# Patient Record
Sex: FEMALE | Race: WHITE | Employment: FULL TIME | URBAN - METROPOLITAN AREA
[De-identification: names, ages, dates, MRNs, and addresses within clinical notes are randomized per-mention and may not be internally consistent; named-entity substitution may affect disease eponyms.]

---

## 2017-01-03 ENCOUNTER — OFFICE VISIT (OUTPATIENT)
Dept: FAMILY MEDICINE CLINIC | Facility: CLINIC | Age: 37
End: 2017-01-03

## 2017-01-03 VITALS
RESPIRATION RATE: 16 BRPM | HEIGHT: 67 IN | SYSTOLIC BLOOD PRESSURE: 132 MMHG | TEMPERATURE: 99 F | WEIGHT: 266 LBS | DIASTOLIC BLOOD PRESSURE: 80 MMHG | OXYGEN SATURATION: 98 % | HEART RATE: 86 BPM | BODY MASS INDEX: 41.75 KG/M2

## 2017-01-03 DIAGNOSIS — R10.11 RUQ ABDOMINAL PAIN: Primary | ICD-10-CM

## 2017-01-03 DIAGNOSIS — N92.6 MISSED PERIOD: ICD-10-CM

## 2017-01-03 LAB
APPEARANCE: CLEAR
BILIRUBIN: NEGATIVE
CONTROL LINE PRESENT WITH A CLEAR BACKGROUND (YES/NO): YES YES/NO
GLUCOSE BLOOD: NEGATIVE
KETONES (URINE DIPSTICK): NEGATIVE MG/DL
LEUKOCYTES: NEGATIVE
MULTISTIX LOT#: NORMAL NUMERIC
NITRITE, URINE: NEGATIVE
OCCULT BLOOD: NEGATIVE
PH, URINE: 6.5 (ref 4.5–8)
PREGNANCY TEST, URINE: NEGATIVE
PROTEIN (URINE DIPSTICK): NEGATIVE MG/DL
SPECIFIC GRAVITY: 1.02 (ref 1–1.03)
URINE-COLOR: YELLOW
UROBILINOGEN,SEMI-QN: 0.2 MG/DL (ref 0–1.9)

## 2017-01-03 PROCEDURE — 81003 URINALYSIS AUTO W/O SCOPE: CPT | Performed by: FAMILY MEDICINE

## 2017-01-03 PROCEDURE — 99214 OFFICE O/P EST MOD 30 MIN: CPT | Performed by: FAMILY MEDICINE

## 2017-01-03 PROCEDURE — 81025 URINE PREGNANCY TEST: CPT | Performed by: FAMILY MEDICINE

## 2017-01-03 RX ORDER — DOXEPIN HYDROCHLORIDE 50 MG/1
1 CAPSULE ORAL DAILY
COMMUNITY

## 2017-01-03 RX ORDER — NAPROXEN 500 MG/1
500 TABLET ORAL 2 TIMES DAILY WITH MEALS
Qty: 28 TABLET | Refills: 0 | Status: SHIPPED | OUTPATIENT
Start: 2017-01-03 | End: 2017-01-17

## 2017-01-03 NOTE — PATIENT INSTRUCTIONS
Unknown Causes of Abdominal Pain (Female)    The exact cause of your abdominal (stomach) pain is not clear. This does not mean that this is something to worry about.  Everyone likes to know the exact cause of the problem, but sometimes with abdominal pain · Water is important so you do not get dehydrated. Soup may also be good. Sports drinks may also help, especially if they are not too acidic. Make sure you don't drink sugary drinks as this can make things worse. Take liquids in small amounts.  Do not guzzl © 0758-5293 [address], [address] [place]. All rights reserved. This information is not intended as a substitute for professional medical care. Always follow your healthcare professional's instructions.

## 2017-01-03 NOTE — PROGRESS NOTES
[facility] Family Medicine Office Note  Chief Complaint:   Patient presents with:  Abdominal Pain: right side x 3 days      HPI:   This is a 39year old female coming in for RUQ pain x 3 days.  Pain is about 4/10, on and off, more of an ache than Smokeless Status: Never Used                        Alcohol Use: No              Family History:  Family History   Problem Relation Age of Onset   • Diabetes Father    • Stroke Father    • High Cholesterol Mother    • Cancer Mother      cervical ca   • Oth response, sneezing, hives, eczema or rhinitis.      EXAM:   /80 mmHg  Pulse 86  Temp(Src) 99.1 °F (37.3 °C) (Oral)  Resp 16  Ht 67\"  Wt 266 lb  BMI 41.65 kg/m2  SpO2 98%  LMP 12/02/2015 (Exact Date) Estimated body mass index is 41.65 kg/(m^2) as calc (14) [E]; Future  - Amylase [E]; Future  - Lipase [E]; Future    2. Missed period  Pregnancy test negative, likely hormonal, just had the baby.   - Urine Preg Test    Meds & Refills for this Visit:    Signed Prescriptions Disp Refills    naproxen 500 MG Ora

## 2017-01-05 ENCOUNTER — LAB ENCOUNTER (OUTPATIENT)
Dept: LAB | Age: 37
End: 2017-01-05
Attending: FAMILY MEDICINE
Payer: COMMERCIAL

## 2017-01-05 ENCOUNTER — HOSPITAL ENCOUNTER (OUTPATIENT)
Dept: ULTRASOUND IMAGING | Age: 37
Discharge: HOME | End: 2017-01-05
Attending: FAMILY MEDICINE
Payer: COMMERCIAL

## 2017-01-05 DIAGNOSIS — R10.11 RUQ ABDOMINAL PAIN: ICD-10-CM

## 2017-01-05 LAB
ALBUMIN SERPL-MCNC: 3.6 G/DL (ref 3.5–4.8)
ALP LIVER SERPL-CCNC: 97 U/L (ref 37–98)
ALT SERPL-CCNC: 17 U/L (ref 14–54)
AMYLASE: 39 U/L (ref 25–115)
AST SERPL-CCNC: 9 U/L (ref 15–41)
BASOPHILS # BLD AUTO: 0.01 X10(3) UL (ref 0–0.1)
BASOPHILS NFR BLD AUTO: 0.2 %
BILIRUB SERPL-MCNC: 0.7 MG/DL (ref 0.1–2)
BUN BLD-MCNC: 12 MG/DL (ref 8–20)
CALCIUM BLD-MCNC: 8.2 MG/DL (ref 8.3–10.3)
CHLORIDE: 105 MMOL/L (ref 101–111)
CO2: 25 MMOL/L (ref 22–32)
CREAT BLD-MCNC: 0.65 MG/DL (ref 0.55–1.02)
EOSINOPHIL # BLD AUTO: 0.18 X10(3) UL (ref 0–0.3)
EOSINOPHIL NFR BLD AUTO: 2.9 %
ERYTHROCYTE [DISTWIDTH] IN BLOOD BY AUTOMATED COUNT: 13.3 % (ref 11.5–16)
GLUCOSE BLD-MCNC: 94 MG/DL (ref 70–99)
HCT VFR BLD AUTO: 40.8 % (ref 34–50)
HGB BLD-MCNC: 13 G/DL (ref 12–16)
IMMATURE GRANULOCYTE COUNT: 0.02 X10(3) UL (ref 0–1)
IMMATURE GRANULOCYTE RATIO %: 0.3 %
LIPASE: 245 U/L (ref 73–393)
LYMPHOCYTES # BLD AUTO: 1.85 X10(3) UL (ref 0.9–4)
LYMPHOCYTES NFR BLD AUTO: 29.6 %
M PROTEIN MFR SERPL ELPH: 7.5 G/DL (ref 6.1–8.3)
MCH RBC QN AUTO: 27.5 PG (ref 27–33.2)
MCHC RBC AUTO-ENTMCNC: 31.9 G/DL (ref 31–37)
MCV RBC AUTO: 86.4 FL (ref 81–100)
MONOCYTES # BLD AUTO: 0.22 X10(3) UL (ref 0.1–0.6)
MONOCYTES NFR BLD AUTO: 3.5 %
NEUTROPHIL ABS PRELIM: 3.96 X10 (3) UL (ref 1.3–6.7)
NEUTROPHILS # BLD AUTO: 3.96 X10(3) UL (ref 1.3–6.7)
NEUTROPHILS NFR BLD AUTO: 63.5 %
PLATELET # BLD AUTO: 228 10(3)UL (ref 150–450)
POTASSIUM SERPL-SCNC: 4 MMOL/L (ref 3.6–5.1)
RBC # BLD AUTO: 4.72 X10(6)UL (ref 3.8–5.1)
RED CELL DISTRIBUTION WIDTH-SD: 41.3 FL (ref 35.1–46.3)
SODIUM SERPL-SCNC: 138 MMOL/L (ref 136–144)
WBC # BLD AUTO: 6.2 X10(3) UL (ref 4–13)

## 2017-01-05 PROCEDURE — 76700 US EXAM ABDOM COMPLETE: CPT

## 2017-01-05 PROCEDURE — 82150 ASSAY OF AMYLASE: CPT

## 2017-01-05 PROCEDURE — 36415 COLL VENOUS BLD VENIPUNCTURE: CPT

## 2017-01-05 PROCEDURE — 83690 ASSAY OF LIPASE: CPT

## 2017-01-05 PROCEDURE — 85025 COMPLETE CBC W/AUTO DIFF WBC: CPT

## 2017-01-05 PROCEDURE — 80053 COMPREHEN METABOLIC PANEL: CPT

## 2017-01-31 ENCOUNTER — HOSPITAL ENCOUNTER (OUTPATIENT)
Age: 37
Discharge: HOME | End: 2017-01-31
Attending: FAMILY MEDICINE
Payer: COMMERCIAL

## 2017-01-31 ENCOUNTER — APPOINTMENT (OUTPATIENT)
Dept: GENERAL RADIOLOGY | Age: 37
End: 2017-01-31
Attending: FAMILY MEDICINE
Payer: COMMERCIAL

## 2017-01-31 VITALS
BODY MASS INDEX: 43.32 KG/M2 | HEART RATE: 87 BPM | RESPIRATION RATE: 20 BRPM | TEMPERATURE: 98 F | OXYGEN SATURATION: 98 % | WEIGHT: 260 LBS | HEIGHT: 65 IN | DIASTOLIC BLOOD PRESSURE: 88 MMHG | SYSTOLIC BLOOD PRESSURE: 158 MMHG

## 2017-01-31 DIAGNOSIS — J98.01 ACUTE BRONCHOSPASM: Primary | ICD-10-CM

## 2017-01-31 PROCEDURE — 71020 XR CHEST PA + LAT CHEST (CPT=71020): CPT

## 2017-01-31 PROCEDURE — 99214 OFFICE O/P EST MOD 30 MIN: CPT

## 2017-01-31 PROCEDURE — 94640 AIRWAY INHALATION TREATMENT: CPT

## 2017-01-31 RX ORDER — PREDNISONE 20 MG/1
TABLET ORAL
Qty: 8 TABLET | Refills: 0 | Status: SHIPPED | OUTPATIENT
Start: 2017-01-31 | End: 2017-03-03 | Stop reason: ALTCHOICE

## 2017-01-31 RX ORDER — IPRATROPIUM BROMIDE AND ALBUTEROL SULFATE 2.5; .5 MG/3ML; MG/3ML
3 SOLUTION RESPIRATORY (INHALATION) ONCE
Status: COMPLETED | OUTPATIENT
Start: 2017-01-31 | End: 2017-01-31

## 2017-01-31 RX ORDER — PROMETHAZINE HYDROCHLORIDE AND CODEINE PHOSPHATE 6.25; 1 MG/5ML; MG/5ML
5 SYRUP ORAL NIGHTLY PRN
Qty: 120 ML | Refills: 0 | Status: SHIPPED | OUTPATIENT
Start: 2017-01-31 | End: 2017-02-10

## 2017-01-31 RX ORDER — ALBUTEROL SULFATE 90 UG/1
2 AEROSOL, METERED RESPIRATORY (INHALATION) EVERY 4 HOURS PRN
Qty: 1 INHALER | Refills: 0 | Status: SHIPPED | OUTPATIENT
Start: 2017-01-31 | End: 2017-03-03 | Stop reason: ALTCHOICE

## 2017-01-31 RX ORDER — PREDNISONE 20 MG/1
40 TABLET ORAL ONCE
Status: COMPLETED | OUTPATIENT
Start: 2017-01-31 | End: 2017-01-31

## 2017-01-31 NOTE — ED INITIAL ASSESSMENT (HPI)
Pt began 3 weeks ago with infuenza, and now she has a cough that is hacking and dry, and now her chest feels heavy.   She has a PMH of of pneumonia

## 2017-01-31 NOTE — ED PROVIDER NOTES
Patient Seen in: [facility]    History   Patient presents with:  Cough/URI    Stated Complaint: 3 WKS COUGH    HPI    This 44year old female presents with complaint of dry cough and chest heaviness.   She states that she had inf gall bladder disorder   • Heart Attack Maternal Grandfather    • Other[other] [OTHER] Sister      gall bladder disorder         Smoking Status: Never Smoker                      Smokeless Status: Never Used                        Alcohol Use: No PATIENT STATED HISTORY:  Dry cough with mid chest tightness for 3 weeks. Patient states she had the flu 3 weeks ago. History of pneumonia several years ago. FINDINGS:  Cardiac size and pulmonary vasculature are within normal limits.  No pleural effusions puffs into the lungs daily. Qty: 1 Inhaler Refills: 0  Associated Diagnoses:Acute bronchospasm    Albuterol Sulfate HFA (PROAIR HFA) 108 (90 Base) MCG/ACT Inhalation Aero Soln  Inhale 2 puffs into the lungs every 4 (four) hours as needed for Wheezing.   Qt

## 2017-03-03 ENCOUNTER — OFFICE VISIT (OUTPATIENT)
Dept: FAMILY MEDICINE CLINIC | Facility: CLINIC | Age: 37
End: 2017-03-03

## 2017-03-03 ENCOUNTER — LAB ENCOUNTER (OUTPATIENT)
Dept: LAB | Age: 37
End: 2017-03-03
Attending: FAMILY MEDICINE
Payer: COMMERCIAL

## 2017-03-03 VITALS
HEIGHT: 67 IN | WEIGHT: 265 LBS | BODY MASS INDEX: 41.59 KG/M2 | OXYGEN SATURATION: 99 % | TEMPERATURE: 98 F | RESPIRATION RATE: 16 BRPM | DIASTOLIC BLOOD PRESSURE: 70 MMHG | SYSTOLIC BLOOD PRESSURE: 118 MMHG | HEART RATE: 84 BPM

## 2017-03-03 DIAGNOSIS — R10.11 RUQ PAIN: ICD-10-CM

## 2017-03-03 DIAGNOSIS — E28.2 PCOS (POLYCYSTIC OVARIAN SYNDROME): ICD-10-CM

## 2017-03-03 DIAGNOSIS — E53.8 VITAMIN B12 DEFICIENCY: ICD-10-CM

## 2017-03-03 DIAGNOSIS — E55.9 VITAMIN D DEFICIENCY: ICD-10-CM

## 2017-03-03 DIAGNOSIS — R53.83 FATIGUE, UNSPECIFIED TYPE: Primary | ICD-10-CM

## 2017-03-03 DIAGNOSIS — E66.01 MORBID OBESITY DUE TO EXCESS CALORIES (HCC): ICD-10-CM

## 2017-03-03 DIAGNOSIS — R53.83 FATIGUE, UNSPECIFIED TYPE: ICD-10-CM

## 2017-03-03 LAB
25-HYDROXYVITAMIN D (TOTAL): 28.7 NG/ML (ref 30–100)
ALBUMIN SERPL-MCNC: 3.7 G/DL (ref 3.5–4.8)
ALP LIVER SERPL-CCNC: 93 U/L (ref 37–98)
ALT SERPL-CCNC: 20 U/L (ref 14–54)
AST SERPL-CCNC: 13 U/L (ref 15–41)
BASOPHILS # BLD AUTO: 0.01 X10(3) UL (ref 0–0.1)
BASOPHILS NFR BLD AUTO: 0.2 %
BILIRUB SERPL-MCNC: 0.8 MG/DL (ref 0.1–2)
BUN BLD-MCNC: 13 MG/DL (ref 8–20)
CALCIUM BLD-MCNC: 9.3 MG/DL (ref 8.3–10.3)
CHLORIDE: 103 MMOL/L (ref 101–111)
CO2: 27 MMOL/L (ref 22–32)
CREAT BLD-MCNC: 0.66 MG/DL (ref 0.55–1.02)
EOSINOPHIL # BLD AUTO: 0.12 X10(3) UL (ref 0–0.3)
EOSINOPHIL NFR BLD AUTO: 1.9 %
ERYTHROCYTE [DISTWIDTH] IN BLOOD BY AUTOMATED COUNT: 15.2 % (ref 11.5–16)
GLUCOSE BLD-MCNC: 88 MG/DL (ref 70–99)
HAV AB SERPL IA-ACNC: 221 PG/ML (ref 193–986)
HCT VFR BLD AUTO: 41.8 % (ref 34–50)
HGB BLD-MCNC: 12.8 G/DL (ref 12–16)
IMMATURE GRANULOCYTE COUNT: 0.02 X10(3) UL (ref 0–1)
IMMATURE GRANULOCYTE RATIO %: 0.3 %
LYMPHOCYTES # BLD AUTO: 1.89 X10(3) UL (ref 0.9–4)
LYMPHOCYTES NFR BLD AUTO: 29.3 %
M PROTEIN MFR SERPL ELPH: 7.5 G/DL (ref 6.1–8.3)
MCH RBC QN AUTO: 26.9 PG (ref 27–33.2)
MCHC RBC AUTO-ENTMCNC: 30.6 G/DL (ref 31–37)
MCV RBC AUTO: 88 FL (ref 81–100)
MONOCYTES # BLD AUTO: 0.3 X10(3) UL (ref 0.1–0.6)
MONOCYTES NFR BLD AUTO: 4.6 %
NEUTROPHIL ABS PRELIM: 4.12 X10 (3) UL (ref 1.3–6.7)
NEUTROPHILS # BLD AUTO: 4.12 X10(3) UL (ref 1.3–6.7)
NEUTROPHILS NFR BLD AUTO: 63.7 %
PLATELET # BLD AUTO: 236 10(3)UL (ref 150–450)
POTASSIUM SERPL-SCNC: 4 MMOL/L (ref 3.6–5.1)
RBC # BLD AUTO: 4.75 X10(6)UL (ref 3.8–5.1)
RED CELL DISTRIBUTION WIDTH-SD: 48.4 FL (ref 35.1–46.3)
SODIUM SERPL-SCNC: 137 MMOL/L (ref 136–144)
TSI SER-ACNC: 1.04 MIU/ML (ref 0.35–5.5)
WBC # BLD AUTO: 6.5 X10(3) UL (ref 4–13)

## 2017-03-03 PROCEDURE — 85025 COMPLETE CBC W/AUTO DIFF WBC: CPT

## 2017-03-03 PROCEDURE — 99214 OFFICE O/P EST MOD 30 MIN: CPT | Performed by: FAMILY MEDICINE

## 2017-03-03 PROCEDURE — 82607 VITAMIN B-12: CPT

## 2017-03-03 PROCEDURE — 36415 COLL VENOUS BLD VENIPUNCTURE: CPT

## 2017-03-03 PROCEDURE — 84443 ASSAY THYROID STIM HORMONE: CPT

## 2017-03-03 PROCEDURE — 82306 VITAMIN D 25 HYDROXY: CPT

## 2017-03-03 PROCEDURE — 80053 COMPREHEN METABOLIC PANEL: CPT

## 2017-03-03 NOTE — PROGRESS NOTES
Alma Perera is a 44year old female. HPI:   Pt. States that her vitamin D and B12 have been in the past.  Her son is 4 months ago. She is working full time and traveling. She is an . Mood is ok. Teary on and off in the past 6 weeks.   Yoel Rogers pg   MCHC 31.9 31.0-37.0 g/dL   RDW 13.3 11.5-16.0 %   RDW-SD 41.3 35.1-46.3 fL   Neutrophil Absolute Prelim 3.96 1.30-6.70 x10 (3) uL   Neutrophil Absolute 3.96 1.30-6.70 x10(3) uL   Lymphocyte Absolute 1.85 0.90-4.00 x10(3) uL   Monocyte Absolute 0.22 0. check her labs  Vitamin B12 deficiency–will check her labs; patient has had shots in the past and that has helped her  Postpartum–advised the patient speaks with Rekha Schmid today and day and is currently in the room with her; discussed with her the importance of

## 2017-03-07 DIAGNOSIS — R79.89 LOW VITAMIN D LEVEL: ICD-10-CM

## 2017-03-07 DIAGNOSIS — D64.9 ANEMIA, UNSPECIFIED TYPE: ICD-10-CM

## 2017-03-07 DIAGNOSIS — E53.8 LOW VITAMIN B12 LEVEL: Primary | ICD-10-CM

## 2017-06-09 ENCOUNTER — HOSPITAL ENCOUNTER (OUTPATIENT)
Age: 37
Discharge: HOME | End: 2017-06-09
Attending: FAMILY MEDICINE
Payer: COMMERCIAL

## 2017-06-09 VITALS
RESPIRATION RATE: 16 BRPM | OXYGEN SATURATION: 99 % | TEMPERATURE: 98 F | HEART RATE: 76 BPM | SYSTOLIC BLOOD PRESSURE: 124 MMHG | DIASTOLIC BLOOD PRESSURE: 69 MMHG

## 2017-06-09 DIAGNOSIS — R11.2 NAUSEA AND VOMITING IN ADULT: ICD-10-CM

## 2017-06-09 DIAGNOSIS — A09 TRAVELER'S DIARRHEA: Primary | ICD-10-CM

## 2017-06-09 PROCEDURE — 96361 HYDRATE IV INFUSION ADD-ON: CPT

## 2017-06-09 PROCEDURE — 96374 THER/PROPH/DIAG INJ IV PUSH: CPT

## 2017-06-09 PROCEDURE — 99214 OFFICE O/P EST MOD 30 MIN: CPT

## 2017-06-09 PROCEDURE — 85025 COMPLETE CBC W/AUTO DIFF WBC: CPT | Performed by: FAMILY MEDICINE

## 2017-06-09 PROCEDURE — 80047 BASIC METABLC PNL IONIZED CA: CPT

## 2017-06-09 PROCEDURE — 81002 URINALYSIS NONAUTO W/O SCOPE: CPT | Performed by: FAMILY MEDICINE

## 2017-06-09 PROCEDURE — 81025 URINE PREGNANCY TEST: CPT | Performed by: FAMILY MEDICINE

## 2017-06-09 RX ORDER — SODIUM CHLORIDE 9 MG/ML
1000 INJECTION, SOLUTION INTRAVENOUS ONCE
Status: COMPLETED | OUTPATIENT
Start: 2017-06-09 | End: 2017-06-09

## 2017-06-09 RX ORDER — ONDANSETRON 8 MG/1
8 TABLET, ORALLY DISINTEGRATING ORAL EVERY 12 HOURS PRN
Qty: 10 TABLET | Refills: 0 | Status: SHIPPED | OUTPATIENT
Start: 2017-06-09 | End: 2017-06-19

## 2017-06-09 RX ORDER — CIPROFLOXACIN 500 MG/1
500 TABLET, FILM COATED ORAL 2 TIMES DAILY
Qty: 6 TABLET | Refills: 0 | Status: SHIPPED | OUTPATIENT
Start: 2017-06-09 | End: 2017-06-12

## 2017-06-09 RX ORDER — ONDANSETRON 2 MG/ML
4 INJECTION INTRAMUSCULAR; INTRAVENOUS ONCE
Status: COMPLETED | OUTPATIENT
Start: 2017-06-09 | End: 2017-06-09

## 2017-06-10 ENCOUNTER — LAB ENCOUNTER (OUTPATIENT)
Dept: LAB | Age: 37
End: 2017-06-10
Attending: FAMILY MEDICINE
Payer: COMMERCIAL

## 2017-06-10 DIAGNOSIS — A09 TRAVELER'S DIARRHEA: ICD-10-CM

## 2017-06-10 PROCEDURE — 87046 STOOL CULTR AEROBIC BACT EA: CPT

## 2017-06-10 PROCEDURE — 87427 SHIGA-LIKE TOXIN AG IA: CPT

## 2017-06-10 PROCEDURE — 89055 LEUKOCYTE ASSESSMENT FECAL: CPT

## 2017-06-10 PROCEDURE — 87015 SPECIMEN INFECT AGNT CONCNTJ: CPT

## 2017-06-10 PROCEDURE — 87077 CULTURE AEROBIC IDENTIFY: CPT

## 2017-06-10 PROCEDURE — 87269 GIARDIA AG IF: CPT

## 2017-06-10 PROCEDURE — 87493 C DIFF AMPLIFIED PROBE: CPT

## 2017-06-10 PROCEDURE — 87177 OVA AND PARASITES SMEARS: CPT

## 2017-06-10 PROCEDURE — 87209 SMEAR COMPLEX STAIN: CPT

## 2017-06-10 PROCEDURE — 87045 FECES CULTURE AEROBIC BACT: CPT

## 2017-06-10 NOTE — ED INITIAL ASSESSMENT (HPI)
Pt states she was in [facility] last week and Saturday night ate fish that no one else in her group ate and since that night N/V/D. States she flew home Monday and states all week has been vomiting and diarrhea every day.  States tried some immodium and it slow

## 2017-06-10 NOTE — ED PROVIDER NOTES
Patient Seen in: [address] [facility]    History   Patient presents with:  Nausea/Vomiting/Diarrhea (gastrointestinal)    Stated Complaint: VOMITING/DIARRHEA SINCE TRAVELING TO [place]     HPI    39-year-old female presents to the clinic today Take 1 tablet by mouth daily. TYLENOL 8 HOUR OR,  Take by mouth as needed.        Family History   Problem Relation Age of Onset   • Diabetes Father    • Stroke Father    • High Cholesterol Mother    • Cancer Mother      cervical ca   • Other[other] Yahir Garsia organomegaly. Periumbilical  Tenderness . Bowel sounds: hyperactive . Guarding : no. Rigidity: no. Percussion : normal   RECTAL: Exam not done.   MUSCULOSKELETAL: back is not tender,FROM of the back  EXTREMITIES: no cyanosis, clubbing or edema  NEURO: Ebbie Needle

## 2017-10-31 ENCOUNTER — OFFICE VISIT (OUTPATIENT)
Dept: PHYSICAL THERAPY | Age: 37
End: 2017-10-31
Attending: ORTHOPAEDIC SURGERY
Payer: COMMERCIAL

## 2017-10-31 DIAGNOSIS — M17.12 ARTHRITIS OF LEFT KNEE: ICD-10-CM

## 2017-10-31 PROCEDURE — 97162 PT EVAL MOD COMPLEX 30 MIN: CPT

## 2017-10-31 PROCEDURE — 97110 THERAPEUTIC EXERCISES: CPT

## 2017-10-31 NOTE — PROGRESS NOTES
LOWER EXTREMITY EVALUATION:   Referring Physician: Dr. Neto Delgado  Diagnosis: Arthritis of left knee (M17.12)       Date of Service: 10/31/2017  Mid November follow up with Dr. Maurice Fajardo is a 44year old y/o female who presents t None  OBJECTIVE:   Observation: bilat hip ER, bilat foot pronation. Pt conscious of wearing proper footwear since hx of plantar fasciitis  Gait: antalgic first steps after sitting.    Palpation: tender medial joint line, mild swelling  Sensation:  No c/o N/ control/in good form to reach floor objects. Frequency / Duration: Patient will be seen for 2 x/week or a total of 8 visits over a 90 day period. Treatment will include: Manual Therapy; Therapeutic Exercises; Neuromuscular Re-education;  Therapeutic Acti

## 2017-11-03 ENCOUNTER — OFFICE VISIT (OUTPATIENT)
Dept: PHYSICAL THERAPY | Age: 37
End: 2017-11-03
Attending: ORTHOPAEDIC SURGERY
Payer: COMMERCIAL

## 2017-11-03 DIAGNOSIS — M17.12 ARTHRITIS OF LEFT KNEE: ICD-10-CM

## 2017-11-03 PROCEDURE — 97140 MANUAL THERAPY 1/> REGIONS: CPT

## 2017-11-03 PROCEDURE — 97110 THERAPEUTIC EXERCISES: CPT

## 2017-11-03 NOTE — PROGRESS NOTES
Dx: Arthritis of left knee (M17.12)           Authorized # of Visits:  8         Next MD visit: after PT/injections  Fall Risk: standard         Precautions: n/a             Subjective: C/o up 8/10 pain and swelling noted by the end of the day.   May get in

## 2017-11-07 ENCOUNTER — OFFICE VISIT (OUTPATIENT)
Dept: PHYSICAL THERAPY | Age: 37
End: 2017-11-07
Attending: ORTHOPAEDIC SURGERY
Payer: COMMERCIAL

## 2017-11-07 DIAGNOSIS — M17.12 ARTHRITIS OF LEFT KNEE: ICD-10-CM

## 2017-11-07 PROCEDURE — 97140 MANUAL THERAPY 1/> REGIONS: CPT

## 2017-11-07 PROCEDURE — 97110 THERAPEUTIC EXERCISES: CPT

## 2017-11-07 PROCEDURE — 97112 NEUROMUSCULAR REEDUCATION: CPT

## 2017-11-07 NOTE — PROGRESS NOTES
Dx: Arthritis of left knee (M17.12)           Authorized # of Visits:  8         Next MD visit: after PT/injections  Fall Risk: standard         Precautions: n/a             Subjective: Pt states she made an appt with the ortho for her knee injections this regarding guidelines and precautions of tape, including length of wear and removal.  However, also discussed tape is to be removed sooner if pt suspects skin irritation or increased pain.     Charges: Andrews 1 NR x 1  man x1   Total Timed Treatment: 45 min

## 2017-11-09 ENCOUNTER — LAB ENCOUNTER (OUTPATIENT)
Dept: LAB | Age: 37
End: 2017-11-09
Attending: FAMILY MEDICINE
Payer: COMMERCIAL

## 2017-11-09 ENCOUNTER — OFFICE VISIT (OUTPATIENT)
Dept: PHYSICAL THERAPY | Age: 37
End: 2017-11-09
Attending: ORTHOPAEDIC SURGERY
Payer: COMMERCIAL

## 2017-11-09 ENCOUNTER — OFFICE VISIT (OUTPATIENT)
Dept: FAMILY MEDICINE CLINIC | Facility: CLINIC | Age: 37
End: 2017-11-09

## 2017-11-09 VITALS
SYSTOLIC BLOOD PRESSURE: 118 MMHG | DIASTOLIC BLOOD PRESSURE: 80 MMHG | WEIGHT: 272 LBS | HEART RATE: 74 BPM | BODY MASS INDEX: 42.69 KG/M2 | RESPIRATION RATE: 16 BRPM | HEIGHT: 67 IN | OXYGEN SATURATION: 99 % | TEMPERATURE: 97 F

## 2017-11-09 DIAGNOSIS — R53.83 FATIGUE, UNSPECIFIED TYPE: ICD-10-CM

## 2017-11-09 DIAGNOSIS — Z11.3 SCREENING EXAMINATION FOR SEXUALLY TRANSMITTED DISEASE: ICD-10-CM

## 2017-11-09 DIAGNOSIS — M17.12 ARTHRITIS OF LEFT KNEE: ICD-10-CM

## 2017-11-09 DIAGNOSIS — R00.2 PALPITATIONS: ICD-10-CM

## 2017-11-09 DIAGNOSIS — Z00.00 ANNUAL PHYSICAL EXAM: Primary | ICD-10-CM

## 2017-11-09 DIAGNOSIS — E53.8 VITAMIN B12 DEFICIENCY: ICD-10-CM

## 2017-11-09 DIAGNOSIS — E28.2 PCOS (POLYCYSTIC OVARIAN SYNDROME): ICD-10-CM

## 2017-11-09 DIAGNOSIS — Z13.89 SCREENING FOR GENITOURINARY CONDITION: ICD-10-CM

## 2017-11-09 DIAGNOSIS — Z12.4 SCREENING FOR CERVICAL CANCER: ICD-10-CM

## 2017-11-09 DIAGNOSIS — M17.12 PRIMARY OSTEOARTHRITIS OF LEFT KNEE: ICD-10-CM

## 2017-11-09 DIAGNOSIS — Z23 NEED FOR VACCINATION: ICD-10-CM

## 2017-11-09 DIAGNOSIS — D64.9 ANEMIA, UNSPECIFIED TYPE: ICD-10-CM

## 2017-11-09 DIAGNOSIS — E66.01 MORBID OBESITY (HCC): ICD-10-CM

## 2017-11-09 DIAGNOSIS — Z00.00 ROUTINE GENERAL MEDICAL EXAMINATION AT A HEALTH CARE FACILITY: ICD-10-CM

## 2017-11-09 DIAGNOSIS — E55.9 VITAMIN D DEFICIENCY: ICD-10-CM

## 2017-11-09 PROCEDURE — 36415 COLL VENOUS BLD VENIPUNCTURE: CPT | Performed by: NURSE PRACTITIONER

## 2017-11-09 PROCEDURE — 87624 HPV HI-RISK TYP POOLED RSLT: CPT | Performed by: NURSE PRACTITIONER

## 2017-11-09 PROCEDURE — 88175 CYTOPATH C/V AUTO FLUID REDO: CPT | Performed by: NURSE PRACTITIONER

## 2017-11-09 PROCEDURE — 90471 IMMUNIZATION ADMIN: CPT | Performed by: NURSE PRACTITIONER

## 2017-11-09 PROCEDURE — 82306 VITAMIN D 25 HYDROXY: CPT | Performed by: NURSE PRACTITIONER

## 2017-11-09 PROCEDURE — 97112 NEUROMUSCULAR REEDUCATION: CPT

## 2017-11-09 PROCEDURE — 90686 IIV4 VACC NO PRSV 0.5 ML IM: CPT | Performed by: NURSE PRACTITIONER

## 2017-11-09 PROCEDURE — 82607 VITAMIN B-12: CPT | Performed by: NURSE PRACTITIONER

## 2017-11-09 PROCEDURE — 99395 PREV VISIT EST AGE 18-39: CPT | Performed by: NURSE PRACTITIONER

## 2017-11-09 PROCEDURE — 80061 LIPID PANEL: CPT | Performed by: NURSE PRACTITIONER

## 2017-11-09 PROCEDURE — 87491 CHLMYD TRACH DNA AMP PROBE: CPT | Performed by: NURSE PRACTITIONER

## 2017-11-09 PROCEDURE — 87591 N.GONORRHOEAE DNA AMP PROB: CPT | Performed by: NURSE PRACTITIONER

## 2017-11-09 PROCEDURE — 81003 URINALYSIS AUTO W/O SCOPE: CPT | Performed by: NURSE PRACTITIONER

## 2017-11-09 PROCEDURE — 80050 GENERAL HEALTH PANEL: CPT | Performed by: NURSE PRACTITIONER

## 2017-11-09 PROCEDURE — 97140 MANUAL THERAPY 1/> REGIONS: CPT

## 2017-11-09 PROCEDURE — 97110 THERAPEUTIC EXERCISES: CPT

## 2017-11-09 NOTE — PROGRESS NOTES
Dx: Arthritis of left knee (M17.12)           Authorized # of Visits:  8         Next MD visit: after PT  Fall Risk: standard         Precautions: n/a             Subjective: Pt states she still has daily pain/stiffness but the intensity can vary day to [employer] Grade II tibial glides, patellar mobes Manual ham stretch, PROM L knee. CP x 10' ITB       Manual L piriformis, ham stretch kinesiotape medial and lateral joint line. I strips        kinesiotape medial joint line.   I strip CP x 10' ITB

## 2017-11-09 NOTE — H&P
HPI:   Kena Kenney is a 44year old female who presents for a complete physical exam. Symptoms: denies discharge, itching, burning or dysuria, flow is <3 days, periods are irregular after stopping metformin for PCOS.  Pt reports she has not taken Metform exam: 6 months ago-pt reports upcoming appointment in 2 weeks. Wt Readings from Last 6 Encounters:  11/09/17 : 272 lb  03/03/17 : 265 lb  01/31/17 : 260 lb  01/03/17 : 266 lb  10/18/16 : 270 lb  09/15/16 : 260 lb    Body mass index is 42.6 kg/m². Alcohol use: No              Occ:  at [employer]. : Yes. Children: Yes,1 son-Arcadio.   Exercise: 3 times per week, healthclub, swimming. Diet: Monitors closely. Has been following Weight Watches for 4 months.      Yung Krishnamurthy introitus and vagina are normal, normal discharge and normal cervix.   Bimanual exam normal no adnexal masses or cervical motion tenderness, PAP was done-Tala AMBRIZ MA present during pap and pelvic exam.  MUSCULOSKELETAL: gait normal.  EXTREMITIES: no cl by mouth daily. Imaging & Consults:  FLULAVAL INFLUENZA VACCINE QUAD PRESERVATIVE FREE 0.5 ML  CARD MONITOR 30 DAY EVENT    Follow Up with:  No follow-up provider specified. 1. Annual physical exam    2.  Palpitations  Cardiac event monitor-30 HCl 500 MG Oral Tab; Take 3 tablets (1,500 mg total) by mouth daily. Dispense: 90 tablet; Refill: 1    9. Primary osteoarthritis of left knee  Pt currently is in physical therapy. She reports she does not feel as though this is helping.   She has a follow

## 2017-11-10 DIAGNOSIS — E55.9 VITAMIN D DEFICIENCY: Primary | ICD-10-CM

## 2017-11-10 DIAGNOSIS — R74.8 ELEVATED ALKALINE PHOSPHATASE LEVEL: ICD-10-CM

## 2017-11-10 DIAGNOSIS — E53.8 VITAMIN B12 DEFICIENCY: ICD-10-CM

## 2017-11-10 RX ORDER — ERGOCALCIFEROL 1.25 MG/1
50000 CAPSULE ORAL WEEKLY
Qty: 12 CAPSULE | Refills: 0 | Status: SHIPPED | OUTPATIENT
Start: 2017-11-10 | End: 2018-01-27

## 2017-11-10 RX ORDER — CYANOCOBALAMIN 1000 UG/ML
1000 INJECTION INTRAMUSCULAR; SUBCUTANEOUS
Status: SHIPPED | OUTPATIENT
Start: 2017-11-10 | End: 2018-02-08

## 2017-11-14 ENCOUNTER — APPOINTMENT (OUTPATIENT)
Dept: PHYSICAL THERAPY | Age: 37
End: 2017-11-14
Attending: ORTHOPAEDIC SURGERY
Payer: COMMERCIAL

## 2017-11-15 ENCOUNTER — NURSE ONLY (OUTPATIENT)
Dept: FAMILY MEDICINE CLINIC | Facility: CLINIC | Age: 37
End: 2017-11-15

## 2017-11-15 PROCEDURE — 96372 THER/PROPH/DIAG INJ SC/IM: CPT | Performed by: FAMILY MEDICINE

## 2017-11-15 RX ADMIN — CYANOCOBALAMIN 1000 MCG: 1000 INJECTION INTRAMUSCULAR; SUBCUTANEOUS at 10:17:00

## 2017-11-16 ENCOUNTER — OFFICE VISIT (OUTPATIENT)
Dept: PHYSICAL THERAPY | Age: 37
End: 2017-11-16
Attending: ORTHOPAEDIC SURGERY
Payer: COMMERCIAL

## 2017-11-16 DIAGNOSIS — M17.12 ARTHRITIS OF LEFT KNEE: ICD-10-CM

## 2017-11-16 PROCEDURE — 97140 MANUAL THERAPY 1/> REGIONS: CPT

## 2017-11-16 PROCEDURE — 97112 NEUROMUSCULAR REEDUCATION: CPT

## 2017-11-16 PROCEDURE — 97110 THERAPEUTIC EXERCISES: CPT

## 2017-11-16 NOTE — PROGRESS NOTES
Dx: Arthritis of left knee (M17.12)           Authorized # of Visits:  8         Next MD visit: after PT  Fall Risk: standard         Precautions: n/a             Subjective: Pt states she got her injection last week and it was very sore initially but now each Rocker board  AP  ML  x20 each      SLS trials/3 way hip  Functional squat in good form review Green TB  -hip ext 10x2  -hip abd 10x2 each LE Green TB  -hip ext 10x2  -hip abd 10x2 each LE      HEP review L ITB STM L ITB STM L ITB STM, knee PROM, grad

## 2017-11-20 ENCOUNTER — OFFICE VISIT (OUTPATIENT)
Dept: PHYSICAL THERAPY | Age: 37
End: 2017-11-20
Attending: ORTHOPAEDIC SURGERY
Payer: COMMERCIAL

## 2017-11-20 PROCEDURE — 97110 THERAPEUTIC EXERCISES: CPT

## 2017-11-20 PROCEDURE — 97112 NEUROMUSCULAR REEDUCATION: CPT

## 2017-11-20 PROCEDURE — 97140 MANUAL THERAPY 1/> REGIONS: CPT

## 2017-11-20 NOTE — PROGRESS NOTES
Dx: Arthritis of left knee (M17.12)           Authorized # of Visits:  8         Next MD visit: after PT, possible gel injection.    Fall Risk: standard         Precautions: n/a             Subjective: Pt states he knee is achy today but the achiness is not Green TB  -hip ext 10x2  -hip abd 10x2 each LE Green TB  -hip ext 10x2  -hip abd 10x2 each LE Green TB  -hip ext 10x2  -hip abd 10x2 each LE     HEP review L ITB STM L ITB STM L ITB STM, knee PROM, grade II mobes, patellar mobility L ITB STM, knee PROM, gr

## 2017-11-21 ENCOUNTER — APPOINTMENT (OUTPATIENT)
Dept: PHYSICAL THERAPY | Age: 37
End: 2017-11-21
Attending: ORTHOPAEDIC SURGERY
Payer: COMMERCIAL

## 2017-11-28 ENCOUNTER — OFFICE VISIT (OUTPATIENT)
Dept: PHYSICAL THERAPY | Age: 37
End: 2017-11-28
Attending: FAMILY MEDICINE
Payer: COMMERCIAL

## 2017-11-28 PROCEDURE — 97140 MANUAL THERAPY 1/> REGIONS: CPT

## 2017-11-28 PROCEDURE — 97110 THERAPEUTIC EXERCISES: CPT

## 2017-11-28 PROCEDURE — 97112 NEUROMUSCULAR REEDUCATION: CPT

## 2017-11-28 NOTE — PROGRESS NOTES
Dx: Arthritis of left knee (M17.12)           Authorized # of Visits:  8         Next MD visit: after PT, possible gel injection.    Fall Risk: standard         Precautions: n/a             Subjective: Pt states the knee has been feeling very good for the p ext 10x2  -hip abd 10x2 each LE Green TB  -hip ext 10x2  -hip abd 10x2 each LE Green TB  -hip ext 10x2  -hip abd 10x2 each LE Green TB  -hip ext 10x2  -hip abd 10x2 each LE    HEP review L ITB STM L ITB STM L ITB STM, knee PROM, grade II mobes, patellar mo

## 2017-12-04 ENCOUNTER — TELEPHONE (OUTPATIENT)
Dept: PHYSICAL THERAPY | Age: 37
End: 2017-12-04

## 2017-12-11 ENCOUNTER — TELEPHONE (OUTPATIENT)
Dept: PHYSICAL THERAPY | Age: 37
End: 2017-12-11

## 2017-12-11 NOTE — TELEPHONE ENCOUNTER
No show for 10:30 appt today. Called pt to follow up and LM, also stating that is her last scheduled visit.

## 2018-01-04 ENCOUNTER — CHARTING TRANS (OUTPATIENT)
Dept: OTHER | Age: 38
End: 2018-01-04

## 2018-01-04 ENCOUNTER — LAB SERVICES (OUTPATIENT)
Dept: OTHER | Age: 38
End: 2018-01-04

## 2018-01-04 LAB — RAPID STREP GROUP A: NORMAL

## 2018-01-04 ASSESSMENT — PAIN SCALES - GENERAL: PAINLEVEL_OUTOF10: 4

## 2018-01-05 ENCOUNTER — HOSPITAL ENCOUNTER (OUTPATIENT)
Age: 38
Discharge: HOME | End: 2018-01-05
Attending: FAMILY MEDICINE
Payer: COMMERCIAL

## 2018-01-05 VITALS
DIASTOLIC BLOOD PRESSURE: 82 MMHG | RESPIRATION RATE: 16 BRPM | SYSTOLIC BLOOD PRESSURE: 160 MMHG | HEIGHT: 66 IN | TEMPERATURE: 98 F | OXYGEN SATURATION: 97 % | HEART RATE: 76 BPM | WEIGHT: 265 LBS | BODY MASS INDEX: 42.59 KG/M2

## 2018-01-05 DIAGNOSIS — J02.9 EXUDATIVE PHARYNGITIS: Primary | ICD-10-CM

## 2018-01-05 LAB
POCT MONO: NEGATIVE
POCT RAPID STREP: NEGATIVE

## 2018-01-05 PROCEDURE — 99214 OFFICE O/P EST MOD 30 MIN: CPT

## 2018-01-05 PROCEDURE — 87081 CULTURE SCREEN ONLY: CPT | Performed by: FAMILY MEDICINE

## 2018-01-05 PROCEDURE — 87430 STREP A AG IA: CPT | Performed by: FAMILY MEDICINE

## 2018-01-05 PROCEDURE — 86308 HETEROPHILE ANTIBODY SCREEN: CPT | Performed by: FAMILY MEDICINE

## 2018-01-05 RX ORDER — AMOXICILLIN AND CLAVULANATE POTASSIUM 875; 125 MG/1; MG/1
1 TABLET, FILM COATED ORAL 2 TIMES DAILY
Qty: 20 TABLET | Refills: 0 | Status: SHIPPED | OUTPATIENT
Start: 2018-01-05 | End: 2018-01-15 | Stop reason: ALTCHOICE

## 2018-01-06 NOTE — ED PROVIDER NOTES
Patient Seen in: [address] [facility]    History   Patient presents with:  Sore Throat    Stated Complaint: ST     HPI    14-year-old female presents to the clinic today with chief complaints of 5 day history of sore throat.   Patient states sh Physical Exam    General appearance: alert, appears stated age and cooperative  Head: Normocephalic, without obvious abnormality, atraumatic  Eyes: conjunctivae/corneas clear. PERRL, EOM's intact. Fundi benign.   Ears: normal TM's and external ear c medications    Amoxicillin-Pot Clavulanate 875-125 MG Oral Tab  Take 1 tablet by mouth 2 (two) times daily.   Qty: 20 tablet Refills: 0

## 2018-01-06 NOTE — ED INITIAL ASSESSMENT (HPI)
Pt with c/o sore throat x5 days. Denies fevers. Difficulty swallowing. Went to walk in clinic 2 days ago and was told viral.  Pt states getting worse. Taking motrin, tylenol, mucinex and claritin.   Pain to ears and jaw

## 2018-01-10 ENCOUNTER — HOSPITAL ENCOUNTER (EMERGENCY)
Age: 38
Discharge: HOME | End: 2018-01-11
Attending: EMERGENCY MEDICINE
Payer: COMMERCIAL

## 2018-01-10 ENCOUNTER — APPOINTMENT (OUTPATIENT)
Dept: CT IMAGING | Age: 38
End: 2018-01-10
Attending: EMERGENCY MEDICINE
Payer: COMMERCIAL

## 2018-01-10 DIAGNOSIS — R51.9 NONINTRACTABLE HEADACHE, UNSPECIFIED CHRONICITY PATTERN, UNSPECIFIED HEADACHE TYPE: Primary | ICD-10-CM

## 2018-01-10 LAB
ALBUMIN SERPL-MCNC: 3.4 G/DL (ref 3.5–4.8)
ALP LIVER SERPL-CCNC: 97 U/L (ref 37–98)
ALT SERPL-CCNC: 19 U/L (ref 14–54)
AST SERPL-CCNC: 38 U/L (ref 15–41)
BASOPHILS # BLD AUTO: 0.03 X10(3) UL (ref 0–0.1)
BASOPHILS NFR BLD AUTO: 0.2 %
BILIRUB SERPL-MCNC: 0.4 MG/DL (ref 0.1–2)
BUN BLD-MCNC: 11 MG/DL (ref 8–20)
CALCIUM BLD-MCNC: 8.6 MG/DL (ref 8.3–10.3)
CHLORIDE: 102 MMOL/L (ref 101–111)
CO2: 24 MMOL/L (ref 22–32)
CREAT BLD-MCNC: 0.67 MG/DL (ref 0.55–1.02)
EOSINOPHIL # BLD AUTO: 0.25 X10(3) UL (ref 0–0.3)
EOSINOPHIL NFR BLD AUTO: 2.1 %
ERYTHROCYTE [DISTWIDTH] IN BLOOD BY AUTOMATED COUNT: 13.5 % (ref 11.5–16)
GLUCOSE BLD-MCNC: 118 MG/DL (ref 70–99)
HCT VFR BLD AUTO: 41.7 % (ref 34–50)
HGB BLD-MCNC: 13.6 G/DL (ref 12–16)
IMMATURE GRANULOCYTE COUNT: 0.06 X10(3) UL (ref 0–1)
IMMATURE GRANULOCYTE RATIO %: 0.5 %
LYMPHOCYTES # BLD AUTO: 3.21 X10(3) UL (ref 0.9–4)
LYMPHOCYTES NFR BLD AUTO: 26.6 %
M PROTEIN MFR SERPL ELPH: 7.9 G/DL (ref 6.1–8.3)
MCH RBC QN AUTO: 27.8 PG (ref 27–33.2)
MCHC RBC AUTO-ENTMCNC: 32.6 G/DL (ref 31–37)
MCV RBC AUTO: 85.1 FL (ref 81–100)
MONOCYTES # BLD AUTO: 0.46 X10(3) UL (ref 0.1–0.6)
MONOCYTES NFR BLD AUTO: 3.8 %
NEUTROPHIL ABS PRELIM: 8.06 X10 (3) UL (ref 1.3–6.7)
NEUTROPHILS # BLD AUTO: 8.06 X10(3) UL (ref 1.3–6.7)
NEUTROPHILS NFR BLD AUTO: 66.8 %
PLATELET # BLD AUTO: 256 10(3)UL (ref 150–450)
POTASSIUM SERPL-SCNC: 4.8 MMOL/L (ref 3.6–5.1)
RBC # BLD AUTO: 4.9 X10(6)UL (ref 3.8–5.1)
RED CELL DISTRIBUTION WIDTH-SD: 41.9 FL (ref 35.1–46.3)
SODIUM SERPL-SCNC: 134 MMOL/L (ref 136–144)
WBC # BLD AUTO: 12.1 X10(3) UL (ref 4–13)

## 2018-01-10 PROCEDURE — 85025 COMPLETE CBC W/AUTO DIFF WBC: CPT | Performed by: EMERGENCY MEDICINE

## 2018-01-10 PROCEDURE — 80053 COMPREHEN METABOLIC PANEL: CPT | Performed by: EMERGENCY MEDICINE

## 2018-01-10 PROCEDURE — 96374 THER/PROPH/DIAG INJ IV PUSH: CPT

## 2018-01-10 PROCEDURE — 96375 TX/PRO/DX INJ NEW DRUG ADDON: CPT

## 2018-01-10 PROCEDURE — 99284 EMERGENCY DEPT VISIT MOD MDM: CPT

## 2018-01-10 PROCEDURE — 70450 CT HEAD/BRAIN W/O DYE: CPT | Performed by: EMERGENCY MEDICINE

## 2018-01-10 RX ORDER — KETOROLAC TROMETHAMINE 30 MG/ML
30 INJECTION, SOLUTION INTRAMUSCULAR; INTRAVENOUS ONCE
Status: COMPLETED | OUTPATIENT
Start: 2018-01-10 | End: 2018-01-10

## 2018-01-10 RX ORDER — DIPHENHYDRAMINE HYDROCHLORIDE 50 MG/ML
50 INJECTION INTRAMUSCULAR; INTRAVENOUS ONCE
Status: COMPLETED | OUTPATIENT
Start: 2018-01-10 | End: 2018-01-10

## 2018-01-10 RX ORDER — HYDROMORPHONE HYDROCHLORIDE 1 MG/ML
1 INJECTION, SOLUTION INTRAMUSCULAR; INTRAVENOUS; SUBCUTANEOUS EVERY 30 MIN PRN
Status: DISCONTINUED | OUTPATIENT
Start: 2018-01-10 | End: 2018-01-11

## 2018-01-10 RX ORDER — METOCLOPRAMIDE HYDROCHLORIDE 5 MG/ML
10 INJECTION INTRAMUSCULAR; INTRAVENOUS ONCE
Status: COMPLETED | OUTPATIENT
Start: 2018-01-10 | End: 2018-01-10

## 2018-01-11 ENCOUNTER — APPOINTMENT (OUTPATIENT)
Dept: GENERAL RADIOLOGY | Age: 38
End: 2018-01-11
Attending: EMERGENCY MEDICINE
Payer: COMMERCIAL

## 2018-01-11 VITALS
BODY MASS INDEX: 40.81 KG/M2 | RESPIRATION RATE: 18 BRPM | OXYGEN SATURATION: 95 % | HEART RATE: 75 BPM | SYSTOLIC BLOOD PRESSURE: 136 MMHG | TEMPERATURE: 98 F | HEIGHT: 67 IN | DIASTOLIC BLOOD PRESSURE: 70 MMHG | WEIGHT: 260 LBS

## 2018-01-11 PROCEDURE — 71046 X-RAY EXAM CHEST 2 VIEWS: CPT | Performed by: EMERGENCY MEDICINE

## 2018-01-11 RX ORDER — ONDANSETRON 8 MG/1
8 TABLET, ORALLY DISINTEGRATING ORAL EVERY 4 HOURS PRN
Qty: 10 TABLET | Refills: 0 | Status: SHIPPED | OUTPATIENT
Start: 2018-01-11 | End: 2018-01-18

## 2018-01-11 RX ORDER — HYDROCODONE BITARTRATE AND ACETAMINOPHEN 5; 325 MG/1; MG/1
1-2 TABLET ORAL EVERY 4 HOURS PRN
Qty: 20 TABLET | Refills: 0 | Status: SHIPPED | OUTPATIENT
Start: 2018-01-11 | End: 2018-01-18

## 2018-01-11 NOTE — ED PROVIDER NOTES
Patient Seen in: [facility] In [place]    History   Patient presents with:  Headache (neurologic)  Cough/URI    Stated Complaint: headache, cough    HPI    Patient diagnosed with influenza as well as streptococcal pharyngitis recently throat normal.  Oropharynx moist.  Neck: No meningismus or adenopathy  Lungs: Clear  Heart: Apical pulse 84 and regular without murmur or rub  Abdomen: Soft and nontender without mass or HSM  Extremities: No peripheral edema or evidence of DVT. Neuro:  Camille 1:13 am    Follow-up:  Jodee Bosworth, DO [place] [address]  [phone]      As needed        Medications Prescribed:  Discharge Medication List as of 1/11/2018  1:15 AM    START taking these medications    HYDROcodone-acetamino

## 2018-01-15 ENCOUNTER — OFFICE VISIT (OUTPATIENT)
Dept: FAMILY MEDICINE CLINIC | Facility: CLINIC | Age: 38
End: 2018-01-15

## 2018-01-15 VITALS
HEIGHT: 67 IN | SYSTOLIC BLOOD PRESSURE: 138 MMHG | DIASTOLIC BLOOD PRESSURE: 80 MMHG | TEMPERATURE: 99 F | OXYGEN SATURATION: 96 % | HEART RATE: 80 BPM | BODY MASS INDEX: 43.47 KG/M2 | RESPIRATION RATE: 18 BRPM | WEIGHT: 277 LBS

## 2018-01-15 DIAGNOSIS — E53.8 VITAMIN B 12 DEFICIENCY: ICD-10-CM

## 2018-01-15 DIAGNOSIS — J01.10 ACUTE FRONTAL SINUSITIS, RECURRENCE NOT SPECIFIED: Primary | ICD-10-CM

## 2018-01-15 DIAGNOSIS — R51.9 SINUS HEADACHE: ICD-10-CM

## 2018-01-15 PROCEDURE — 99214 OFFICE O/P EST MOD 30 MIN: CPT | Performed by: FAMILY MEDICINE

## 2018-01-15 PROCEDURE — 96372 THER/PROPH/DIAG INJ SC/IM: CPT | Performed by: FAMILY MEDICINE

## 2018-01-15 RX ORDER — METHYLPREDNISOLONE 4 MG/1
TABLET ORAL
Qty: 1 KIT | Refills: 0 | Status: SHIPPED | OUTPATIENT
Start: 2018-01-15 | End: 2018-02-26

## 2018-01-15 RX ORDER — CYANOCOBALAMIN 1000 UG/ML
1000 INJECTION INTRAMUSCULAR; SUBCUTANEOUS ONCE
Status: COMPLETED | OUTPATIENT
Start: 2018-01-15 | End: 2018-01-15

## 2018-01-15 RX ADMIN — CYANOCOBALAMIN 1000 MCG: 1000 INJECTION INTRAMUSCULAR; SUBCUTANEOUS at 16:11:00

## 2018-01-15 NOTE — PROGRESS NOTES
HPI:   Unique Riley is a 44year old female who presents for upper respiratory symptoms for persistent sore throat and sinus headache. Patient states she started having symptoms of sore throat along with congestion and cough about 2 and half weeks ago. Mother    • Cancer Mother      cervical ca   • Other [employer] Mother      gall bladder disorder   • Heart Attack Maternal Grandfather    • Other [employer] Sister      gall bladder disorder      Smoking status: Never Smoker

## 2018-02-08 ENCOUNTER — TELEPHONE (OUTPATIENT)
Dept: FAMILY MEDICINE CLINIC | Facility: CLINIC | Age: 38
End: 2018-02-08

## 2018-02-08 RX ORDER — ERGOCALCIFEROL 1.25 MG/1
CAPSULE ORAL
Qty: 12 CAPSULE | Refills: 0 | OUTPATIENT
Start: 2018-02-08

## 2018-02-26 ENCOUNTER — HOSPITAL ENCOUNTER (EMERGENCY)
Age: 38
Discharge: HOME | End: 2018-02-27
Attending: EMERGENCY MEDICINE
Payer: COMMERCIAL

## 2018-02-26 ENCOUNTER — HOSPITAL ENCOUNTER (OUTPATIENT)
Age: 38
Discharge: ANOTHER HOSPITAL | End: 2018-02-26
Attending: FAMILY MEDICINE
Payer: COMMERCIAL

## 2018-02-26 ENCOUNTER — APPOINTMENT (OUTPATIENT)
Dept: ULTRASOUND IMAGING | Age: 38
End: 2018-02-26
Attending: EMERGENCY MEDICINE
Payer: COMMERCIAL

## 2018-02-26 VITALS
RESPIRATION RATE: 20 BRPM | DIASTOLIC BLOOD PRESSURE: 82 MMHG | SYSTOLIC BLOOD PRESSURE: 158 MMHG | TEMPERATURE: 98 F | OXYGEN SATURATION: 100 % | HEART RATE: 82 BPM

## 2018-02-26 DIAGNOSIS — R10.9 ABDOMINAL PAIN OF UNKNOWN ETIOLOGY: Primary | ICD-10-CM

## 2018-02-26 DIAGNOSIS — R10.11 RUQ ABDOMINAL PAIN: Primary | ICD-10-CM

## 2018-02-26 LAB
ALBUMIN SERPL-MCNC: 3.5 G/DL (ref 3.5–4.8)
ALP LIVER SERPL-CCNC: 91 U/L (ref 37–98)
ALT SERPL-CCNC: 16 U/L (ref 14–54)
AST SERPL-CCNC: 10 U/L (ref 15–41)
BASOPHILS # BLD AUTO: 0.02 X10(3) UL (ref 0–0.1)
BASOPHILS NFR BLD AUTO: 0.2 %
BILIRUB SERPL-MCNC: 0.3 MG/DL (ref 0.1–2)
BILIRUB UR QL STRIP.AUTO: NEGATIVE
BUN BLD-MCNC: 12 MG/DL (ref 8–20)
CALCIUM BLD-MCNC: 8.6 MG/DL (ref 8.3–10.3)
CHLORIDE: 104 MMOL/L (ref 101–111)
CLARITY UR REFRACT.AUTO: CLEAR
CO2: 29 MMOL/L (ref 22–32)
COLOR UR AUTO: YELLOW
CREAT BLD-MCNC: 0.64 MG/DL (ref 0.55–1.02)
EOSINOPHIL # BLD AUTO: 0.24 X10(3) UL (ref 0–0.3)
EOSINOPHIL NFR BLD AUTO: 2.6 %
ERYTHROCYTE [DISTWIDTH] IN BLOOD BY AUTOMATED COUNT: 14.1 % (ref 11.5–16)
GLUCOSE BLD-MCNC: 101 MG/DL (ref 70–99)
GLUCOSE UR STRIP.AUTO-MCNC: NEGATIVE MG/DL
HCT VFR BLD AUTO: 40.9 % (ref 34–50)
HGB BLD-MCNC: 13.3 G/DL (ref 12–16)
IMMATURE GRANULOCYTE COUNT: 0.03 X10(3) UL (ref 0–1)
IMMATURE GRANULOCYTE RATIO %: 0.3 %
KETONES UR STRIP.AUTO-MCNC: NEGATIVE MG/DL
LEUKOCYTE ESTERASE UR QL STRIP.AUTO: NEGATIVE
LIPASE: 326 U/L (ref 73–393)
LYMPHOCYTES # BLD AUTO: 2.79 X10(3) UL (ref 0.9–4)
LYMPHOCYTES NFR BLD AUTO: 30 %
M PROTEIN MFR SERPL ELPH: 7.7 G/DL (ref 6.1–8.3)
MCH RBC QN AUTO: 28.5 PG (ref 27–33.2)
MCHC RBC AUTO-ENTMCNC: 32.5 G/DL (ref 31–37)
MCV RBC AUTO: 87.6 FL (ref 81–100)
MONOCYTES # BLD AUTO: 0.32 X10(3) UL (ref 0.1–1)
MONOCYTES NFR BLD AUTO: 3.4 %
NEUTROPHIL ABS PRELIM: 5.9 X10 (3) UL (ref 1.3–6.7)
NEUTROPHILS # BLD AUTO: 5.9 X10(3) UL (ref 1.3–6.7)
NEUTROPHILS NFR BLD AUTO: 63.5 %
NITRITE UR QL STRIP.AUTO: NEGATIVE
PH UR STRIP.AUTO: 7 [PH] (ref 4.5–8)
PLATELET # BLD AUTO: 187 10(3)UL (ref 150–450)
POCT BILIRUBIN URINE: NEGATIVE
POCT GLUCOSE URINE: NEGATIVE MG/DL
POCT KETONE URINE: NEGATIVE MG/DL
POCT LEUKOCYTE ESTERASE URINE: NEGATIVE
POCT LOT NUMBER: NORMAL
POCT NITRITE URINE: NEGATIVE
POCT PH URINE: 7 (ref 5–8)
POCT PROTEIN URINE: NEGATIVE MG/DL
POCT SPECIFIC GRAVITY URINE: 1.02
POCT URINE CLARITY: CLEAR
POCT URINE COLOR: YELLOW
POCT URINE PREGNANCY: NEGATIVE
POCT URINE PREGNANCY: NEGATIVE
POCT UROBILINOGEN URINE: 0.2 MG/DL
POTASSIUM SERPL-SCNC: 3.8 MMOL/L (ref 3.6–5.1)
PROCEDURE CONTROL: PRESENT
PROT UR STRIP.AUTO-MCNC: NEGATIVE MG/DL
RBC # BLD AUTO: 4.67 X10(6)UL (ref 3.8–5.1)
RED CELL DISTRIBUTION WIDTH-SD: 44.6 FL (ref 35.1–46.3)
SODIUM SERPL-SCNC: 140 MMOL/L (ref 136–144)
SP GR UR STRIP.AUTO: 1.02 (ref 1–1.03)
UROBILINOGEN UR STRIP.AUTO-MCNC: 0.2 MG/DL
WBC # BLD AUTO: 9.3 X10(3) UL (ref 4–13)

## 2018-02-26 PROCEDURE — 81025 URINE PREGNANCY TEST: CPT | Performed by: FAMILY MEDICINE

## 2018-02-26 PROCEDURE — 96374 THER/PROPH/DIAG INJ IV PUSH: CPT

## 2018-02-26 PROCEDURE — 81002 URINALYSIS NONAUTO W/O SCOPE: CPT

## 2018-02-26 PROCEDURE — 85025 COMPLETE CBC W/AUTO DIFF WBC: CPT | Performed by: EMERGENCY MEDICINE

## 2018-02-26 PROCEDURE — 80053 COMPREHEN METABOLIC PANEL: CPT | Performed by: EMERGENCY MEDICINE

## 2018-02-26 PROCEDURE — 81025 URINE PREGNANCY TEST: CPT

## 2018-02-26 PROCEDURE — 99215 OFFICE O/P EST HI 40 MIN: CPT

## 2018-02-26 PROCEDURE — 83690 ASSAY OF LIPASE: CPT | Performed by: EMERGENCY MEDICINE

## 2018-02-26 PROCEDURE — 81002 URINALYSIS NONAUTO W/O SCOPE: CPT | Performed by: FAMILY MEDICINE

## 2018-02-26 PROCEDURE — 99284 EMERGENCY DEPT VISIT MOD MDM: CPT

## 2018-02-26 PROCEDURE — 81003 URINALYSIS AUTO W/O SCOPE: CPT | Performed by: EMERGENCY MEDICINE

## 2018-02-26 PROCEDURE — 76700 US EXAM ABDOM COMPLETE: CPT | Performed by: EMERGENCY MEDICINE

## 2018-02-26 RX ORDER — KETOROLAC TROMETHAMINE 30 MG/ML
30 INJECTION, SOLUTION INTRAMUSCULAR; INTRAVENOUS ONCE
Status: COMPLETED | OUTPATIENT
Start: 2018-02-26 | End: 2018-02-26

## 2018-02-27 ENCOUNTER — APPOINTMENT (OUTPATIENT)
Dept: CT IMAGING | Age: 38
End: 2018-02-27
Attending: EMERGENCY MEDICINE
Payer: COMMERCIAL

## 2018-02-27 VITALS
SYSTOLIC BLOOD PRESSURE: 122 MMHG | HEART RATE: 80 BPM | OXYGEN SATURATION: 98 % | RESPIRATION RATE: 18 BRPM | TEMPERATURE: 98 F | HEIGHT: 66 IN | BODY MASS INDEX: 41.78 KG/M2 | DIASTOLIC BLOOD PRESSURE: 67 MMHG | WEIGHT: 260 LBS

## 2018-02-27 PROCEDURE — 74177 CT ABD & PELVIS W/CONTRAST: CPT | Performed by: EMERGENCY MEDICINE

## 2018-02-27 RX ORDER — TRAMADOL HYDROCHLORIDE 50 MG/1
TABLET ORAL EVERY 4 HOURS PRN
Qty: 20 TABLET | Refills: 0 | Status: SHIPPED | OUTPATIENT
Start: 2018-02-27 | End: 2018-03-06 | Stop reason: ALTCHOICE

## 2018-02-27 NOTE — ED PROVIDER NOTES
Patient Seen in: THE [facility] [place]    History   Patient presents with:  Abdomen/Flank Pain (GI/)    Stated Complaint: Abd pain x 2 weeks    HPI    Patient presents with right upper quadrant pain which has been coming and going f Exam  Patient appears uncomfortable but otherwise generally healthy. Afebrile and nontoxic in appearance  Skin: Warm and dry without cyanosis or pallor. No rashes noted turgor normal.  HEENT: No scleral icterus.   Oropharynx moist.  Neck: Supple without a less tenderness in the right upper quadrant and somewhat into the right flank. There was no guarding or rebound tenderness or other evidence of surgical abdomen. Etiology the patient's symptoms unclear although I think constipation is a possibility here.

## 2018-02-27 NOTE — ED PROVIDER NOTES
Patient Seen in: THE [facility] [place]    History   Patient presents with:  Abdomen/Flank Pain (GI/)    Stated Complaint: ABDOMEN PAIN     HPI    This 28-year-old female presents to the office with a 2-3 week history of worsening right u 2021]  BP: 158/82  Pulse: 82  Resp: 20  Temp: 97.9 °F (36.6 °C)  Temp src: Temporal  SpO2: 100 %  O2 Device: None (Room air)    Current:/82   Pulse 82   Temp 97.9 °F (36.6 °C) (Temporal)   Resp 20   SpO2 100%         Physical Exam    General: WH/obes Impression:  RUQ abdominal pain  (primary encounter diagnosis)    Disposition:   Ic to ed  2/26/2018  8:48 pm    Follow-up:  [facility] MANDY Navarro  [phone]  Today          Medications Prescribed:  Current D

## 2018-03-06 ENCOUNTER — OFFICE VISIT (OUTPATIENT)
Dept: FAMILY MEDICINE CLINIC | Facility: CLINIC | Age: 38
End: 2018-03-06

## 2018-03-06 VITALS
DIASTOLIC BLOOD PRESSURE: 70 MMHG | SYSTOLIC BLOOD PRESSURE: 108 MMHG | WEIGHT: 272 LBS | RESPIRATION RATE: 16 BRPM | HEIGHT: 67 IN | TEMPERATURE: 98 F | HEART RATE: 94 BPM | BODY MASS INDEX: 42.69 KG/M2

## 2018-03-06 DIAGNOSIS — M79.2 NEUROPATHIC PAIN: ICD-10-CM

## 2018-03-06 DIAGNOSIS — R10.11 RUQ PAIN: Primary | ICD-10-CM

## 2018-03-06 PROBLEM — K76.0 FATTY LIVER: Status: ACTIVE | Noted: 2018-03-06

## 2018-03-06 PROCEDURE — 64450 NJX AA&/STRD OTHER PN/BRANCH: CPT | Performed by: FAMILY MEDICINE

## 2018-03-06 PROCEDURE — 99212 OFFICE O/P EST SF 10 MIN: CPT | Performed by: FAMILY MEDICINE

## 2018-03-06 RX ORDER — LIDOCAINE HYDROCHLORIDE 20 MG/ML
1 INJECTION, SOLUTION INFILTRATION; PERINEURAL ONCE
Status: COMPLETED | OUTPATIENT
Start: 2018-03-06 | End: 2018-03-06

## 2018-03-06 RX ADMIN — LIDOCAINE HYDROCHLORIDE 1 ML: 20 INJECTION, SOLUTION INFILTRATION; PERINEURAL at 14:32:00

## 2018-03-06 NOTE — PROGRESS NOTES
Alma Perera is a 43year old female. HPI:   Patient has complained of on and off right upper quadrant pain since delivering her child in November 2016. She saw Dr. Fabiola Feldman in January 2017.   At that time she was advised to do a HIDA scan for her gallb CO2 29.0 22.0 - 32.0 mmol/L   -URINALYSIS WITH CULTURE REFLEX   Result Value Ref Range   Urine Color Yellow Yellow   Clarity Urine Clear Clear   Spec Gravity 1.020 1.001 - 1.030   Glucose Urine Negative Negative mg/dl   Bilirubin Urine Negative Negative Pulse 94   Temp 98.4 °F (36.9 °C) (Oral)   Resp 16   Ht 67\"   Wt 272 lb   LMP 12/22/2017 (Approximate)   BMI 42.60 kg/m²   GENERAL: well developed, well nourished,in no apparent distress  SKIN: no rashes,no suspicious lesions  LUNGS: clear to auscultation

## 2018-04-05 ENCOUNTER — OFFICE VISIT (OUTPATIENT)
Dept: FAMILY MEDICINE CLINIC | Facility: CLINIC | Age: 38
End: 2018-04-05

## 2018-04-05 VITALS
RESPIRATION RATE: 12 BRPM | HEART RATE: 60 BPM | BODY MASS INDEX: 41.37 KG/M2 | DIASTOLIC BLOOD PRESSURE: 70 MMHG | SYSTOLIC BLOOD PRESSURE: 120 MMHG | WEIGHT: 273 LBS | HEIGHT: 68 IN

## 2018-04-05 DIAGNOSIS — M79.2 NEUROPATHIC PAIN: Primary | ICD-10-CM

## 2018-04-05 PROCEDURE — 64450 NJX AA&/STRD OTHER PN/BRANCH: CPT | Performed by: FAMILY MEDICINE

## 2018-04-05 RX ORDER — LIDOCAINE HYDROCHLORIDE 20 MG/ML
10 INJECTION, SOLUTION INFILTRATION; PERINEURAL ONCE
Status: COMPLETED | OUTPATIENT
Start: 2018-04-05 | End: 2018-04-05

## 2018-04-05 RX ADMIN — LIDOCAINE HYDROCHLORIDE 10 ML: 20 INJECTION, SOLUTION INFILTRATION; PERINEURAL at 10:41:00

## 2018-04-05 NOTE — PROGRESS NOTES
Reno Alexander is a 43year old female. HPI:   Patient has complained of on and off right upper quadrant pain since delivering her child in November 2016. She saw Dr. Hussein Jc in January 2017.   At that time she was advised to do a HIDA scan for her gallb Protein 7.7 6.1 - 8.3 g/dL   Albumin 3.5 3.5 - 4.8 g/dL   Sodium 140 136 - 144 mmol/L   Potassium 3.8 3.6 - 5.1 mmol/L   Chloride 104 101 - 111 mmol/L   CO2 29.0 22.0 - 32.0 mmol/L   -URINALYSIS WITH CULTURE REFLEX   Result Value Ref Range   Urine Color Clarance Cam on exertion  GI: denies abdominal pain,denies heartburn  MUSCULOSKELETAL: denies back pain  EXTREMITIES:  No pain or numbness    EXAM:   /70 (BP Location: Left arm, Patient Position: Sitting, Cuff Size: large)   Pulse 60   Resp 12   Ht 68\"   Wt 273

## 2018-04-26 ENCOUNTER — OFFICE VISIT (OUTPATIENT)
Dept: FAMILY MEDICINE CLINIC | Facility: CLINIC | Age: 38
End: 2018-04-26

## 2018-04-26 VITALS
HEIGHT: 68 IN | SYSTOLIC BLOOD PRESSURE: 110 MMHG | RESPIRATION RATE: 12 BRPM | DIASTOLIC BLOOD PRESSURE: 70 MMHG | WEIGHT: 273 LBS | BODY MASS INDEX: 41.37 KG/M2 | HEART RATE: 88 BPM

## 2018-04-26 DIAGNOSIS — M79.2 NEUROPATHIC PAIN: Primary | ICD-10-CM

## 2018-04-26 PROCEDURE — 64450 NJX AA&/STRD OTHER PN/BRANCH: CPT | Performed by: FAMILY MEDICINE

## 2018-04-26 RX ORDER — TRIAMCINOLONE ACETONIDE 40 MG/ML
20 INJECTION, SUSPENSION INTRA-ARTICULAR; INTRAMUSCULAR ONCE
Status: COMPLETED | OUTPATIENT
Start: 2018-04-26 | End: 2018-04-26

## 2018-04-26 RX ORDER — LIDOCAINE HYDROCHLORIDE AND EPINEPHRINE BITARTRATE 20; .01 MG/ML; MG/ML
10 INJECTION, SOLUTION SUBCUTANEOUS ONCE
Status: COMPLETED | OUTPATIENT
Start: 2018-04-26 | End: 2018-04-26

## 2018-04-26 RX ADMIN — TRIAMCINOLONE ACETONIDE 20 MG: 40 INJECTION, SUSPENSION INTRA-ARTICULAR; INTRAMUSCULAR at 12:20:00

## 2018-04-26 RX ADMIN — LIDOCAINE HYDROCHLORIDE AND EPINEPHRINE BITARTRATE 10 ML: 20; .01 INJECTION, SOLUTION SUBCUTANEOUS at 12:19:00

## 2018-04-26 NOTE — PROGRESS NOTES
Hannah Mixon is a 43year old female. HPI:   Patient has complained of on and off right upper quadrant pain since delivering her child in November 2016. She saw Dr. Dalila Reina in January 2017.   At that time she was advised to do a HIDA scan for her gallb 4.8 g/dL   Sodium 140 136 - 144 mmol/L   Potassium 3.8 3.6 - 5.1 mmol/L   Chloride 104 101 - 111 mmol/L   CO2 29.0 22.0 - 32.0 mmol/L   -URINALYSIS WITH CULTURE REFLEX   Result Value Ref Range   Urine Color Yellow Yellow   Clarity Urine Clear Clear   Spec heartburn  MUSCULOSKELETAL: denies back pain; neuropathic pain on abdomen  EXTREMITIES:  No pain or numbness    EXAM:   /70 (BP Location: Left arm, Patient Position: Sitting, Cuff Size: large)   Pulse 88   Resp 12   Ht 68\"   Wt 273 lb   BMI 41.51 kg

## 2018-11-02 VITALS
HEART RATE: 76 BPM | HEIGHT: 66 IN | BODY MASS INDEX: 42.59 KG/M2 | WEIGHT: 264.99 LBS | RESPIRATION RATE: 16 BRPM | TEMPERATURE: 98.9 F

## 2018-11-08 ENCOUNTER — LAB ENCOUNTER (OUTPATIENT)
Dept: LAB | Age: 38
End: 2018-11-08
Attending: FAMILY MEDICINE
Payer: COMMERCIAL

## 2018-11-08 ENCOUNTER — OFFICE VISIT (OUTPATIENT)
Dept: FAMILY MEDICINE CLINIC | Facility: CLINIC | Age: 38
End: 2018-11-08
Payer: COMMERCIAL

## 2018-11-08 VITALS
WEIGHT: 267 LBS | BODY MASS INDEX: 40.94 KG/M2 | RESPIRATION RATE: 15 BRPM | HEIGHT: 67.75 IN | SYSTOLIC BLOOD PRESSURE: 130 MMHG | DIASTOLIC BLOOD PRESSURE: 70 MMHG | HEART RATE: 72 BPM

## 2018-11-08 DIAGNOSIS — E78.5 DYSLIPIDEMIA: ICD-10-CM

## 2018-11-08 DIAGNOSIS — Z13.29 SCREENING FOR ENDOCRINE, METABOLIC AND IMMUNITY DISORDER: ICD-10-CM

## 2018-11-08 DIAGNOSIS — Z11.3 SCREENING FOR STD (SEXUALLY TRANSMITTED DISEASE): ICD-10-CM

## 2018-11-08 DIAGNOSIS — E55.9 VITAMIN D DEFICIENCY: ICD-10-CM

## 2018-11-08 DIAGNOSIS — Z12.4 SCREENING FOR CERVICAL CANCER: ICD-10-CM

## 2018-11-08 DIAGNOSIS — Z13.228 SCREENING FOR ENDOCRINE, METABOLIC AND IMMUNITY DISORDER: ICD-10-CM

## 2018-11-08 DIAGNOSIS — Z13.0 SCREENING FOR ENDOCRINE, METABOLIC AND IMMUNITY DISORDER: ICD-10-CM

## 2018-11-08 DIAGNOSIS — Z13.89 SCREENING FOR GENITOURINARY CONDITION: ICD-10-CM

## 2018-11-08 DIAGNOSIS — Z00.00 LABORATORY EXAMINATION ORDERED AS PART OF A ROUTINE GENERAL MEDICAL EXAMINATION: ICD-10-CM

## 2018-11-08 DIAGNOSIS — E53.8 VITAMIN B12 DEFICIENCY: ICD-10-CM

## 2018-11-08 DIAGNOSIS — Z00.00 ROUTINE GENERAL MEDICAL EXAMINATION AT A HEALTH CARE FACILITY: Primary | ICD-10-CM

## 2018-11-08 DIAGNOSIS — Z23 NEED FOR VACCINATION: ICD-10-CM

## 2018-11-08 LAB — PAP-ABSTRACT: NORMAL

## 2018-11-08 PROCEDURE — 90686 IIV4 VACC NO PRSV 0.5 ML IM: CPT | Performed by: FAMILY MEDICINE

## 2018-11-08 PROCEDURE — 85025 COMPLETE CBC W/AUTO DIFF WBC: CPT | Performed by: FAMILY MEDICINE

## 2018-11-08 PROCEDURE — 36415 COLL VENOUS BLD VENIPUNCTURE: CPT | Performed by: FAMILY MEDICINE

## 2018-11-08 PROCEDURE — 87340 HEPATITIS B SURFACE AG IA: CPT | Performed by: FAMILY MEDICINE

## 2018-11-08 PROCEDURE — 90471 IMMUNIZATION ADMIN: CPT | Performed by: FAMILY MEDICINE

## 2018-11-08 PROCEDURE — 86803 HEPATITIS C AB TEST: CPT | Performed by: FAMILY MEDICINE

## 2018-11-08 PROCEDURE — 84443 ASSAY THYROID STIM HORMONE: CPT | Performed by: FAMILY MEDICINE

## 2018-11-08 PROCEDURE — 81003 URINALYSIS AUTO W/O SCOPE: CPT | Performed by: FAMILY MEDICINE

## 2018-11-08 PROCEDURE — 86780 TREPONEMA PALLIDUM: CPT | Performed by: FAMILY MEDICINE

## 2018-11-08 PROCEDURE — 99395 PREV VISIT EST AGE 18-39: CPT | Performed by: FAMILY MEDICINE

## 2018-11-08 PROCEDURE — 87491 CHLMYD TRACH DNA AMP PROBE: CPT | Performed by: FAMILY MEDICINE

## 2018-11-08 PROCEDURE — 87591 N.GONORRHOEAE DNA AMP PROB: CPT | Performed by: FAMILY MEDICINE

## 2018-11-08 PROCEDURE — 87624 HPV HI-RISK TYP POOLED RSLT: CPT | Performed by: FAMILY MEDICINE

## 2018-11-08 PROCEDURE — 82306 VITAMIN D 25 HYDROXY: CPT | Performed by: FAMILY MEDICINE

## 2018-11-08 PROCEDURE — 82607 VITAMIN B-12: CPT | Performed by: FAMILY MEDICINE

## 2018-11-08 PROCEDURE — 87389 HIV-1 AG W/HIV-1&-2 AB AG IA: CPT | Performed by: FAMILY MEDICINE

## 2018-11-08 RX ORDER — MULTIVIT-MIN/IRON/FOLIC ACID/K 18-600-40
CAPSULE ORAL
COMMUNITY
End: 2019-07-30

## 2018-11-08 NOTE — H&P
CC: Annual Physical Exam    HPI:   Denise Hayes is a 43year old female who presents for a complete physical exam. Symptoms: periods are regular. Patient complains of nothing.   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 Exercise:  twice per week, biking. bilaterally    ASSESSMENT AND PLAN:   Gege Carrion is a 43year old female who presents for a complete physical exam.   Pap and pelvic done. Pt. Will be doing IVF again for another child. Her son, Lisa Gerber is 2 years. Self breast exam explained.    H

## 2018-12-07 ENCOUNTER — PATIENT MESSAGE (OUTPATIENT)
Dept: FAMILY MEDICINE CLINIC | Facility: CLINIC | Age: 38
End: 2018-12-07

## 2018-12-07 NOTE — TELEPHONE ENCOUNTER
From: Denise Hayes  To: Yassine Oneill DO  Sent: 12/7/2018 7:30 AM CST  Subject: Visit Follow-up Question    Hi Dr Domínguez More,  I know my test results got sent to Dr Cal Carter but he needs them faxed to him to continue with my IVF.  Would you be able to fax my

## 2019-01-02 ENCOUNTER — TELEPHONE (OUTPATIENT)
Dept: FAMILY MEDICINE CLINIC | Facility: CLINIC | Age: 39
End: 2019-01-02

## 2019-01-02 DIAGNOSIS — N97.9 FEMALE INFERTILITY: Primary | ICD-10-CM

## 2019-01-02 NOTE — TELEPHONE ENCOUNTER
Referral to Suzanne White MD, Fertility (INTERNAL)    Reason for the order/referral: REFERRAL   PCP:  Sapna Morillo MD   Refer to Provider: Rocio Ramos MD   Specialty: ENDOCRINOLOGY-REPRODUCTIVE/GYNECO   Patient Insurance: Payor: [facility]

## 2019-01-08 ENCOUNTER — TELEPHONE (OUTPATIENT)
Dept: FAMILY MEDICINE CLINIC | Facility: CLINIC | Age: 39
End: 2019-01-08

## 2019-01-08 NOTE — TELEPHONE ENCOUNTER
Spoke with patient. She stated yes, she has been trying to conceive for the last 6 months with no protection and no contraception. She would like the referral to be sent to Darlyn Santos at [address] [facility] infertility treatment at [phone].

## 2019-02-14 ENCOUNTER — TELEPHONE (OUTPATIENT)
Dept: OBGYN CLINIC | Facility: CLINIC | Age: 39
End: 2019-02-14

## 2019-02-14 NOTE — TELEPHONE ENCOUNTER
[facility]  Left message for patient to call back so appointment can be scheduled correctly.

## 2019-02-27 ENCOUNTER — HOSPITAL (OUTPATIENT)
Dept: OTHER | Age: 39
End: 2019-02-27
Attending: OBSTETRICS & GYNECOLOGY

## 2019-02-28 ENCOUNTER — HOSPITAL (OUTPATIENT)
Dept: OTHER | Age: 39
End: 2019-02-28

## 2019-03-05 LAB — PATHOLOGIST NAME: NORMAL

## 2019-07-30 NOTE — H&P
[facility]    History and Physical    Zonia Gilford Patient Status:  Hospital Outpatient Surgery    1980 MRN [id number]   [facility] SURGERY Attending Reyna Spaulding MD   Hosp Day # 0 PCP Asif Avendano DO     Date:   motion. Neurological: She is alert and oriented to person, place, and time. Skin: Skin is warm and dry. Psychiatric: She has a normal mood and affect.      Cervical Papanicolaou done within 1 year of adm    Results:     Lab Results   Component Value D

## 2019-07-31 ENCOUNTER — HOSPITAL ENCOUNTER (OUTPATIENT)
Dept: MAMMOGRAPHY | Age: 39
Discharge: HOME | End: 2019-07-31
Attending: FAMILY MEDICINE
Payer: COMMERCIAL

## 2019-07-31 DIAGNOSIS — Z12.31 ENCOUNTER FOR SCREENING MAMMOGRAM FOR MALIGNANT NEOPLASM OF BREAST: ICD-10-CM

## 2019-07-31 PROCEDURE — 77067 SCR MAMMO BI INCL CAD: CPT | Performed by: FAMILY MEDICINE

## 2019-08-01 ENCOUNTER — PATIENT MESSAGE (OUTPATIENT)
Dept: FAMILY MEDICINE CLINIC | Facility: CLINIC | Age: 39
End: 2019-08-01

## 2019-08-01 ENCOUNTER — ANESTHESIA (OUTPATIENT)
Dept: SURGERY | Facility: HOSPITAL | Age: 39
End: 2019-08-01

## 2019-08-01 ENCOUNTER — ANESTHESIA EVENT (OUTPATIENT)
Dept: SURGERY | Facility: HOSPITAL | Age: 39
End: 2019-08-01

## 2019-08-01 ENCOUNTER — HOSPITAL ENCOUNTER (OUTPATIENT)
Facility: HOSPITAL | Age: 39
Setting detail: HOSPITAL OUTPATIENT SURGERY
Discharge: HOME | End: 2019-08-01
Attending: OBSTETRICS & GYNECOLOGY | Admitting: OBSTETRICS & GYNECOLOGY
Payer: COMMERCIAL

## 2019-08-01 VITALS
TEMPERATURE: 99 F | SYSTOLIC BLOOD PRESSURE: 135 MMHG | RESPIRATION RATE: 18 BRPM | BODY MASS INDEX: 43.26 KG/M2 | DIASTOLIC BLOOD PRESSURE: 80 MMHG | WEIGHT: 269.19 LBS | OXYGEN SATURATION: 99 % | HEART RATE: 78 BPM | HEIGHT: 66 IN

## 2019-08-01 LAB
POCT LOT NUMBER: NORMAL
POCT URINE PREGNANCY: NEGATIVE

## 2019-08-01 PROCEDURE — 10T24ZZ RESECTION OF PRODUCTS OF CONCEPTION, ECTOPIC, PERCUTANEOUS ENDOSCOPIC APPROACH: ICD-10-PCS | Performed by: OBSTETRICS & GYNECOLOGY

## 2019-08-01 PROCEDURE — 0UT54ZZ RESECTION OF RIGHT FALLOPIAN TUBE, PERCUTANEOUS ENDOSCOPIC APPROACH: ICD-10-PCS | Performed by: OBSTETRICS & GYNECOLOGY

## 2019-08-01 PROCEDURE — S0077 INJECTION, CLINDAMYCIN PHOSP: HCPCS | Performed by: OBSTETRICS & GYNECOLOGY

## 2019-08-01 PROCEDURE — 81025 URINE PREGNANCY TEST: CPT | Performed by: OBSTETRICS & GYNECOLOGY

## 2019-08-01 PROCEDURE — 88305 TISSUE EXAM BY PATHOLOGIST: CPT | Performed by: OBSTETRICS & GYNECOLOGY

## 2019-08-01 RX ORDER — SODIUM CHLORIDE, SODIUM LACTATE, POTASSIUM CHLORIDE, CALCIUM CHLORIDE 600; 310; 30; 20 MG/100ML; MG/100ML; MG/100ML; MG/100ML
INJECTION, SOLUTION INTRAVENOUS CONTINUOUS
Status: DISCONTINUED | OUTPATIENT
Start: 2019-08-01 | End: 2019-08-01

## 2019-08-01 RX ORDER — SCOLOPAMINE TRANSDERMAL SYSTEM 1 MG/1
1 PATCH, EXTENDED RELEASE TRANSDERMAL ONCE
Status: DISCONTINUED | OUTPATIENT
Start: 2019-08-01 | End: 2019-08-01

## 2019-08-01 RX ORDER — BUPIVACAINE HYDROCHLORIDE 5 MG/ML
INJECTION, SOLUTION EPIDURAL; INTRACAUDAL AS NEEDED
Status: DISCONTINUED | OUTPATIENT
Start: 2019-08-01 | End: 2019-08-01 | Stop reason: HOSPADM

## 2019-08-01 RX ORDER — MIDAZOLAM HYDROCHLORIDE 1 MG/ML
1 INJECTION INTRAMUSCULAR; INTRAVENOUS EVERY 5 MIN PRN
Status: DISCONTINUED | OUTPATIENT
Start: 2019-08-01 | End: 2019-08-01

## 2019-08-01 RX ORDER — HYDROCODONE BITARTRATE AND ACETAMINOPHEN 5; 325 MG/1; MG/1
2 TABLET ORAL AS NEEDED
Status: DISCONTINUED | OUTPATIENT
Start: 2019-08-01 | End: 2019-08-01

## 2019-08-01 RX ORDER — HYDROMORPHONE HYDROCHLORIDE 1 MG/ML
INJECTION, SOLUTION INTRAMUSCULAR; INTRAVENOUS; SUBCUTANEOUS
Status: COMPLETED
Start: 2019-08-01 | End: 2019-08-01

## 2019-08-01 RX ORDER — HYDROCODONE BITARTRATE AND ACETAMINOPHEN 5; 325 MG/1; MG/1
1 TABLET ORAL AS NEEDED
Status: DISCONTINUED | OUTPATIENT
Start: 2019-08-01 | End: 2019-08-01

## 2019-08-01 RX ORDER — PHENAZOPYRIDINE HYDROCHLORIDE 200 MG/1
200 TABLET, FILM COATED ORAL ONCE
Status: COMPLETED | OUTPATIENT
Start: 2019-08-01 | End: 2019-08-01

## 2019-08-01 RX ORDER — NALOXONE HYDROCHLORIDE 0.4 MG/ML
80 INJECTION, SOLUTION INTRAMUSCULAR; INTRAVENOUS; SUBCUTANEOUS AS NEEDED
Status: DISCONTINUED | OUTPATIENT
Start: 2019-08-01 | End: 2019-08-01

## 2019-08-01 RX ORDER — ONDANSETRON 2 MG/ML
4 INJECTION INTRAMUSCULAR; INTRAVENOUS AS NEEDED
Status: DISCONTINUED | OUTPATIENT
Start: 2019-08-01 | End: 2019-08-01

## 2019-08-01 RX ORDER — HYDROMORPHONE HYDROCHLORIDE 1 MG/ML
0.4 INJECTION, SOLUTION INTRAMUSCULAR; INTRAVENOUS; SUBCUTANEOUS EVERY 5 MIN PRN
Status: DISCONTINUED | OUTPATIENT
Start: 2019-08-01 | End: 2019-08-01

## 2019-08-01 RX ORDER — CLINDAMYCIN PHOSPHATE 600 MG/50ML
600 INJECTION INTRAVENOUS ONCE
Status: COMPLETED | OUTPATIENT
Start: 2019-08-01 | End: 2019-08-01

## 2019-08-01 RX ORDER — ACETAMINOPHEN 500 MG
1000 TABLET ORAL ONCE
Status: DISCONTINUED | OUTPATIENT
Start: 2019-08-01 | End: 2019-08-01

## 2019-08-01 NOTE — TELEPHONE ENCOUNTER
From: [employer]   To: Parker Miranda DO  Sent: 8/1/2019 12:06 PM CDT  Subject: Non-Urgent Medical Question    Hi. Can you look at my breast letter from the radiologist. I don’t understand if all is good or if I need more tests.      Thank you!!!!

## 2019-08-01 NOTE — BRIEF OP NOTE
Pre-Operative Diagnosis: ECTOPIC PREGNANCY     Post-Operative Diagnosis: ECTOPIC PREGNANCY      Procedure Performed:   Procedure(s):  LAPAROSCOPY, RIGHT SALPINGECTOMY, HYSTEROSCOPY    Surgeon(s) and Role:     * Jorge Bhagat MD - Primary     * Chol

## 2019-08-01 NOTE — OPERATIVE REPORT
[facility]    PATIENT'S NAME: Bradyantonia Colleen   ATTENDING PHYSICIAN: Janiya Banegas M.D. OPERATING PHYSICIAN: Janiya Banegas M.D.    PATIENT ACCOUNT#:   [de-identified]    LOCATION:  PACU [facility] PACU 7 EDWP 10  MEDICAL RECORD #:   [id number]       DATE OF BIR were as noted above. Having completed this, a Jarcho cannula placed. Attention was now directed toward the umbilicus. Umbilicus was everted with a Maddison Boehringer.   Two towel clamps placed on either side of the umbilicus, small rent made mid umbilical with a #

## 2019-08-01 NOTE — ANESTHESIA PREPROCEDURE EVALUATION
PRE-OP EVALUATION    Patient Name: Unique Riley    Pre-op Diagnosis: ECTOPIC PREGNANCY    Procedure(s):  LAPAROSCOPY, RIGHT SALPINGECTOMY, HYSTEROSCOPY    Surgeon(s) and Role:     * Laura Jose MD - Primary    Pre-op vitals reviewed.         Bod

## 2019-08-01 NOTE — INTERVAL H&P NOTE
Pre-op Diagnosis: ECTOPIC PREGNANCY    The above referenced H&P was reviewed by Louis Claudio MD on 8/1/2019, the patient was examined and no significant changes have occurred in the patient's condition since the H&P was performed.   I discussed w

## 2019-08-02 NOTE — TELEPHONE ENCOUNTER
Outgoing call to patient, reports she was contacted by the radiology dept, and has mammogram apportionment  Scheduled.

## 2019-08-13 ENCOUNTER — TELEPHONE (OUTPATIENT)
Dept: OBGYN UNIT | Facility: HOSPITAL | Age: 39
End: 2019-08-13

## 2019-08-23 ENCOUNTER — HOSPITAL ENCOUNTER (OUTPATIENT)
Dept: ULTRASOUND IMAGING | Age: 39
Discharge: HOME | End: 2019-08-23
Attending: FAMILY MEDICINE
Payer: COMMERCIAL

## 2019-08-23 ENCOUNTER — HOSPITAL ENCOUNTER (OUTPATIENT)
Dept: MAMMOGRAPHY | Age: 39
Discharge: HOME | End: 2019-08-23
Attending: FAMILY MEDICINE
Payer: COMMERCIAL

## 2019-08-23 DIAGNOSIS — R92.2 INCONCLUSIVE MAMMOGRAM: ICD-10-CM

## 2019-08-23 PROCEDURE — 76641 ULTRASOUND BREAST COMPLETE: CPT | Performed by: FAMILY MEDICINE

## 2019-08-23 PROCEDURE — 77066 DX MAMMO INCL CAD BI: CPT | Performed by: FAMILY MEDICINE

## 2019-08-23 PROCEDURE — 77062 BREAST TOMOSYNTHESIS BI: CPT | Performed by: FAMILY MEDICINE

## 2019-08-30 NOTE — ANESTHESIA POSTPROCEDURE EVALUATION
[facility] Patient Status:  Hospital Outpatient Surgery   Age/Gender 35year old female MRN [id number]   Location [address] [place] [address] Attending No att. providers found   Hosp Day # 0 PCP Nena Sevilla DO       Anesth

## 2020-05-18 ENCOUNTER — OFFICE VISIT (OUTPATIENT)
Dept: FAMILY MEDICINE | Facility: CLINIC | Age: 40
End: 2020-05-18
Payer: COMMERCIAL

## 2020-05-18 VITALS
BODY MASS INDEX: 46.82 KG/M2 | SYSTOLIC BLOOD PRESSURE: 112 MMHG | HEIGHT: 65 IN | DIASTOLIC BLOOD PRESSURE: 82 MMHG | OXYGEN SATURATION: 96 % | HEART RATE: 94 BPM | WEIGHT: 281 LBS | TEMPERATURE: 99.1 F

## 2020-05-18 DIAGNOSIS — R79.89 LOW VITAMIN B12 LEVEL: ICD-10-CM

## 2020-05-18 DIAGNOSIS — R10.84 ABDOMINAL PAIN, GENERALIZED: ICD-10-CM

## 2020-05-18 DIAGNOSIS — E55.9 HYPOVITAMINOSIS D: ICD-10-CM

## 2020-05-18 DIAGNOSIS — Z00.00 PREVENTATIVE HEALTH CARE: ICD-10-CM

## 2020-05-18 DIAGNOSIS — R92.0 MAMMOGRAPHIC MICROCALCIFICATION: ICD-10-CM

## 2020-05-18 DIAGNOSIS — R19.7 DIARRHEA, UNSPECIFIED TYPE: Primary | ICD-10-CM

## 2020-05-18 PROBLEM — K76.0 FATTY LIVER: Status: ACTIVE | Noted: 2018-03-06

## 2020-05-18 LAB
ALBUMIN UR-MCNC: NEGATIVE MG/DL
APPEARANCE UR: CLEAR
BACTERIA #/AREA URNS HPF: ABNORMAL /HPF
BASOPHILS # BLD AUTO: 0 10E9/L (ref 0–0.2)
BASOPHILS NFR BLD AUTO: 0.1 %
BILIRUB UR QL STRIP: NEGATIVE
COLOR UR AUTO: YELLOW
DIFFERENTIAL METHOD BLD: NORMAL
EOSINOPHIL # BLD AUTO: 0.2 10E9/L (ref 0–0.7)
EOSINOPHIL NFR BLD AUTO: 2.3 %
ERYTHROCYTE [DISTWIDTH] IN BLOOD BY AUTOMATED COUNT: 13 % (ref 10–15)
ERYTHROCYTE [SEDIMENTATION RATE] IN BLOOD BY WESTERGREN METHOD: 17 MM/H (ref 0–20)
GLUCOSE UR STRIP-MCNC: NEGATIVE MG/DL
HCG UR QL: NEGATIVE
HCT VFR BLD AUTO: 43.2 % (ref 35–47)
HGB BLD-MCNC: 13.8 G/DL (ref 11.7–15.7)
HGB UR QL STRIP: NEGATIVE
KETONES UR STRIP-MCNC: NEGATIVE MG/DL
LEUKOCYTE ESTERASE UR QL STRIP: NEGATIVE
LYMPHOCYTES # BLD AUTO: 2.3 10E9/L (ref 0.8–5.3)
LYMPHOCYTES NFR BLD AUTO: 28.6 %
MCH RBC QN AUTO: 28.2 PG (ref 26.5–33)
MCHC RBC AUTO-ENTMCNC: 31.9 G/DL (ref 31.5–36.5)
MCV RBC AUTO: 88 FL (ref 78–100)
MONOCYTES # BLD AUTO: 0.3 10E9/L (ref 0–1.3)
MONOCYTES NFR BLD AUTO: 3.8 %
NEUTROPHILS # BLD AUTO: 5.2 10E9/L (ref 1.6–8.3)
NEUTROPHILS NFR BLD AUTO: 65.2 %
NITRATE UR QL: NEGATIVE
PH UR STRIP: 7 PH (ref 5–7)
PLATELET # BLD AUTO: 164 10E9/L (ref 150–450)
RBC # BLD AUTO: 4.9 10E12/L (ref 3.8–5.2)
RBC #/AREA URNS AUTO: ABNORMAL /HPF
SOURCE: ABNORMAL
SP GR UR STRIP: 1.02 (ref 1–1.03)
UROBILINOGEN UR STRIP-ACNC: 0.2 EU/DL (ref 0.2–1)
WBC # BLD AUTO: 8 10E9/L (ref 4–11)
WBC #/AREA URNS AUTO: ABNORMAL /HPF

## 2020-05-18 PROCEDURE — 99204 OFFICE O/P NEW MOD 45 MIN: CPT | Performed by: PHYSICIAN ASSISTANT

## 2020-05-18 PROCEDURE — 80050 GENERAL HEALTH PANEL: CPT | Performed by: PHYSICIAN ASSISTANT

## 2020-05-18 PROCEDURE — 86140 C-REACTIVE PROTEIN: CPT | Performed by: PHYSICIAN ASSISTANT

## 2020-05-18 PROCEDURE — 87086 URINE CULTURE/COLONY COUNT: CPT | Performed by: PHYSICIAN ASSISTANT

## 2020-05-18 PROCEDURE — 82607 VITAMIN B-12: CPT | Performed by: PHYSICIAN ASSISTANT

## 2020-05-18 PROCEDURE — 81025 URINE PREGNANCY TEST: CPT | Performed by: PHYSICIAN ASSISTANT

## 2020-05-18 PROCEDURE — 83690 ASSAY OF LIPASE: CPT | Performed by: PHYSICIAN ASSISTANT

## 2020-05-18 PROCEDURE — 85652 RBC SED RATE AUTOMATED: CPT | Performed by: PHYSICIAN ASSISTANT

## 2020-05-18 PROCEDURE — 36415 COLL VENOUS BLD VENIPUNCTURE: CPT | Performed by: PHYSICIAN ASSISTANT

## 2020-05-18 PROCEDURE — 82306 VITAMIN D 25 HYDROXY: CPT | Performed by: PHYSICIAN ASSISTANT

## 2020-05-18 PROCEDURE — 81001 URINALYSIS AUTO W/SCOPE: CPT | Performed by: PHYSICIAN ASSISTANT

## 2020-05-18 PROCEDURE — 82150 ASSAY OF AMYLASE: CPT | Performed by: PHYSICIAN ASSISTANT

## 2020-05-18 PROCEDURE — 83516 IMMUNOASSAY NONANTIBODY: CPT | Mod: 59 | Performed by: PHYSICIAN ASSISTANT

## 2020-05-18 PROCEDURE — 83516 IMMUNOASSAY NONANTIBODY: CPT | Performed by: PHYSICIAN ASSISTANT

## 2020-05-18 RX ORDER — MULTIPLE VITAMINS W/ MINERALS TAB 9MG-400MCG
1 TAB ORAL AT BEDTIME
COMMUNITY

## 2020-05-18 SDOH — HEALTH STABILITY: MENTAL HEALTH: HOW OFTEN DO YOU HAVE A DRINK CONTAINING ALCOHOL?: MONTHLY OR LESS

## 2020-05-18 ASSESSMENT — MIFFLIN-ST. JEOR: SCORE: 1940.49

## 2020-05-18 NOTE — PROGRESS NOTES
SUBJECTIVE:   Luda Rai is a 40 year old female who presents to clinic today for the following health issues:    Diarrhea - moved here from [place] recently in , needs a repeat mammogram - last mammogram in chart  Onset: Started in January noticed BM's changed more loose, but in March had constant Diarrhea     Description:   Consistency of stool: watery, runny, yellow and green- depends on time of day   Blood in stool: no   Number of loose stools in past 24 hours: at least 12 (has been having 8-12 watery stools daily since January)    Progression of Symptoms:  Worsening    Accompanying Signs & Symptoms:  Fever: no    Nausea or vomiting; no   Abdominal pain: YES- cramping- new over last couple of weeks - this was NOT present in January/Feb/March.  Suprapubic/lower but occasional severe sharp RLQ.  Episodes of constipation: no   Weight loss: no     History:   Ill contacts: no   Recent use of antibiotics: no    Recent travels: last time she traveled was in 2020- [place]- travels for work.         Recent medication-new or changes(Rx or OTC): no     Precipitating factors:   nothing    Alleviating factors:   nothing  Therapies Tried and outcome:  Imodium AD; Outcome: did nothing    Denies diet changes.  Has had bilateral salpingectomies last year for ectopic pregnancies while undergoing IVF.  Does still have menses regularly.  Heavier last 2-3 cycles.  Has had one .  Also history of Nissen fundoplication ~20 years ago.  No recurrent GERD since that time.      Abnormal mammogram  2019.  In [place].  Multiple microcalcifications consistent with milk calcium.  Recommended 6 month follow up mammogram.    History low B12 and Vit D  Takes multivitamin and occasional Vit D 1000 international unit(s) daily.      Problem list and histories reviewed & adjusted, as indicated.  Additional history: as documented    Patient Active Problem List   Diagnosis     Bronchitis     Dyslipidemia     Fatty liver      "Morbid obesity (H)     Osteoarthrosis, unspecified whether generalized or localized, lower leg     PCOS (polycystic ovarian syndrome)     Primary osteoarthritis of left knee     Vitamin B12 deficiency     Vitamin D deficiency     Past Surgical History:   Procedure Laterality Date      SECTION  10/2016     NISSEN FUNDOPLICATION      starting of Up's     SALPINGECTOMY Left 2019    Fallopian tube (including fimbria) with extensive intraluminal hemorrhage,     SALPINGECTOMY Right 2019    tubal pregnancy from IVF       Social History     Tobacco Use     Smoking status: Never Smoker     Smokeless tobacco: Never Used   Substance Use Topics     Alcohol use: Yes     Frequency: Monthly or less     History reviewed. No pertinent family history.      Current Outpatient Medications   Medication Sig Dispense Refill     multivitamin w/minerals (MULTI-VITAMIN) tablet Take 1 tablet by mouth daily       Allergies   Allergen Reactions     Codeine      Rocephin [Ceftriaxone] Hives     Vicodin [Acetaminophen]      Passed out       Reviewed and updated as needed this visit by clinical staff  Tobacco  Allergies  Meds  Problems  Med Hx  Surg Hx  Fam Hx  Soc Hx        Reviewed and updated as needed this visit by Provider  Tobacco  Allergies  Meds  Problems  Med Hx  Surg Hx  Fam Hx  Soc Hx          ROS:  Constitutional, HEENT, cardiovascular, pulmonary, GI, , musculoskeletal, neuro, skin, endocrine and psych systems are negative, except as otherwise noted.      OBJECTIVE:   /82 (BP Location: Right arm, Patient Position: Sitting, Cuff Size: Adult Large)   Pulse 94   Temp 99.1  F (37.3  C) (Oral)   Ht 1.651 m (5' 5\")   Wt 127.5 kg (281 lb)   LMP 2020   SpO2 96%   BMI 46.76 kg/m   Body mass index is 46.76 kg/m .    GENERAL: healthy, alert and no distress  EYES: Eyes grossly normal to inspection, PERRL and conjunctivae and sclerae normal  HENT: ear canals and TM's normal and nose and " mouth without ulcers or lesions  NECK: no adenopathy  RESP: lungs clear to auscultation - no rales, rhonchi or wheezes  CV: regular rate and rhythm, normal S1 S2, no S3 or S4, no murmur, click or rub, no peripheral edema and peripheral pulses strong  MS: no gross musculoskeletal defects noted, no edema  SKIN: no suspicious lesions or rashes  NEURO: Normal strength and tone, mentation intact and speech normal  PSYCH: mentation appears normal, affect normal/bright    Diagnostic Test Results:  Results for orders placed or performed in visit on 05/18/20 (from the past 24 hour(s))   UA with Microscopic   Result Value Ref Range    Color Urine Yellow     Appearance Urine Clear     Glucose Urine Negative NEG^Negative mg/dL    Bilirubin Urine Negative NEG^Negative    Ketones Urine Negative NEG^Negative mg/dL    Specific Gravity Urine 1.020 1.003 - 1.035    pH Urine 7.0 5.0 - 7.0 pH    Protein Albumin Urine Negative NEG^Negative mg/dL    Urobilinogen Urine 0.2 0.2 - 1.0 EU/dL    Nitrite Urine Negative NEG^Negative    Blood Urine Negative NEG^Negative    Leukocyte Esterase Urine Negative NEG^Negative    Source Midstream Urine     WBC Urine 0 - 5 OTO5^0 - 5 /HPF    RBC Urine O - 2 OTO2^O - 2 /HPF    Bacteria Urine Few (A) NEG^Negative /HPF   HCG Qual, Urine ([id number])   Result Value Ref Range    HCG Qual Urine Negative NEG^Negative   CBC with platelets and differential   Result Value Ref Range    WBC 8.0 4.0 - 11.0 10e9/L    RBC Count 4.90 3.8 - 5.2 10e12/L    Hemoglobin 13.8 11.7 - 15.7 g/dL    Hematocrit 43.2 35.0 - 47.0 %    MCV 88 78 - 100 fl    MCH 28.2 26.5 - 33.0 pg    MCHC 31.9 31.5 - 36.5 g/dL    RDW 13.0 10.0 - 15.0 %    Platelet Count 164 150 - 450 10e9/L    % Neutrophils 65.2 %    % Lymphocytes 28.6 %    % Monocytes 3.8 %    % Eosinophils 2.3 %    % Basophils 0.1 %    Absolute Neutrophil 5.2 1.6 - 8.3 10e9/L    Absolute Lymphocytes 2.3 0.8 - 5.3 10e9/L    Absolute Monocytes 0.3 0.0 - 1.3 10e9/L    Absolute  Eosinophils 0.2 0.0 - 0.7 10e9/L    Absolute Basophils 0.0 0.0 - 0.2 10e9/L    Diff Method Automated Method    ESR: Erythrocyte sedimentation rate   Result Value Ref Range    Sed Rate 17 0 - 20 mm/h     ASSESSMENT/PLAN:   Luda was seen today for diarrhea.    Diagnoses and all orders for this visit:    Diarrhea, unspecified type  Abdominal pain, generalized  Unclear etiology.  Stool cultures needed to r/o infectious etiology.  No STAT imaging needed based on exam and length of symptoms.  Labs to r/o metabolic etiology.    -     TSH with free T4 reflex  -     Comprehensive metabolic panel (BMP + Alb, Alk Phos, ALT, AST, Total. Bili, TP)  -     CBC with platelets and differential  -     ESR: Erythrocyte sedimentation rate  -     CRP, inflammation  -     Ova and Parasite Exam Routine; Future  -     Clostridium difficile toxin B PCR; Future  -     Tissue transglutaminase roddy IgA and IgG  -     Enteric Bacteria and Virus Panel by MILIND Stool; Future  -     Lipase  -     Amylase  -     UA with Microscopic  -     Urine Culture Aerobic Bacterial  -     HCG Qual, Urine ([id number])    Mammographic microcalcification  6 month f/u recommended from scans 8/2019 in [place].  -     MA Diagnostic Digital Bilateral; Future  -     US Breast Bilateral Complete 4 Quadrants; Future    Preventative health care   Pap abstracted.    Hypovitaminosis D  Recheck levels today  -     Vitamin D Deficiency    Low vitamin B12 level  Recheck levels today.  Had B12 injection in 2014.  -     Vitamin B12      Return in about 2 days (around 5/20/2020) for pending labs.     [address] [address]  [email or web address]   Office: [phone]           Mulu Montalvo, MS, PARoelC

## 2020-05-18 NOTE — PROGRESS NOTES
"  SUBJECTIVE:   Luda Bruce is a 40 year old female who presents to clinic today for the following health issues:    { Abdominal and/or Flank :983238} PAIN     Onset: ***    Description:   Character: {.:471743}  Location: {.:394078}  Radiation: {.:353646::\"None\"}    Intensity: {.:560479}    Progression of Symptoms:  {.:265959}    Accompanying Signs & Symptoms:  Fever/Chills?: { :218151}  Gas/Bloating: { :171513}  Nausea: { :101110}  Vomitting: { :845216}  Diarrhea?: { :455850}  Constipation:{ :818202}  Dysuria or Hematuria: { :561205}   History:   Trauma: { :207765}  Previous similar pain: { :273565}   Previous tests done: { .:617054}    Precipitating factors:   Does the pain change with:     Food: { :958081}     BM: { :756594}    Urination: { :427589}    Alleviating factors:  ***    Therapies Tried and outcome: ***    LMP:  {NOT applicable:923261::\"not applicable\"}         Problem list and histories reviewed & adjusted, as indicated.  Additional history: as documented    Patient Active Problem List   Diagnosis   (none) - all problems resolved or deleted     No past surgical history on file.    Social History     Tobacco Use     Smoking status: Never Smoker   Substance Use Topics     Alcohol use: Not on file     No family history on file.      Current Outpatient Medications   Medication Sig Dispense Refill     MECLIZINE HCL 12.5 MG OR TABS 1 TABLET 3 TIMES DAILY AS NEEDED 30 0     METFORMIN HCL 1000 MG OR TABS 2 tabs daily       TORADOL 30 MG/ML IM SOLN 30 mg IM x 1 30 0     VISTARIL 25 MG/ML IM SOLN 50 mg IM x 1 50 mg 0     Allergies   Allergen Reactions     Codeine      Rocephin [Ceftriaxone] Hives     Vicodin [Acetaminophen]      Passed out       Reviewed and updated as needed this visit by clinical staff       Reviewed and updated as needed this visit by Provider         ROS:  Constitutional, HEENT, cardiovascular, pulmonary, GI, , musculoskeletal, neuro, skin, endocrine and psych systems are negative, except " "as otherwise noted.    This document serves as a record of the services and decisions personally performed and made by Mulu Montalvo PA-C. It was created on her behalf by ***, a trained medical scribe. The creation of this document is based on the provider's statements to the medical scribe.  *** 12:04 PM May 18, 2020  OBJECTIVE:   There were no vitals taken for this visit. There is no height or weight on file to calculate BMI.    ***  GENERAL: healthy, alert and no distress  EYES: Eyes grossly normal to inspection, PERRL and conjunctivae and sclerae normal  HENT: ear canals and TM's normal and nose and mouth without ulcers or lesions  NECK: no adenopathy  RESP: lungs clear to auscultation - no rales, rhonchi or wheezes  CV: regular rate and rhythm, normal S1 S2, no S3 or S4, no murmur, click or rub, no peripheral edema and peripheral pulses strong  MS: no gross musculoskeletal defects noted, no edema  SKIN: no suspicious lesions or rashes  NEURO: Normal strength and tone, mentation intact and speech normal  PSYCH: mentation appears normal, affect normal/bright    {Diagnostic Test Results:067151::\"Diagnostic Test Results:\",\"none \"}  ASSESSMENT/PLAN:   There are no diagnoses linked to this encounter.    There are no Patient Instructions on file for this visit.    No follow-ups on file.     [address] [address]  [email or web address]   Office: [phone]           Mulu Montalvo MS, HELLEN      "

## 2020-05-19 DIAGNOSIS — R19.7 DIARRHEA, UNSPECIFIED TYPE: ICD-10-CM

## 2020-05-19 LAB
ALBUMIN SERPL-MCNC: 3.5 G/DL (ref 3.4–5)
ALP SERPL-CCNC: 89 U/L (ref 40–150)
ALT SERPL W P-5'-P-CCNC: 21 U/L (ref 0–50)
AMYLASE SERPL-CCNC: 44 U/L (ref 30–110)
ANION GAP SERPL CALCULATED.3IONS-SCNC: 8 MMOL/L (ref 3–14)
AST SERPL W P-5'-P-CCNC: 13 U/L (ref 0–45)
BILIRUB SERPL-MCNC: 0.4 MG/DL (ref 0.2–1.3)
BUN SERPL-MCNC: 8 MG/DL (ref 7–30)
C COLI+JEJUNI+LARI FUSA STL QL NAA+PROBE: NOT DETECTED
C DIFF TOX B STL QL: NEGATIVE
CALCIUM SERPL-MCNC: 8.7 MG/DL (ref 8.5–10.1)
CHLORIDE SERPL-SCNC: 104 MMOL/L (ref 94–109)
CO2 SERPL-SCNC: 26 MMOL/L (ref 20–32)
CREAT SERPL-MCNC: 0.58 MG/DL (ref 0.52–1.04)
CRP SERPL-MCNC: 13 MG/L (ref 0–8)
EC STX1 GENE STL QL NAA+PROBE: NOT DETECTED
EC STX2 GENE STL QL NAA+PROBE: NOT DETECTED
ENTERIC PATHOGEN COMMENT: NORMAL
GFR SERPL CREATININE-BSD FRML MDRD: >90 ML/MIN/{1.73_M2}
GLUCOSE SERPL-MCNC: 93 MG/DL (ref 70–99)
LIPASE SERPL-CCNC: 227 U/L (ref 73–393)
NOROV GI+II ORF1-ORF2 JNC STL QL NAA+PR: NOT DETECTED
POTASSIUM SERPL-SCNC: 4.1 MMOL/L (ref 3.4–5.3)
PROT SERPL-MCNC: 7.7 G/DL (ref 6.8–8.8)
RVA NSP5 STL QL NAA+PROBE: NOT DETECTED
SALMONELLA SP RPOD STL QL NAA+PROBE: NOT DETECTED
SHIGELLA SP+EIEC IPAH STL QL NAA+PROBE: NOT DETECTED
SODIUM SERPL-SCNC: 138 MMOL/L (ref 133–144)
SPECIMEN SOURCE: NORMAL
TSH SERPL DL<=0.005 MIU/L-ACNC: 1.13 MU/L (ref 0.4–4)
V CHOL+PARA RFBL+TRKH+TNAA STL QL NAA+PR: NOT DETECTED
VIT B12 SERPL-MCNC: 159 PG/ML (ref 193–986)
Y ENTERO RECN STL QL NAA+PROBE: NOT DETECTED

## 2020-05-19 PROCEDURE — 87493 C DIFF AMPLIFIED PROBE: CPT | Mod: XU | Performed by: PHYSICIAN ASSISTANT

## 2020-05-19 PROCEDURE — 87209 SMEAR COMPLEX STAIN: CPT | Performed by: PHYSICIAN ASSISTANT

## 2020-05-19 PROCEDURE — 87506 IADNA-DNA/RNA PROBE TQ 6-11: CPT | Performed by: PHYSICIAN ASSISTANT

## 2020-05-19 PROCEDURE — 87177 OVA AND PARASITES SMEARS: CPT | Performed by: PHYSICIAN ASSISTANT

## 2020-05-20 PROBLEM — R79.89 LOW VITAMIN B12 LEVEL: Status: ACTIVE | Noted: 2020-05-20

## 2020-05-20 PROBLEM — R92.0 MAMMOGRAPHIC MICROCALCIFICATION: Status: ACTIVE | Noted: 2020-05-20

## 2020-05-20 LAB
BACTERIA SPEC CULT: NORMAL
DEPRECATED CALCIDIOL+CALCIFEROL SERPL-MC: 26 UG/L (ref 20–75)
O+P STL MICRO: NORMAL
O+P STL MICRO: NORMAL
SPECIMEN SOURCE: NORMAL
SPECIMEN SOURCE: NORMAL
TTG IGA SER-ACNC: 1 U/ML
TTG IGG SER-ACNC: <1 U/ML

## 2020-05-20 RX ORDER — LANOLIN ALCOHOL/MO/W.PET/CERES
1000 CREAM (GRAM) TOPICAL DAILY
Qty: 30 TABLET | COMMUNITY
Start: 2020-05-20 | End: 2020-12-03

## 2020-05-20 NOTE — RESULT ENCOUNTER NOTE
Luda  Here are your recent results.  Your stool cultures are normal/negative.  I am awaiting a few more bloodwork results but will let you know those as soon as they are all complete.  If you have any questions please do not hesitate to contact our office via phone ([phone]) or MyChart.    Mulu Montalvo, MS, PARoelC  [facility]

## 2020-05-21 NOTE — RESULT ENCOUNTER NOTE
Triage: please advise of results/recommendations:     Luda  Here are your recent results.  Overall, most of your labs are normal.      We DEFINITELY need to have you start a Vitamin B12 supplement (1000 international unit(s) daily) in addition to your multivitamin.  B12 at low levels can cause memory, neurological, and significant fatigue issues.  I recommend that we recheck this level in 2 months to ensure you absorb it when taken orally.       Other than the low B12 you only had a very mildly elevated inflammatory marker.      Based on these normal lab findings and stool cultures, I'd like to get a CT scan of your abdomen/pelvis as we discussed in the office.  I will place this order and [facility] will contact you to schedule.     If you have any questions please do not hesitate to contact our office via phone ([phone]) or MyChart.    Mulu Montalvo, MS, PA-C  [facility]

## 2020-06-05 ENCOUNTER — HOSPITAL ENCOUNTER (OUTPATIENT)
Dept: CT IMAGING | Facility: CLINIC | Age: 40
Discharge: HOME | End: 2020-06-05
Attending: PHYSICIAN ASSISTANT | Admitting: PHYSICIAN ASSISTANT
Payer: COMMERCIAL

## 2020-06-05 DIAGNOSIS — R10.84 ABDOMINAL PAIN, GENERALIZED: ICD-10-CM

## 2020-06-05 DIAGNOSIS — R19.7 DIARRHEA, UNSPECIFIED TYPE: ICD-10-CM

## 2020-06-05 DIAGNOSIS — R19.7 DIARRHEA, UNSPECIFIED TYPE: Primary | ICD-10-CM

## 2020-06-05 PROCEDURE — 25000128 H RX IP 250 OP 636: Performed by: PHYSICIAN ASSISTANT

## 2020-06-05 PROCEDURE — 74177 CT ABD & PELVIS W/CONTRAST: CPT

## 2020-06-05 PROCEDURE — 25000125 ZZHC RX 250: Performed by: PHYSICIAN ASSISTANT

## 2020-06-05 RX ORDER — IOPAMIDOL 755 MG/ML
500 INJECTION, SOLUTION INTRAVASCULAR ONCE
Status: COMPLETED | OUTPATIENT
Start: 2020-06-05 | End: 2020-06-05

## 2020-06-05 RX ADMIN — SODIUM CHLORIDE 65 ML: 9 INJECTION, SOLUTION INTRAVENOUS at 13:22

## 2020-06-05 RX ADMIN — IOPAMIDOL 100 ML: 755 INJECTION, SOLUTION INTRAVENOUS at 13:22

## 2020-06-05 NOTE — RESULT ENCOUNTER NOTE
Note to Staff: please call the patient to explain results.    CT  IMPRESSION:   1.  Enlarged, fatty infiltrated liver.  2.  No cause for pain demonstrated.    Patient having diarrhea and abdominal pain.   CT has large fatty liver otherwise is normal. At this time given symptoms and no clear cut answer will refer to [facility] to assess if any further workup is needed for her liver and also workup of this chronic diarrhea. Referral placed-please let patient know and fax referral to [facility] this is a new referral process.         Thanks,    Jacqueline Santana, KAUR-BC(Covering provider for Mulu Montalvo)

## 2020-06-09 DIAGNOSIS — R19.7 DIARRHEA: ICD-10-CM

## 2020-06-09 DIAGNOSIS — K76.0 FATTY LIVER: Primary | ICD-10-CM

## 2020-07-17 NOTE — TELEPHONE ENCOUNTER
RECORDS RECEIVED FROM: Internal    DATE RECEIVED: 8/18/20   NOTES STATUS DETAILS   OFFICE NOTE from referring provider Internal OV notes 5/18/20   OFFICE NOTE from other specialist Care Everywhere Tracey Le recs in [facility]        STOOL TESTING Internal 5/19/20   PERTINENT LABS Internal    IMAGING (CT, MRI, EGD) Internal CT ABD PELVIS 6/5/20     REFERRAL INFORMATION    Date referral was placed: 8/18/20   Date all records received:    Date records were scanned into EPIC:    Date records were sent to Provider to review:    Date and recommendation received from provider:  LETTER SENT  SCHEDULE APPOINTMENT   Date patient was contacted to schedule: 7/16/20

## 2020-07-28 ENCOUNTER — OFFICE VISIT (OUTPATIENT)
Dept: PODIATRY | Facility: CLINIC | Age: 40
End: 2020-07-28
Payer: COMMERCIAL

## 2020-07-28 VITALS
WEIGHT: 277 LBS | BODY MASS INDEX: 46.15 KG/M2 | DIASTOLIC BLOOD PRESSURE: 70 MMHG | HEIGHT: 65 IN | SYSTOLIC BLOOD PRESSURE: 116 MMHG

## 2020-07-28 DIAGNOSIS — M72.2 PLANTAR FASCIITIS: Primary | ICD-10-CM

## 2020-07-28 PROCEDURE — 20550 NJX 1 TENDON SHEATH/LIGAMENT: CPT | Performed by: PODIATRIST

## 2020-07-28 PROCEDURE — 99203 OFFICE O/P NEW LOW 30 MIN: CPT | Mod: 25 | Performed by: PODIATRIST

## 2020-07-28 ASSESSMENT — MIFFLIN-ST. JEOR: SCORE: 1922.34

## 2020-07-28 NOTE — PROGRESS NOTES
"Foot & Ankle Surgery  2020    CC: \"plantar facitis\"    I was asked to see Luda Rai regarding the chief complaint by:  self    HPI:  Pt is a 40 year old female who presents with above complaint.  Right lower extremity pain x 10 months.  Looking for \"pain relief\".  \"shooting, throbbing\" pain, 7/10 \"daily\", worse with \"walking'.  \"PT, braces, ice, shoes\" for treatment.  Braces = night splint.  Moved last Oct, heel started to hurt.  Had heel pain when she had her son, but this went away.  \"MOrning's are touch\".  Also describes PSD.  \"It swells here\", pointing to distal plantar medial heel.  \"I had back surgery\", discectomy L5-S1    ROS:   Pos for CC.  The patient denies current nausea, vomiting, chills, fevers, belly pain, calf pain, chest pain or SOB.  Complete remainder of ROS is otherwise neg.    VITALS:    Vitals:    20 1117   BP: 116/70   Weight: 125.6 kg (277 lb)   Height: 1.651 m (5' 5\")       PMH:  Lower back pain    SXHX:    Past Surgical History:   Procedure Laterality Date      SECTION  10/2016     NISSEN FUNDOPLICATION      starting of Up's     SALPINGECTOMY Left 2019    Fallopian tube (including fimbria) with extensive intraluminal hemorrhage,     SALPINGECTOMY Right 2019    tubal pregnancy from IVF        MEDS:    Current Outpatient Medications   Medication     cyanocobalamin (VITAMIN B-12) 1000 MCG tablet     multivitamin w/minerals (MULTI-VITAMIN) tablet     No current facility-administered medications for this visit.        ALL:     Allergies   Allergen Reactions     Codeine      Rocephin [Ceftriaxone] Hives     Vicodin [Acetaminophen]      Passed out       FMH:  Neg for RA, foot problems, diabetes    SocHx:    Social History     Socioeconomic History     Marital status:      Spouse name: Not on file     Number of children: Not on file     Years of education: Not on file     Highest education level: Not on file   Occupational History     Not on file "   Social Needs     Financial resource strain: Not on file     Food insecurity     Worry: Not on file     Inability: Not on file     Transportation needs     Medical: Not on file     Non-medical: Not on file   Tobacco Use     Smoking status: Never Smoker     Smokeless tobacco: Never Used   Substance and Sexual Activity     Alcohol use: Yes     Frequency: Monthly or less     Drug use: Never     Sexual activity: Yes     Partners: Male   Lifestyle     Physical activity     Days per week: Not on file     Minutes per session: Not on file     Stress: Not on file   Relationships     Social connections     Talks on phone: Not on file     Gets together: Not on file     Attends Alevism service: Not on file     Active member of club or organization: Not on file     Attends meetings of clubs or organizations: Not on file     Relationship status: Not on file     Intimate partner violence     Fear of current or ex partner: Not on file     Emotionally abused: Not on file     Physically abused: Not on file     Forced sexual activity: Not on file   Other Topics Concern     Not on file   Social History Narrative     Not on file           EXAMINATION:  Gen:   No apparent distress  Neuro:   A&Ox3, no deficits  Psych:    Answering questions appropriately for age and situation with normal affect  Head:    NCAT  Eye:    Visual scanning without deficit  Ear:    Response to auditory stimuli wnl  Lung:    Non-labored breathing on RA noted  Abd:    NTND per patient report  Lymph:    Neg for pitting/non-pitting edema BLE  Vasc:    Pulses palpable, CFT minimally delayed  Neuro:    Light touch sensation intact to all sensory nerve distributions without paresthesias  Derm:    Neg for nodules, lesions or ulcerations  MSK:    Right lower extremity - plantar-medial heel.  Pronounced abductor hallucis belly but no ICN, tibial nerve/branch pain  Calf:    Neg for redness, swelling or tenderness    Assessment:  40 year old female with plantar fasciitis  right lower extremity       Plan:  Discussed etiologies, anatomy and options  1.  Plantar fasciitis right lower extremity   -Regarding the heel pain, the Plantar Fasciitis handout was dispensed and discussed.  We talked about stretching, resting/activity modification, icing, NSAID/tylenol use as tolerated, inserts, supportive/comfortable shoes and minimizing shoeless walking.    -discussed Achilles, plantar fascial and hamstring stretches  -OTC insert information dispensed and discussed   -diagnostic/therapeutic injection, see procedure note  -walking cast boot with instructions.  Return to shoe gear as tolerated, but would recommend at least 3 weeks of boot.    After obtaining written consent, the skin was prepped with alcohol.  The needle was advance to the underlying plantar fascia attachment point, at/superior to attachment to avoid injection into fat pad, aspiration was done with position change.  1 1/2cc mixture of 2:1 kenalog 40:0.25% marcaine plain was injected.  The patient tolerated the procedure without complication.  Risks that were discussed included possible joint/soft tissue damage, neuritis/numbness, infection, pigment change, steroid flare.    Regarding the CAM walker, the following proper-usage instructions were discussed and dispensed:  1.  Do not sleep with the CAM boot on; 2.  Do not wear during long-distance travel, ie long car rides, plane trips(they were instructed not to drive with it if the involved foot is required to operate a vehicle); 3.  The patient was encouraged to remove the boot throughout the day when off their feet;  4.  They are to have the boot on when ambulating, regardless of WB status         Follow up:  prn or sooner with acute issues      Patient's medical history was reviewed today    Body mass index is 46.1 kg/m .  Weight management plan: Patient was referred to their PCP to discuss a diet and exercise plan.        Jeffrey Sarah DPM FACFAS FACFAOM  Podiatric  Foot & Ankle Surgeon  [facility]  [phone]

## 2020-07-28 NOTE — PATIENT INSTRUCTIONS
Thank you for choosing [facility]!    [facility] SCHEDULE SURGERY: [phone]   [address] BILLING QUESTIONS: [phone]   [place] [address] AFTER HOURS: [phone]   PH: [phone] CONSUMER ROSSI LINE:[phone]   FAX: [phone] APPOINTMENTS: [phone]     AIRCAST / CAM WALKING BOOT INSTRUCTIONS  - Do NOT drive with CAM walker on. This is due to safety and legal issues.   - Do NOT wear the CAM walker on long car/train rides or on an airplane.  - Remove the CAM walker several times a day and do ankle range of motion (ROM) exercises/wiggle toes.  - It is recommended that a thick-soled shoe be worn on the other foot to offset any created leg length issue.   - The boot does not have to be worn at night.   - There is an increased risk of developing a blood clot with lower extremity immobilization. ROM exercises and knee-high compression (tenso /ACE wrap) is recommended to lower that risk.   - You should seek medical attention if you experience calf swelling and/or pain, chest pain, or shortness of breath.     PLANTAR FASCIITIS    Plantar fasciitis is often referred to as heel spurs or heel pain. Plantar fasciitis is a very common problem that affects people of all foot shapes, age, weight and activity level. Pain may be in the arch or on the weight-bearing surface of the heel. The pain may come on without injury or identifiable cause. Pain is generally present when first getting out of bed in the morning or up from a seated break.     CAUSES  The plantar fascia is a dense fibrous band of tissue that stretches across the bottom surface of the foot. The fascia helps support the foot muscles and arch. Plantar fasciitis is thought to be caused by mechanical strain or overload. Frequent walking without shoes or wearing unsupportive shoes is thought to cause structural overload and ultimately inflammation of the plantar fascia. Some  people have heel spurs that can be seen on x-ray. The heel spur is actually a minor component of plantar fascitis and is largely ignored.       SELF TREATMENT   The easiest solution is to stop walking around your home without shoes. Plantar fasciitis is largely a shoe problem. Shoes are either not being worn often enough or your current shoes are inadequate for your weight, foot structure or activity level. The majority of shoes on the market today are not sufficient to resist development of plantar fasciitis or to promote healing. Assume that your current shoes are inadequate and will need to be replaced. Even high quality shoes wear out with 6 months to one year of frequent use. Weight loss is another option. Losing ten pounds in the next two months may be enough to resolve the problem. Ice applied to the area of pain two to three times per day for ten minutes each session can be very helpful. This should continue until the problem resolves. Achilles tendon stretching is essential. Stretch multiple times daily to promote healing and to prevent recurrence in the future.     MEDICAL TREATMENT  Medical treatments often include custom arch supports, cortisone injections, physical therapy, splints to be worn in bed, prescription medications and surgery. The home treatments listed above will be necessary regardless of these advanced medical treatments. Surgery is rarely needed but is very helpful in selected cases.     PROGNOSIS  Plantar fasciitis can last from one day to a lifetime. Some people get intermittent fascitis that is very short-lived. Others suffer daily for years. Excessive body weight, frequent bare foot walking, long hours on the feet, inadequate shoes, predisposing foot structures and excessive activity such as running are all potential issues that lead to chronic and/or recurring plantar fascitis. Having plantar fasciitis means that you are forever prone to this problem and will require modification of  some of the above factors. Most people seek treatment within one to four months. Healing usually requires a similar one to four month time frame. Healing time is relative to the amount of effort spent treating the problem.   Plantar fasciitis is highly recurrent. Risk factors often continue, including return to bare foot walking, inadequate shoes, excessive body weight, excessive activities, etc. Your life style and foot structure may predispose you to recurrent plantar fasciitis. A daily prevention regimen can be very helpful. Ongoing use of shoe inserts, careful attention to appropriate shoes, daily Achilles stretching, etc. may prevent recurrence. Prompt attention at the earliest warning signs of heel pain can resolve the problem in as short as a few days.     EXERCISES    Stair Exercise: Step on the stairs with the ball of your foot and hold your position for at least 15 seconds, then slowly step down with the heels of your foot. You can do this daily and as often as you want.   Picking the Towel: Sit comfortably and then pick the towel up with your toes. You can use any object other than a towel as long as the material can be soft and you can pick it up with your toes.  Rolling the Bottle: Use a small ball or frozen water bottle and then roll it around with your foot.   Flex the Toes: Sit comfortably and then flex your toes by pointing it towards the floor or towards your body. This will relax and flex your foot and exercise your plantar fascia, the calf, and the Achilles tendon. The inability of the foot to stretch often causes the bunching up of the plantar fascia area leading to the pain.  Calf/Achilles Stretching: Lay on you back and raise one foot, then point your toes towards the floor. See photo below:               Hold each stretch for 10 seconds. Stretch 10 times per set, three sets per day. Morning, afternoon and evening. If your heel pain is very severe in the morning, consider doing the first set  of stretches before you get out of bed.    THERAPIES DISCUSSED:  1.  Supportive Shoes: minimizing barefoot ambulation helps to provide cushion, padding and support to the ligament that is inflamed. Socks, flip flops, flats and some slippers are not typically sufficient to provide support. Shoes should be worn even indoors  2.  Insert/Orthotics: ones with an arch support built in to them provide further stress relief for the ligament. See the information below on recommended inserts.  3. Icing: using a frozen water bottle or orange, and rolling it along the bottom of the arch/heel can help to alleviate discomfort, and can act as a tissue massage to the painful, inflamed ligament.  There is evidence that shows icing at least three times daily can be beneficial  4.  Antiinflammatory (NSAID): Ibuprofen, Aleve, as well as Tylenol can be used to help decrease symptoms and improve pain levels. If you have high blood pressure, heart disease, stomach or kidney problems, use antiinflammatories sparingly. Tylenol should not be used if you have liver problems.   5. Activity Modifications: if there are certain things that you do, whether it's going barefoot or certain shoes/activities, you should try to minimize those activities as much as possible until your symptoms are sufficiently resolved. Certainly, some activities, such as running on the treadmill, are easier to take a break from versus others, such as work or chores at home. If there are certain activities that hurt your heel, and you keep doing those activities that hurt your heel, your heel will keep hurting.  **If these initial therapies are insufficient, we have our tier 2 therapies that can more aggressively work to improve your symptoms and get you back to the activities that you enjoy!    OVER THE COUNTER INSERTS    Most of these can be found at your local [employer], [employer], or online:    Odeole Fit  Dick   Power Step   Walk-Fit ([employer])  *For heel pain* Arch Cradles  *For heel pain*       **A good high quality over the counter insert should cost around $40-$50      NADEEM SHOES [facility]  [address] [place] [address]  [phone]   [address]  [phone] [place]  [address] [facility]  [phone]   [place] [id number] [facility] [address]  [phone]   [place]  [address] [facility]  [phone] [place]  [address] [place] [address] [place].  [phone]   [place]  [address] [facility]  [phone]   [place]  [address] [facility]  [phone] [place]  [address]  [phone]

## 2020-08-11 RX ORDER — TRIAMCINOLONE ACETONIDE 40 MG/ML
40 INJECTION, SUSPENSION INTRA-ARTICULAR; INTRAMUSCULAR ONCE
Status: DISCONTINUED | OUTPATIENT
Start: 2020-08-11 | End: 2020-12-03 | Stop reason: CLARIF

## 2020-08-18 ENCOUNTER — PRE VISIT (OUTPATIENT)
Dept: GASTROENTEROLOGY | Facility: CLINIC | Age: 40
End: 2020-08-18

## 2020-11-16 ENCOUNTER — HEALTH MAINTENANCE LETTER (OUTPATIENT)
Age: 40
End: 2020-11-16

## 2020-12-03 ENCOUNTER — OFFICE VISIT (OUTPATIENT)
Dept: FAMILY MEDICINE | Facility: CLINIC | Age: 40
End: 2020-12-03
Payer: COMMERCIAL

## 2020-12-03 VITALS
SYSTOLIC BLOOD PRESSURE: 118 MMHG | DIASTOLIC BLOOD PRESSURE: 70 MMHG | OXYGEN SATURATION: 97 % | TEMPERATURE: 98.1 F | HEIGHT: 66 IN | BODY MASS INDEX: 42.11 KG/M2 | HEART RATE: 86 BPM | WEIGHT: 262 LBS

## 2020-12-03 DIAGNOSIS — E66.01 MORBID OBESITY (H): ICD-10-CM

## 2020-12-03 DIAGNOSIS — Z01.818 PREOP GENERAL PHYSICAL EXAM: Primary | ICD-10-CM

## 2020-12-03 DIAGNOSIS — R79.89 LOW VITAMIN B12 LEVEL: ICD-10-CM

## 2020-12-03 DIAGNOSIS — M17.11 PRIMARY OSTEOARTHRITIS OF RIGHT KNEE: ICD-10-CM

## 2020-12-03 DIAGNOSIS — E55.9 HYPOVITAMINOSIS D: ICD-10-CM

## 2020-12-03 LAB
ERYTHROCYTE [DISTWIDTH] IN BLOOD BY AUTOMATED COUNT: 13.8 % (ref 10–15)
HCG UR QL: NEGATIVE
HCT VFR BLD AUTO: 44 % (ref 35–47)
HGB BLD-MCNC: 14.6 G/DL (ref 11.7–15.7)
MCH RBC QN AUTO: 28.7 PG (ref 26.5–33)
MCHC RBC AUTO-ENTMCNC: 33.2 G/DL (ref 31.5–36.5)
MCV RBC AUTO: 87 FL (ref 78–100)
PLATELET # BLD AUTO: 180 10E9/L (ref 150–450)
RBC # BLD AUTO: 5.08 10E12/L (ref 3.8–5.2)
VIT B12 SERPL-MCNC: 408 PG/ML (ref 193–986)
WBC # BLD AUTO: 9.7 10E9/L (ref 4–11)

## 2020-12-03 PROCEDURE — 85027 COMPLETE CBC AUTOMATED: CPT | Performed by: PHYSICIAN ASSISTANT

## 2020-12-03 PROCEDURE — 82306 VITAMIN D 25 HYDROXY: CPT | Performed by: PHYSICIAN ASSISTANT

## 2020-12-03 PROCEDURE — 36415 COLL VENOUS BLD VENIPUNCTURE: CPT | Performed by: PHYSICIAN ASSISTANT

## 2020-12-03 PROCEDURE — 99215 OFFICE O/P EST HI 40 MIN: CPT | Performed by: PHYSICIAN ASSISTANT

## 2020-12-03 PROCEDURE — 82607 VITAMIN B-12: CPT | Performed by: PHYSICIAN ASSISTANT

## 2020-12-03 PROCEDURE — 81025 URINE PREGNANCY TEST: CPT | Performed by: PHYSICIAN ASSISTANT

## 2020-12-03 PROCEDURE — 80048 BASIC METABOLIC PNL TOTAL CA: CPT | Performed by: PHYSICIAN ASSISTANT

## 2020-12-03 RX ORDER — LIRAGLUTIDE 6 MG/ML
3 INJECTION, SOLUTION SUBCUTANEOUS DAILY
Start: 2020-12-03 | End: 2021-03-11

## 2020-12-03 RX ORDER — LIRAGLUTIDE 6 MG/ML
3 INJECTION, SOLUTION SUBCUTANEOUS
COMMUNITY
Start: 2020-09-14 | End: 2020-12-03

## 2020-12-03 RX ORDER — LANOLIN ALCOHOL/MO/W.PET/CERES
1000 CREAM (GRAM) TOPICAL DAILY
Qty: 30 TABLET
Start: 2020-12-03 | End: 2022-06-08

## 2020-12-03 ASSESSMENT — MIFFLIN-ST. JEOR: SCORE: 1862.23

## 2020-12-03 NOTE — PATIENT INSTRUCTIONS
"Morning of procedure: No medications    For 7 days prior to procedure: Hold all vitamins/supplements.  Hold all NSAID medications (ibuprofen/motrin/aleve) and aspirin.  Tylenol products OK.      Preparing for Your Surgery  Getting started  A surgery nurse will call you to review your health history and instructions. They will give you an arrival time based on your scheduled surgery time.  Please be ready to share the following:    Your doctor's clinic name and phone number    Your medical, surgical and anesthesia history    A list of allergies and sensitivities    A list of medicines, including herbal treatments and over-the-counter drugs    Whether the patient has a legal guardian (ask how to send us the papers in advance)  If your child is having surgery, please ask for a copy of Preparing for Your Child's Surgery.    Preparing for surgery    Within 30 days of surgery: Have an exam at your family clinic (primary care clinic), or go to a pre-operative clinic. This exam is called a \"History and Physical,\" or H&P.    At your H&P exam, talk to your care team about all medicines you take. If you need to stop any medicines before surgery, ask when to start taking them again.  ? We do this for your safety. Many medicines can make you bleed too much during surgery. Some change how well surgery (anesthesia) drugs work.    Call your insurance company to see what it will and won't pay for. Ask if they need to pre-approve the surgery. (If no insurance, call [phone].)    Call your surgeon's clinic if there's any change in your health. This includes signs of a cold or flu (sore throat, runny nose, cough, rash, fever). It also includes a scrape or scratch near the surgery site.    If you have questions on the day of surgery, call your surgery center.  Eating and drinking guidelines  For your safety: Unless your surgeon tells you otherwise, follow the guidelines below.    Eat and drink as usual until 8 hours before surgery. " After that, no food or milk.    Drink clear liquids until 2 hours before surgery. These are liquids you can see through, like water, Gatorade and Propel Water. You may also have black coffee and tea (no cream or milk).    Nothing by mouth within 2 hours of surgery. This includes gum, candy and breath mints.    Stop alcohol the midnight before surgery.    If your family clinic tells you to take medicine on the morning of surgery, it's okay to take it with a sip of water.  Preventing infection    Shower or bathe the night before and morning of your surgery. Follow the instructions your clinic gave you. (If no instructions, use regular soap.)    Don't shave or clip hair near your surgery site. This can lead to skin infection.    Don't smoke the morning of surgery. Smoking increases the risk of infection. You may chew nicotine gum up to 2 hours before surgery. A nicotine patch is okay.  ? Note: Some surgeries require you to completely quit smoking and nicotine. Check with your surgeon.    Your care team will make every effort to keep you safe from infection. We will:  ? Clean our hands often with soap and water (or an alcohol-based hand rub).  ? Clean the skin at your surgery site with a special soap that kills germs. We'll also remove hair from the site as needed.  ? Wear special hair covers, masks, gowns and gloves during surgery.  ? Give antibiotic medicine, if prescribed. Not all surgeries need antibiotics.  What to bring on the day of surgery    Photo ID and insurance card    Copy of your health care directive, if you have one    Glasses and hearing aides (bring cases)  ? You can't wear contacts during surgery    Inhaler and eye drops, if you use them (tell us about these when you arrive)    CPAP machine or breathing device, if you use them    A few personal items, if spending the night    If you have . . .  ? A pacemaker or ICD (cardiac defibrillator): Bring the ID card.  ? An implanted stimulator: Bring the  remote control.  ? A legal guardian: Bring a copy of the certified (court-stamped) guardianship papers.  Please remove any jewelry, including body piercings. Leave jewelry and other valuables at home.  If you're going home the day of surgery  Important: If you don't follow the rules below, we must cancel your surgery.     Arrange for someone to drive you home after surgery. You may not drive, take a taxi or take public transportation by yourself (unless you'll have local anesthesia only).    Arrange for a responsible adult to stay with you overnight. If you don't, we may keep you in the hospital overnight, and you may need to pay the costs yourself.  Questions?   If you have any questions for your care team, list them here: _________________________________________________________________________________________________________________________________________________________________________________________________________________________________________________________________________________________________________________________  For informational purposes only. Not to replace the advice of your health care provider. Copyright   7284-3633 [facility]. All rights reserved. Clinically reviewed by Lori Anaya MD. [employer] [id number] - REV 07/19.

## 2020-12-03 NOTE — PROGRESS NOTES
[address] [address]  Phone: [phone]  Primary Provider: Mulu Castillo  Pre-op Performing Provider: MULU CASTILLO    PREOPERATIVE EVALUATION:  Today's date: 12/3/2020    Luda Rai is a 40 year old female who presents for a preoperative evaluation for severe RIGHT knee osteoarthritis.      Surgical Information:  Surgery/Procedure: Right total Knee Replacement  Surgery Location: [facility]  Surgeon: Dr Stan Mccord  Surgery Date: 12/18/2020  Time of Surgery: TBD  Where patient plans to recover: At home with family  Fax number for surgical facility: Fax to &^# [phone]    Type of Anesthesia Anticipated: to be determined    Subjective     HPI related to upcoming procedure: symptomatic right knee osteoarthritis.  Has exhausted all conservative cares.      Preop Questions 12/3/2020   1. Have you ever had a heart attack or stroke? No   2. Have you ever had surgery on your heart or blood vessels, such as a stent placement, a coronary artery bypass, or surgery on an artery in your head, neck, heart, or legs? No   3. Do you have chest pain with activity? No   4. Do you have a history of  heart failure? No   5. Do you currently have a cold, bronchitis or symptoms of other infection? No   6. Do you have a cough, shortness of breath, or wheezing? No   7. Do you or anyone in your family have previous history of blood clots? No   8. Do you or does anyone in your family have a serious bleeding problem such as prolonged bleeding following surgeries or cuts? No   9. Have you ever had problems with anemia or been told to take iron pills? No   10. Have you had any abnormal blood loss such as black, tarry or bloody stools, or abnormal vaginal bleeding? No   11. Have you ever had a blood transfusion? No   12. Are you willing to have a blood transfusion if it is medically needed before, during, or after your surgery? Yes   13. Have you  or any of your relatives ever had problems with anesthesia? No   14. Do you have sleep apnea, excessive snoring or daytime drowsiness? No   15. Do you have any artifical heart valves or other implanted medical devices like a pacemaker, defibrillator, or continuous glucose monitor? No   16. Do you have artificial joints? No   17. Are you allergic to latex? No   18. Is there any chance that you may be pregnant? No     Health Care Directive:  Patient does not have a Health Care Directive or Living Will: Discussed advance care planning with patient; information given to patient to review.    Preoperative Review of :   reviewed - no record of controlled substances prescribed.      Status of Chronic Conditions:  MORBID OBESITY - positioning considerations during procedure    Review of Systems  CONSTITUTIONAL: NEGATIVE for fever, chills, change in weight  INTEGUMENTARY/SKIN: NEGATIVE for worrisome rashes, moles or lesions  EYES: NEGATIVE for vision changes or irritation  ENT/MOUTH: NEGATIVE for ear, mouth and throat problems  RESP: NEGATIVE for significant cough or SOB  BREAST: NEGATIVE for masses, tenderness or discharge  CV: NEGATIVE for chest pain, palpitations or peripheral edema  GI: NEGATIVE for nausea, abdominal pain, heartburn, or change in bowel habits  : NEGATIVE for frequency, dysuria, or hematuria  MUSCULOSKELETAL: NEGATIVE for significant arthralgias or myalgia  NEURO: NEGATIVE for weakness, dizziness or paresthesias  ENDOCRINE: NEGATIVE for temperature intolerance, skin/hair changes  HEME: NEGATIVE for bleeding problems  PSYCHIATRIC: NEGATIVE for changes in mood or affect    Patient Active Problem List    Diagnosis Date Noted     Low vitamin B12 level 05/20/2020     Priority: Medium     Mammographic microcalcification 05/20/2020     Priority: Medium     Fatty liver 03/06/2018     Priority: Medium     Primary osteoarthritis of left knee 06/07/2016     Priority: Medium     Dyslipidemia 01/12/2016      "Priority: Medium     Morbid obesity (H) 2016     Priority: Medium     Vitamin B12 deficiency 2016     Priority: Medium     Osteoarthrosis, unspecified whether generalized or localized, lower leg 10/30/2014     Priority: Medium     IMO Regulatory Load OCT 2020       PCOS (polycystic ovarian syndrome) 2014     Priority: Medium     Hypovitaminosis D 2014     Priority: Medium     Bronchitis 2012     Priority: Medium      History reviewed. No pertinent past medical history.  Past Surgical History:   Procedure Laterality Date      SECTION  10/2016     NISSEN FUNDOPLICATION      starting of Up's     SALPINGECTOMY Left 2019    Fallopian tube (including fimbria) with extensive intraluminal hemorrhage,     SALPINGECTOMY Right 2019    tubal pregnancy from IVF     Current Outpatient Medications   Medication Sig Dispense Refill     cyanocobalamin (VITAMIN B-12) 1000 MCG tablet Take 1 tablet (1,000 mcg) by mouth daily 30 tablet      liraglutide - Weight Management (SAXENDA) 18 MG/3ML pen Inject 3 mg Subcutaneous daily       multivitamin w/minerals (MULTI-VITAMIN) tablet Take 1 tablet by mouth daily         Allergies   Allergen Reactions     Codeine GI Disturbance     Rocephin [Ceftriaxone] Hives        Social History     Tobacco Use     Smoking status: Never Smoker     Smokeless tobacco: Never Used   Substance Use Topics     Alcohol use: Yes     Frequency: Monthly or less     History reviewed. No pertinent family history.  History   Drug Use Unknown         Objective     /70   Pulse 86   Temp 98.1  F (36.7  C) (Tympanic)   Ht 1.664 m (5' 5.5\")   Wt 118.8 kg (262 lb)   LMP 2020   SpO2 97%   Breastfeeding No   BMI 42.94 kg/m      Physical Exam:     GENERAL APPEARANCE: healthy, alert and no distress     EYES: EOMI, PERRL     HENT: ear canals and TM's normal and nose and mouth without ulcers or lesions     NECK: no adenopathy, no asymmetry, masses, or scars " and thyroid normal to palpation     RESP: lungs clear to auscultation - no rales, rhonchi or wheezes     CV: regular rates and rhythm, normal S1 S2, no S3 or S4 and no murmur, click or rub     ABDOMEN:  soft, nontender, no HSM or masses and bowel sounds normal     MS: extremities normal- no gross deformities noted, no evidence of inflammation in joints, FROM in all extremities.     SKIN: no suspicious lesions or rashes     NEURO: Normal strength and tone, sensory exam grossly normal, mentation intact and speech normal     PSYCH: mentation appears normal. and affect normal/bright     LYMPHATICS: No cervical adenopathy    Recent Labs   Lab Test 05/18/20  1411   HGB 13.8         POTASSIUM 4.1   CR 0.58        Diagnostics:  Recent Results (from the past 24 hour(s))   CBC with platelets    Collection Time: 12/03/20  2:11 PM   Result Value Ref Range    WBC 9.7 4.0 - 11.0 10e9/L    RBC Count 5.08 3.8 - 5.2 10e12/L    Hemoglobin 14.6 11.7 - 15.7 g/dL    Hematocrit 44.0 35.0 - 47.0 %    MCV 87 78 - 100 fl    MCH 28.7 26.5 - 33.0 pg    MCHC 33.2 31.5 - 36.5 g/dL    RDW 13.8 10.0 - 15.0 %    Platelet Count 180 150 - 450 10e9/L   Basic metabolic panel  (Ca, Cl, CO2, Creat, Gluc, K, Na, BUN)    Collection Time: 12/03/20  2:11 PM   Result Value Ref Range    Sodium 137 133 - 144 mmol/L    Potassium 3.8 3.4 - 5.3 mmol/L    Chloride 105 94 - 109 mmol/L    Carbon Dioxide 29 20 - 32 mmol/L    Anion Gap 3 3 - 14 mmol/L    Glucose 89 70 - 99 mg/dL    Urea Nitrogen 10 7 - 30 mg/dL    Creatinine 0.67 0.52 - 1.04 mg/dL    GFR Estimate >90 >60 mL/min/[1.73_m2]    GFR Estimate If Black >90 >60 mL/min/[1.73_m2]    Calcium 8.7 8.5 - 10.1 mg/dL   Vitamin B12    Collection Time: 12/03/20  2:29 PM   Result Value Ref Range    Vitamin B12 408 193 - 986 pg/mL   HCG Qual, Urine ([id number])    Collection Time: 12/03/20  2:41 PM   Result Value Ref Range    HCG Qual Urine Negative NEG^Negative      No EKG required, no history of coronary  heart disease, significant arrhythmia, peripheral arterial disease or other structural heart disease.    Revised Cardiac Risk Index (RCRI):  The patient has the following serious cardiovascular risks for perioperative complications:   - No serious cardiac risks = 0 points     RCRI Interpretation: 0 points: Class I (very low risk - 0.4% complication rate)       Assessment & Plan   The proposed surgical procedure is considered INTERMEDIATE risk.    Luda was seen today for pre-op exam.    Diagnoses and all orders for this visit:    Preop general physical exam  -     HCG Qual, Urine ([id number])    Primary osteoarthritis of right knee    Morbid obesity (H)  -     liraglutide - Weight Management (SAXENDA) 18 MG/3ML pen; Inject 3 mg Subcutaneous daily    Low vitamin B12 level  -     cyanocobalamin (VITAMIN B-12) 1000 MCG tablet; Take 1 tablet (1,000 mcg) by mouth daily  -     Vitamin B12    Hypovitaminosis D  -     Vitamin D Deficiency    Other orders  -     CBC with platelets  -     Basic metabolic panel  (Ca, Cl, CO2, Creat, Gluc, K, Na, BUN)        Risks and Recommendations:  The patient has the following additional risks and recommendations for perioperative complications:   - Morbid obesity (BMI >40)    Medication Instructions:   - GLP-1 Injectable (exenitide, liraglutide, semaglutide, dulaglutide, etc.): HOLD day of surgery    - ibuprofen (Advil, Motrin): HOLD 1 day before surgery.     RECOMMENDATION:  APPROVAL GIVEN to proceed with proposed procedure, without further diagnostic evaluation.    Signed Electronically by: Mulu Montalvo PA-C    I have reviewed today's vital signs, medications, labs and H &P. I agree with Mulu Montalvo PA-C 's assessment and plan as above.        Farnaz Conrad MD       Copy of this evaluation report is provided to requesting physician.    [facility] Preop Guidelines    Revised Cardiac Risk Index

## 2020-12-04 LAB
ANION GAP SERPL CALCULATED.3IONS-SCNC: 3 MMOL/L (ref 3–14)
BUN SERPL-MCNC: 10 MG/DL (ref 7–30)
CALCIUM SERPL-MCNC: 8.7 MG/DL (ref 8.5–10.1)
CHLORIDE SERPL-SCNC: 105 MMOL/L (ref 94–109)
CO2 SERPL-SCNC: 29 MMOL/L (ref 20–32)
CREAT SERPL-MCNC: 0.67 MG/DL (ref 0.52–1.04)
DEPRECATED CALCIDIOL+CALCIFEROL SERPL-MC: 44 UG/L (ref 20–75)
GFR SERPL CREATININE-BSD FRML MDRD: >90 ML/MIN/{1.73_M2}
GLUCOSE SERPL-MCNC: 89 MG/DL (ref 70–99)
POTASSIUM SERPL-SCNC: 3.8 MMOL/L (ref 3.4–5.3)
SODIUM SERPL-SCNC: 137 MMOL/L (ref 133–144)

## 2020-12-07 NOTE — RESULT ENCOUNTER NOTE
Luda  I have reviewed your recent labs. Here are the results:    -Normal red blood cell (hgb) levels, normal white blood cell count and normal platelet levels.  -Kidney function is normal (Cr, GFR), Sodium is normal, Potassium is normal, Calcium is normal, Glucose is normal.   -Vitamin D level is normal and getting 1000 IU daily in your diet or supplements is recommended.   -B12 level is normal.  Continue your current supplementation.    Good luck with your surgery!     If you have any questions please do not hesitate to contact our office via phone ([phone]) or MyChart.    Mulu Montalvo, MS, PA-C  [facility] [place]

## 2021-03-11 ENCOUNTER — OFFICE VISIT (OUTPATIENT)
Dept: FAMILY MEDICINE | Facility: CLINIC | Age: 41
End: 2021-03-11
Payer: COMMERCIAL

## 2021-03-11 ENCOUNTER — HOSPITAL ENCOUNTER (OUTPATIENT)
Dept: MAMMOGRAPHY | Facility: CLINIC | Age: 41
End: 2021-03-11
Attending: PHYSICIAN ASSISTANT
Payer: COMMERCIAL

## 2021-03-11 ENCOUNTER — HOSPITAL ENCOUNTER (OUTPATIENT)
Dept: ULTRASOUND IMAGING | Facility: CLINIC | Age: 41
End: 2021-03-11
Attending: PHYSICIAN ASSISTANT
Payer: COMMERCIAL

## 2021-03-11 VITALS
TEMPERATURE: 98.1 F | HEIGHT: 66 IN | BODY MASS INDEX: 43.75 KG/M2 | DIASTOLIC BLOOD PRESSURE: 76 MMHG | SYSTOLIC BLOOD PRESSURE: 130 MMHG | HEART RATE: 109 BPM | OXYGEN SATURATION: 96 % | WEIGHT: 272.2 LBS

## 2021-03-11 DIAGNOSIS — R92.0 MAMMOGRAPHIC MICROCALCIFICATION: ICD-10-CM

## 2021-03-11 DIAGNOSIS — H93.8X2 CRACKLING SOUND IN LEFT EAR: ICD-10-CM

## 2021-03-11 DIAGNOSIS — E28.2 PCOS (POLYCYSTIC OVARIAN SYNDROME): Primary | ICD-10-CM

## 2021-03-11 PROCEDURE — 76642 ULTRASOUND BREAST LIMITED: CPT | Mod: RT

## 2021-03-11 PROCEDURE — G0279 TOMOSYNTHESIS, MAMMO: HCPCS

## 2021-03-11 PROCEDURE — 99214 OFFICE O/P EST MOD 30 MIN: CPT | Performed by: PHYSICIAN ASSISTANT

## 2021-03-11 ASSESSMENT — MIFFLIN-ST. JEOR: SCORE: 1903.5

## 2021-03-11 NOTE — PROGRESS NOTES
"    Assessment & Plan     PCOS (polycystic ovarian syndrome)  Patient did well on Metformin in the past.  Would like labs for baseline.  Restart in a slow taper.  Diet and weight loss efforts encouraged.  - metFORMIN (GLUCOPHAGE) 500 MG tablet  Dispense: 360 tablet; Refill: 1  - Comprehensive metabolic panel (BMP + Alb, Alk Phos, ALT, AST, Total. Bili, TP)  - Hemoglobin A1c  - Lipid panel reflex to direct LDL Fasting    Crackling sound in left ear  Cerumen adhered to the tympanic membrane.  Removed with ear wash and hydrogen peroxide soak.  Patient tolerated well.  If not improving consider Flonase 2 sprays in each nostril once daily x10 days.  Patient voiced understanding and agreement     BMI:   Estimated body mass index is 44.61 kg/m  as calculated from the following:    Height as of this encounter: 1.664 m (5' 5.5\").    Weight as of this encounter: 123.5 kg (272 lb 3.2 oz).   Weight management plan: Discussed healthy diet and exercise guidelines      Return in about 1 week (around 3/18/2021) for Fasting labs.    Mulu Montalvo PA-C  [facility]    Darrell Lares is a 41 year old who presents for the following health issues     HPI       Medication Followup of Metformin - off since 2015.  Did well with facial hair/regulating periods.  Was taken off when trying to conceive.  Current periods are every 30 to 60 days.  Facial hair is quite bothersome..      Taking Medication as prescribed: not applicable    Side Effects:  Never started after doing fertility    Medication Helping Symptoms:  not applicable     Acute Illness  Acute illness concerns: left ear crackling  Onset/Duration: 2 weeks  Symptoms:  Fever: no  Chills/Sweats: no  Headache (location?): no  Sinus Pressure: no  Conjunctivitis:  no  Ear Pain: left ear crackling, pressure  Rhinorrhea: no  Congestion: no  Sore Throat: no  Cough: no  Wheeze: no  Decreased Appetite: no  Nausea: no  Vomiting: no  Diarrhea: no  Dysuria/Freq.: " "no  Dysuria or Hematuria: no  Fatigue/Achiness: no  Sick/Strep Exposure: no  Therapies tried and outcome: sudafed, ear wax softener      Review of Systems   Constitutional, HEENT, cardiovascular, pulmonary, GI, , musculoskeletal, neuro, skin, endocrine and psych systems are negative, except as otherwise noted.      Objective    /76 (BP Location: Left arm, Patient Position: Sitting, Cuff Size: Adult Large)   Pulse 109   Temp 98.1  F (36.7  C) (Tympanic)   Ht 1.664 m (5' 5.5\")   Wt 123.5 kg (272 lb 3.2 oz)   LMP 02/01/2021 (Approximate)   SpO2 96%   BMI 44.61 kg/m    Body mass index is 44.61 kg/m .  Physical Exam   GENERAL: healthy, alert and no distress  EYES: Eyes grossly normal to inspection, PERRL and conjunctivae and sclerae normal  HENT: ear canals -partial cerumen impaction on the right removed with curette by Mulu Montalvo PA-C.  Left TM partial cerumen impaction removed with curette as well as cerumen adhered to the left TM.  Soak with hydrogen peroxide and subsequent ear wash removed this cerumen completely.  Patient tolerated well. TM's normal, nose and mouth without ulcers or lesions  RESP: lungs clear to auscultation - no rales, rhonchi or wheezes  CV: regular rate and rhythm, normal S1 S2, no S3 or S4, no murmur, click or rub, no peripheral edema and peripheral pulses strong  MS: no gross musculoskeletal defects noted, no edema  SKIN: no suspicious lesions or rashes  NEURO: Normal strength and tone, mentation intact and speech normal  PSYCH: mentation appears normal, affect normal/bright                "

## 2021-03-11 NOTE — RESULT ENCOUNTER NOTE
Luda  Here are your recent results.  They are normal.  If you have any questions please do not hesitate to contact our office via phone ([phone]) or MyChart.    Mulu Montalvo MS, PA-C  [facility]

## 2021-03-11 NOTE — RESULT ENCOUNTER NOTE
Luda  Here are your recent results.  They are normal.  If you have any questions please do not hesitate to contact our office via phone ([phone]) or MyChart.    Mulu Montalvo MS, PA-C  [facility]

## 2021-03-11 NOTE — LETTER
Luda Rai  [address]  [place] [facility] [place] [address]        March 11, 2021    Date of Exam:     Dear Luda:    Thank you for your recent visit.     Breast Imaging Result: We are pleased to inform you that the results of your recent breast imaging show no evidence of malignancy (cancer).    Breast Density: Your breast imaging shows that you have dense breast tissue. This means you have slightly more risk of getting breast cancer. It also means your breast imaging will be harder to read. While it's harder to read, it's still important to do breast imaging. In fact, yearly breast imaging is even more important for anyone at higher risk. You may need to do more testing if you have enough risk.    If you are having any problems such as a lump or pain in your breast, please discuss this with your health care team if you haven't already. You may need more testing.    As you know, it's important to find cancer early. Not all cancers are found through breast imaging, but it's the most accurate method for finding cancer early. A complete check should include breast imaging, physical exams, and breast self-exams. The American College of Radiology and the Society of Breast Imaging advises that yearly breast imaging should start at age 40.    A report of your breast imaging results was sent to: Mulu Montalvo    Your breast imaging will become part of your medical file here at [facility] for at least 10 years. You are responsible for telling any new health care team or breast imaging site of the date and place of this exam.     We appreciate the opportunity to participate in your health care.    Sincerely,  Joe Mckeon MD  [facility]

## 2021-03-16 DIAGNOSIS — E28.2 PCOS (POLYCYSTIC OVARIAN SYNDROME): ICD-10-CM

## 2021-03-16 LAB
ALBUMIN SERPL-MCNC: 3.3 G/DL (ref 3.4–5)
ALP SERPL-CCNC: 89 U/L (ref 40–150)
ALT SERPL W P-5'-P-CCNC: 21 U/L (ref 0–50)
ANION GAP SERPL CALCULATED.3IONS-SCNC: 1 MMOL/L (ref 3–14)
AST SERPL W P-5'-P-CCNC: 10 U/L (ref 0–45)
BILIRUB SERPL-MCNC: 0.4 MG/DL (ref 0.2–1.3)
BUN SERPL-MCNC: 9 MG/DL (ref 7–30)
CALCIUM SERPL-MCNC: 8.5 MG/DL (ref 8.5–10.1)
CHLORIDE SERPL-SCNC: 106 MMOL/L (ref 94–109)
CHOLEST SERPL-MCNC: 207 MG/DL
CO2 SERPL-SCNC: 32 MMOL/L (ref 20–32)
CREAT SERPL-MCNC: 0.64 MG/DL (ref 0.52–1.04)
GFR SERPL CREATININE-BSD FRML MDRD: >90 ML/MIN/{1.73_M2}
GLUCOSE SERPL-MCNC: 113 MG/DL (ref 70–99)
HBA1C MFR BLD: 5.5 % (ref 0–5.6)
HDLC SERPL-MCNC: 39 MG/DL
LDLC SERPL CALC-MCNC: 135 MG/DL
NONHDLC SERPL-MCNC: 168 MG/DL
POTASSIUM SERPL-SCNC: 4.5 MMOL/L (ref 3.4–5.3)
PROT SERPL-MCNC: 7.4 G/DL (ref 6.8–8.8)
SODIUM SERPL-SCNC: 139 MMOL/L (ref 133–144)
TRIGL SERPL-MCNC: 166 MG/DL

## 2021-03-16 PROCEDURE — 36415 COLL VENOUS BLD VENIPUNCTURE: CPT | Performed by: PHYSICIAN ASSISTANT

## 2021-03-16 PROCEDURE — 83036 HEMOGLOBIN GLYCOSYLATED A1C: CPT | Performed by: PHYSICIAN ASSISTANT

## 2021-03-16 PROCEDURE — 80061 LIPID PANEL: CPT | Performed by: PHYSICIAN ASSISTANT

## 2021-03-16 PROCEDURE — 80053 COMPREHEN METABOLIC PANEL: CPT | Performed by: PHYSICIAN ASSISTANT

## 2021-03-16 NOTE — RESULT ENCOUNTER NOTE
Luda  I have reviewed your recent labs. Here are the results:    -LDL(bad) cholesterol level is elevated, HDL(good) cholesterol level is low and your triglycerides are elevated which can increase your heart disease risk.  A diet high in fat and simple carbohydrates, genetics and being overweight can contribute to this. ADVISE: exercising 150 minutes of aerobic exercise per week (30 minutes for 5 days per week or 50 minutes for 3 days per week are options), eating a low saturated fat/low carbohydrate diet, and omega-3 fatty acids (fish oil) 4953-4401 mg daily are helpful to improve this. In 12 months, you should recheck your fasting cholesterol panel by scheduling a lab-only appointment.  -Liver and gallbladder tests are normal (ALT,AST, Alk phos, bilirubin), kidney function is normal (Cr, GFR), sodium is normal, potassium is normal, calcium is normal, glucose is slightly elevated (I'm not sure if you were fasting) and we will continue to monitor.  -A1C (test of diabetes control the last 2-3 months) is at your goal. Please continue with your current plan. Also, you should make an appointment to see me and recheck your A1C test in 6 months.      If you have any questions please do not hesitate to contact our office via phone ([phone]) or MyChart.    Mulu Montalvo, MS, PA-C  [facility]

## 2021-04-25 ENCOUNTER — TRANSFERRED RECORDS (OUTPATIENT)
Dept: HEALTH INFORMATION MANAGEMENT | Facility: CLINIC | Age: 41
End: 2021-04-25

## 2021-05-27 ENCOUNTER — MYC MEDICAL ADVICE (OUTPATIENT)
Dept: FAMILY MEDICINE | Facility: CLINIC | Age: 41
End: 2021-05-27

## 2021-07-09 NOTE — PROGRESS NOTES
"New Bariatric Nutrition Consultation Note    Reason For Visit: Nutrition Assessment    Luda Rai is a 41 year old presenting today for new bariatric nutrition consult.  Pt is interested in laparoscopic gastric bypass.  Patient is accompanied by self.    Support System Reviewed With Patient 7/8/2021   Who do you have in your support network that can be available to help you for the first 2 weeks after surgery?    Who can you count on for support throughout your weight loss surgery journey?  Friends       ANTHROPOMETRICS:   Estimated body mass index is 43.18 kg/m  as calculated from the following:    Height as of this encounter: 1.727 m (5' 8\").    Weight as of this encounter: 128.8 kg (284 lb).    Required weight loss goal pre-op: 5 lbs from initial consult weight (goal weight 279 lbs or less before surgery)       7/8/2021   I have tried the following methods to lose weight Watching portions or calories, Exercise, Weight Watchers, Atkins type diet (low carb/high protein), Velma Núñez, Pre packaged meals ex: Nutrisystem, Prescription Medications       Weight Loss Questions Reviewed With Patient 7/8/2021   How long have you been overweight? Since late teens through early 20's       NUTRITION HISTORY:  Recall Diet Questions Reviewed With Patient 7/8/2021   Describe what you typically consume for breakfast (typical or most recent): English muffin fruit   Describe what you typically consume for lunch (typical or most recent): Fast food, sandwhiches, salad   Describe what you typically consume for supper (typical or most recent): Meat veggie starch   Describe what you typically consume as snacks (typical or most recent): Cheese fruit chips   How many ounces of water, or other low calorie drinks, do you drink daily (8 oz=1 glass)? 48 oz   How many ounces of caffeine (coffee, tea, pop) do you drink daily (8 oz=1 glass)? 16 oz   How many ounces of carbonated (pop, beer, sparkling water) drinks do you drinky " daily (8 oz=1 glass)? 16 oz   How many ounces of juice, pop, sweet tea, sports drinks, protein drinks, other sweetened drinks, do you drink daily (8 oz=1 glass)? 16 oz   How often do you drink alcohol? Monthly or less   If you do drink alcohol, how many drinks might you have in a day? (one drink = 5 oz. wine, 1 can/bottle of beer, 1 shot liquor) 1 or 2       Eating Habits 7/8/2021   Do you have any dietary restrictions? No   Do you currently binge eat (eat a large amount of food in a short time)? No   Are you an emotional eater? Yes   Do you get up to eat after falling asleep? No   What foods do you crave? Sweets       ADDITIONAL INFORMATION:  Pt worked with RD while using Saxenda last year. Successfully lost weight to obtain TKA. Had to discontinue Saxenda d/t migraines and now with weight gain, seeking tool for more sustainable success.     Dining Out History Reviewed With Patient 7/8/2021   How often do you dine out? A couple of times a week.   Where do you dine out? (select all that apply) sit-down restaurants, fast food chains, take out   What types of food do you order when you dine out? Burgers, chicken, pasta mexican       Physical Activity Reviewed With Patient 7/8/2021   How often do you exercise? Less than 1 time per week   What is the duration of your exercise (in minutes)? 30 Minutes   What types of exercise do you do? walking   What keeps you from being more active?  Pain, I have just had surgery on one or more of my joints, Too tired       NUTRITION DIAGNOSIS:  Obesity r/t long history of self-monitoring deficit and excessive energy intake aeb BMI >30 kg/m2.    INTERVENTION:  Intervention Provided/Education Provided on post-op diet guidelines, vitamins/minerals essential post-operatively, GI anatomy of bariatric surgeries, ways to help prepare for post-op diet guidelines pre-operatively, portion/calorie-control, mindful eating .  Provided pt with list of goals RD contact information.      Questions  Reviewed With Patient 7/8/2021   How ready are you to make changes regarding your weight? Number 1 = Not ready at all to make changes up to 10 = very ready. 10   How confident are you that you can change? 1 = Not confident that you will be successful making changes up to 10 = very confident. 10       Patient Understanding: good  Expected Compliance: good    GOALS:  Begin taking multivitamin, calcium and vitamin D per guidelines  Increase water intake to 64oz per day  Eat breakfast within 1h of waking up      Follow-Up:   Recommend 2-3 follow up visits to assist with lifestyle changes or per insurance.  **Insurance requires 6 months structured - met via previous RD visits    Time spent with patient: 45 minutes.      Liana Crandall RD, LD  Clinical Dietitian

## 2021-07-12 ENCOUNTER — OFFICE VISIT (OUTPATIENT)
Dept: SURGERY | Facility: CLINIC | Age: 41
End: 2021-07-12
Payer: COMMERCIAL

## 2021-07-12 VITALS
DIASTOLIC BLOOD PRESSURE: 86 MMHG | BODY MASS INDEX: 43.04 KG/M2 | HEART RATE: 79 BPM | WEIGHT: 284 LBS | SYSTOLIC BLOOD PRESSURE: 136 MMHG | HEIGHT: 68 IN | OXYGEN SATURATION: 98 %

## 2021-07-12 VITALS — BODY MASS INDEX: 43.04 KG/M2 | HEIGHT: 68 IN | WEIGHT: 284 LBS

## 2021-07-12 DIAGNOSIS — E28.2 PCOS (POLYCYSTIC OVARIAN SYNDROME): ICD-10-CM

## 2021-07-12 DIAGNOSIS — Z13.21 SCREENING FOR ENDOCRINE, NUTRITIONAL, METABOLIC AND IMMUNITY DISORDER: ICD-10-CM

## 2021-07-12 DIAGNOSIS — Z13.0 SCREENING FOR IRON DEFICIENCY ANEMIA: ICD-10-CM

## 2021-07-12 DIAGNOSIS — E55.9 HYPOVITAMINOSIS D: ICD-10-CM

## 2021-07-12 DIAGNOSIS — Z13.29 SCREENING FOR ENDOCRINE, NUTRITIONAL, METABOLIC AND IMMUNITY DISORDER: ICD-10-CM

## 2021-07-12 DIAGNOSIS — E66.01 MORBID OBESITY (H): ICD-10-CM

## 2021-07-12 DIAGNOSIS — E66.01 MORBID OBESITY (H): Primary | ICD-10-CM

## 2021-07-12 DIAGNOSIS — K76.0 FATTY LIVER: ICD-10-CM

## 2021-07-12 DIAGNOSIS — M17.12 PRIMARY OSTEOARTHRITIS OF LEFT KNEE: ICD-10-CM

## 2021-07-12 DIAGNOSIS — Z13.228 SCREENING FOR ENDOCRINE, NUTRITIONAL, METABOLIC AND IMMUNITY DISORDER: ICD-10-CM

## 2021-07-12 DIAGNOSIS — E53.8 VITAMIN B12 DEFICIENCY: ICD-10-CM

## 2021-07-12 DIAGNOSIS — Z13.0 SCREENING FOR ENDOCRINE, NUTRITIONAL, METABOLIC AND IMMUNITY DISORDER: ICD-10-CM

## 2021-07-12 DIAGNOSIS — E78.5 DYSLIPIDEMIA: ICD-10-CM

## 2021-07-12 PROCEDURE — 97802 MEDICAL NUTRITION INDIV IN: CPT | Performed by: DIETITIAN, REGISTERED

## 2021-07-12 PROCEDURE — 99215 OFFICE O/P EST HI 40 MIN: CPT | Performed by: PHYSICIAN ASSISTANT

## 2021-07-12 ASSESSMENT — MIFFLIN-ST. JEOR
SCORE: 1996.72
SCORE: 1996.72

## 2021-07-12 NOTE — PATIENT INSTRUCTIONS
1. Begin taking multivitamin, calcium and vitamin D per guidelines    2. Increase water intake to 64oz per day    3. Eat breakfast within 1 hour of waking up

## 2021-07-12 NOTE — LETTER
Luda Rai  July 12, 2021        Bariatric Task List      Required Weight loss:    Lose 5 lbs prior to surgery.  Goal Weight: 279 lbs  Tasks:  Have preoperative laboratory tests drawn.     Psychological Evaluation with MMPI and clearance for weight loss surgery.    Achieve clearance from dietitian to see surgeon.    A total of 6 structured dietitian weight loss visits are required by your insurance.

## 2021-07-12 NOTE — PROGRESS NOTES
"New Bariatric Surgery Consultation Note    2021    RE: Luda Rai  MR#: [id number]  : 1980      Referring provider: No flowsheet data found.    Chief Complaint/Reason for visit: evaluation for possible weight loss surgery    Dear Selvin, Mulu Bean PA-C (General),    I had the pleasure of seeing your patient, Luda Rai, to evaluate her obesity and consider her for possible weight loss surgery. As you know, Luda Rai is 41 year old.  She has a height of 5' 8\", a weight of 284 lbs 0 oz, and calculated Body mass index is 43.18 kg/m .     Assessment & Plan   Problem List Items Addressed This Visit     Dyslipidemia    Fatty liver    Morbid obesity (H) - Primary    Relevant Orders    CBC with platelets    Comprehensive metabolic panel    Hemoglobin A1c    Vitamin D Deficiency    NUTRITION REFERRAL    MENTAL HEALTH REFERRAL  - Adult; Assessments and Testing; Bariatric Eval; [facility] [place] [facility] [phone]; We will contact you to schedule the appointment or please call with any questions    Osteoarthrosis, unspecified whether generalized or localized, lower leg    PCOS (polycystic ovarian syndrome)    Relevant Orders    Hemoglobin A1c    Primary osteoarthritis of left knee    Vitamin B12 deficiency    Hypovitaminosis D    Relevant Orders    Vitamin D Deficiency      Other Visit Diagnoses     Screening for iron deficiency anemia        Relevant Orders    CBC with platelets    Screening for endocrine, nutritional, metabolic and immunity disorder        Relevant Orders    Comprehensive metabolic panel           60 minutes spent on the date of the encounter doing chart review, history and exam, review test results, counseling, developing plan of care, documentation, and further activities as noted above.       HISTORY OF PRESENT ILLNESS:  Weight Loss History Reviewed with Patient 2021   How long have you been overweight? Since late teens through early 20's   What is the most " that you have ever weighed? 290   What is the most weight you have lost? 40   I have tried the following methods to lose weight Watching portions or calories, Exercise, Weight Watchers, Atkins type diet (low carb/high protein), Velma Núñez, Pre packaged meals ex: Nutrisystem, Prescription Medications   I have tried the following weight loss medications? (Check all that apply) Victoza/liraglutide   Have you ever had weight loss surgery? No   Pt needed to get to 250 lbs to get TKA.  Met with dietician and was successful on Saxenda but had headaches daily on it.      CO-MORBIDITIES OF OBESITY INCLUDE:     2021   I have the following health issues associated with obesity: Polycystic Ovarian Syndrome, GERD (Reflux), Fatty Liver, Stress Incontinence, Osteoarthritis (joint disease)       PAST MEDICAL HISTORY:  Past Medical History:   Diagnosis Date     Bone and joint disord back, pelvis, leg complicat preg, childb, puerp     Knee replacement      Endocrine problem     Pcos     Esophageal reflux     Had Nissen Fundiplication      Osteoporosis     Knee       PAST SURGICAL HISTORY: No history of bleeding, clotting, malignant hyperthermia, or other anesthesia problems with surgery. Denies PONV.  Past Surgical History:   Procedure Laterality Date     BACK SURGERY  2011    Diskectomy      SECTION  10/2016     GYN SURGERY  2016    C section 2 ectipics     NISSEN FUNDOPLICATION      starting of Up's     ORTHOPEDIC SURGERY      Knee replacement     SALPINGECTOMY Left 2019    Fallopian tube (including fimbria) with extensive intraluminal hemorrhage,     SALPINGECTOMY Right 2019    tubal pregnancy from IVF   Can tell she is getting a little more acid production.  Last EGD 5 yrs ago. No signs of Up's.      Rt TKA    FAMILY HISTORY:   Family History   Problem Relation Age of Onset     Diabetes Father      Cerebrovascular Disease Father      Hypertension Mother        SOCIAL HISTORY:    Social History Questions Reviewed With Patient 7/8/2021   Which best describes your employment status (select all that apply) I work full-time (currently at home)   If you work, what is your occupation?    Which best describes your marital status:     Do you have children? Yes 1 4 year old boy   Who do you have in your support network that can be available to help you for the first 2 weeks after surgery?    Who can you count on for support throughout your weight loss surgery journey?  Friends   Can you afford 3 meals a day?  Yes   Can you afford 50-60 dollars a month for vitamins? Yes       HABITS:     7/8/2021   How often do you drink alcohol? Monthly or less   If you do drink alcohol, how many drinks might you have in a day? (one drink = 5 oz. wine, 1 can/bottle of beer, 1 shot liquor) 1 or 2   Have you ever used any of the following nicotine products? No   Have you or are you currently using street drugs or prescription strength medication for which you do not have a prescription for? No   Do you have a history of chemical dependency (alcohol or drug abuse)? No       PSYCHOLOGICAL HISTORY:   Psychological History Reviewed With Patient 7/8/2021   Have you ever attempted suicide? Never.   Have you had thoughts of suicide in the past year? No   Have you ever been hospitalized for mental illness or a suicide attempt? Never.   Do you have a history of chronic pain? No   Have you ever been diagnosed with fibromyalgia? No   Are you currently seeing a therapist or counselor?  No   Are you currently seeing a psychiatrist? No       ROS:     7/8/2021   Skin:  None of the above   HEENT: None of these   Musculoskeletal: Joint Pain, Back pain, Arthritis   Cardiovascular: None of the above   Pulmonary: None of the above   Gastrointestinal: Heartburn, Reflux   Genitourinary: Stress incontinence   Hematological: None of the above   Neurological: None of the above   Female only: Polycystic  ovarian syndrome (PCOS)       EATING BEHAVIORS:     7/8/2021   Have you or anyone else thought that you had an eating disorder? No   Do you currently binge eat (eat a large amount of food in a short time)? No   Are you an emotional eater? Yes   Do you get up to eat after falling asleep? No       EXERCISE:     7/8/2021   How often do you exercise? Less than 1 time per week   What is the duration of your exercise (in minutes)? 30 Minutes   What types of exercise do you do? walking   What keeps you from being more active?  Pain, I have just had surgery on one or more of my joints, Too tired   Has a stationary recombinant bike    MEDICATIONS:  Current Outpatient Medications   Medication     cyanocobalamin (VITAMIN B-12) 1000 MCG tablet     metFORMIN (GLUCOPHAGE) 500 MG tablet     multivitamin w/minerals (MULTI-VITAMIN) tablet     No current facility-administered medications for this visit.        ALLERGIES:  Allergies   Allergen Reactions     Codeine GI Disturbance     Rocephin [Ceftriaxone] Hives       LABS AND RECORDS REVIEWED:  Hemoglobin A1C   Date Value Ref Range Status   03/16/2021 5.5 0 - 5.6 % Final     Comment:     Normal <5.7% Prediabetes 5.7-6.4%  Diabetes 6.5% or higher - adopted from ADA   consensus guidelines.       Vitamin D Deficiency screening   Date Value Ref Range Status   12/03/2020 44 20 - 75 ug/L Final     Comment:     Season, race, dietary intake, and treatment affect the concentration of   25-hydroxy-Vitamin D. Values may decrease during winter months and increase   during summer months. Values 20-29 ug/L may indicate Vitamin D insufficiency   and values <20 ug/L may indicate Vitamin D deficiency.  Vitamin D determination is routinely performed by an immunoassay specific for   25 hydroxyvitamin D3.  If an individual is on vitamin D2 (ergocalciferol)   supplementation, please specify 25 OH vitamin D2 and D3 level determination by   LCMSMS test [id number].       TSH   Date Value Ref Range Status    05/18/2020 1.13 0.40 - 4.00 mU/L Final     Sodium   Date Value Ref Range Status   03/16/2021 139 133 - 144 mmol/L Final     Potassium   Date Value Ref Range Status   03/16/2021 4.5 3.4 - 5.3 mmol/L Final     Chloride   Date Value Ref Range Status   03/16/2021 106 94 - 109 mmol/L Final     Carbon Dioxide   Date Value Ref Range Status   03/16/2021 32 20 - 32 mmol/L Final     Anion Gap   Date Value Ref Range Status   03/16/2021 1 (L) 3 - 14 mmol/L Final     Glucose   Date Value Ref Range Status   03/16/2021 113 (H) 70 - 99 mg/dL Final     Urea Nitrogen   Date Value Ref Range Status   03/16/2021 9 7 - 30 mg/dL Final     Creatinine   Date Value Ref Range Status   03/16/2021 0.64 0.52 - 1.04 mg/dL Final     GFR Estimate   Date Value Ref Range Status   03/16/2021 >90 >60 mL/min/[1.73_m2] Final     Comment:     Non  GFR Calc  Starting 12/18/2018, serum creatinine based estimated GFR (eGFR) will be   calculated using the Chronic Kidney Disease Epidemiology Collaboration   (CKD-EPI) equation.       Calcium   Date Value Ref Range Status   03/16/2021 8.5 8.5 - 10.1 mg/dL Final     Bilirubin Total   Date Value Ref Range Status   03/16/2021 0.4 0.2 - 1.3 mg/dL Final     Alkaline Phosphatase   Date Value Ref Range Status   03/16/2021 89 40 - 150 U/L Final     ALT   Date Value Ref Range Status   03/16/2021 21 0 - 50 U/L Final     AST   Date Value Ref Range Status   03/16/2021 10 0 - 45 U/L Final     Cholesterol   Date Value Ref Range Status   03/16/2021 207 (H) <200 mg/dL Final     Comment:     Desirable:       <200 mg/dl     HDL Cholesterol   Date Value Ref Range Status   03/16/2021 39 (L) >49 mg/dL Final     LDL Cholesterol Calculated   Date Value Ref Range Status   03/16/2021 135 (H) <100 mg/dL Final     Comment:     Above desirable:  100-129 mg/dl  Borderline High:  130-159 mg/dL  High:             160-189 mg/dL  Very high:       >189 mg/dl       Triglycerides   Date Value Ref Range Status   03/16/2021 166 (H)  "<150 mg/dL Final     Comment:     Borderline high:  150-199 mg/dl  High:             200-499 mg/dl  Very high:       >499 mg/dl       WBC   Date Value Ref Range Status   12/03/2020 9.7 4.0 - 11.0 10e9/L Final     Hemoglobin   Date Value Ref Range Status   12/03/2020 14.6 11.7 - 15.7 g/dL Final     Hematocrit   Date Value Ref Range Status   12/03/2020 44.0 35.0 - 47.0 % Final     MCV   Date Value Ref Range Status   12/03/2020 87 78 - 100 fl Final     Platelet Count   Date Value Ref Range Status   12/03/2020 180 150 - 450 10e9/L Final         PHYSICAL EXAM:  /86   Pulse 79   Ht 5' 8\" (1.727 m)   Wt 284 lb (128.8 kg)   SpO2 98%   BMI 43.18 kg/m    GENERAL:  Good development and normal affect in no acute distress. Patient is alert and orientated x 3 and answers all questions appropriately.  HEENT: Head is normocephalic, nontraumatic. Pupils equal and round without conjunctival injection. Neck is supple without lymphadenopathy, thyroidmegaly, or mass. Trachea midline. Dentition WNL.   CARDIOVASCULAR:  Regular rate and rhythm without murmurs, rubs, or gallops.  RESPIRATORY: Lungs are clear to auscultation bilaterally with good breath sounds.  GASTROINTESTINAL: Abdomen is obese, nondistended, soft, nontender, without organomegaly or masses. No bruits.  LOWER EXTREMITIES: No LE edema bilaterally, no cyanosis, ulceration, or chronic venous stasis noted.  MUSCULOSKELETAL:  Moves all 4 extremities equal and strong. Has a normal gait.   NEUROLOGIC: Cranial nerves II-XII grossly intact.  SKIN: No intertriginous irritation or rash.     In summary, Luda Rai has Class III obesity with a body mass index of Body mass index is 43.18 kg/m . kg/m2 and the comorbidities stated above. She completed an informational seminar and is a possible candidate for the laparoscopic gastric bypass.  She will have to complete the following pre-requisites:    Lose 5 lbs prior to surgery.  Goal Weight: 279 lbs    Have preoperative " laboratory tests drawn.     Psychological Evaluation with MMPI and clearance for weight loss surgery.    Achieve clearance from dietitian to see surgeon.    A total of 6 structured dietitian weight loss visits are required by your insurance.- met requirement through [facility] in 2020.      I will message medical director today about nissen takedown at time of surgery.  If additional tests needed, will mychart patient and add to tasklist.     Today in the office we discussed gastric bypass surgery.  Preoperative, perioperative, and postoperative processes, management, and follow up were addressed.  Risks and benefits were outlined including the risk of death, staple line leak (1-2%), PE, DVT, ulcer, N/V, stricture, hernia, wound infection, weight regain, and vitamin deficiencies. I emphasized exercise and activity along with appropriate food choice as the main foundation for weight loss with surgery providing surgical reinforcement of this.   A goal sheet and support group handout were given to the patient.     Once the patient has completed the requirements in the above tasklist and there are no further recommendations, pt will see the surgeon for consultation for bariatric surgery.   Patient verbalizes understanding of the process to surgery and the post operative schedule.  All questions were answered.      Sincerely,     Bettina Donovan PA-C

## 2021-07-12 NOTE — PATIENT INSTRUCTIONS
Plan:   Please refer to your task list created today at your appointment.  Labs have been ordered in the system for you. You can have these drawn at any [facility] lab.  Please call [phone] set up an appointment.  Your results will be posted on Private [facility] as soon as they're complete.  After all are resulted, I will review and comment to you.  Make appointment to return to clinic in 1 month to see the dietician and Bettina Donovan PA-C.  _____________________________________________________________________  In general before being submitted for insurance approval, you will need the following:  -Clearance by the Psychologist  -Clearance by the dietitian  -Structured weight loss visits if mandated by your insurance carrier  -Surgeon consult  -Use CPAP for at least one month before surgery if you have sleep apnea. Make sure to bring your CPAP or BiPAP to the hospital at the time of surgery.  -You will need to be tobacco free for 3 months before surgery and remain a non-smoker thereafter. If you are currently smoking or have recently quit, your urine will be evaluated for tobacco metabolites pre-operatively.  -If you have diabetes, you will need to have an A1C less than or equal to 8.0 within 3 months of surgery.  -You will need an exercise plan which includes MOVE, ie., walking and MUSCLE, ie.,calisthenics, bands, weight, machines, etc...  _____________________________________________________________________  Remember that after your bariatric surgery, vitamin supplementation is a lifelong need.  You will take:    B-12 1000mcg or higher sublingual (under the tongue) daily or by injection 1-2X /month  Vitamin D3 5000U daily   Multivitamin containing 18mg of iron twice a day  Calcium citrate 1 or 2 daily  Thiamine 100 mg weekly   Vitron C once daily if you are a menstruating female  To keep your weight off and your vitamin levels up, follow-up is important.  Your labs will be monitored every 3 months for the first year  and yearly thereafter.  To avoid ulcers in your stomach avoid tobacco forever, alcohol in excess, caffeine in excess and anti-inflammatories (NSAIDS)  (Aspirin, Ibuprofen, Naproxen and similar medications). Tylenol is fine.  If you are told by your physician take Aspirin to protect your heart or for another reason, make sure to take omeprazole or similar medication (protonix, nexium, prevacid) to protect your stomach.  Remember that alcohol affects you differently after bariatric surgery. If you have even ONE drink DO NOT DRIVE.

## 2021-07-16 ENCOUNTER — OFFICE VISIT (OUTPATIENT)
Dept: FAMILY MEDICINE | Facility: CLINIC | Age: 41
End: 2021-07-16
Payer: COMMERCIAL

## 2021-07-16 VITALS — SYSTOLIC BLOOD PRESSURE: 122 MMHG | DIASTOLIC BLOOD PRESSURE: 78 MMHG

## 2021-07-16 DIAGNOSIS — D18.01 CHERRY ANGIOMA: ICD-10-CM

## 2021-07-16 DIAGNOSIS — D48.5 NEOPLASM OF UNCERTAIN BEHAVIOR OF SKIN: ICD-10-CM

## 2021-07-16 DIAGNOSIS — L73.8 SEBACEOUS GLAND HYPERPLASIA OF FACE: Primary | ICD-10-CM

## 2021-07-16 DIAGNOSIS — D22.9 MULTIPLE BENIGN NEVI: ICD-10-CM

## 2021-07-16 DIAGNOSIS — L91.8 INFLAMED SKIN TAG: ICD-10-CM

## 2021-07-16 DIAGNOSIS — L91.8 ACROCHORDON: ICD-10-CM

## 2021-07-16 PROCEDURE — 99213 OFFICE O/P EST LOW 20 MIN: CPT | Performed by: PHYSICIAN ASSISTANT

## 2021-07-16 NOTE — PROGRESS NOTES
HPI:  Luda Rai is a 41 year old female patient here today for spots on face  Patient states this has been present for years.  Patient reports the following symptoms: none .  Patient reports the following previous treatments: none.  Patient reports the following modifying factors: none. Also has a spot on left leg.  Associated symptoms: none.  Patient has no other skin complaints today.  Remainder of the HPI, Meds, PMH, Allergies, FH, and SH was reviewed in chart.    Pertinent Hx:   No personal or family history of skin cancer    Past Medical History:   Diagnosis Date     Bone and joint disord back, pelvis, leg complicat preg, childb, puerp     Knee replacement      Endocrine problem     Pcos     Esophageal reflux 2005    Had Nissen Fundiplication 2006     Osteoporosis     Knee       Past Surgical History:   Procedure Laterality Date     BACK SURGERY  2011    Diskectomy      SECTION  10/2016     GYN SURGERY  2016    C section 2 ectipics     NISSEN FUNDOPLICATION      starting of Up's     ORTHOPEDIC SURGERY      Knee replacement     SALPINGECTOMY Left 2019    Fallopian tube (including fimbria) with extensive intraluminal hemorrhage,     SALPINGECTOMY Right 2019    tubal pregnancy from IVF        Family History   Problem Relation Age of Onset     Diabetes Father      Cerebrovascular Disease Father      Hypertension Mother        Social History     Socioeconomic History     Marital status:      Spouse name: Not on file     Number of children: Not on file     Years of education: Not on file     Highest education level: Not on file   Occupational History     Not on file   Tobacco Use     Smoking status: Never Smoker     Smokeless tobacco: Never Used   Substance and Sexual Activity     Alcohol use: Yes     Comment: Rare     Drug use: Never     Sexual activity: Yes     Partners: Male     Birth control/protection: Female Surgical   Other Topics Concern     Not on file   Social  History Narrative     Not on file     Social Determinants of Health     Financial Resource Strain:      Difficulty of Paying Living Expenses:    Food Insecurity:      Worried About Running Out of Food in the Last Year:      Ran Out of Food in the Last Year:    Transportation Needs:      Lack of Transportation (Medical):      Lack of Transportation (Non-Medical):    Physical Activity:      Days of Exercise per Week:      Minutes of Exercise per Session:    Stress:      Feeling of Stress :    Social Connections:      Frequency of Communication with Friends and Family:      Frequency of Social Gatherings with Friends and Family:      Attends Sikhism Services:      Active Member of Clubs or Organizations:      Attends Club or Organization Meetings:      Marital Status:    Intimate Partner Violence:      Fear of Current or Ex-Partner:      Emotionally Abused:      Physically Abused:      Sexually Abused:        Outpatient Encounter Medications as of 7/16/2021   Medication Sig Dispense Refill     cyanocobalamin (VITAMIN B-12) 1000 MCG tablet Take 1 tablet (1,000 mcg) by mouth daily 30 tablet      metFORMIN (GLUCOPHAGE) 500 MG tablet Take 1 tablet (500 mg) by mouth 2 times daily (with meals) for 7 days, THEN 2 tablets (1,000 mg) 2 times daily (with meals). 360 tablet 1     multivitamin w/minerals (MULTI-VITAMIN) tablet Take 1 tablet by mouth daily       No facility-administered encounter medications on file as of 7/16/2021.       Review Of Systems:  Skin: spots  Eyes: negative  Ears/Nose/Throat: negative  Respiratory: No shortness of breath, dyspnea on exertion, cough, or hemoptysis  Cardiovascular: negative  Gastrointestinal: negative  Genitourinary: negative  Musculoskeletal: negative  Neurologic: negative  Psychiatric: negative  Hematologic/Lymphatic/Immunologic: negative  Endocrine: negative      Objective:     /78   Eyes: Conjunctivae/lids: Normal   ENT: Lips:  Normal  MSK: Normal  Cardiovascular: Peripheral  edema none  Pulm: Breathing Normal  Neuro/Psych: Orientation: A/O x 3. Normal; Mood/Affect: Normal, NAD, WDWN  Pt accompanied by: self  Following areas examined: Scalp, face, eyelids, lips, neck, chest, abdomen, back, and R&L upper and lower extremities,  buttock, hips, pt defers exam of groin and genitals.   Joy skin type:ii   Findings:  Red smooth well-defined macules on trunk and extremities.  Brown, stuck-on scaly appearing papules on LLE  Well circumscribed macules with symmetric color distribution on trunk and extremities.  Tan WD smooth macules on face, neck, trunk, and extremities.  flesh-colored smooth pedunculated papules on axilla and left neck  Flesh colored papule/s with yellow lobules and central depression on left cheek    Assessment and Plan:     1) Cherry angiomas, Seborrheic keratoses, Benign nevi, Lentigines, sebaceous gland hyperplasia, acrochordons     I discussed the specifics of tumor, prognosis, and genetics of benign lesions.  I explained that treatment of these lesions would be purely cosmetic and not medically neccessary.  I discussed with patient different removal options including excision, cryotherapy, cautery and /or laser.  Lesion may recur and/or may not completely resolve. May need additional treatment.     Signs and Symptoms of non-melanoma skin cancer and ABCDEs of melanoma reviewed with patient. Patient encouraged to perform monthly self skin exams and educated on how to perform them. UV precautions reviewed with patient. Patient was asked about new or changing moles/lesions on body.   Wear a sunscreen with at least SPF 30 on your face, ears, neck and V of the chest daily. Wear sunscreen on other areas of the body if those areas are exposed to the sun throughout the day. Sunscreens can contain physical and/or chemical blockers. Physical blockers are less likely to clog pores, these include zinc oxide and titanium dioxide. Reapply every two hour and after swimming.  Sunscreen examples include Neutrogena, CeraVe, Blue Lizard, Elta MD and many others.    Proper skin care from [facility]:    -Eliminate harsh soaps as they strip the natural oils from the skin, often resulting in dry itchy skin ( i.e. Dial, Zest, [place])  -Use mild soaps such as Cetaphil or Dove Sensitive Skin in the shower. You do not need to use soap on arms, legs, and trunk every time you shower unless visibly soiled.   -Avoid hot or cold showers.  -After showering, lightly dry off and apply moisturizing within 2-3 minutes. This will help trap moisture in the skin.   -Aggressive use of a moisturizer at least 1-2 times a day to the entire body (including -Vanicream, Cetaphil, Aquaphor or Cerave) and moisturize hands after every washing.  -We recommend using moisturizers that come in a tub that needs to be scooped out, not a pump. This has more of an oil base. It will hold moisture in your skin much better than a water base moisturizer. The above recommended are non-pore clogging.         It was a pleasure speaking with Luda Rai today.       Follow up in yearly FBE

## 2021-07-16 NOTE — LETTER
2021         RE: Luda Rai  [address]  [facility] [address]        Dear Colleague,    Thank you for referring your patient, Luda Rai, to the [facility]. Please see a copy of my visit note below.    HPI:  Luda Rai is a 41 year old female patient here today for spots on face  Patient states this has been present for years.  Patient reports the following symptoms: none .  Patient reports the following previous treatments: none.  Patient reports the following modifying factors: none. Also has a spot on left leg.  Associated symptoms: none.  Patient has no other skin complaints today.  Remainder of the HPI, Meds, PMH, Allergies, FH, and SH was reviewed in chart.    Pertinent Hx:   No personal or family history of skin cancer    Past Medical History:   Diagnosis Date     Bone and joint disord back, pelvis, leg complicat preg, childb, puerp     Knee replacement      Endocrine problem     Pcos     Esophageal reflux 2005    Had Nissen Fundiplication 2006     Osteoporosis     Knee       Past Surgical History:   Procedure Laterality Date     BACK SURGERY  2011    Diskectomy      SECTION  10/2016     GYN SURGERY  2016    C section 2 ectipics     NISSEN FUNDOPLICATION      starting of Up's     ORTHOPEDIC SURGERY      Knee replacement     SALPINGECTOMY Left 2019    Fallopian tube (including fimbria) with extensive intraluminal hemorrhage,     SALPINGECTOMY Right 2019    tubal pregnancy from IVF        Family History   Problem Relation Age of Onset     Diabetes Father      Cerebrovascular Disease Father      Hypertension Mother        Social History     Socioeconomic History     Marital status:      Spouse name: Not on file     Number of children: Not on file     Years of education: Not on file     Highest education level: Not on file   Occupational History     Not on file   Tobacco Use     Smoking status: Never Smoker      Smokeless tobacco: Never Used   Substance and Sexual Activity     Alcohol use: Yes     Comment: Rare     Drug use: Never     Sexual activity: Yes     Partners: Male     Birth control/protection: Female Surgical   Other Topics Concern     Not on file   Social History Narrative     Not on file     Social Determinants of Health     Financial Resource Strain:      Difficulty of Paying Living Expenses:    Food Insecurity:      Worried About Running Out of Food in the Last Year:      Ran Out of Food in the Last Year:    Transportation Needs:      Lack of Transportation (Medical):      Lack of Transportation (Non-Medical):    Physical Activity:      Days of Exercise per Week:      Minutes of Exercise per Session:    Stress:      Feeling of Stress :    Social Connections:      Frequency of Communication with Friends and Family:      Frequency of Social Gatherings with Friends and Family:      Attends [employer] Services:      Active Member of Clubs or Organizations:      Attends Club or Organization Meetings:      Marital Status:    Intimate Partner Violence:      Fear of Current or Ex-Partner:      Emotionally Abused:      Physically Abused:      Sexually Abused:        Outpatient Encounter Medications as of 7/16/2021   Medication Sig Dispense Refill     cyanocobalamin (VITAMIN B-12) 1000 MCG tablet Take 1 tablet (1,000 mcg) by mouth daily 30 tablet      metFORMIN (GLUCOPHAGE) 500 MG tablet Take 1 tablet (500 mg) by mouth 2 times daily (with meals) for 7 days, THEN 2 tablets (1,000 mg) 2 times daily (with meals). 360 tablet 1     multivitamin w/minerals (MULTI-VITAMIN) tablet Take 1 tablet by mouth daily       No facility-administered encounter medications on file as of 7/16/2021.       Review Of Systems:  Skin: spots  Eyes: negative  Ears/Nose/Throat: negative  Respiratory: No shortness of breath, dyspnea on exertion, cough, or hemoptysis  Cardiovascular: negative  Gastrointestinal: negative  Genitourinary:  negative  Musculoskeletal: negative  Neurologic: negative  Psychiatric: negative  Hematologic/Lymphatic/Immunologic: negative  Endocrine: negative      Objective:     /78   Eyes: Conjunctivae/lids: Normal   ENT: Lips:  Normal  MSK: Normal  Cardiovascular: Peripheral edema none  Pulm: Breathing Normal  Neuro/Psych: Orientation: A/O x 3. Normal; Mood/Affect: Normal, NAD, WDWN  Pt accompanied by: self  Following areas examined: Scalp, face, eyelids, lips, neck, chest, abdomen, back, and R&L upper and lower extremities,  buttock, hips, pt defers exam of groin and genitals.   Joy skin type:ii   Findings:  Red smooth well-defined macules on trunk and extremities.  Brown, stuck-on scaly appearing papules on LLE  Well circumscribed macules with symmetric color distribution on trunk and extremities.  Tan WD smooth macules on face, neck, trunk, and extremities.  flesh-colored smooth pedunculated papules on axilla and left neck  Flesh colored papule/s with yellow lobules and central depression on left cheek    Assessment and Plan:     1) Cherry angiomas, Seborrheic keratoses, Benign nevi, Lentigines, sebaceous gland hyperplasia, acrochordons     I discussed the specifics of tumor, prognosis, and genetics of benign lesions.  I explained that treatment of these lesions would be purely cosmetic and not medically neccessary.  I discussed with patient different removal options including excision, cryotherapy, cautery and /or laser.  Lesion may recur and/or may not completely resolve. May need additional treatment.     Signs and Symptoms of non-melanoma skin cancer and ABCDEs of melanoma reviewed with patient. Patient encouraged to perform monthly self skin exams and educated on how to perform them. UV precautions reviewed with patient. Patient was asked about new or changing moles/lesions on body.   Wear a sunscreen with at least SPF 30 on your face, ears, neck and V of the chest daily. Wear sunscreen on other areas  of the body if those areas are exposed to the sun throughout the day. Sunscreens can contain physical and/or chemical blockers. Physical blockers are less likely to clog pores, these include zinc oxide and titanium dioxide. Reapply every two hour and after swimming. Sunscreen examples include Neutrogena, CeraVe, Blue Lizard, Elta MD and many others.    Proper skin care from [facility]:    -Eliminate harsh soaps as they strip the natural oils from the skin, often resulting in dry itchy skin ( i.e. Dial, Zest, Kenyan Spring)  -Use mild soaps such as Cetaphil or Dove Sensitive Skin in the shower. You do not need to use soap on arms, legs, and trunk every time you shower unless visibly soiled.   -Avoid hot or cold showers.  -After showering, lightly dry off and apply moisturizing within 2-3 minutes. This will help trap moisture in the skin.   -Aggressive use of a moisturizer at least 1-2 times a day to the entire body (including -Vanicream, Cetaphil, Aquaphor or Cerave) and moisturize hands after every washing.  -We recommend using moisturizers that come in a tub that needs to be scooped out, not a pump. This has more of an oil base. It will hold moisture in your skin much better than a water base moisturizer. The above recommended are non-pore clogging.         It was a pleasure speaking with Luda Rai today.       Follow up in yearly FBE        Again, thank you for allowing me to participate in the care of your patient.        Sincerely,        Elena Bettencourt PA-C

## 2021-07-16 NOTE — PATIENT INSTRUCTIONS
Proper skin care from [facility]:    -Eliminate harsh soaps as they strip the natural oils from the skin, often resulting in dry itchy skin ( i.e. Dial, Zest, Rosemary Spring)  -Use mild soaps such as Cetaphil or Dove Sensitive Skin in the shower. You do not need to use soap on arms, legs, and trunk every time you shower unless visibly soiled.   -Avoid hot or cold showers.  -After showering, lightly dry off and apply moisturizing within 2-3 minutes. This will help trap moisture in the skin.   -Aggressive use of a moisturizer at least 1-2 times a day to the entire body (including -Vanicream, Cetaphil, Aquaphor or Cerave) and moisturize hands after every washing.  -We recommend using moisturizers that come in a tub that needs to be scooped out, not a pump. This has more of an oil base. It will hold moisture in your skin much better than a water base moisturizer. The above recommended are non-pore clogging.      Wear a sunscreen with at least SPF 30 on your face, ears, neck and V of the chest daily. Wear sunscreen on other areas of the body if those areas are exposed to the sun throughout the day. Sunscreens can contain physical and/or chemical blockers. Physical blockers are less likely to clog pores, these include zinc oxide and titanium dioxide. Reapply every two hour and after swimming. Sunscreen examples include Neutrogena, CeraVe, Blue Lizard, Elta MD and many others.    UV radiation  UVA radiation remains constant throughout the day and throughout the year. It is a longer wavelength than UVB and therefore penetrates deeper into the skin leading to immediate and delayed tanning, photoaging, and skin cancer. 70-80% of UVA and UVB radiation occurs between the hours of 10am-2pm.  UVB radiation  UVB radiation causes the most harmful effects and is more significant during the summer months. However, snow and ice can reflect UVB radiation leading to skin damage during the winter months as well. UVB radiation is  responsible for tanning, burning, inflammation, delayed erythema (pinkness), pigmentation (brown spots), and skin cancer.     I recommend self monthly full body exams and yearly full body exams with a dermatology provider. If you develop a new or changing lesion please follow up for examination. Most skin cancers are pink and scaly or pink and pearly. However, we do see blue/brown/black skin cancers.  Consider the ABCDEs of melanoma when giving yourself your monthly full body exam ( don't forget the groin, buttocks, feet, toes, etc). A-asymmetry, B-borders, C-color, D-diameter, E-elevation or evolving. If you see any of these changes please follow up in clinic. If you cannot see your back I recommend purchasing a hand held mirror to use with a larger wall mirror.      Sebaceous gland hyperplasia L73.8 treatment code 84569  Irritated/inflamed skin tags L91.8 treatment code 74245   Irritated Seborrheic keratoses L82.0 treatment code 98997    Questions about the cost of your care?    Please contact our consumer price line at [phone] ([facility]) or visit [email or web address] for an estimate on the care you received today or upcoming procedures.  To determine pricing, you will be asked for:    Name  Date of birth  Phone number  Date of upcoming office visit or procedure  If insured, policy information  Name or CPT (common procedural terminology) code of test or procedure    For information on how your insurance will cover you visit, please call the customer service number on your insurance card.

## 2021-07-19 ENCOUNTER — TRANSFERRED RECORDS (OUTPATIENT)
Dept: HEALTH INFORMATION MANAGEMENT | Facility: CLINIC | Age: 41
End: 2021-07-19

## 2021-08-11 NOTE — PROGRESS NOTES
"PRE SURGICAL WEIGHT LOSS NUTRITION APPOINTMENT    Luda Rai  1980  female  [id number]  41 year old    ASSESSMENT    Desired Surgical Procedure: gastric bypass    REASON FOR VISIT:  Luda Rai is a 41 year old year old female presents today for a pre-surgical weight loss follow-up appointment. Patient accompanied by self.    DIAGNOSIS:  Weight Status Obesity Grade III BMI >40    ANTHROPOMETRICS:  Height: 68\"    Initial Weight: 284 lbs     Current weight: 280 lbs 12.8 oz    BMI: Body mass index is 42.7 kg/m .    VITAMINS AND MINERALS:  1 Multivitamin with Minerals (AM)  (pt unsure of dose) International units Vitamin D (AM)      NUTRITION HISTORY:  Breakfast: [wakes at 7:30am, eats at 9am] english muffin + peanut butter  fruit  yogurt  eggs   Lunch: [1pm] cheese + meat (or sandwich) + fruits + vegetables +/- chips   Supper: [6pm] take-out or fast food: [employer] burger and fries, chicken wings or sandwich  cooks ~3x/week - greek chicken pitas w/cucumber salad  tacos + mexican rice   Snacks: rare   Fluids Consumed: Water (60-90oz), diet coke (16oz)  Eating slower: No  Chewing foods thoroughly: No  Take 20-30 minutes to consume each meal: No  Fluids and meals separate by at least 30 minutes: No  Carbonation: yes  Caffeine: yes  Additional Information: Pt pleased with progress toward weight loss goal - note change of goal to 10 lbs d/t liver inflammation (task list adjusted). Reviewed expectations for behavior change in anticipation of surgery.     PHYSICAL ACTIVITY:  None outside of daily activity    DIAGNOSIS:  Previous Nutrition Diagnosis: Obesity related to long history of self- monitoring deficit and excessive energy intake evidenced by BMI of 43.18 kg/m2  No change, modified below    Previous goals:   Begin taking multivitamin, calcium and vitamin D per guidelines - partially met  Increase water intake to 64oz per day - met  Eat breakfast within 1h of waking up - not met/improving    Current " Nutrition Diagnosis: Obesity related to long history of self-monitoring deficit and excessive energy intake as evidenced by BMI of 42.7 kg/m2.    INTERVENTION:  Nutrition Prescription: Recommended energy/nutrient modification.    GOALS:  Take calcium per guidelines  Eat slowly: 20-30 minutes per meal  Separate fluids and meals by 30 minutes  Eliminate caffeine and carbonation    Intervention:  - Discussed progress towards previous goals.  - Reinforced importance of making behavior changes in preparation for bariatric surgery.   - Assessed learning needs and learning preferences       NUTRITION MONITORING AND EVALUATION:  Anticipated compliance: fair-good  Patient demonstrated good understanding.     Follow up: Continue to monitor patient closely regarding weight loss and diet.  # of visits needed: 1-2  Cleared by RD: No     TIME SPENT WITH PATIENT: 23 minutes      Liana Crandall RD, LD  Clinical Dietitian

## 2021-08-12 ENCOUNTER — OFFICE VISIT (OUTPATIENT)
Dept: SURGERY | Facility: CLINIC | Age: 41
End: 2021-08-12
Payer: COMMERCIAL

## 2021-08-12 VITALS — BODY MASS INDEX: 42.56 KG/M2 | HEIGHT: 68 IN | WEIGHT: 280.8 LBS

## 2021-08-12 PROCEDURE — 97803 MED NUTRITION INDIV SUBSEQ: CPT | Performed by: DIETITIAN, REGISTERED

## 2021-08-12 ASSESSMENT — MIFFLIN-ST. JEOR: SCORE: 1982.2

## 2021-08-12 NOTE — PATIENT INSTRUCTIONS
1. Eliminate caffeine and carbonation    2. Begin calcium: 500mg 3x/day OR 600mg 2x/day. Take separate from multivitamin    3. Eat slowly: 20-30 minutes per meal    4. Separate fluids and meals by 30 minutes

## 2021-09-10 ENCOUNTER — TRANSFERRED RECORDS (OUTPATIENT)
Dept: HEALTH INFORMATION MANAGEMENT | Facility: CLINIC | Age: 41
End: 2021-09-10

## 2021-09-18 ENCOUNTER — HEALTH MAINTENANCE LETTER (OUTPATIENT)
Age: 41
End: 2021-09-18

## 2021-09-28 ENCOUNTER — DOCUMENTATION ONLY (OUTPATIENT)
Dept: LAB | Facility: CLINIC | Age: 41
End: 2021-09-28

## 2021-09-28 DIAGNOSIS — E78.5 DYSLIPIDEMIA: Primary | ICD-10-CM

## 2021-09-28 NOTE — PROGRESS NOTES
Luda Rai has an upcoming lab appointment:    Future Appointments   Date Time Provider Department Center   10/1/2021  1:00 PM RV LAB [facility]   10/12/2021  9:00 AM 1, Sh Jennifer Diet, RD [facility]     Patient is scheduled for the following lab(s): LIPIDS    Patient either has no future order, or has Health Maintenance labs due. Please review and place either future orders or HMPO (Review of Health Maintenance Protocol Orders), as appropriate.    Health Maintenance Due   Topic     ANNUAL REVIEW OF HM ORDERS      HIV SCREENING      HEPATITIS C SCREENING      Elena Delgado

## 2021-10-06 NOTE — PROGRESS NOTES
"PRE SURGICAL WEIGHT LOSS NUTRITION APPOINTMENT    Luda Rai  1980  female  [id number]  41 year old    ASSESSMENT    Desired Surgical Procedure: gastric bypass    REASON FOR VISIT:  Luda Rai is a 41 year old year old female presents today for a pre-surgical weight loss follow-up appointment. Patient accompanied by self.    DIAGNOSIS:  Weight Status Obesity Grade III BMI >40    ANTHROPOMETRICS:  Height: 68\"   Initial Weight: 284 lb    Weight last visit: 280.8 lb    Current weight: 282.9 lb   BMI: 43.01   kg/(m^2).    VITAMINS AND MINERALS:   1 Multivitamin with Minerals (AM)-morning  (pt unsure of dose) International units Vitamin D (AM)  600 mg Calcium with Vitamin D BID (mid morning/ afternoon)        NUTRITION HISTORY:  Breakfast: English muffin with peanut butter or Greek yogurt or eggs, sausage on weekend  Lunch: meat sandwich or taco salad or leftover from dinner  Supper: chicken or pork or salmon, potatoes, green beans or broccoli or brussels sprouts  Snacks: rare or fresh fruit or cheese stick  Fluids Consumed: Water,iced tea, ETOH-very rare,   Eating slower: Yes  Chewing foods thoroughly: Yes  Take 20-30 minutes to consume each meal: Yes  Fluids and meals separate by at least 30 minutes: Yes  Carbonation: no  Caffeine: yes  Additional Information: Working ~ 80 hours per week at 2 jobs. She will be cutting back her hours soon.  More social eating due to birthdays. Biggest accomplishment was giving up Diet Coke. She said her insurance will be counting the visit she did at [facility] when she was on [employer]. Entered 2 visits that were in the past 1 year on her pre-op task list.     PHYSICAL ACTIVITY:  Type: stationary bike  Frequency: 3 (days per week)  Duration: 30 (minutes)     DIAGNOSIS:  Previous Nutrition Diagnosis: Obesity related to long history of self- monitoring deficit and excessive energy intake evidenced by BMI of 42.7 kg/m2  No change, modified below    Previous goals:   Take calcium " per guidelines-met  Eat slowly: 20-30 minutes per meal-met  Separate fluids and meals by 30 minutes-met  Eliminate caffeine and carbonation-partially met    Current Nutrition Diagnosis: Obesity related to long history of self-monitoring deficit and excessive energy intake as evidenced by BMI of 43.01 kg/m2.    INTERVENTION:  Nutrition Prescription: Recommended energy/nutrient modification.    GOALS:  Verify does of vitamin D (5000 international unit(s) recommended unless labs are not within normal limits)  Try some protein drinks or protein guajardo for post-op  Switch to decaffeinated tea or herbal tea  Focus on smaller portions/ try smaller plate/ bowl/utensil      Intervention:  -Reviewed Tips for Weight loss and Weight Maintenance (written information provided)  -Discussed use of a bariatric mary ann to help stay on task  - Discussed progress towards previous goals.  - Reinforced importance of making behavior changes in preparation for bariatric surgery.   - Assessed learning needs and learning preferences       NUTRITION MONITORING AND EVALUATION:  Anticipated compliance: fair-good  Patient demonstrated good understanding.       Follow up: Continue to monitor patient closely regarding weight loss and diet.  # of visits needed: 2 (will depend on progress with weight loss)  Cleared by RD: No     TIME SPENT WITH PATIENT: 26 minutes  Perfecto Cosme RD, ZAIRA  [facility]

## 2021-10-12 ENCOUNTER — OFFICE VISIT (OUTPATIENT)
Dept: SURGERY | Facility: CLINIC | Age: 41
End: 2021-10-12
Payer: COMMERCIAL

## 2021-10-12 VITALS — WEIGHT: 282.9 LBS | BODY MASS INDEX: 43.01 KG/M2

## 2021-10-12 DIAGNOSIS — E66.01 MORBID OBESITY (H): ICD-10-CM

## 2021-10-12 PROCEDURE — 97803 MED NUTRITION INDIV SUBSEQ: CPT | Performed by: DIETITIAN, REGISTERED

## 2021-10-19 ENCOUNTER — MYC MEDICAL ADVICE (OUTPATIENT)
Dept: FAMILY MEDICINE | Facility: CLINIC | Age: 41
End: 2021-10-19

## 2021-10-21 NOTE — TELEPHONE ENCOUNTER
My chart message sent     Denise Leal RN, BSN  [facility]      
0

## 2021-10-26 ENCOUNTER — LAB (OUTPATIENT)
Dept: LAB | Facility: CLINIC | Age: 41
End: 2021-10-26
Payer: COMMERCIAL

## 2021-10-26 DIAGNOSIS — Z13.0 SCREENING FOR ENDOCRINE, NUTRITIONAL, METABOLIC AND IMMUNITY DISORDER: ICD-10-CM

## 2021-10-26 DIAGNOSIS — E55.9 HYPOVITAMINOSIS D: ICD-10-CM

## 2021-10-26 DIAGNOSIS — Z13.0 SCREENING FOR IRON DEFICIENCY ANEMIA: ICD-10-CM

## 2021-10-26 DIAGNOSIS — Z13.21 SCREENING FOR ENDOCRINE, NUTRITIONAL, METABOLIC AND IMMUNITY DISORDER: ICD-10-CM

## 2021-10-26 DIAGNOSIS — Z13.228 SCREENING FOR ENDOCRINE, NUTRITIONAL, METABOLIC AND IMMUNITY DISORDER: ICD-10-CM

## 2021-10-26 DIAGNOSIS — Z11.59 NEED FOR HEPATITIS C SCREENING TEST: ICD-10-CM

## 2021-10-26 DIAGNOSIS — Z11.4 SCREENING FOR HIV (HUMAN IMMUNODEFICIENCY VIRUS): ICD-10-CM

## 2021-10-26 DIAGNOSIS — E66.01 MORBID OBESITY (H): ICD-10-CM

## 2021-10-26 DIAGNOSIS — E28.2 PCOS (POLYCYSTIC OVARIAN SYNDROME): ICD-10-CM

## 2021-10-26 DIAGNOSIS — Z13.29 SCREENING FOR ENDOCRINE, NUTRITIONAL, METABOLIC AND IMMUNITY DISORDER: ICD-10-CM

## 2021-10-26 LAB
ALBUMIN SERPL-MCNC: 3.2 G/DL (ref 3.4–5)
ALP SERPL-CCNC: 88 U/L (ref 40–150)
ALT SERPL W P-5'-P-CCNC: 24 U/L (ref 0–50)
ANION GAP SERPL CALCULATED.3IONS-SCNC: 6 MMOL/L (ref 3–14)
AST SERPL W P-5'-P-CCNC: 14 U/L (ref 0–45)
BILIRUB SERPL-MCNC: 0.6 MG/DL (ref 0.2–1.3)
BUN SERPL-MCNC: 10 MG/DL (ref 7–30)
CALCIUM SERPL-MCNC: 8.5 MG/DL (ref 8.5–10.1)
CHLORIDE BLD-SCNC: 105 MMOL/L (ref 94–109)
CO2 SERPL-SCNC: 25 MMOL/L (ref 20–32)
CREAT SERPL-MCNC: 0.76 MG/DL (ref 0.52–1.04)
DEPRECATED CALCIDIOL+CALCIFEROL SERPL-MC: 30 UG/L (ref 20–75)
ERYTHROCYTE [DISTWIDTH] IN BLOOD BY AUTOMATED COUNT: 13.3 % (ref 10–15)
GFR SERPL CREATININE-BSD FRML MDRD: >90 ML/MIN/1.73M2
GLUCOSE BLD-MCNC: 122 MG/DL (ref 70–99)
HBA1C MFR BLD: 5.7 % (ref 0–5.6)
HCT VFR BLD AUTO: 41.3 % (ref 35–47)
HGB BLD-MCNC: 13.5 G/DL (ref 11.7–15.7)
MCH RBC QN AUTO: 28.4 PG (ref 26.5–33)
MCHC RBC AUTO-ENTMCNC: 32.7 G/DL (ref 31.5–36.5)
MCV RBC AUTO: 87 FL (ref 78–100)
PLATELET # BLD AUTO: 171 10E3/UL (ref 150–450)
POTASSIUM BLD-SCNC: 4 MMOL/L (ref 3.4–5.3)
PROT SERPL-MCNC: 7.4 G/DL (ref 6.8–8.8)
RBC # BLD AUTO: 4.76 10E6/UL (ref 3.8–5.2)
SODIUM SERPL-SCNC: 136 MMOL/L (ref 133–144)
WBC # BLD AUTO: 5.9 10E3/UL (ref 4–11)

## 2021-10-26 PROCEDURE — 36415 COLL VENOUS BLD VENIPUNCTURE: CPT

## 2021-10-26 PROCEDURE — 85027 COMPLETE CBC AUTOMATED: CPT

## 2021-10-26 PROCEDURE — 80053 COMPREHEN METABOLIC PANEL: CPT

## 2021-10-26 PROCEDURE — 82306 VITAMIN D 25 HYDROXY: CPT

## 2021-10-26 PROCEDURE — 83036 HEMOGLOBIN GLYCOSYLATED A1C: CPT

## 2022-01-08 ENCOUNTER — HEALTH MAINTENANCE LETTER (OUTPATIENT)
Age: 42
End: 2022-01-08

## 2022-01-11 ENCOUNTER — OFFICE VISIT (OUTPATIENT)
Dept: OBGYN | Facility: CLINIC | Age: 42
End: 2022-01-11
Payer: COMMERCIAL

## 2022-01-11 VITALS — WEIGHT: 285 LBS | DIASTOLIC BLOOD PRESSURE: 84 MMHG | SYSTOLIC BLOOD PRESSURE: 128 MMHG | BODY MASS INDEX: 43.33 KG/M2

## 2022-01-11 DIAGNOSIS — R10.2 PELVIC PAIN IN FEMALE: Primary | ICD-10-CM

## 2022-01-11 PROCEDURE — 99202 OFFICE O/P NEW SF 15 MIN: CPT | Performed by: ADVANCED PRACTICE MIDWIFE

## 2022-01-11 NOTE — PROGRESS NOTES
"  SUBJECTIVE:                                                   Luda Rai is a 41 year old who presents to clinic today for the following health issue(s):  Patient presents with:  Consult: c/o random sharp cramping, lower abdomen-sometimes to right side, also feels an intermittent fullness in uterus x1.5 mos  Gyn Exam: pap? last 2018      HPI:  Luda reports right sided lower abdominal pain that appears to be random x 1.5mo. Also feels \"heaviness\" in her pelvis. She reports she has been paying attention to when this pain occurs and it doesn't correlate with her periods.   Pap UTD    Patient's last menstrual period was 2021.  Menstrual History: frequency: every 28 days  Patient is sexually active  No obstetric history on file..  Using female sterilization for contraception.     STI infx testing offered:  Declined    Last PHQ-9 score on record = No flowsheet data found.  Last GAD7 score on record = No flowsheet data found.  Alcohol Score = 0    Problem list and histories reviewed & adjusted, as indicated.  Additional history: as documented.    Patient Active Problem List   Diagnosis     Bronchitis     Dyslipidemia     Fatty liver     Morbid obesity (H)     Osteoarthrosis, unspecified whether generalized or localized, lower leg     PCOS (polycystic ovarian syndrome)     Primary osteoarthritis of left knee     Vitamin B12 deficiency     Hypovitaminosis D     Low vitamin B12 level     Mammographic microcalcification     Past Surgical History:   Procedure Laterality Date     BACK SURGERY  2011    Diskectomy      SECTION  10/2016     GYN SURGERY  2016    C section 2 ectipics     NISSEN FUNDOPLICATION      starting of Up's     ORTHOPEDIC SURGERY      Knee replacement     SALPINGECTOMY Left 2019    Fallopian tube (including fimbria) with extensive intraluminal hemorrhage,     SALPINGECTOMY Right 2019    tubal pregnancy from IVF      Social History     Tobacco Use     Smoking " status: Never Smoker     Smokeless tobacco: Never Used   Substance Use Topics     Alcohol use: Yes     Comment: Rare      Problem (# of Occurrences) Relation (Name,Age of Onset)    Cerebrovascular Disease (1) Father (Seferino Rai)    Diabetes (1) Father (Seferino Rai)    Hypertension (1) Mother (Cyn)            Current Outpatient Medications   Medication Sig     cyanocobalamin (VITAMIN B-12) 1000 MCG tablet Take 1 tablet (1,000 mcg) by mouth daily     metFORMIN (GLUCOPHAGE) 500 MG tablet Take 1 tablet (500 mg) by mouth 2 times daily (with meals) for 7 days, THEN 2 tablets (1,000 mg) 2 times daily (with meals).     multivitamin w/minerals (MULTI-VITAMIN) tablet Take 1 tablet by mouth daily     No current facility-administered medications for this visit.     Allergies   Allergen Reactions     Codeine GI Disturbance     Rocephin [Ceftriaxone] Hives       ROS:  CONSTITUTIONAL: NEGATIVE for fever, chills, change in weight  INTEGUMENTARU/SKIN: NEGATIVE for worrisome rashes, moles or lesions  EYES: NEGATIVE for vision changes or irritation  ENT: NEGATIVE for ear, mouth and throat problems  RESP: NEGATIVE for significant cough or SOB  BREAST: NEGATIVE for masses, tenderness or discharge  CV: NEGATIVE for chest pain, palpitations or peripheral edema  GI: NEGATIVE for nausea, abdominal pain, heartburn, or change in bowel habits  : NEGATIVE for unusual urinary or vaginal symptoms. Periods are regular. Pelvic pain/fullness   MUSCULOSKELETAL: NEGATIVE for significant arthralgias or myalgia  NEURO: NEGATIVE for weakness, dizziness or paresthesias  PSYCHIATRIC: NEGATIVE for changes in mood or affect    OBJECTIVE:     /84 (BP Location: Left arm, Cuff Size: Adult Large)   Wt 129.3 kg (285 lb)   LMP 12/28/2021   BMI 43.33 kg/m    Body mass index is 43.33 kg/m .    PHYSICAL EXAM:  Constitutional:  Appearance: Well nourished, well developed alert, in no acute distress  Skin: General Inspection:  No rashes present, no lesions  present, no areas of discoloration.  Neurologic:  Mental Status:  Oriented X3.  Normal strength and tone, sensory exam grossly normal, mentation intact and speech normal.    Psychiatric:  Mentation appears normal and affect normal/bright.  Pelvic Exam:  External Genitalia:     Normal appearance for age, no discharge present, no tenderness present, no inflammatory lesions present, color normal  Vagina:     Normal vaginal vault without central or paravaginal defects, no discharge present, no inflammatory lesions present, no masses present  Bladder:     Nontender to palpation  Cervix:     Appearance healthy, no lesions present, nontender to palpation, no bleeding present, neg CMT  Uterus:     Uterus: firm, normal sized and nontender, difficult to palpate d/t habitus   Adnexa:     No adnexal tenderness present, no adnexal masses present  Perineum:     Perineum within normal limits, no evidence of trauma, no rashes or skin lesions present  Anus:     Anus within normal limits, no hemorrhoids present  Inguinal Lymph Nodes:     No lymphadenopathy present  Pubic Hair:     Normal pubic hair distribution for age  Genitalia and Groin:     No rashes present, no lesions present, no areas of discoloration, no masses present       In-Clinic Test Results:  No results found for this or any previous visit (from the past 24 hour(s)).    ASSESSMENT/PLAN:                                                        ICD-10-CM    1. Pelvic pain in female  R10.2 US Pelvic Complete with Transvaginal     - TVUS ordered to assess for causes of intermittent right sided pelvic pain and fullness such as ovarian cyst. UPT not ordered as pt has bilateral salpingectomy after hx of two ectopic pregnancies.   - If TVUS wnl and symptoms continue, consider GI referral for further evaluation. Could also consider pelvic flood PT d/t hx of three abdominal surgeries (salpigectomy following ectopic x 2,  section) and presence of scar tissue or adhesions  that could be causing pain/senstation of fullness.     Return if symptoms worsen or fail to improve.    MAXIM Arciniega, BRYN    Total time spent was 15 minutes; this includes pre-work time, intra-service time, and post-work time including time spent on documentation which occurred on the date of service.

## 2022-01-11 NOTE — NURSING NOTE
"Chief Complaint   Patient presents with     Consult     c/o random sharp cramping, lower abdomen-sometimes to right side, also feels an intermittent fullness in uterus x1.5 mos     Gyn Exam     pap? last 11-8-2018       Initial /84 (BP Location: Left arm, Cuff Size: Adult Large)   Wt 129.3 kg (285 lb)   LMP 12/28/2021   BMI 43.33 kg/m   Estimated body mass index is 43.33 kg/m  as calculated from the following:    Height as of 8/12/21: 1.727 m (5' 8\").    Weight as of this encounter: 129.3 kg (285 lb).  BP completed using cuff size: large    Questioned patient about current smoking habits.  Pt. has never smoked.      "

## 2022-03-03 ENCOUNTER — VIRTUAL VISIT (OUTPATIENT)
Dept: SURGERY | Facility: CLINIC | Age: 42
End: 2022-03-03
Payer: COMMERCIAL

## 2022-03-03 VITALS — WEIGHT: 273 LBS | BODY MASS INDEX: 41.51 KG/M2

## 2022-03-03 DIAGNOSIS — E66.01 MORBID OBESITY (H): ICD-10-CM

## 2022-03-03 PROCEDURE — 97803 MED NUTRITION INDIV SUBSEQ: CPT | Performed by: DIETITIAN, REGISTERED

## 2022-03-03 NOTE — PROGRESS NOTES
"Video-Visit Details    Type of service:  Video Visit    Video Start Time (time video started): 9:56    Video End Time (time video stopped): 10:15     Originating Location (pt. Location): Home    Distant Location (provider location):  [facility] [place]     Mode of Communication:  Video Conference via [employer]    PRE SURGICAL WEIGHT LOSS NUTRITION APPOINTMENT    Luda Rai  1980  female  [id number]  42 year old    ASSESSMENT    Desired Surgical Procedure: gastric bypass    REASON FOR VISIT:  Luda Rai is a 42 year old year old female presents today for a pre-surgical weight loss follow-up appointment. Patient accompanied by self.    DIAGNOSIS:  Weight Status Obesity Grade III BMI >40    ANTHROPOMETRICS:  Height: 68\"   Initial Weight: 284 lb    Previous weight: 282.9 lb   Current weight: 273 lbs   BMI: 41.5  kg/(m^2).    VITAMINS AND MINERALS:  1 Multivitamin with Minerals (AM)-morning  800 International units Vitamin D (AM)  600 mg Calcium with Vitamin D BID (mid morning/ afternoon)    NUTRITION HISTORY:  Breakfast: English muffin with peanut butter or Greek yogurt or eggs, sausage on weekend; hard boiled egg  Lunch: meat sandwich or taco salad or leftover from dinner; roll up with turkey and cheese or tuna with celery + fruit   Supper: chicken or pork or salmon, potatoes, green beans or broccoli or brussels sprouts  Snacks: rare or fresh fruit or cheese stick  Fluids Consumed: Water 32 oz x 5 with Ugo; peach or peppermint tea   Eating slower: Yes  Chewing foods thoroughly: Yes  Take 20-30 minutes to consume each meal: Yes  Fluids and meals separate by at least 30 minutes: Yes  Carbonation: none  Caffeine: none   Additional Information: Patient has switched to small plates and utensils.  Patient consistently taking 20 minutes with meals now that she is using smaller utensil.      PHYSICAL ACTIVITY:  Type: biking   Frequency: 3 (days per week)  Duration: 30 (minutes) "     DIAGNOSIS:  Previous Nutrition Diagnosis: Obesity related to long history of self- monitoring deficit and excessive energy intake evidenced by BMI of 43 kg/m2  No change, modified below    Previous goals:   Verify does of vitamin D (5000 international unit(s) recommended unless labs are not within normal limits)-met-needs higher dose vitamin D   Try some protein drinks or protein guajardo for post-op-improving   Switch to decaffeinated tea or herbal tea-met  Focus on smaller portions/ try smaller plate/ bowl/utensil-met    Current Nutrition Diagnosis: Obesity related to long history of self-monitoring deficit and excessive energy intake as evidenced by BMI of 41.5 kg/m2.    INTERVENTION:  Nutrition Prescription: Recommended energy/nutrient modification.    GOALS:  Continue following pre surgery diet guidelines  Switch to vitamin D for combined 5000 international unit(s) vitamin D     Intervention:  - Discussed progress towards previous goals.  - Reinforced importance of making behavior changes in preparation for bariatric surgery.   - Assessed learning needs and learning preferences  - Reviewed post op stage 2 diet     NUTRITION MONITORING AND EVALUATION:  Expected patient engagement: good  Patient demonstrated good understanding.   Patient has met pre bariatric surgery diet requirements by meeting with RD for 4 sessions + additional sessions with PCP.    Follow up: Continue to monitor patient closely regarding weight loss and diet.  # of visits needed: 0  Cleared by RD: Yes     TIME SPENT WITH PATIENT: 19 minutes    Denis Easley, RD, LD  [facility]   [phone] (office phone)

## 2022-03-07 ENCOUNTER — TELEPHONE (OUTPATIENT)
Dept: SURGERY | Facility: CLINIC | Age: 42
End: 2022-03-07
Payer: COMMERCIAL

## 2022-03-30 ENCOUNTER — OFFICE VISIT (OUTPATIENT)
Dept: SURGERY | Facility: CLINIC | Age: 42
End: 2022-03-30
Payer: COMMERCIAL

## 2022-03-30 VITALS
HEIGHT: 68 IN | WEIGHT: 276 LBS | BODY MASS INDEX: 41.83 KG/M2 | DIASTOLIC BLOOD PRESSURE: 90 MMHG | SYSTOLIC BLOOD PRESSURE: 146 MMHG | HEART RATE: 110 BPM

## 2022-03-30 DIAGNOSIS — E66.01 MORBID OBESITY (H): Primary | ICD-10-CM

## 2022-03-30 PROCEDURE — 99215 OFFICE O/P EST HI 40 MIN: CPT | Performed by: SURGERY

## 2022-03-30 NOTE — PATIENT INSTRUCTIONS
Luda to follow up with Primary Care provider regarding elevated blood pressure.    Your CT Abd is scheduled for 4/13/22 4:00pm at [facility]

## 2022-04-04 NOTE — PROGRESS NOTES
"Dear Dr. Montalvo, Mulu Bean,    I was asked to see the patient regarding obesity by the referring provider above.    I had the pleasure of meeting with your patient Luda Rai in our weight loss surgery office.  This patient is a 42 year old female who has been undergoing our thorough preoperative screening process in anticipation of potential bariatric surgery.  Her surgical history is remarkable for laparoscopic Nissen fundoplication in 2005 for the treatment of severe reflux, she had a  in 2016 and 2 ectopic pregnancies in 2019.    At evaluation we recorded Luda Rai's Height: 172.7 cm (5' 8\"), 276 pounds and 41.9 BMI.  The patient has been unsuccessful with other methods of permanent weight loss and suffers from multiple weight related medical conditions.  Due to lack of success in achieving weight loss through other methods, she is interested in undergoing bariatric surgery.    PREVIOUS WEIGHT LOSS ATTEMPTS:     2021   I have tried the following methods to lose weight Watching portions or calories, Exercise, Weight Watchers, Atkins type diet (low carb/high protein), Velma Wilton, Pre packaged meals ex: Nutrisystem, Prescription Medications       CO-MORBIDITIES OF OBESITY INCLUDE:     2021   I have the following health issues associated with obesity: Polycystic Ovarian Syndrome, GERD (Reflux), Fatty Liver, Stress Incontinence, Osteoarthritis (joint disease)       VITALS:  BP (!) 146/90   Pulse 110   Ht 1.727 m (5' 8\")   Wt 125.2 kg (276 lb)   BMI 41.97 kg/m      PE:  GENERAL: Alert and oriented x3. NAD  HEENT exam: Sclerae not icteric. Hearing good. Head normocephalic and atraumatic.   CARDIOVASCULAR: No JVD  RESPIRATORY: Breathing unlabored  GASTROINTESTINAL: Obese  LOWER EXTREMITIES: no deformities  MUSCULOSKELETAL: Normal gait, Moves all 4 extremities equal and strong  NEUROLOGIC: no gross defect  SKIN: warm and dry to touch     In summary, she has undergone an appropriate " medical evaluation, dietitian evaluation, as well as psychologic screening. The patient appears to be an appropriate candidate for bariatric surgery.    In the office today, I discussed the laparoscopic lysis of adhesion, reversal of Nissen fundoplication followed by laparoscopic Drea-en-Y gastric bypass surgery.  Risks, benefits and anticipated outcomes were outlined including the risk of death, staple line leak (1-2%), PE, DVT, ulcer, worsening GERD, N/V, stricture, hernia, wound infection, weight regain, and vitamin deficiencies. This patient has a good chance of sustaining permanent weight loss due to this procedure.  This should also allow improvement if not resolution of his/her weight related medical conditions.    At present we are going to present your patient's file for prior authorization to insurance.  Pending prior authorization, I anticipate a surgical date in the near future.  We will keep you updated on any progress.  If you have questions regarding care please feel free to contact me.  Total time spent in the clinic was 60 minutes with greater than 50% in face-to-face consultation.        Sincerely,    Panchito Zambrano MD    Please route or send letter to:  Primary Care Provider (PCP) and Referring Provider

## 2022-04-13 ENCOUNTER — HOSPITAL ENCOUNTER (OUTPATIENT)
Dept: CT IMAGING | Facility: CLINIC | Age: 42
Discharge: HOME | End: 2022-04-13
Attending: SURGERY | Admitting: SURGERY
Payer: COMMERCIAL

## 2022-04-13 DIAGNOSIS — E66.01 MORBID OBESITY (H): ICD-10-CM

## 2022-04-13 PROCEDURE — 74177 CT ABD & PELVIS W/CONTRAST: CPT

## 2022-04-13 PROCEDURE — 250N000009 HC RX 250: Performed by: SURGERY

## 2022-04-13 PROCEDURE — 250N000011 HC RX IP 250 OP 636: Performed by: SURGERY

## 2022-04-13 RX ORDER — IOPAMIDOL 755 MG/ML
135 INJECTION, SOLUTION INTRAVASCULAR ONCE
Status: COMPLETED | OUTPATIENT
Start: 2022-04-13 | End: 2022-04-13

## 2022-04-13 RX ADMIN — IOPAMIDOL 135 ML: 755 INJECTION, SOLUTION INTRAVENOUS at 16:02

## 2022-04-13 RX ADMIN — SODIUM CHLORIDE 79 ML: 9 INJECTION, SOLUTION INTRAVENOUS at 16:02

## 2022-04-19 ENCOUNTER — TELEPHONE (OUTPATIENT)
Dept: SURGERY | Facility: CLINIC | Age: 42
End: 2022-04-19
Payer: COMMERCIAL

## 2022-05-17 ENCOUNTER — MYC MEDICAL ADVICE (OUTPATIENT)
Dept: FAMILY MEDICINE | Facility: CLINIC | Age: 42
End: 2022-05-17

## 2022-05-17 ENCOUNTER — VIRTUAL VISIT (OUTPATIENT)
Dept: FAMILY MEDICINE | Facility: CLINIC | Age: 42
End: 2022-05-17
Payer: COMMERCIAL

## 2022-05-17 DIAGNOSIS — J02.9 PHARYNGITIS, UNSPECIFIED ETIOLOGY: Primary | ICD-10-CM

## 2022-05-17 PROCEDURE — 99213 OFFICE O/P EST LOW 20 MIN: CPT | Mod: GT | Performed by: PHYSICIAN ASSISTANT

## 2022-05-17 NOTE — PROGRESS NOTES
"Luda is a 42 year old who is being evaluated via a billable video visit.      How would you like to obtain your AVS? MyChart  If the video visit is dropped, the invitation should be resent by: Text to cell phone: [phone]   Will anyone else be joining your video visit? No    Video Start Time: 1:00 PM    Assessment & Plan     Pharyngitis, unspecified etiology  Suspect bacterial etiology secondary to length of time that the illness has been present.  High suspicion for strep secondary to exposure from her son's classroom.  Salt water gargles recommended and initiation of Augmentin.  If symptoms worsening or not improving make an appointment for further evaluation in the clinic.  - amoxicillin-clavulanate (AUGMENTIN) 875-125 MG tablet  Dispense: 20 tablet; Refill: 0       BMI:   Estimated body mass index is 41.97 kg/m  as calculated from the following:    Height as of 3/30/22: 1.727 m (5' 8\").    Weight as of 3/30/22: 125.2 kg (276 lb).   Weight management plan: Patient referred to endocrine and/or weight management specialty      Return in about 2 days (around 5/19/2022) for Evaluation if worsening or not improving.    Mulu Montalvo PA-C  [facility]    Subjective   Luda is a 42 year old who presents for the following health issues     History of Present Illness       Reason for visit:  Sore throat  Symptom onset:  3-4 weeks ago  Symptoms include:  Sore throat  Symptom intensity:  Moderate  Symptom progression:  Worsening  Had these symptoms before:  No  What makes it worse:  No  What makes it better:  Advil    She eats 4 or more servings of fruits and vegetables daily.She consumes 0 sweetened beverage(s) daily.She exercises with enough effort to increase her heart rate 10 to 19 minutes per day.  She exercises with enough effort to increase her heart rate 3 or less days per week.   She is taking medications regularly.       Acute Illness  Acute illness concerns: Sore throat -- " negative Covid test multiple times, last test was 3p yesterday. Entire family is negative  Onset/Duration: x3 weeks  Symptoms:  Fever: no  Chills/Sweats: no  Headache (location?): no  Sinus Pressure: no  Conjunctivitis:  no  Ear Pain: no  Rhinorrhea: no  Congestion: no  Sore Throat: YES- and hoarse voice - comes and goes was better last weekend - wore today  Cough: no  Wheeze: no  Decreased Appetite: YES- decreased  Nausea: no  Vomiting: no  Diarrhea: no  Dysuria/Freq.: no  Dysuria or Hematuria: no  Fatigue/Achiness: YES- more tired  Sick/Strep Exposure: YES- been traveling for work - strep in [employer] class 2 weeks ago.  Therapies tried and outcome: Ibuprofen - moderate relief but has been taking daily 600mg Q7-8H.  Sudafed - no relief    Has taken 3 COVID tests that are all negative last one being yesterday.  Did feel that it was getting better initially but then in the last 1 to 2 weeks has worsened significantly.  Noticed some white spots in the back of her throat over the last couple days.  No significant sinus pressure/drainage.  No significant coughing.    Review of Systems   Constitutional, HEENT, cardiovascular, pulmonary, GI, , musculoskeletal, neuro, skin, endocrine and psych systems are negative, except as otherwise noted.      Objective           Vitals:  No vitals were obtained today due to virtual visit.    Physical Exam   GENERAL: Healthy, alert and no distress  EYES: Eyes grossly normal to inspection.  No discharge or erythema, or obvious scleral/conjunctival abnormalities.  RESP: No audible wheeze, cough, or visible cyanosis.  No visible retractions or increased work of breathing.    SKIN: Visible skin clear. No significant rash, abnormal pigmentation or lesions.  NEURO: Cranial nerves grossly intact.  Mentation and speech appropriate for age.  PSYCH: Mentation appears normal, affect normal/bright, judgement and insight intact, normal speech and appearance well-groomed.                Video-Visit  Details    Type of service:  Video Visit    Video End Time:1:07 PM    Originating Location (pt. Location): Home    Distant Location (provider location):  [facility]     Platform used for Video Visit: "[employer]

## 2022-05-23 NOTE — PROGRESS NOTES
Virtual bariatric pre op class completed.  Class power point and patient checklist information gone over with patient.    All questions were answered.  Quiz was completed.    Patient to take picture of weight and My Chart to clinic or call weight to clinic.  Patient was advised to call if further questions.  Randee Cobian, MS, RD, RN

## 2022-05-27 ENCOUNTER — VIRTUAL VISIT (OUTPATIENT)
Dept: SURGERY | Facility: CLINIC | Age: 42
End: 2022-05-27
Payer: COMMERCIAL

## 2022-05-27 DIAGNOSIS — E66.01 MORBID OBESITY (H): Primary | ICD-10-CM

## 2022-05-27 PROCEDURE — 99207 PR NO CHARGE NURSE ONLY: CPT

## 2022-06-07 ENCOUNTER — TELEPHONE (OUTPATIENT)
Dept: SURGERY | Facility: CLINIC | Age: 42
End: 2022-06-07
Payer: COMMERCIAL

## 2022-06-07 NOTE — TELEPHONE ENCOUNTER
Returned pt call  No answer  Left  to call clinic  Jacqueline Littlejohn RN on 6/7/2022 at 1:42 PM

## 2022-06-07 NOTE — TELEPHONE ENCOUNTER
Pt reports  had her have CT scan prior to surgery. She said her insurance is not covering it because they say it was not medically necessary. She said she tried explaining it to her insurance but they need a letter or something submitted from  saying this procedure was medically necessary in order to be cleared for bariatric surgery.    Pt is going to find out how she needs the letter (mail, fax, MC) she will let us know.     Pt had questions about CLD prior to surgery. We also discussed what full liquid are after surgery.     Jacqueline Littlejohn RN on 6/7/2022 at 2:25 PM

## 2022-06-07 NOTE — TELEPHONE ENCOUNTER
Pt states she had a CAT scan and her ins is saying it was not medically neccessary. She would like a call back    [phone]

## 2022-06-08 ENCOUNTER — OFFICE VISIT (OUTPATIENT)
Dept: FAMILY MEDICINE | Facility: CLINIC | Age: 42
End: 2022-06-08
Payer: COMMERCIAL

## 2022-06-08 VITALS
DIASTOLIC BLOOD PRESSURE: 84 MMHG | HEART RATE: 89 BPM | SYSTOLIC BLOOD PRESSURE: 122 MMHG | TEMPERATURE: 98.3 F | OXYGEN SATURATION: 96 % | WEIGHT: 273 LBS | BODY MASS INDEX: 41.37 KG/M2 | HEIGHT: 68 IN

## 2022-06-08 DIAGNOSIS — Z01.818 PREOP GENERAL PHYSICAL EXAM: Primary | ICD-10-CM

## 2022-06-08 DIAGNOSIS — Z11.59 NEED FOR HEPATITIS C SCREENING TEST: ICD-10-CM

## 2022-06-08 DIAGNOSIS — R79.89 LOW VITAMIN B12 LEVEL: ICD-10-CM

## 2022-06-08 DIAGNOSIS — Z11.4 SCREENING FOR HIV (HUMAN IMMUNODEFICIENCY VIRUS): ICD-10-CM

## 2022-06-08 DIAGNOSIS — E78.5 DYSLIPIDEMIA: ICD-10-CM

## 2022-06-08 DIAGNOSIS — E66.01 MORBID OBESITY (H): ICD-10-CM

## 2022-06-08 LAB
ANION GAP SERPL CALCULATED.3IONS-SCNC: 5 MMOL/L (ref 3–14)
BUN SERPL-MCNC: 11 MG/DL (ref 7–30)
CALCIUM SERPL-MCNC: 9.1 MG/DL (ref 8.5–10.1)
CHLORIDE BLD-SCNC: 106 MMOL/L (ref 94–109)
CHOLEST SERPL-MCNC: 212 MG/DL
CO2 SERPL-SCNC: 26 MMOL/L (ref 20–32)
CREAT SERPL-MCNC: 0.58 MG/DL (ref 0.52–1.04)
ERYTHROCYTE [DISTWIDTH] IN BLOOD BY AUTOMATED COUNT: 13.3 % (ref 10–15)
FASTING STATUS PATIENT QL REPORTED: YES
GFR SERPL CREATININE-BSD FRML MDRD: >90 ML/MIN/1.73M2
GLUCOSE BLD-MCNC: 97 MG/DL (ref 70–99)
HCG UR QL: NEGATIVE
HCT VFR BLD AUTO: 40.6 % (ref 35–47)
HCV AB SERPL QL IA: NONREACTIVE
HDLC SERPL-MCNC: 43 MG/DL
HGB BLD-MCNC: 13.5 G/DL (ref 11.7–15.7)
HIV 1+2 AB+HIV1 P24 AG SERPL QL IA: NONREACTIVE
LDLC SERPL CALC-MCNC: 134 MG/DL
MCH RBC QN AUTO: 28.7 PG (ref 26.5–33)
MCHC RBC AUTO-ENTMCNC: 33.3 G/DL (ref 31.5–36.5)
MCV RBC AUTO: 86 FL (ref 78–100)
NONHDLC SERPL-MCNC: 169 MG/DL
PLATELET # BLD AUTO: 156 10E3/UL (ref 150–450)
POTASSIUM BLD-SCNC: 4 MMOL/L (ref 3.4–5.3)
RBC # BLD AUTO: 4.7 10E6/UL (ref 3.8–5.2)
SODIUM SERPL-SCNC: 137 MMOL/L (ref 133–144)
TRIGL SERPL-MCNC: 173 MG/DL
VIT B12 SERPL-MCNC: 647 PG/ML (ref 193–986)
WBC # BLD AUTO: 6 10E3/UL (ref 4–11)

## 2022-06-08 PROCEDURE — 80048 BASIC METABOLIC PNL TOTAL CA: CPT | Performed by: PHYSICIAN ASSISTANT

## 2022-06-08 PROCEDURE — 80061 LIPID PANEL: CPT | Performed by: PHYSICIAN ASSISTANT

## 2022-06-08 PROCEDURE — 82607 VITAMIN B-12: CPT | Performed by: PHYSICIAN ASSISTANT

## 2022-06-08 PROCEDURE — 81025 URINE PREGNANCY TEST: CPT | Performed by: PHYSICIAN ASSISTANT

## 2022-06-08 PROCEDURE — 87389 HIV-1 AG W/HIV-1&-2 AB AG IA: CPT | Performed by: PHYSICIAN ASSISTANT

## 2022-06-08 PROCEDURE — 86803 HEPATITIS C AB TEST: CPT | Performed by: PHYSICIAN ASSISTANT

## 2022-06-08 PROCEDURE — 85027 COMPLETE CBC AUTOMATED: CPT | Performed by: PHYSICIAN ASSISTANT

## 2022-06-08 PROCEDURE — 36415 COLL VENOUS BLD VENIPUNCTURE: CPT | Performed by: PHYSICIAN ASSISTANT

## 2022-06-08 PROCEDURE — 99214 OFFICE O/P EST MOD 30 MIN: CPT | Performed by: PHYSICIAN ASSISTANT

## 2022-06-08 RX ORDER — CHOLECALCIFEROL (VITAMIN D3) 1250 MCG
1250 CAPSULE ORAL
COMMUNITY
End: 2022-06-30

## 2022-06-08 NOTE — PROGRESS NOTES
[address] [address]  Phone: [phone]  Primary Provider: Maia Castillo  Pre-op Performing Provider: MAIA CASTILLO    PREOPERATIVE EVALUATION:  Today's date: 6/8/2022    Luda Rai is a 42 year old female who presents for a preoperative evaluation.    Surgical Information:  Surgery/Procedure: laparoscopic gastric bypass, Laparoscopic reversal of Nissen fundoplication, laparoscopic lysis of adhesion  Surgery Location: [facility]  Surgeon: Dr Panchito Zambrano  Surgery Date: 06/16/2022  Time of Surgery: 7:30a  Where patient plans to recover: At home with family  Fax number for surgical facility: Note does not need to be faxed, will be available electronically in Epic.    Type of Anesthesia Anticipated: General    Assessment & Plan     The proposed surgical procedure is considered INTERMEDIATE risk.    Preop general physical exam  Morbid obesity (H)  Cleared for surgical procedure without further diagnostics  - Urine HCG  - Basic Metabolic Panel  - CBC with platelets    Low vitamin B12 level  Restart supplementation after procedure as recommended by bariatric team  - Vitamin B12    Dyslipidemia  Patient is fasting today.  Would like fasting labs done as a baseline  - Lipid panel reflex to direct LDL Fasting    Screening for HIV (human immunodeficiency virus)  Routine screening  - HIV Antigen Antibody Combo    Need for hepatitis C screening test  Routine screening  - Hepatitis C Screen Reflex to HCV RNA Quant and Genotype           Risks and Recommendations:  The patient has the following additional risks and recommendations for perioperative complications:   - Morbid obesity (BMI >40) -positioning considerations during procedure    Medication Instructions:  Patient is to take hold scheduled medications from now until the day of surgery    RECOMMENDATION:  APPROVAL GIVEN to proceed with proposed procedure,  without further diagnostic evaluation.        Subjective     HPI related to upcoming procedure: Morbid obesity that has been unresponsive to conservative treatment    Preop Questions 6/8/2022   1. Have you ever had a heart attack or stroke? No   2. Have you ever had surgery on your heart or blood vessels, such as a stent placement, a coronary artery bypass, or surgery on an artery in your head, neck, heart, or legs? No   3. Do you have chest pain with activity? No   4. Do you have a history of  heart failure? No   5. Do you currently have a cold, bronchitis or symptoms of other infection? No   6. Do you have a cough, shortness of breath, or wheezing? No   7. Do you or anyone in your family have previous history of blood clots? No   8. Do you or does anyone in your family have a serious bleeding problem such as prolonged bleeding following surgeries or cuts? No   9. Have you ever had problems with anemia or been told to take iron pills? No   10. Have you had any abnormal blood loss such as black, tarry or bloody stools, or abnormal vaginal bleeding? No   11. Have you ever had a blood transfusion? No   12. Are you willing to have a blood transfusion if it is medically needed before, during, or after your surgery? Yes   13. Have you or any of your relatives ever had problems with anesthesia? No   14. Do you have sleep apnea, excessive snoring or daytime drowsiness? No   15. Do you have any artifical heart valves or other implanted medical devices like a pacemaker, defibrillator, or continuous glucose monitor? No   16. Do you have artificial joints? YES - right knee   17. Are you allergic to latex? No   18. Is there any chance that you may be pregnant? No       Health Care Directive:  Patient does not have a Health Care Directive or Living Will: Discussed advance care planning with patient; however, patient declined at this time.    Preoperative Review of :   reviewed - no record of controlled substances  prescribed.      Status of Chronic Conditions:  See problem list for active medical problems.  Problems all longstanding and stable, except as noted/documented.  See ROS for pertinent symptoms related to these conditions.      Review of Systems  CONSTITUTIONAL: NEGATIVE for fever, chills, change in weight  INTEGUMENTARY/SKIN: NEGATIVE for worrisome rashes, moles or lesions  EYES: NEGATIVE for vision changes or irritation  ENT/MOUTH: NEGATIVE for ear, mouth and throat problems  RESP: NEGATIVE for significant cough or SOB  CV: NEGATIVE for chest pain, palpitations or peripheral edema  GI: NEGATIVE for nausea, abdominal pain, heartburn, or change in bowel habits  : NEGATIVE for frequency, dysuria, or hematuria  MUSCULOSKELETAL: NEGATIVE for significant arthralgias or myalgia  NEURO: NEGATIVE for weakness, dizziness or paresthesias  ENDOCRINE: NEGATIVE for temperature intolerance, skin/hair changes  HEME: NEGATIVE for bleeding problems  PSYCHIATRIC: NEGATIVE for changes in mood or affect    Patient Active Problem List    Diagnosis Date Noted     Low vitamin B12 level 05/20/2020     Priority: Medium     Mammographic microcalcification 05/20/2020     Priority: Medium     Fatty liver 03/06/2018     Priority: Medium     Primary osteoarthritis of left knee 06/07/2016     Priority: Medium     Dyslipidemia 01/12/2016     Priority: Medium     Morbid obesity (H) 01/12/2016     Priority: Medium     Vitamin B12 deficiency 01/12/2016     Priority: Medium     Osteoarthrosis, unspecified whether generalized or localized, lower leg 10/30/2014     Priority: Medium     IMO Regulatory Load OCT 2020  Replacing diagnoses that were inactivated after the 10/1/2021 regulatory import.       PCOS (polycystic ovarian syndrome) 01/13/2014     Priority: Medium     Hypovitaminosis D 01/13/2014     Priority: Medium     Bronchitis 12/18/2012     Priority: Medium      Past Medical History:   Diagnosis Date     Bone and joint disord back, pelvis, leg  "complicat preg, childb, puerp     Knee replacement      Endocrine problem     Pcos     Esophageal reflux 2005    Had Nissen Fundiplication 2006     Osteoporosis     Knee     Past Surgical History:   Procedure Laterality Date      SECTION  10/2016     DISCECTOMY LUMBAR ANTERIOR  2011    Diskectomy     NISSEN FUNDOPLICATION      starting of Up's     SALPINGECTOMY Left 2019    Fallopian tube (including fimbria) with extensive intraluminal hemorrhage,     SALPINGECTOMY Right 2019    tubal pregnancy from IVF     TOTAL KNEE ARTHROPLASTY Right 2020    Knee replacement     Current Outpatient Medications   Medication Sig Dispense Refill     Calcium Citrate 150 MG CAPS Take 500 mg by mouth 3 times daily       cholecalciferol (VITAMIN D3) 1250 mcg (22809 units) capsule Take 1,250 mcg by mouth every 7 days       multivitamin w/minerals (MULTI-VITAMIN) tablet Take 1 tablet by mouth daily         Allergies   Allergen Reactions     Codeine GI Disturbance     Nsaids Other (See Comments)     Avoid due to bariatric surgery 2022     Rocephin [Ceftriaxone] Hives        Social History     Tobacco Use     Smoking status: Never Smoker     Smokeless tobacco: Never Used   Substance Use Topics     Alcohol use: Not Currently     Comment: Rare     Family History   Problem Relation Age of Onset     Hypertension Mother      Diabetes Father      Cerebrovascular Disease Father      History   Drug Use Unknown         Objective     /84 (BP Location: Left arm, Patient Position: Chair, Cuff Size: Adult Large)   Pulse 89   Temp 98.3  F (36.8  C) (Tympanic)   Ht 1.727 m (5' 8\")   Wt 123.8 kg (273 lb)   LMP 05/10/2022 (Exact Date)   SpO2 96%   Breastfeeding No   BMI 41.51 kg/m      Physical Exam    GENERAL APPEARANCE: healthy, alert and no distress     EYES: EOMI, PERRL     HENT: ear canals and TM's normal and nose and mouth without ulcers or lesions     NECK: no adenopathy, no asymmetry, masses, or scars " and thyroid normal to palpation     RESP: lungs clear to auscultation - no rales, rhonchi or wheezes     CV: regular rates and rhythm, normal S1 S2, no S3 or S4 and no murmur, click or rub     ABDOMEN:  soft, nontender, no HSM or masses and bowel sounds normal     MS: extremities normal- no gross deformities noted, no evidence of inflammation in joints, FROM in all extremities.     SKIN: no suspicious lesions or rashes     NEURO: Normal strength and tone, sensory exam grossly normal, mentation intact and speech normal     PSYCH: mentation appears normal. and affect normal/bright     LYMPHATICS: No cervical adenopathy    Recent Labs   Lab Test 10/26/21  0734 03/16/21  0832 12/03/20  1411   HGB 13.5  --  14.6     --  180    139 137   POTASSIUM 4.0 4.5 3.8   CR 0.76 0.64 0.67   A1C 5.7* 5.5  --         Diagnostics:  Results for orders placed or performed in visit on 06/08/22   Lipid panel reflex to direct LDL Fasting     Status: Abnormal   Result Value Ref Range    Cholesterol 212 (H) <200 mg/dL    Triglycerides 173 (H) <150 mg/dL    Direct Measure HDL 43 (L) >=50 mg/dL    LDL Cholesterol Calculated 134 (H) <=100 mg/dL    Non HDL Cholesterol 169 (H) <130 mg/dL    Patient Fasting > 8hrs? Yes     Narrative    Cholesterol  Desirable:  <200 mg/dL    Triglycerides  Normal:  Less than 150 mg/dL  Borderline High:  150-199 mg/dL  High:  200-499 mg/dL  Very High:  Greater than or equal to 500 mg/dL    Direct Measure HDL  Female:  Greater than or equal to 50 mg/dL   Male:  Greater than or equal to 40 mg/dL    LDL Cholesterol  Desirable:  <100mg/dL  Above Desirable:  100-129 mg/dL   Borderline High:  130-159 mg/dL   High:  160-189 mg/dL   Very High:  >= 190 mg/dL    Non HDL Cholesterol  Desirable:  130 mg/dL  Above Desirable:  130-159 mg/dL  Borderline High:  160-189 mg/dL  High:  190-219 mg/dL  Very High:  Greater than or equal to 220 mg/dL   HIV Antigen Antibody Combo     Status: Normal   Result Value Ref Range     HIV Antigen Antibody Combo Nonreactive Nonreactive   Hepatitis C Screen Reflex to HCV RNA Quant and Genotype     Status: Normal   Result Value Ref Range    Hepatitis C Antibody Nonreactive Nonreactive    Narrative    Assay performance characteristics have not been established for newborns, infants, and children.   Urine HCG     Status: Normal   Result Value Ref Range    hCG Urine Qualitative Negative Negative   Basic Metabolic Panel     Status: Normal   Result Value Ref Range    Sodium 137 133 - 144 mmol/L    Potassium 4.0 3.4 - 5.3 mmol/L    Chloride 106 94 - 109 mmol/L    Carbon Dioxide (CO2) 26 20 - 32 mmol/L    Anion Gap 5 3 - 14 mmol/L    Urea Nitrogen 11 7 - 30 mg/dL    Creatinine 0.58 0.52 - 1.04 mg/dL    Calcium 9.1 8.5 - 10.1 mg/dL    Glucose 97 70 - 99 mg/dL    GFR Estimate >90 >60 mL/min/1.73m2   CBC with platelets     Status: Normal   Result Value Ref Range    WBC Count 6.0 4.0 - 11.0 10e3/uL    RBC Count 4.70 3.80 - 5.20 10e6/uL    Hemoglobin 13.5 11.7 - 15.7 g/dL    Hematocrit 40.6 35.0 - 47.0 %    MCV 86 78 - 100 fL    MCH 28.7 26.5 - 33.0 pg    MCHC 33.3 31.5 - 36.5 g/dL    RDW 13.3 10.0 - 15.0 %    Platelet Count 156 150 - 450 10e3/uL   Vitamin B12     Status: Normal   Result Value Ref Range    Vitamin B12 647 193 - 986 pg/mL        No EKG required, no history of coronary heart disease, significant arrhythmia, peripheral arterial disease or other structural heart disease.    Revised Cardiac Risk Index (RCRI):  The patient has the following serious cardiovascular risks for perioperative complications:   - No serious cardiac risks = 0 points     RCRI Interpretation: 0 points: Class I (very low risk - 0.4% complication rate)       Signed Electronically by: Mulu Montalvo PA-C  Copy of this evaluation report is provided to requesting physician.

## 2022-06-08 NOTE — H&P (VIEW-ONLY)
[address] [address]  Phone: [phone]  Primary Provider: Maia Castillo  Pre-op Performing Provider: MAIA CASTILLO    PREOPERATIVE EVALUATION:  Today's date: 6/8/2022    Luda Rai is a 42 year old female who presents for a preoperative evaluation.    Surgical Information:  Surgery/Procedure: laparoscopic gastric bypass, Laparoscopic reversal of Nissen fundoplication, laparoscopic lysis of adhesion  Surgery Location: [facility]  Surgeon: Dr Panchito Zambrano  Surgery Date: 06/16/2022  Time of Surgery: 7:30a  Where patient plans to recover: At home with family  Fax number for surgical facility: Note does not need to be faxed, will be available electronically in Epic.    Type of Anesthesia Anticipated: General    Assessment & Plan     The proposed surgical procedure is considered INTERMEDIATE risk.    Preop general physical exam  Morbid obesity (H)  Cleared for surgical procedure without further diagnostics  - Urine HCG  - Basic Metabolic Panel  - CBC with platelets    Low vitamin B12 level  Restart supplementation after procedure as recommended by bariatric team  - Vitamin B12    Dyslipidemia  Patient is fasting today.  Would like fasting labs done as a baseline  - Lipid panel reflex to direct LDL Fasting    Screening for HIV (human immunodeficiency virus)  Routine screening  - HIV Antigen Antibody Combo    Need for hepatitis C screening test  Routine screening  - Hepatitis C Screen Reflex to HCV RNA Quant and Genotype           Risks and Recommendations:  The patient has the following additional risks and recommendations for perioperative complications:   - Morbid obesity (BMI >40) -positioning considerations during procedure    Medication Instructions:  Patient is to take hold scheduled medications from now until the day of surgery    RECOMMENDATION:  APPROVAL GIVEN to proceed with proposed procedure,  without further diagnostic evaluation.        Subjective     HPI related to upcoming procedure: Morbid obesity that has been unresponsive to conservative treatment    Preop Questions 6/8/2022   1. Have you ever had a heart attack or stroke? No   2. Have you ever had surgery on your heart or blood vessels, such as a stent placement, a coronary artery bypass, or surgery on an artery in your head, neck, heart, or legs? No   3. Do you have chest pain with activity? No   4. Do you have a history of  heart failure? No   5. Do you currently have a cold, bronchitis or symptoms of other infection? No   6. Do you have a cough, shortness of breath, or wheezing? No   7. Do you or anyone in your family have previous history of blood clots? No   8. Do you or does anyone in your family have a serious bleeding problem such as prolonged bleeding following surgeries or cuts? No   9. Have you ever had problems with anemia or been told to take iron pills? No   10. Have you had any abnormal blood loss such as black, tarry or bloody stools, or abnormal vaginal bleeding? No   11. Have you ever had a blood transfusion? No   12. Are you willing to have a blood transfusion if it is medically needed before, during, or after your surgery? Yes   13. Have you or any of your relatives ever had problems with anesthesia? No   14. Do you have sleep apnea, excessive snoring or daytime drowsiness? No   15. Do you have any artifical heart valves or other implanted medical devices like a pacemaker, defibrillator, or continuous glucose monitor? No   16. Do you have artificial joints? YES - right knee   17. Are you allergic to latex? No   18. Is there any chance that you may be pregnant? No       Health Care Directive:  Patient does not have a Health Care Directive or Living Will: Discussed advance care planning with patient; however, patient declined at this time.    Preoperative Review of :   reviewed - no record of controlled substances  prescribed.      Status of Chronic Conditions:  See problem list for active medical problems.  Problems all longstanding and stable, except as noted/documented.  See ROS for pertinent symptoms related to these conditions.      Review of Systems  CONSTITUTIONAL: NEGATIVE for fever, chills, change in weight  INTEGUMENTARY/SKIN: NEGATIVE for worrisome rashes, moles or lesions  EYES: NEGATIVE for vision changes or irritation  ENT/MOUTH: NEGATIVE for ear, mouth and throat problems  RESP: NEGATIVE for significant cough or SOB  CV: NEGATIVE for chest pain, palpitations or peripheral edema  GI: NEGATIVE for nausea, abdominal pain, heartburn, or change in bowel habits  : NEGATIVE for frequency, dysuria, or hematuria  MUSCULOSKELETAL: NEGATIVE for significant arthralgias or myalgia  NEURO: NEGATIVE for weakness, dizziness or paresthesias  ENDOCRINE: NEGATIVE for temperature intolerance, skin/hair changes  HEME: NEGATIVE for bleeding problems  PSYCHIATRIC: NEGATIVE for changes in mood or affect    Patient Active Problem List    Diagnosis Date Noted     Low vitamin B12 level 05/20/2020     Priority: Medium     Mammographic microcalcification 05/20/2020     Priority: Medium     Fatty liver 03/06/2018     Priority: Medium     Primary osteoarthritis of left knee 06/07/2016     Priority: Medium     Dyslipidemia 01/12/2016     Priority: Medium     Morbid obesity (H) 01/12/2016     Priority: Medium     Vitamin B12 deficiency 01/12/2016     Priority: Medium     Osteoarthrosis, unspecified whether generalized or localized, lower leg 10/30/2014     Priority: Medium     IMO Regulatory Load OCT 2020  Replacing diagnoses that were inactivated after the 10/1/2021 regulatory import.       PCOS (polycystic ovarian syndrome) 01/13/2014     Priority: Medium     Hypovitaminosis D 01/13/2014     Priority: Medium     Bronchitis 12/18/2012     Priority: Medium      Past Medical History:   Diagnosis Date     Bone and joint disord back, pelvis, leg  "complicat preg, childb, puerp     Knee replacement      Endocrine problem     Pcos     Esophageal reflux 2005    Had Nissen Fundiplication 2006     Osteoporosis     Knee     Past Surgical History:   Procedure Laterality Date      SECTION  10/2016     DISCECTOMY LUMBAR ANTERIOR  2011    Diskectomy     NISSEN FUNDOPLICATION      starting of Up's     SALPINGECTOMY Left 2019    Fallopian tube (including fimbria) with extensive intraluminal hemorrhage,     SALPINGECTOMY Right 2019    tubal pregnancy from IVF     TOTAL KNEE ARTHROPLASTY Right 2020    Knee replacement     Current Outpatient Medications   Medication Sig Dispense Refill     Calcium Citrate 150 MG CAPS Take 500 mg by mouth 3 times daily       cholecalciferol (VITAMIN D3) 1250 mcg (98045 units) capsule Take 1,250 mcg by mouth every 7 days       multivitamin w/minerals (MULTI-VITAMIN) tablet Take 1 tablet by mouth daily         Allergies   Allergen Reactions     Codeine GI Disturbance     Nsaids Other (See Comments)     Avoid due to bariatric surgery 2022     Rocephin [Ceftriaxone] Hives        Social History     Tobacco Use     Smoking status: Never Smoker     Smokeless tobacco: Never Used   Substance Use Topics     Alcohol use: Not Currently     Comment: Rare     Family History   Problem Relation Age of Onset     Hypertension Mother      Diabetes Father      Cerebrovascular Disease Father      History   Drug Use Unknown         Objective     /84 (BP Location: Left arm, Patient Position: Chair, Cuff Size: Adult Large)   Pulse 89   Temp 98.3  F (36.8  C) (Tympanic)   Ht 1.727 m (5' 8\")   Wt 123.8 kg (273 lb)   LMP 05/10/2022 (Exact Date)   SpO2 96%   Breastfeeding No   BMI 41.51 kg/m      Physical Exam    GENERAL APPEARANCE: healthy, alert and no distress     EYES: EOMI, PERRL     HENT: ear canals and TM's normal and nose and mouth without ulcers or lesions     NECK: no adenopathy, no asymmetry, masses, or scars " and thyroid normal to palpation     RESP: lungs clear to auscultation - no rales, rhonchi or wheezes     CV: regular rates and rhythm, normal S1 S2, no S3 or S4 and no murmur, click or rub     ABDOMEN:  soft, nontender, no HSM or masses and bowel sounds normal     MS: extremities normal- no gross deformities noted, no evidence of inflammation in joints, FROM in all extremities.     SKIN: no suspicious lesions or rashes     NEURO: Normal strength and tone, sensory exam grossly normal, mentation intact and speech normal     PSYCH: mentation appears normal. and affect normal/bright     LYMPHATICS: No cervical adenopathy    Recent Labs   Lab Test 10/26/21  0734 03/16/21  0832 12/03/20  1411   HGB 13.5  --  14.6     --  180    139 137   POTASSIUM 4.0 4.5 3.8   CR 0.76 0.64 0.67   A1C 5.7* 5.5  --         Diagnostics:  Results for orders placed or performed in visit on 06/08/22   Lipid panel reflex to direct LDL Fasting     Status: Abnormal   Result Value Ref Range    Cholesterol 212 (H) <200 mg/dL    Triglycerides 173 (H) <150 mg/dL    Direct Measure HDL 43 (L) >=50 mg/dL    LDL Cholesterol Calculated 134 (H) <=100 mg/dL    Non HDL Cholesterol 169 (H) <130 mg/dL    Patient Fasting > 8hrs? Yes     Narrative    Cholesterol  Desirable:  <200 mg/dL    Triglycerides  Normal:  Less than 150 mg/dL  Borderline High:  150-199 mg/dL  High:  200-499 mg/dL  Very High:  Greater than or equal to 500 mg/dL    Direct Measure HDL  Female:  Greater than or equal to 50 mg/dL   Male:  Greater than or equal to 40 mg/dL    LDL Cholesterol  Desirable:  <100mg/dL  Above Desirable:  100-129 mg/dL   Borderline High:  130-159 mg/dL   High:  160-189 mg/dL   Very High:  >= 190 mg/dL    Non HDL Cholesterol  Desirable:  130 mg/dL  Above Desirable:  130-159 mg/dL  Borderline High:  160-189 mg/dL  High:  190-219 mg/dL  Very High:  Greater than or equal to 220 mg/dL   HIV Antigen Antibody Combo     Status: Normal   Result Value Ref Range     HIV Antigen Antibody Combo Nonreactive Nonreactive   Hepatitis C Screen Reflex to HCV RNA Quant and Genotype     Status: Normal   Result Value Ref Range    Hepatitis C Antibody Nonreactive Nonreactive    Narrative    Assay performance characteristics have not been established for newborns, infants, and children.   Urine HCG     Status: Normal   Result Value Ref Range    hCG Urine Qualitative Negative Negative   Basic Metabolic Panel     Status: Normal   Result Value Ref Range    Sodium 137 133 - 144 mmol/L    Potassium 4.0 3.4 - 5.3 mmol/L    Chloride 106 94 - 109 mmol/L    Carbon Dioxide (CO2) 26 20 - 32 mmol/L    Anion Gap 5 3 - 14 mmol/L    Urea Nitrogen 11 7 - 30 mg/dL    Creatinine 0.58 0.52 - 1.04 mg/dL    Calcium 9.1 8.5 - 10.1 mg/dL    Glucose 97 70 - 99 mg/dL    GFR Estimate >90 >60 mL/min/1.73m2   CBC with platelets     Status: Normal   Result Value Ref Range    WBC Count 6.0 4.0 - 11.0 10e3/uL    RBC Count 4.70 3.80 - 5.20 10e6/uL    Hemoglobin 13.5 11.7 - 15.7 g/dL    Hematocrit 40.6 35.0 - 47.0 %    MCV 86 78 - 100 fL    MCH 28.7 26.5 - 33.0 pg    MCHC 33.3 31.5 - 36.5 g/dL    RDW 13.3 10.0 - 15.0 %    Platelet Count 156 150 - 450 10e3/uL   Vitamin B12     Status: Normal   Result Value Ref Range    Vitamin B12 647 193 - 986 pg/mL        No EKG required, no history of coronary heart disease, significant arrhythmia, peripheral arterial disease or other structural heart disease.    Revised Cardiac Risk Index (RCRI):  The patient has the following serious cardiovascular risks for perioperative complications:   - No serious cardiac risks = 0 points     RCRI Interpretation: 0 points: Class I (very low risk - 0.4% complication rate)       Signed Electronically by: Mulu Montalvo PA-C  Copy of this evaluation report is provided to requesting physician.

## 2022-06-08 NOTE — PATIENT INSTRUCTIONS
Preparing for Your Surgery  Getting started  A nurse will call you to review your health history and instructions. They will give you an arrival time based on your scheduled surgery time. Please be ready to share:    Your doctor's clinic name and phone number    Your medical, surgical and anesthesia history    A list of allergies and sensitivities    A list of medicines, including herbal treatments and over-the-counter drugs    Whether the patient has a legal guardian (ask how to send us the papers in advance)  Please tell us if you're pregnant--or if there's any chance you might be pregnant. Some surgeries may injure a fetus (unborn baby), so they require a pregnancy test. Surgeries that are safe for a fetus don't always need a test, and you can choose whether to have one.   If you have a child who's having surgery, please ask for a copy of Preparing for Your Child's Surgery.    Preparing for surgery    Within 30 days of surgery: Have a pre-op exam (sometimes called an H&P, or History and Physical). This can be done at a clinic or pre-operative center.  ? If you're having a , you may not need this exam. Talk to your care team.    At your pre-op exam, talk to your care team about all medicines you take. If you need to stop any medicines before surgery, ask when to start taking them again.  ? We do this for your safety. Many medicines can make you bleed too much during surgery. Some change how well surgery (anesthesia) drugs work.    Call your insurance company to let them know you're having surgery. (If you don't have insurance, call [phone].)    Call your clinic if there's any change in your health. This includes signs of a cold or flu (sore throat, runny nose, cough, rash, fever). It also includes a scrape or scratch near the surgery site.    If you have questions on the day of surgery, call your hospital or surgery center.  COVID testing  You may need to be tested for COVID-19 before having  surgery. If so, we will give you instructions.  Eating and drinking guidelines  For your safety: Unless your surgeon tells you otherwise, follow the guidelines below.    Eat and drink as usual until 8 hours before surgery. After that, no food or milk.    Drink clear liquids until 2 hours before surgery. These are liquids you can see through, like water, Gatorade and Propel Water. You may also have black coffee and tea (no cream or milk).    Nothing by mouth within 2 hours of surgery. This includes gum, candy and breath mints.    If you drink alcohol: Stop drinking it the night before surgery.    If your care team tells you to take medicine on the morning of surgery, it's okay to take it with a sip of water.  Preventing infection    Shower or bathe the night before and morning of your surgery. Follow the instructions your clinic gave you. (If no instructions, use regular soap.)    Don't shave or clip hair near your surgery site. We'll remove the hair if needed.    Don't smoke or vape the morning of surgery. You may chew nicotine gum up to 2 hours before surgery. A nicotine patch is okay.  ? Note: Some surgeries require you to completely quit smoking and nicotine. Check with your surgeon.    Your care team will make every effort to keep you safe from infection. We will:  ? Clean our hands often with soap and water (or an alcohol-based hand rub).  ? Clean the skin at your surgery site with a special soap that kills germs.  ? Give you a special gown to keep you warm. (Cold raises the risk of infection.)  ? Wear special hair covers, masks, gowns and gloves during surgery.  ? Give antibiotic medicine, if prescribed. Not all surgeries need antibiotics.  What to bring on the day of surgery    Photo ID and insurance card    Copy of your health care directive, if you have one    Glasses and hearing aides (bring cases)  ? You can't wear contacts during surgery    Inhaler and eye drops, if you use them (tell us about these when  you arrive)    CPAP machine or breathing device, if you use them    A few personal items, if spending the night    If you have . . .  ? A pacemaker, ICD (cardiac defibrillator) or other implant: Bring the ID card.  ? An implanted stimulator: Bring the remote control.  ? A legal guardian: Bring a copy of the certified (court-stamped) guardianship papers.  Please remove any jewelry, including body piercings. Leave jewelry and other valuables at home.  If you're going home the day of surgery    You must have a responsible adult drive you home. They should stay with you overnight as well.    If you don't have someone to stay with you, and you aren't safe to go home alone, we may keep you overnight. Insurance often won't pay for this.  After surgery  If it's hard to control your pain or you need more pain medicine, please call your surgeon's office.  Questions?   If you have any questions for your care team, list them here: _________________________________________________________________________________________________________________________________________________________________________ ____________________________________ ____________________________________ ____________________________________  For informational purposes only. Not to replace the advice of your health care provider. Copyright   2003, 2019 [facility]. All rights reserved. Clinically reviewed by Lori Anaya MD. [employer] [id number] - REV 07/21.

## 2022-06-09 NOTE — RESULT ENCOUNTER NOTE
Luda  I have reviewed your recent labs. Here are the results:    -Normal red blood cell (hgb) levels, normal white blood cell count and normal platelet levels.  -LDL(bad) cholesterol level is elevated, HDL(good) cholesterol level is low and your triglycerides are elevated which can increase your heart disease risk.  A diet high in fat and simple carbohydrates, genetics and being overweight can contribute to this. ADVISE: exercising 150 minutes of aerobic exercise per week (30 minutes for 5 days per week or 50 minutes for 3 days per week are options), eating a low saturated fat/low carbohydrate diet, and omega-3 fatty acids (fish oil) 6389-4014 mg daily are helpful to improve this. In 6 months, you should recheck your fasting cholesterol panel by scheduling a lab-only appointment.  -Kidney function is normal (Cr, GFR), Sodium is normal, Potassium is normal, Calcium is normal, Glucose is normal.   -Hepatitis C antibody screen test shows no signs of a previous hepatitis C infection.  -HIV test is normal.  - B12 level is normal.  Continue your current supplementation     For additional lab test information, labtestsonline.org is an excellent reference.    Good luck with your procedure!    If you have any questions please do not hesitate to contact our office via phone ([phone]) or MyChart.    Mulu Montalvo MBA, MS, PA-C  [facility]

## 2022-06-14 ENCOUNTER — LAB (OUTPATIENT)
Dept: LAB | Facility: CLINIC | Age: 42
End: 2022-06-14
Payer: COMMERCIAL

## 2022-06-14 DIAGNOSIS — Z01.818 PREOP GENERAL PHYSICAL EXAM: ICD-10-CM

## 2022-06-14 PROCEDURE — U0003 INFECTIOUS AGENT DETECTION BY NUCLEIC ACID (DNA OR RNA); SEVERE ACUTE RESPIRATORY SYNDROME CORONAVIRUS 2 (SARS-COV-2) (CORONAVIRUS DISEASE [COVID-19]), AMPLIFIED PROBE TECHNIQUE, MAKING USE OF HIGH THROUGHPUT TECHNOLOGIES AS DESCRIBED BY CMS-2020-01-R: HCPCS

## 2022-06-14 PROCEDURE — U0005 INFEC AGEN DETEC AMPLI PROBE: HCPCS

## 2022-06-15 ENCOUNTER — MYC MEDICAL ADVICE (OUTPATIENT)
Dept: FAMILY MEDICINE | Facility: CLINIC | Age: 42
End: 2022-06-15
Payer: COMMERCIAL

## 2022-06-15 ENCOUNTER — ANESTHESIA EVENT (OUTPATIENT)
Dept: SURGERY | Facility: CLINIC | Age: 42
DRG: 621 | End: 2022-06-15
Payer: COMMERCIAL

## 2022-06-15 LAB — SARS-COV-2 RNA RESP QL NAA+PROBE: NEGATIVE

## 2022-06-15 NOTE — PROGRESS NOTES
PTA medications updated by Medication Scribe prior to surgery via phone call with patient (last doses completed by Nurse)     Medication history sources: Patient and H&P  In the past week, patient estimated taking medication this percent of the time: Greater than 90%  Adherence assessment: N/A Not Observed    Significant changes made to the medication list:  None      Additional medication history information:   None    Medication reconciliation completed by provider prior to medication history? No    Time spent in this activity: 15 minutes    The information provided in this note is only as accurate as the sources available at the time of update(s)    Prior to Admission medications    Medication Sig Last Dose Taking? Auth Provider Long Term End Date   CALCIUM CITRATE PO Take 1 capsule by mouth daily 6/8/2022 at am Yes Reported, Patient     cholecalciferol (VITAMIN D3) 1250 mcg (39774 units) capsule Take 1,250 mcg by mouth every 7 days 6/8/2022 at am Yes Reported, Patient     multivitamin w/minerals (THERA-VIT-M) tablet Take 1 tablet by mouth daily 6/8/2022 at am Yes Reported, Patient       Medication history completed by:    Basilio Woodson CPhT  Medication Scribe  [facility]

## 2022-06-15 NOTE — TELEPHONE ENCOUNTER
My chart message sent     Denise Leal RN, BSN  [facility]

## 2022-06-15 NOTE — ANESTHESIA PREPROCEDURE EVALUATION
Anesthesia Pre-Procedure Evaluation    Patient: Luda Rai   MRN: [id number] : 1980        Procedure : Procedure(s):  laparoscopic gastric bypass, Laparoscopic reversal of Nissen fundoplication, laparoscopic lysis of adhesion          Past Medical History:   Diagnosis Date     Bone and joint disord back, pelvis, leg complicat preg, childb, puerp     Knee replacement      Endocrine problem     Pcos     Esophageal reflux 2005    Had Nissen Fundiplication 2006     Osteoporosis     Knee      Past Surgical History:   Procedure Laterality Date      SECTION  10/2016     DISCECTOMY LUMBAR ANTERIOR      Diskectomy     NISSEN FUNDOPLICATION      starting of Up's     SALPINGECTOMY Left 2019    Fallopian tube (including fimbria) with extensive intraluminal hemorrhage,     SALPINGECTOMY Right 2019    tubal pregnancy from IVF     TOTAL KNEE ARTHROPLASTY Right     Knee replacement      Allergies   Allergen Reactions     Codeine GI Disturbance     Nsaids Other (See Comments)     Avoid due to bariatric surgery 2022     Rocephin [Ceftriaxone] Hives      Social History     Tobacco Use     Smoking status: Never Smoker     Smokeless tobacco: Never Used   Substance Use Topics     Alcohol use: Not Currently     Comment: Rare      Wt Readings from Last 1 Encounters:   22 123.8 kg (273 lb)        Anesthesia Evaluation   Pt has had prior anesthetic.     No history of anesthetic complications       ROS/MED HX  ENT/Pulmonary:  - neg pulmonary ROS     Neurologic:  - neg neurologic ROS     Cardiovascular:  - neg cardiovascular ROS     METS/Exercise Tolerance:     Hematologic:  - neg hematologic  ROS     Musculoskeletal: Comment: S/p knee replacement  (+) arthritis,     GI/Hepatic: Comment: H/o Fatty liver  S/p Nissen Fundiplication 2006    (+) GERD, liver disease,     Renal/Genitourinary: Comment: PCOS (polycystic ovarian syndrome)      Endo: Comment: Low vitamin B12  level  Dyslipidemia  BMI 42.    (+) Obesity,     Psychiatric/Substance Use:  - neg psychiatric ROS     Infectious Disease:       Malignancy:  - neg malignancy ROS     Other:  - neg other ROS          Physical Exam    Airway  airway exam normal      Mallampati: II   TM distance: > 3 FB   Neck ROM: full   Mouth opening: > 3 cm    Respiratory Devices and Support         Dental  no notable dental history         Cardiovascular   cardiovascular exam normal          Pulmonary   pulmonary exam normal                OUTSIDE LABS:  CBC:   Lab Results   Component Value Date    WBC 6.0 06/08/2022    WBC 5.9 10/26/2021    HGB 13.5 06/08/2022    HGB 13.5 10/26/2021    HCT 40.6 06/08/2022    HCT 41.3 10/26/2021     06/08/2022     10/26/2021     BMP:   Lab Results   Component Value Date     06/08/2022     10/26/2021    POTASSIUM 4.0 06/08/2022    POTASSIUM 4.0 10/26/2021    CHLORIDE 106 06/08/2022    CHLORIDE 105 10/26/2021    CO2 26 06/08/2022    CO2 25 10/26/2021    BUN 11 06/08/2022    BUN 10 10/26/2021    CR 0.58 06/08/2022    CR 0.76 10/26/2021    GLC 97 06/08/2022     (H) 10/26/2021     COAGS: No results found for: PTT, INR, FIBR  POC:   Lab Results   Component Value Date    HCG Negative 06/08/2022     HEPATIC:   Lab Results   Component Value Date    ALBUMIN 3.2 (L) 10/26/2021    PROTTOTAL 7.4 10/26/2021    ALT 24 10/26/2021    AST 14 10/26/2021    ALKPHOS 88 10/26/2021    BILITOTAL 0.6 10/26/2021     OTHER:   Lab Results   Component Value Date    A1C 5.7 (H) 10/26/2021    TAY 9.1 06/08/2022    LIPASE 227 05/18/2020    AMYLASE 44 05/18/2020    TSH 1.13 05/18/2020    CRP 13.0 (H) 05/18/2020    SED 17 05/18/2020       Anesthesia Plan    ASA Status:  2      Anesthesia Type: General.     - Airway: ETT   Induction: Intravenous.   Maintenance: Balanced.   Techniques and Equipment:     - Airway: Video-Laryngoscope         Consents    Anesthesia Plan(s) and associated risks, benefits, and realistic  alternatives discussed. Questions answered and patient/representative(s) expressed understanding.    - Discussed:     - Discussed with:  Patient         Postoperative Care    Pain management: IV analgesics, Multi-modal analgesia.   PONV prophylaxis: Ondansetron (or other 5HT-3), Dexamethasone or Solumedrol, Background Propofol Infusion     Comments:                Tommy Kay MD

## 2022-06-16 ENCOUNTER — HOSPITAL ENCOUNTER (INPATIENT)
Facility: CLINIC | Age: 42
LOS: 1 days | Discharge: HOME | DRG: 621 | End: 2022-06-17
Attending: SURGERY | Admitting: SURGERY
Payer: COMMERCIAL

## 2022-06-16 ENCOUNTER — ANESTHESIA (OUTPATIENT)
Dept: SURGERY | Facility: CLINIC | Age: 42
DRG: 621 | End: 2022-06-16
Payer: COMMERCIAL

## 2022-06-16 DIAGNOSIS — E66.01 MORBID OBESITY WITH BMI OF 40.0-44.9, ADULT (H): Primary | ICD-10-CM

## 2022-06-16 LAB
CREAT SERPL-MCNC: 0.62 MG/DL (ref 0.52–1.04)
GFR SERPL CREATININE-BSD FRML MDRD: >90 ML/MIN/1.73M2
HCG UR QL: NEGATIVE

## 2022-06-16 PROCEDURE — 360N000077 HC SURGERY LEVEL 4, PER MIN: Performed by: SURGERY

## 2022-06-16 PROCEDURE — 250N000011 HC RX IP 250 OP 636: Performed by: PHYSICIAN ASSISTANT

## 2022-06-16 PROCEDURE — 258N000003 HC RX IP 258 OP 636: Performed by: PHYSICIAN ASSISTANT

## 2022-06-16 PROCEDURE — 43644 LAP GASTRIC BYPASS/ROUX-EN-Y: CPT | Mod: 22 | Performed by: SURGERY

## 2022-06-16 PROCEDURE — 0D164ZA BYPASS STOMACH TO JEJUNUM, PERCUTANEOUS ENDOSCOPIC APPROACH: ICD-10-PCS | Performed by: SURGERY

## 2022-06-16 PROCEDURE — 43644 LAP GASTRIC BYPASS/ROUX-EN-Y: CPT | Mod: AS | Performed by: PHYSICIAN ASSISTANT

## 2022-06-16 PROCEDURE — 258N000003 HC RX IP 258 OP 636: Performed by: ANESTHESIOLOGY

## 2022-06-16 PROCEDURE — 250N000011 HC RX IP 250 OP 636: Performed by: SURGERY

## 2022-06-16 PROCEDURE — 250N000009 HC RX 250: Performed by: NURSE ANESTHETIST, CERTIFIED REGISTERED

## 2022-06-16 PROCEDURE — 0DNW4ZZ RELEASE PERITONEUM, PERCUTANEOUS ENDOSCOPIC APPROACH: ICD-10-PCS | Performed by: SURGERY

## 2022-06-16 PROCEDURE — 43633 REMOVAL OF STOMACH PARTIAL: CPT | Performed by: SURGERY

## 2022-06-16 PROCEDURE — 120N000001 HC R&B MED SURG/OB

## 2022-06-16 PROCEDURE — 250N000013 HC RX MED GY IP 250 OP 250 PS 637: Performed by: PHYSICIAN ASSISTANT

## 2022-06-16 PROCEDURE — 82565 ASSAY OF CREATININE: CPT | Performed by: PHYSICIAN ASSISTANT

## 2022-06-16 PROCEDURE — 272N000001 HC OR GENERAL SUPPLY STERILE: Performed by: SURGERY

## 2022-06-16 PROCEDURE — 250N000011 HC RX IP 250 OP 636: Performed by: NURSE ANESTHETIST, CERTIFIED REGISTERED

## 2022-06-16 PROCEDURE — 43633 REMOVAL OF STOMACH PARTIAL: CPT | Mod: AS | Performed by: PHYSICIAN ASSISTANT

## 2022-06-16 PROCEDURE — 258N000003 HC RX IP 258 OP 636: Performed by: NURSE ANESTHETIST, CERTIFIED REGISTERED

## 2022-06-16 PROCEDURE — 250N000009 HC RX 250: Performed by: SURGERY

## 2022-06-16 PROCEDURE — 88300 SURGICAL PATH GROSS: CPT | Mod: TC | Performed by: SURGERY

## 2022-06-16 PROCEDURE — 250N000025 HC SEVOFLURANE, PER MIN: Performed by: SURGERY

## 2022-06-16 PROCEDURE — 250N000009 HC RX 250: Performed by: ANESTHESIOLOGY

## 2022-06-16 PROCEDURE — 250N000013 HC RX MED GY IP 250 OP 250 PS 637: Performed by: NURSE ANESTHETIST, CERTIFIED REGISTERED

## 2022-06-16 PROCEDURE — 250N000013 HC RX MED GY IP 250 OP 250 PS 637: Performed by: SURGERY

## 2022-06-16 PROCEDURE — 258N000001 HC RX 258: Performed by: SURGERY

## 2022-06-16 PROCEDURE — 999N000141 HC STATISTIC PRE-PROCEDURE NURSING ASSESSMENT: Performed by: SURGERY

## 2022-06-16 PROCEDURE — 370N000017 HC ANESTHESIA TECHNICAL FEE, PER MIN: Performed by: SURGERY

## 2022-06-16 PROCEDURE — 250N000009 HC RX 250: Performed by: PHYSICIAN ASSISTANT

## 2022-06-16 PROCEDURE — 81025 URINE PREGNANCY TEST: CPT | Performed by: ANESTHESIOLOGY

## 2022-06-16 PROCEDURE — 258N000003 HC RX IP 258 OP 636: Performed by: SURGERY

## 2022-06-16 PROCEDURE — 250N000011 HC RX IP 250 OP 636: Performed by: ANESTHESIOLOGY

## 2022-06-16 PROCEDURE — 710N000009 HC RECOVERY PHASE 1, LEVEL 1, PER MIN: Performed by: SURGERY

## 2022-06-16 PROCEDURE — 36415 COLL VENOUS BLD VENIPUNCTURE: CPT | Performed by: PHYSICIAN ASSISTANT

## 2022-06-16 RX ORDER — ENALAPRILAT 1.25 MG/ML
1.25 INJECTION INTRAVENOUS EVERY 6 HOURS PRN
Status: DISCONTINUED | OUTPATIENT
Start: 2022-06-16 | End: 2022-06-17 | Stop reason: HOSPADM

## 2022-06-16 RX ORDER — HYDROMORPHONE HYDROCHLORIDE 1 MG/ML
.3-.5 INJECTION, SOLUTION INTRAMUSCULAR; INTRAVENOUS; SUBCUTANEOUS
Status: DISCONTINUED | OUTPATIENT
Start: 2022-06-16 | End: 2022-06-17 | Stop reason: HOSPADM

## 2022-06-16 RX ORDER — SCOLOPAMINE TRANSDERMAL SYSTEM 1 MG/1
1 PATCH, EXTENDED RELEASE TRANSDERMAL
Status: DISCONTINUED | OUTPATIENT
Start: 2022-06-16 | End: 2022-06-17 | Stop reason: HOSPADM

## 2022-06-16 RX ORDER — OXYCODONE HYDROCHLORIDE 5 MG/1
10 TABLET ORAL
Status: DISCONTINUED | OUTPATIENT
Start: 2022-06-16 | End: 2022-06-17 | Stop reason: HOSPADM

## 2022-06-16 RX ORDER — ONDANSETRON 4 MG/1
4 TABLET, ORALLY DISINTEGRATING ORAL EVERY 30 MIN PRN
Status: DISCONTINUED | OUTPATIENT
Start: 2022-06-16 | End: 2022-06-16 | Stop reason: HOSPADM

## 2022-06-16 RX ORDER — CLINDAMYCIN PHOSPHATE 900 MG/50ML
900 INJECTION, SOLUTION INTRAVENOUS SEE ADMIN INSTRUCTIONS
Status: DISCONTINUED | OUTPATIENT
Start: 2022-06-16 | End: 2022-06-16 | Stop reason: HOSPADM

## 2022-06-16 RX ORDER — SODIUM CHLORIDE, SODIUM LACTATE, POTASSIUM CHLORIDE, CALCIUM CHLORIDE 600; 310; 30; 20 MG/100ML; MG/100ML; MG/100ML; MG/100ML
INJECTION, SOLUTION INTRAVENOUS CONTINUOUS
Status: DISCONTINUED | OUTPATIENT
Start: 2022-06-16 | End: 2022-06-17 | Stop reason: HOSPADM

## 2022-06-16 RX ORDER — NALOXONE HYDROCHLORIDE 0.4 MG/ML
0.2 INJECTION, SOLUTION INTRAMUSCULAR; INTRAVENOUS; SUBCUTANEOUS
Status: DISCONTINUED | OUTPATIENT
Start: 2022-06-16 | End: 2022-06-17 | Stop reason: HOSPADM

## 2022-06-16 RX ORDER — PROCHLORPERAZINE MALEATE 10 MG
10 TABLET ORAL EVERY 6 HOURS PRN
Status: DISCONTINUED | OUTPATIENT
Start: 2022-06-16 | End: 2022-06-17 | Stop reason: HOSPADM

## 2022-06-16 RX ORDER — SODIUM CHLORIDE, SODIUM LACTATE, POTASSIUM CHLORIDE, CALCIUM CHLORIDE 600; 310; 30; 20 MG/100ML; MG/100ML; MG/100ML; MG/100ML
INJECTION, SOLUTION INTRAVENOUS CONTINUOUS
Status: DISCONTINUED | OUTPATIENT
Start: 2022-06-16 | End: 2022-06-16 | Stop reason: HOSPADM

## 2022-06-16 RX ORDER — DIPHENHYDRAMINE HYDROCHLORIDE 50 MG/ML
25 INJECTION INTRAMUSCULAR; INTRAVENOUS EVERY 6 HOURS PRN
Status: DISCONTINUED | OUTPATIENT
Start: 2022-06-16 | End: 2022-06-17 | Stop reason: HOSPADM

## 2022-06-16 RX ORDER — CLINDAMYCIN PHOSPHATE 900 MG/50ML
900 INJECTION, SOLUTION INTRAVENOUS EVERY 8 HOURS
Status: COMPLETED | OUTPATIENT
Start: 2022-06-16 | End: 2022-06-17

## 2022-06-16 RX ORDER — ONDANSETRON 2 MG/ML
4 INJECTION INTRAMUSCULAR; INTRAVENOUS EVERY 30 MIN PRN
Status: DISCONTINUED | OUTPATIENT
Start: 2022-06-16 | End: 2022-06-16 | Stop reason: HOSPADM

## 2022-06-16 RX ORDER — GLYCOPYRROLATE 0.2 MG/ML
INJECTION, SOLUTION INTRAMUSCULAR; INTRAVENOUS PRN
Status: DISCONTINUED | OUTPATIENT
Start: 2022-06-16 | End: 2022-06-16

## 2022-06-16 RX ORDER — ONDANSETRON 2 MG/ML
4 INJECTION INTRAMUSCULAR; INTRAVENOUS
Status: DISCONTINUED | OUTPATIENT
Start: 2022-06-16 | End: 2022-06-16 | Stop reason: HOSPADM

## 2022-06-16 RX ORDER — HYDROMORPHONE HCL IN WATER/PF 6 MG/30 ML
0.2 PATIENT CONTROLLED ANALGESIA SYRINGE INTRAVENOUS EVERY 5 MIN PRN
Status: DISCONTINUED | OUTPATIENT
Start: 2022-06-16 | End: 2022-06-16 | Stop reason: HOSPADM

## 2022-06-16 RX ORDER — ENOXAPARIN SODIUM 100 MG/ML
40 INJECTION SUBCUTANEOUS EVERY 12 HOURS
Status: DISCONTINUED | OUTPATIENT
Start: 2022-06-17 | End: 2022-06-17 | Stop reason: HOSPADM

## 2022-06-16 RX ORDER — FENTANYL CITRATE 50 UG/ML
25 INJECTION, SOLUTION INTRAMUSCULAR; INTRAVENOUS EVERY 5 MIN PRN
Status: DISCONTINUED | OUTPATIENT
Start: 2022-06-16 | End: 2022-06-16 | Stop reason: HOSPADM

## 2022-06-16 RX ORDER — DIPHENHYDRAMINE HCL 25 MG
25 CAPSULE ORAL EVERY 6 HOURS PRN
Status: DISCONTINUED | OUTPATIENT
Start: 2022-06-16 | End: 2022-06-17 | Stop reason: HOSPADM

## 2022-06-16 RX ORDER — ACETAMINOPHEN 325 MG/1
975 TABLET ORAL ONCE
Status: COMPLETED | OUTPATIENT
Start: 2022-06-16 | End: 2022-06-16

## 2022-06-16 RX ORDER — FENTANYL CITRATE 50 UG/ML
INJECTION, SOLUTION INTRAMUSCULAR; INTRAVENOUS PRN
Status: DISCONTINUED | OUTPATIENT
Start: 2022-06-16 | End: 2022-06-16

## 2022-06-16 RX ORDER — PROPOFOL 10 MG/ML
INJECTION, EMULSION INTRAVENOUS PRN
Status: DISCONTINUED | OUTPATIENT
Start: 2022-06-16 | End: 2022-06-16

## 2022-06-16 RX ORDER — PROPOFOL 10 MG/ML
INJECTION, EMULSION INTRAVENOUS CONTINUOUS PRN
Status: DISCONTINUED | OUTPATIENT
Start: 2022-06-16 | End: 2022-06-16

## 2022-06-16 RX ORDER — ALBUTEROL SULFATE 90 UG/1
AEROSOL, METERED RESPIRATORY (INHALATION) PRN
Status: DISCONTINUED | OUTPATIENT
Start: 2022-06-16 | End: 2022-06-16

## 2022-06-16 RX ORDER — DEXAMETHASONE SODIUM PHOSPHATE 4 MG/ML
INJECTION, SOLUTION INTRA-ARTICULAR; INTRALESIONAL; INTRAMUSCULAR; INTRAVENOUS; SOFT TISSUE PRN
Status: DISCONTINUED | OUTPATIENT
Start: 2022-06-16 | End: 2022-06-16

## 2022-06-16 RX ORDER — ONDANSETRON 2 MG/ML
4 INJECTION INTRAMUSCULAR; INTRAVENOUS EVERY 6 HOURS PRN
Status: DISCONTINUED | OUTPATIENT
Start: 2022-06-16 | End: 2022-06-17 | Stop reason: HOSPADM

## 2022-06-16 RX ORDER — HEPARIN SODIUM 5000 [USP'U]/.5ML
5000 INJECTION, SOLUTION INTRAVENOUS; SUBCUTANEOUS
Status: COMPLETED | OUTPATIENT
Start: 2022-06-16 | End: 2022-06-16

## 2022-06-16 RX ORDER — MAGNESIUM HYDROXIDE 1200 MG/15ML
LIQUID ORAL PRN
Status: DISCONTINUED | OUTPATIENT
Start: 2022-06-16 | End: 2022-06-16 | Stop reason: HOSPADM

## 2022-06-16 RX ORDER — NALOXONE HYDROCHLORIDE 0.4 MG/ML
0.4 INJECTION, SOLUTION INTRAMUSCULAR; INTRAVENOUS; SUBCUTANEOUS
Status: DISCONTINUED | OUTPATIENT
Start: 2022-06-16 | End: 2022-06-17 | Stop reason: HOSPADM

## 2022-06-16 RX ORDER — OXYCODONE HYDROCHLORIDE 5 MG/1
5 TABLET ORAL
Status: DISCONTINUED | OUTPATIENT
Start: 2022-06-16 | End: 2022-06-17 | Stop reason: HOSPADM

## 2022-06-16 RX ORDER — ONDANSETRON 4 MG/1
4 TABLET, ORALLY DISINTEGRATING ORAL EVERY 6 HOURS PRN
Status: DISCONTINUED | OUTPATIENT
Start: 2022-06-16 | End: 2022-06-17 | Stop reason: HOSPADM

## 2022-06-16 RX ORDER — BUPIVACAINE HYDROCHLORIDE AND EPINEPHRINE 2.5; 5 MG/ML; UG/ML
INJECTION, SOLUTION INFILTRATION; PERINEURAL PRN
Status: DISCONTINUED | OUTPATIENT
Start: 2022-06-16 | End: 2022-06-16 | Stop reason: HOSPADM

## 2022-06-16 RX ORDER — ONDANSETRON 2 MG/ML
INJECTION INTRAMUSCULAR; INTRAVENOUS PRN
Status: DISCONTINUED | OUTPATIENT
Start: 2022-06-16 | End: 2022-06-16

## 2022-06-16 RX ORDER — ACETAMINOPHEN 325 MG/1
650 TABLET ORAL EVERY 4 HOURS PRN
Status: DISCONTINUED | OUTPATIENT
Start: 2022-06-16 | End: 2022-06-17 | Stop reason: HOSPADM

## 2022-06-16 RX ORDER — NEOSTIGMINE METHYLSULFATE 1 MG/ML
VIAL (ML) INJECTION PRN
Status: DISCONTINUED | OUTPATIENT
Start: 2022-06-16 | End: 2022-06-16

## 2022-06-16 RX ORDER — LIDOCAINE 40 MG/G
CREAM TOPICAL
Status: DISCONTINUED | OUTPATIENT
Start: 2022-06-16 | End: 2022-06-17 | Stop reason: HOSPADM

## 2022-06-16 RX ORDER — DEXMEDETOMIDINE HYDROCHLORIDE 4 UG/ML
INJECTION, SOLUTION INTRAVENOUS PRN
Status: DISCONTINUED | OUTPATIENT
Start: 2022-06-16 | End: 2022-06-16

## 2022-06-16 RX ORDER — CYCLOBENZAPRINE HCL 5 MG
5-10 TABLET ORAL 3 TIMES DAILY PRN
Status: DISCONTINUED | OUTPATIENT
Start: 2022-06-16 | End: 2022-06-17 | Stop reason: HOSPADM

## 2022-06-16 RX ORDER — LIDOCAINE HYDROCHLORIDE 20 MG/ML
INJECTION, SOLUTION INFILTRATION; PERINEURAL PRN
Status: DISCONTINUED | OUTPATIENT
Start: 2022-06-16 | End: 2022-06-16

## 2022-06-16 RX ORDER — CLINDAMYCIN PHOSPHATE 900 MG/50ML
900 INJECTION, SOLUTION INTRAVENOUS
Status: COMPLETED | OUTPATIENT
Start: 2022-06-16 | End: 2022-06-16

## 2022-06-16 RX ADMIN — ALBUTEROL SULFATE 2 PUFF: 90 AEROSOL, METERED RESPIRATORY (INHALATION) at 08:43

## 2022-06-16 RX ADMIN — FENTANYL CITRATE 100 MCG: 50 INJECTION, SOLUTION INTRAMUSCULAR; INTRAVENOUS at 07:43

## 2022-06-16 RX ADMIN — ROCURONIUM BROMIDE 40 MG: 50 INJECTION, SOLUTION INTRAVENOUS at 08:20

## 2022-06-16 RX ADMIN — ALBUTEROL SULFATE 2 PUFF: 90 AEROSOL, METERED RESPIRATORY (INHALATION) at 08:42

## 2022-06-16 RX ADMIN — SODIUM CHLORIDE, POTASSIUM CHLORIDE, SODIUM LACTATE AND CALCIUM CHLORIDE: 600; 310; 30; 20 INJECTION, SOLUTION INTRAVENOUS at 06:22

## 2022-06-16 RX ADMIN — PHENYLEPHRINE HYDROCHLORIDE 100 MCG: 10 INJECTION INTRAVENOUS at 08:48

## 2022-06-16 RX ADMIN — ROCURONIUM BROMIDE 20 MG: 50 INJECTION, SOLUTION INTRAVENOUS at 09:01

## 2022-06-16 RX ADMIN — FENTANYL CITRATE 25 MCG: 50 INJECTION, SOLUTION INTRAMUSCULAR; INTRAVENOUS at 12:32

## 2022-06-16 RX ADMIN — SODIUM CHLORIDE, POTASSIUM CHLORIDE, SODIUM LACTATE AND CALCIUM CHLORIDE: 600; 310; 30; 20 INJECTION, SOLUTION INTRAVENOUS at 08:53

## 2022-06-16 RX ADMIN — PROCHLORPERAZINE EDISYLATE 10 MG: 5 INJECTION INTRAMUSCULAR; INTRAVENOUS at 20:51

## 2022-06-16 RX ADMIN — DEXMEDETOMIDINE HYDROCHLORIDE 12 MCG: 200 INJECTION INTRAVENOUS at 10:10

## 2022-06-16 RX ADMIN — HYDROMORPHONE HYDROCHLORIDE 0.2 MG: 0.2 INJECTION, SOLUTION INTRAMUSCULAR; INTRAVENOUS; SUBCUTANEOUS at 13:17

## 2022-06-16 RX ADMIN — HYDROMORPHONE HYDROCHLORIDE 0.5 MG: 1 INJECTION, SOLUTION INTRAMUSCULAR; INTRAVENOUS; SUBCUTANEOUS at 20:51

## 2022-06-16 RX ADMIN — HEPARIN SODIUM 5000 UNITS: 5000 INJECTION, SOLUTION INTRAVENOUS; SUBCUTANEOUS at 06:08

## 2022-06-16 RX ADMIN — PHENYLEPHRINE HYDROCHLORIDE 100 MCG: 10 INJECTION INTRAVENOUS at 07:56

## 2022-06-16 RX ADMIN — PROPOFOL 50 MCG/KG/MIN: 10 INJECTION, EMULSION INTRAVENOUS at 07:43

## 2022-06-16 RX ADMIN — SODIUM CHLORIDE, POTASSIUM CHLORIDE, SODIUM LACTATE AND CALCIUM CHLORIDE: 600; 310; 30; 20 INJECTION, SOLUTION INTRAVENOUS at 21:01

## 2022-06-16 RX ADMIN — LIDOCAINE HYDROCHLORIDE 100 MG: 20 INJECTION, SOLUTION INFILTRATION; PERINEURAL at 07:43

## 2022-06-16 RX ADMIN — PROPOFOL 200 MG: 10 INJECTION, EMULSION INTRAVENOUS at 07:43

## 2022-06-16 RX ADMIN — ONDANSETRON 4 MG: 2 INJECTION INTRAMUSCULAR; INTRAVENOUS at 18:36

## 2022-06-16 RX ADMIN — ROCURONIUM BROMIDE 30 MG: 50 INJECTION, SOLUTION INTRAVENOUS at 07:55

## 2022-06-16 RX ADMIN — ACETAMINOPHEN 975 MG: 325 TABLET ORAL at 06:08

## 2022-06-16 RX ADMIN — ONDANSETRON 4 MG: 2 INJECTION INTRAMUSCULAR; INTRAVENOUS at 12:48

## 2022-06-16 RX ADMIN — ROCURONIUM BROMIDE 10 MG: 50 INJECTION, SOLUTION INTRAVENOUS at 10:21

## 2022-06-16 RX ADMIN — CLINDAMYCIN PHOSPHATE 900 MG: 900 INJECTION, SOLUTION INTRAVENOUS at 15:03

## 2022-06-16 RX ADMIN — HYDROMORPHONE HYDROCHLORIDE 0.5 MG: 1 INJECTION, SOLUTION INTRAMUSCULAR; INTRAVENOUS; SUBCUTANEOUS at 18:36

## 2022-06-16 RX ADMIN — PHENYLEPHRINE HYDROCHLORIDE 100 MCG: 10 INJECTION INTRAVENOUS at 07:59

## 2022-06-16 RX ADMIN — DEXMEDETOMIDINE HYDROCHLORIDE 8 MCG: 200 INJECTION INTRAVENOUS at 10:14

## 2022-06-16 RX ADMIN — FENTANYL CITRATE 25 MCG: 50 INJECTION, SOLUTION INTRAMUSCULAR; INTRAVENOUS at 12:42

## 2022-06-16 RX ADMIN — FENTANYL CITRATE 50 MCG: 50 INJECTION, SOLUTION INTRAMUSCULAR; INTRAVENOUS at 11:34

## 2022-06-16 RX ADMIN — CYCLOBENZAPRINE HYDROCHLORIDE 10 MG: 5 TABLET, FILM COATED ORAL at 23:57

## 2022-06-16 RX ADMIN — NEOSTIGMINE METHYLSULFATE 5 MG: 1 INJECTION, SOLUTION INTRAVENOUS at 11:35

## 2022-06-16 RX ADMIN — OXYCODONE HYDROCHLORIDE 10 MG: 5 TABLET ORAL at 20:51

## 2022-06-16 RX ADMIN — HYDROMORPHONE HYDROCHLORIDE 0.5 MG: 1 INJECTION, SOLUTION INTRAMUSCULAR; INTRAVENOUS; SUBCUTANEOUS at 15:02

## 2022-06-16 RX ADMIN — MIDAZOLAM 2 MG: 1 INJECTION INTRAMUSCULAR; INTRAVENOUS at 07:29

## 2022-06-16 RX ADMIN — PHENYLEPHRINE HYDROCHLORIDE 100 MCG: 10 INJECTION INTRAVENOUS at 08:51

## 2022-06-16 RX ADMIN — FENTANYL CITRATE 100 MCG: 50 INJECTION, SOLUTION INTRAMUSCULAR; INTRAVENOUS at 08:16

## 2022-06-16 RX ADMIN — ROCURONIUM BROMIDE 30 MG: 50 INJECTION, SOLUTION INTRAVENOUS at 07:43

## 2022-06-16 RX ADMIN — DEXAMETHASONE SODIUM PHOSPHATE 4 MG: 4 INJECTION, SOLUTION INTRA-ARTICULAR; INTRALESIONAL; INTRAMUSCULAR; INTRAVENOUS; SOFT TISSUE at 07:50

## 2022-06-16 RX ADMIN — ONDANSETRON 4 MG: 2 INJECTION INTRAMUSCULAR; INTRAVENOUS at 07:29

## 2022-06-16 RX ADMIN — LIDOCAINE HYDROCHLORIDE 0.1 ML: 10 INJECTION, SOLUTION EPIDURAL; INFILTRATION; INTRACAUDAL; PERINEURAL at 06:26

## 2022-06-16 RX ADMIN — PHENYLEPHRINE HYDROCHLORIDE 100 MCG: 10 INJECTION INTRAVENOUS at 07:53

## 2022-06-16 RX ADMIN — CLINDAMYCIN PHOSPHATE 900 MG: 900 INJECTION, SOLUTION INTRAVENOUS at 06:27

## 2022-06-16 RX ADMIN — PROCHLORPERAZINE EDISYLATE 5 MG: 5 INJECTION INTRAMUSCULAR; INTRAVENOUS at 13:48

## 2022-06-16 RX ADMIN — ONDANSETRON 4 MG: 2 INJECTION INTRAMUSCULAR; INTRAVENOUS at 11:48

## 2022-06-16 RX ADMIN — HYDROMORPHONE HYDROCHLORIDE 0.5 MG: 1 INJECTION, SOLUTION INTRAMUSCULAR; INTRAVENOUS; SUBCUTANEOUS at 11:28

## 2022-06-16 RX ADMIN — CLINDAMYCIN PHOSPHATE 900 MG: 900 INJECTION, SOLUTION INTRAVENOUS at 23:47

## 2022-06-16 RX ADMIN — SCOPALAMINE 1 PATCH: 1 PATCH, EXTENDED RELEASE TRANSDERMAL at 15:08

## 2022-06-16 RX ADMIN — GLYCOPYRROLATE 0.8 MG: 0.2 INJECTION, SOLUTION INTRAMUSCULAR; INTRAVENOUS at 11:35

## 2022-06-16 RX ADMIN — ROCURONIUM BROMIDE 10 MG: 50 INJECTION, SOLUTION INTRAVENOUS at 10:00

## 2022-06-16 RX ADMIN — FAMOTIDINE 20 MG: 10 INJECTION INTRAVENOUS at 15:03

## 2022-06-16 RX ADMIN — FAMOTIDINE 20 MG: 10 INJECTION, SOLUTION INTRAVENOUS at 06:28

## 2022-06-16 RX ADMIN — SODIUM CHLORIDE, POTASSIUM CHLORIDE, SODIUM LACTATE AND CALCIUM CHLORIDE: 600; 310; 30; 20 INJECTION, SOLUTION INTRAVENOUS at 15:07

## 2022-06-16 ASSESSMENT — ACTIVITIES OF DAILY LIVING (ADL)
ADLS_ACUITY_SCORE: 20
ADLS_ACUITY_SCORE: 18
ADLS_ACUITY_SCORE: 20
ADLS_ACUITY_SCORE: 18
ADLS_ACUITY_SCORE: 20

## 2022-06-16 NOTE — PHARMACY-CONSULT NOTE
Bariatric Consult    Medications evaluated as requested per Bariatric Consult.     No medication changes were needed based on the Bariatric Medication Management Policy.  All home meds can be crushed/opened.    The pharmacist will continue to follow as new medications are ordered.

## 2022-06-16 NOTE — PLAN OF CARE
Goal Outcome Evaluation:    Plan of Care Reviewed With: patient, spouse      Pt alert and oriented, vss, up with stand by assist of one, oral pain medications given as ordered, complains of nausea, medications given with good results, scop patch in place, incisions clean dry and intact, abd binder in place, lungs clear, O2 sats low 90's in room air, hypo active bowel sounds, good urine output, IS instructions given, pt instructed on po intake, fluid log given,

## 2022-06-16 NOTE — ANESTHESIA CARE TRANSFER NOTE
Patient: Luda Rai    Procedure: Procedure(s):  laparoscopic gastric bypass, Laparoscopic reversal of Nissen fundoplication, laparoscopic lysis of adhesion, partial gastrectomy       Diagnosis: Morbid obesity (H) [E66.01]  Diagnosis Additional Information: No value filed.    Anesthesia Type:   General     Note:    Oropharynx: oropharynx clear of all foreign objects and spontaneously breathing  Level of Consciousness: awake  Oxygen Supplementation: face mask  Level of Supplemental Oxygen (L/min / FiO2): 6  Independent Airway: airway patency satisfactory and stable  Dentition: dentition unchanged  Vital Signs Stable: post-procedure vital signs reviewed and stable  Report to RN Given: handoff report given  Patient transferred to: PACU  Comments: Neuromuscular blockade reversed after TOF 3/4, spontaneous respirations, adequate tidal volumes, followed commands to voice, oropharynx suctioned with soft flexible catheter, extubated atraumatically, extubated with suction, airway patent after extubation.  Oxygen via facemask at 6 liters per minute to PACU. Oxygen tubing connected to wall O2 in PACU, SpO2, NiBP, and EKG monitors and alarms on and functioning, report on patient's clinical status given to PACU RN, RN questions answered.     Handoff Report: Identifed the Patient, Identified the Reponsible Provider, Reviewed the pertinent medical history, Discussed the surgical course, Reviewed Intra-OP anesthesia mangement and issues during anesthesia, Set expectations for post-procedure period and Allowed opportunity for questions and acknowledgement of understanding      Vitals:  Vitals Value Taken Time   BP     Temp     Pulse 86 06/16/22 1207   Resp 21 06/16/22 1207   SpO2 97 % 06/16/22 1207   Vitals shown include unvalidated device data.    Electronically Signed By: MAXIM Trejo CRNA  June 16, 2022  12:08 PM

## 2022-06-16 NOTE — BRIEF OP NOTE
[facility]    Brief Operative Note    Pre-operative diagnosis: Morbid obesity, previous nissen fundoplication  Post-operative diagnosis Same    Procedure:    laparoscopic gastric bypass, Laparoscopic reversal of Nissen fundoplication, laparoscopic lysis of adhesion, partial gastrectomy    Surgeon:       * Panchito Zambrano MD - Primary     * Mulu España PA-C - Assisting    Anesthesia: General     Estimated Blood Loss: 35 mL from 6/16/2022  7:30 AM to 6/16/2022 12:00 PM      Specimens:   ID Type Source Tests Collected by Time Destination   1 : PORTION OF STOMACH Tissue Stomach SURGICAL PATHOLOGY EXAM Panchito Zambrano MD 6/16/2022 11:01 AM      Findings: As above. No immediate complications. See operative report for full details.      Mulu España PA-C  [facility]  [phone]

## 2022-06-16 NOTE — PROGRESS NOTES
RT met with patient regarding the use of CPAP/BiPAP at night. Patient does not wear any device at night and is not interested in using the hospital's equipment.      Micky Cabello, RT on 6/16/2022 at 5:37 PM

## 2022-06-16 NOTE — OR NURSING
NICHOLAS Hernandez gave OK for pt to go to her inpt room 3904 from PACU when from PACU when ready. Dr. Hernandez stopped by and evaluated pt.

## 2022-06-16 NOTE — ANESTHESIA PROCEDURE NOTES
Airway       Patient location during procedure: OR ([facility] - Operating Room or Procedural Area)       Procedure Start/Stop Times: 6/16/2022 7:47 AM  Staff -        Anesthesiologist:  Jonathan Hernandez MD       CRNA: Whit Cruz APRN CRNA       Performed By: CRNAIndications and Patient Condition       Indications for airway management: andrei-procedural       Induction type:intravenous       Mask difficulty assessment: 2 - vent by mask + OA or adjuvant +/- NMBA    Final Airway Details       Final airway type: endotracheal airway       Successful airway: ETT - single  Endotracheal Airway Details        ETT size (mm): 7.0       Cuffed: yes       Successful intubation technique: video laryngoscopy       VL Blade Size: Glidescope 3       Grade View of Cords: 1       Adjucts: stylet       Position: Right       Measured from: gums/teeth       Secured at (cm): 22       Bite block used: None    Post intubation assessment        Number of attempts at approach: 1       Number of other approaches attempted: 0       Secured with: pink tape and commercial tube xiao       Ease of procedure: easy       Dentition: Intact and Unchanged    Medication(s) Administered   Medication Administration Time: 6/16/2022 7:47 AM

## 2022-06-16 NOTE — ANESTHESIA POSTPROCEDURE EVALUATION
Patient: Luda Rai    Procedure: Procedure(s):  laparoscopic gastric bypass, Laparoscopic reversal of Nissen fundoplication, laparoscopic lysis of adhesion, partial gastrectomy       Anesthesia Type:  General    Note:  Disposition: Inpatient   Postop Pain Control: Uneventful            Sign Out: Well controlled pain   PONV:    Neuro/Psych: Uneventful            Sign Out: Acceptable/Baseline neuro status   Airway/Respiratory: Uneventful            Sign Out: Acceptable/Baseline resp. status   CV/Hemodynamics: Uneventful            Sign Out: Acceptable CV status   Other NRE: NONE   DID A NON-ROUTINE EVENT OCCUR? No           Last vitals:  Vitals Value Taken Time   /73 06/16/22 1250   Temp     Pulse 73 06/16/22 1301   Resp 17 06/16/22 1301   SpO2 98 % 06/16/22 1301   Vitals shown include unvalidated device data.    Electronically Signed By: Jonathan Hernandez MD  June 16, 2022  1:02 PM

## 2022-06-17 VITALS
SYSTOLIC BLOOD PRESSURE: 160 MMHG | WEIGHT: 271.1 LBS | HEIGHT: 67 IN | TEMPERATURE: 98.2 F | DIASTOLIC BLOOD PRESSURE: 94 MMHG | OXYGEN SATURATION: 92 % | BODY MASS INDEX: 42.55 KG/M2 | RESPIRATION RATE: 16 BRPM | HEART RATE: 96 BPM

## 2022-06-17 LAB
ANION GAP SERPL CALCULATED.3IONS-SCNC: 3 MMOL/L (ref 3–14)
CHLORIDE BLD-SCNC: 104 MMOL/L (ref 94–109)
CO2 SERPL-SCNC: 28 MMOL/L (ref 20–32)
GLUCOSE BLDC GLUCOMTR-MCNC: 118 MG/DL (ref 70–99)
HGB BLD-MCNC: 12.4 G/DL (ref 11.7–15.7)
POTASSIUM BLD-SCNC: 3.8 MMOL/L (ref 3.4–5.3)
SODIUM SERPL-SCNC: 135 MMOL/L (ref 133–144)

## 2022-06-17 PROCEDURE — 36415 COLL VENOUS BLD VENIPUNCTURE: CPT | Performed by: PHYSICIAN ASSISTANT

## 2022-06-17 PROCEDURE — 80051 ELECTROLYTE PANEL: CPT | Performed by: PHYSICIAN ASSISTANT

## 2022-06-17 PROCEDURE — 258N000003 HC RX IP 258 OP 636: Performed by: PHYSICIAN ASSISTANT

## 2022-06-17 PROCEDURE — 85018 HEMOGLOBIN: CPT | Performed by: PHYSICIAN ASSISTANT

## 2022-06-17 PROCEDURE — 250N000013 HC RX MED GY IP 250 OP 250 PS 637: Performed by: PHYSICIAN ASSISTANT

## 2022-06-17 PROCEDURE — 250N000011 HC RX IP 250 OP 636: Performed by: PHYSICIAN ASSISTANT

## 2022-06-17 RX ORDER — ONDANSETRON 4 MG/1
4 TABLET, ORALLY DISINTEGRATING ORAL EVERY 6 HOURS PRN
Qty: 10 TABLET | Refills: 0 | Status: SHIPPED | OUTPATIENT
Start: 2022-06-17 | End: 2022-06-23

## 2022-06-17 RX ORDER — CYCLOBENZAPRINE HCL 5 MG
5-10 TABLET ORAL 3 TIMES DAILY PRN
Qty: 15 TABLET | Refills: 0 | Status: SHIPPED | OUTPATIENT
Start: 2022-06-17 | End: 2022-06-23

## 2022-06-17 RX ORDER — ACETAMINOPHEN 325 MG/1
650 TABLET ORAL EVERY 4 HOURS PRN
Qty: 90 TABLET | Refills: 0 | Status: SHIPPED | OUTPATIENT
Start: 2022-06-17 | End: 2022-09-16

## 2022-06-17 RX ORDER — OXYCODONE HYDROCHLORIDE 5 MG/1
5 TABLET ORAL EVERY 4 HOURS PRN
Qty: 15 TABLET | Refills: 0 | Status: SHIPPED | OUTPATIENT
Start: 2022-06-17 | End: 2022-06-23

## 2022-06-17 RX ADMIN — ENOXAPARIN SODIUM 40 MG: 40 INJECTION SUBCUTANEOUS at 10:02

## 2022-06-17 RX ADMIN — OXYCODONE HYDROCHLORIDE 5 MG: 5 TABLET ORAL at 10:02

## 2022-06-17 RX ADMIN — SODIUM CHLORIDE, POTASSIUM CHLORIDE, SODIUM LACTATE AND CALCIUM CHLORIDE: 600; 310; 30; 20 INJECTION, SOLUTION INTRAVENOUS at 05:48

## 2022-06-17 RX ADMIN — OXYCODONE HYDROCHLORIDE 5 MG: 5 TABLET ORAL at 13:12

## 2022-06-17 RX ADMIN — OMEPRAZOLE 20 MG: 20 CAPSULE, DELAYED RELEASE ORAL at 05:48

## 2022-06-17 RX ADMIN — FAMOTIDINE 20 MG: 10 INJECTION INTRAVENOUS at 04:09

## 2022-06-17 RX ADMIN — OXYCODONE HYDROCHLORIDE 10 MG: 5 TABLET ORAL at 04:10

## 2022-06-17 ASSESSMENT — ACTIVITIES OF DAILY LIVING (ADL)
ADLS_ACUITY_SCORE: 20

## 2022-06-17 NOTE — OP NOTE
Surgeon: Panchito Zambrano MD   1 st Assistant: Mulu España PA-C, The physicians assistant was medically necessary for their expertise in camera management, suctioning, suturing, and retraction.    PREOPERATIVE DIAGNOSIS:   Morbid Obesity  Previous Nissen fundoplication  Indication for operation:  Body mass index is 42.46 kg/m .  Luda Rai has been previously unsuccessful with medical treatment for obesity and some of her comorbidities are directly related to obesity  Past Medical History:   Diagnosis Date     Bone and joint disord back, pelvis, leg complicat preg, childb, puerp     Knee replacement 2020     Endocrine problem     Pcos     Esophageal reflux 2005    Had Nissen Fundiplication 2006     Osteoporosis     Knee       POSTOPERATIVE DIAGNOSIS:   Same    PROCEDURE:   1. Laparoscopic Gastric Bypass with Nadja-en-Y Gastrojejunostomy ( 100 cm nadja limb)  2.  Lysis of adhesion  3.  Takedown of Nissen fundoplication  4.  Partial gastrectomy  ANESTHESIA:   General Endotracheal Anesthesia   COMPLICATIONS:   None  EBL:   35 ml     Events:   The patient was taken to the operating room and placed in the supine position. After successful induction of general endotracheal anesthesia, an orogastric tube was placed to decompress the stomach. The patient was placed in the Lithotomy position, supine with legs abducted to 30 degrees. Care was taken to pad the exposed extremities. The patient's abdomen was prepped and draped in the normal sterile fashion. A direct visualization trocar was placed in the left subcostal position in the midclavicular line and access to abdominal cavity was obtained under visualization. Pneumoperitoneum was then established without complications. After evaluation of the abdominal contents from this trocar position, four other 12-mm ports were placed under direct visualization. A solution of local anesthetic was used to infiltrate the subcutaneous tissues prior to trocar placement.   We turned  our attention to dividing the greater omentum. This was accomplished with the harmonic scalpel. After this, we identified the ligament of Treitz and went 45 to 50-cm distal to that. We divided the bowel using a 45-mm KALI stapler. We then measured 100-cm distal on the bowel and oriented our bowel to create the jejunojejunostomy. An enterotomy was created in each limb with the harmonic scalpel and using the triple-staple technique created a side-to-side jejunojejunostomy. The opening was evaluated for hemostasis and care was taken to make sure that the posterior wall of the anastomosis was free. The remaining defect was closed with one more firing of the KAIL stapler.  Mesenteric defect closed with silk suture.  Left lobe of the liver was retracted with a Britany device on the sub-xyphoid location. After decompression of the stomach with the orogastric tube, the orogastric tube and any esophageal probes were removed from the patient.  We then spent significant amount of time lysing adhesions between the stomach, fundoplication, liver, and diaphragm.  Eventually the anterior part of the plication was free, the posterior portion was more complicated due to the significant adhesions to the hiatus repair and the posterior esophagus.  During this dissection a portion of the stomach suffered significant damage from manipulation and cautery.  Eventually the stomach was back on its normal anatomical position.  Along the lesser curvature of the stomach and preserving all the neurovascular structures in the lesser omentum the retrogastric space was entered. Once the lesser sac was entered, a KALI staple loads were used to progress cephalad toward the Angle of His.   A small gastric pouch was created, approximately 25-30 ml in size.  The portion of gastric fundus that suffered from the previous dissection was transected with KALI stapler and later removed from the abdomen.  After this was done, the anvil for the 25 mm EEA stapler  was placed transorally. The orogastric tube with the anvil was seen in the gastric pouch. The harmonic scalpel was used to make a gastrotomy on the pouch thru the staple line, and the orogastric tube was pulled out of the gastric pouch. The orogastric tube was detached from the anvil and removed leaving the anvil in the gastric pouch.   After this was done we brought the efferent limb all the way up to the gastric pouch, in between our previously created defect in the greater omentum. The staple line on the jejunum was opened using the harmonic scalpel and after enlarging the lateral trocar site, the 25-mm EEA stapler was introduced into the abdomen and into the open jejunum. Using the EEA stapler, we performed our end to side gastrojejunostomy. The stapler was removed and two healthy donuts of tissue were identified. The open-ended jejunum was closed using a KALI stapler. This amputated piece of jejunum was placed into an endopouch, and together with the portion of remove the stomach were removed from the abdomen. The gallbladder was inspected and found to be normal.  Having completed our gastrojejunostomy, an orogastric tube was placed by anesthesia and the integrity of the anastomosis was checked. Saline was used to irrigate the pouch to evaluate for hemostasis, and after that, the pouch was insufflated with air and methylene blue. There was no evidence of bleeding, air leak nor liquid leak.  Gastrojejunostomy further secured with 3-0 Vicryl sutures.  The abdomen was then copiously irrigated and checked for hemostasis. The effluent was evacuated from the abdomen and then we turned our attention to closure of the trocar sites.   At this point, prior to evacuation of the pneumoperitoneum, we closed the dilated port site with the laparoscopic suture fascial closure device using 0-Vicryl. The pneumoperitoneum was then evacuated. The wounds were irrigated and found to be hemostatic. The skin was closed using 4-0  Monocryl. Steri strips were applied.  Counts reported as correct.  No immediate complications.

## 2022-06-17 NOTE — PROGRESS NOTES
"Bariatric Surgery Progress Note    Admission Date: 6/16/2022  Today's Date: 6/17/2022         Assessment:      Luda Rai is a 42 year old female POD 1 s/p laparoscopic reversal of nissen fundoplication, partial gastrectomy, nadja-en-y gastric bypass.    Doing well, tolerating good PO intake.          Plan:   - Continue current cares  - Bariatric clear liquids today, fulls tomorrow. Dietician consult today  - Continue maintenance IV fluids for now, saline lock later today  - Completing 2 doses of postoperative antibiotics  - PCDs, ambulate at least QID, lovenox BID, encourage IS use every hour  - Pepcid IV x 2 doses, transitioned to PO prilosec this morning  - Transition to PO pain meds: tylenol, oxy, flexeril  - Continue to monitor blood pressure. Expect improvement over time, patient has cuff at home to monitor and will call PCP if continued hypertension after discharge    Dispo: expect discharge home later today  - Discharge instructions reviewed, questions answered  - Follow-up in weight loss clinic next week as scheduled. Call with any questions in the meantime        Interval History:   Afebrile, vitals stable, on room air today. Some hypertension, acceptable for postoperative period. No nausea this morning. Has taken in good amounts of water, tried some juice this morning and went well. Pain controlled. Has been up walking. No chest pain or shortness of breath. Feels comfortable going home today.           Physical Exam:   BP (!) 160/94 (BP Location: Right arm)   Pulse 96   Temp 98.2  F (36.8  C) (Oral)   Resp 16   Ht 1.702 m (5' 7\")   Wt 123 kg (271 lb 1.6 oz)   LMP 06/13/2022 (Exact Date)   SpO2 92%   BMI 42.46 kg/m    I/O last 3 completed shifts:  In: 3958 [P.O.:270; I.V.:3688]  Out: 1385 [Urine:1350; Blood:35]  General: NAD, pleasant, alert and oriented x3  Cardiovascular: regular rate and rhythm; S1 and S2 distinct without murmur   Respiratory: lungs clear to auscultation bilaterally without " wheezes, rales or rhonchi   Abdomen: soft, appropriately tender around incisions, non distended   Incision: clean, dry, and intact. No erythema or drainage. Steris in place, removed bandaids  Extremities: no lower extremity edema, no calf tenderness. Wearing PCDs    LABS:  Recent Labs   Lab Test 06/17/22  0708 06/08/22  1022 10/26/21  0734 12/03/20  1411   WBC  --  6.0 5.9 9.7   HGB 12.4 13.5 13.5 14.6   MCV  --  86 87 87   PLT  --  156 171 180      Recent Labs   Lab Test 06/17/22  0708 06/16/22  1625 06/08/22  1022 10/26/21  0734 03/16/21  0832   POTASSIUM 3.8  --  4.0 4.0 4.5   CHLORIDE 104  --  106 105 106   CO2 28  --  26 25 32   BUN  --   --  11 10 9   CR  --  0.62 0.58 0.76 0.64   ANIONGAP 3  --  5 6 1*    Na: 135         -------------------------------    Mulu España PA-C  [facility]  [phone]

## 2022-06-17 NOTE — PROGRESS NOTES
General surgery progress note    Resting comfortable in bed.  Explained operative findings. She is tolerating water without problem.  Pain is tolerable.  Abdomen is benign  Looking forward to more ambulation this morning and advancing/taking more clear liquids.  Follow protocol, home later today.

## 2022-06-17 NOTE — PLAN OF CARE
Goal Outcome Evaluation:    Shift: 9968-8787    Patient alert & oriented x4. VSS on RA except for SBP's in the 140's-150's. Compazine given PRN x1 in the beginning of shift for nausea when taking oral medication. Patient is now tolerating bariatric clear liquid diet. Patient is voiding adequate urine output. CMS intact. Patient has not passed gas yet. Bowel sounds hypoactive. Lap sites to abdomen are clean, dry, & intact. Ice packs applied to abdomen. Abdominal binder on. PCD's on. Encouraging patient to use incentive spirometer. Patient up SBA.LR running at 125 ml/hr. PRN dilaudid, flexeril, & oxycodone given this shift for pain management. Lung sounds clear. Possible discharge this morning. Will continue to monitor.

## 2022-06-17 NOTE — CONSULTS
-NUTRITION EDUCATION    REASON FOR ASSESSMENT:  Bariatric Surgery Consult    CURRENT DIET:  Bariatric Clear Liquid    NUTRITION HISTORY:  Patient worked with clinical dietitian in Weight Loss Clinic prior to surgery to assist with lifestyle modification.    ANTHROPOMETRICS:   Ht: 67 inches  Wt: 123 kg  BMI: 42.46 kg/(m^2)  IBW: 61.6 kg  %IBW: 200%    ASSESSED NUTRITION NEEDS:  Energy Needs: 8584-7300 kcals (11 - 14 kcal/kg ABW)                      Protein Needs: 62-92 g (1 - 1.5 gm/kg IBW)  Fluid Needs: 7794-8126 mL (1mL/kcal/ABW)    Know patient will not meet assessed needs due to the restrictive nature of surgery.    NUTRITION DIAGNOSIS:   Food- and nutrition related knowledge deficit related to bariatric surgery as evidence by laparoscopic gastric bypass surgery on 6/16/2022.    INTERVENTIONS:    Nutrition Prescription:    Recommended patient follow bariatric diet advancement for laparoscopic gastric bypass    Implementation:    Nutrition Education (Content):  a) Reviewed laparoscopic gastric bypass diet guidelines   b) Provided handouts:  Your Stage 2 Diet, Low-Fat Full Liquids and Supplements After Weight Loss Surgery    Nutrition Education (Application):  c) Discussed current eating habits and recommended alternative food choices  d) Patient verbalizes understanding of diet by listing appropriate foods for home    Anticipate good compliance    Diet Education - refer to Education Flowsheet    Goals:    Patient will follow bariatric diet advancement schedule    Patient will follow post-operative vitamin mineral schedule      Follow Up:    Patient to follow up in 2 weeks with RD in Weight Loss Clinic    Provided RD contact information for future questions      Liana Crandall RD, LD  Clinical Dietitian

## 2022-06-17 NOTE — DISCHARGE SUMMARY
[facility] Discharge Summary    Luda Rai MRN# [id number]   Age: 42 year old YOB: 1980     Date of Admission:  6/16/2022  Date of Discharge:  6/17/2022  2:34 PM  Admitting Provider:  Panchito Zambrano MD  Discharge Provider:  Mulu España PA-C with Dr. Zambrano  Discharging Service: Bariatric Surgery     Primary Provider: Mulu Montalvo  Primary Care Physician Phone Number: [phone]          Admission Diagnoses:   Principle Diagnosis: Morbid obesity (H) [E66.01]  Morbid obesity with BMI of 40.0-44.9, adult (H) [E66.01, Z68.41]    Secondary Diagnoses:  GERD, previous Nissen fundoplication          Discharge Diagnosis:   Same as above          Procedures:   Procedure(s): 1. Laparoscopic Gastric Bypass with Nadja-en-Y Gastrojejunostomy ( 100 cm nadja limb)  2.  Lysis of adhesion  3.  Takedown of Nissen fundoplication  4.  Partial gastrectomy            Discharge Medications:     Discharge Medication List as of 6/17/2022  1:06 PM      START taking these medications    Details   acetaminophen (TYLENOL) 325 MG tablet Take 2 tablets (650 mg) by mouth every 4 hours as needed for pain, Disp-90 tablet, R-0, E-Prescribe      cyclobenzaprine (FLEXERIL) 5 MG tablet Take 1-2 tablets (5-10 mg) by mouth 3 times daily as needed for muscle spasms (pain), Disp-15 tablet, R-0, E-Prescribe      hyoscyamine (LEVSIN/SL) 0.125 MG sublingual tablet Place 1 tablet (125 mcg) under the tongue every 4 hours as needed for cramping (spasms), Disp-30 tablet, R-0, E-Prescribe      omeprazole (PRILOSEC) 20 MG DR capsule Take 1 capsule (20 mg) by mouth every morning (before breakfast), Disp-90 capsule, R-0, E-Prescribe      ondansetron (ZOFRAN ODT) 4 MG ODT tab Take 1 tablet (4 mg) by mouth every 6 hours as needed for nausea or vomiting, Disp-10 tablet, R-0, E-Prescribe      oxyCODONE (ROXICODONE) 5 MG tablet Take 1 tablet (5 mg) by mouth every 4 hours as needed for moderate to severe pain, Disp-15 tablet, R-0,  E-Prescribe         CONTINUE these medications which have NOT CHANGED    Details   CALCIUM CITRATE PO Take 1 capsule by mouth daily, Historical      cholecalciferol (VITAMIN D3) 1250 mcg (62682 units) capsule Take 1,250 mcg by mouth every 7 days, Historical      multivitamin w/minerals (THERA-VIT-M) tablet Take 1 tablet by mouth daily, Historical                 Allergies:         Allergies   Allergen Reactions     Codeine GI Disturbance     Nsaids Other (See Comments)     Avoid due to bariatric surgery 6/16/2022     Rocephin [Ceftriaxone] Hives             Brief History of Illness:   Luda Rai is a 42 year old-year-old female who underwent preoperative screening in anticipation for potential bariatric surgery. She had a previous nissen fundoplication for severe reflux. She was deemed an appropriate candidate for bariatric surgery by the consensus of our [facility] team. In the office, surgical options were thoroughly discussed. She was furnished with a copy of our specialized consent form for bariatric surgery. The potential risks as outlined in that document were all thoroughly reviewed. All questions were answered and she wished to proceed.    On the day of surgery, after reviewing the risks, benefits, and possible complications, informed consent was obtained and the patient underwent the above procedure.  There were no complications.  Please see the Operative Report for full details.           Hospital Course:   Luda Rai's hospital course was unremarkable.  She recovered as anticipated and experienced no post-operative complications. On POD 1 she was tolerating adequate intake of clear liquid diet, was ambulating and voiding appropriately, had good control of pain on oral medications, was afebrile. She did have some hypertension, but it was within acceptable limits for discharged. She has a blood pressure cuff at home and plans to monitor her pressure closely and call her primary care  "provider for follow-up.     On the date of discharge, the patient was discharged to home in stable condition and afebrile.  She verbalized understanding of all discharge instructions and felt comfortable with the discharge plan.  She was asked to call with any further questions or concerns.           Condition on Discharge:      Discharge condition: Stable   Discharge vitals: Blood pressure (!) 160/94, pulse 96, temperature 98.2  F (36.8  C), temperature source Oral, resp. rate 16, height 1.702 m (5' 7\"), weight 123 kg (271 lb 1.6 oz), last menstrual period 06/13/2022, SpO2 92 %, not currently breastfeeding.           Discharge Disposition:   Discharged to home          Discharge Instructions and Follow-Up:      Luda Rai was asked to follow up with the bariatric surgical team within 1 week.  See AVS for complete discharge instructions.     Mulu España PA-C  [facility]  [phone]     "

## 2022-06-17 NOTE — PLAN OF CARE
POD 1. Pt VSS ex BP, on RA, A/Ox4.  Pt c/o generalized abd pain, managed with oxy.  Abd binder in place, lap sites CDI.  BS very hypoactive and faint, pt tolerating bariatric clears diet, pt has had PO intake of over 500 ml.  Pt voiding adequately, no flatus yet.  Pt up independently, ambulating in room.  Discharge and medication education given with pt verbalized understanding.  All questions answered.  Pt discharged home via transport in wheelchair.

## 2022-06-18 NOTE — PROGRESS NOTES
[facility]  Post Bariatric Clinic  1 Week Surgical Follow-Up     PCP:  Mulu Montalvo    DOS: 06/16/22  Surgeon: LEL  Surgery Type: Bypass    HISTORY OF PRESENT ILLNESS:  Luda Rai returns today for her follow-up appointment status post bariatric surgery.  Pt's course was unremarkable.  She recovered as anticipated and experienced no post-operative complications. She did have some hypertension, but it was within acceptable limits for discharged. It is improved today.   On the date of discharge, the patient was discharged to home in stable condition and afebrile.    She is currently using Tylenol and flexeril once a day for pain medication.  Refill Needed: No.  Patient's Pain Scale: 2. The pain is located left incision site.  Patient's current daily fluid intake in oz is: 50-60 water + crystal light, 8z diet juice with scoop of protein powder.  Patient has started postoperative Vitamins: Yes.  Activity:   Activity: at home up and around, every hour     REVIEW OF SYSTEMS:  GI:    Nausea: No  Vomiting: No  Diarrhea: No  Constipation: No  Last BM: last night; nl (took stool softener 2 days ago)  Dysphagia: No  GERD: No Taking omeprazole daily.    CV/Pulmonary:   Chest Pain: No  Dizzy: No  Light Headed: No  SOB: No   Using IS and getting 2500 daily.   Endo:  Diabetes: No  :    Contraception: tubes removed  Dysuria: No  Vascular:    LE Edema: No  Current Outpatient Medications   Medication     acetaminophen (TYLENOL) 325 MG tablet     CALCIUM CITRATE PO     cholecalciferol (VITAMIN D3) 1250 mcg (13793 units) capsule     cyclobenzaprine (FLEXERIL) 5 MG tablet     multivitamin w/minerals (THERA-VIT-M) tablet     omeprazole (PRILOSEC) 20 MG DR capsule     No current facility-administered medications for this visit.      PHYSICAL EXAMINATION:    /79 (BP Location: Left arm, Patient Position: Sitting)   Pulse 85   Temp 98.5  F (36.9  C) (Oral)   Resp 16   Wt 260 lb 8  oz (118.2 kg)   LMP 06/13/2022 (Exact Date)   SpO2 96%   BMI 40.80 kg/m    GENERAL: No acute distress. Alert and oriented time 3.  HEART: Regular rate and rhythm. No murmurs, rubs or gallops  LUNGS:  Breathing unlabored. Clear to auscultation bilaterally.  ABDOMEN: Soft, incisions clean,dry, and intact. Tenderness WNL for post op.  EXTREMITIES: No lower extremity edema bilaterally. No calf swelling or tenderness. Negative cat's sign.  SKIN: No rashes.  PSYCHOLOGICAL: Stable. Pleasant     Assessment & Plan   Problem List Items Addressed This Visit     Morbid obesity (H)    Morbid obesity with BMI of 40.0-44.9, adult (H)    Relevant Medications    cyclobenzaprine (FLEXERIL) 5 MG tablet    Bariatric surgery status - Primary     6/16/2022 RYGBS and Takedown of Nissen fundoplication LEL           Malnutrition following gastrointestinal surgery     Start taking recommended post-op vitamins within first 6 weeks post op   Bring home vitamins to be reviewed at 2nd week post up nurse visit  Labs to be ordered per protocol at 3 mo po.                   PLAN:  GENERAL POST OP GUIDELINES  Hydration:  Strive to drink 64 oz of fluid daily  Diet:   Advance diet per Dietitian at your 2 week appointment. Start recommended postoperative vitamins within in the first 6 weeks.  Movement:  Strive to move hourly while awake to decrease risk of developing a blood clot. Continue to do deep breathing exercises twice daily or use your spirometer.  Pain:  Can use tylenol 1000 mg scheduled three times a day for pain. If you have Narcotic pain medication take only as needed. Start to decrease use and stop by end of week 2.  Ice packs and heat are helpful for abdominal pain and muscle strains. Wear binder to support abdominal muscles and gradually wean off over the next few weeks.   Meds: Continue with recommended antacid medication for the next 3 months.   FOLLOW UP:  Return to clinic for 2 wk post op appointment and bring post op vitamins  along.  Make follow up appointment to meet with psychologist at 1 and 3 months post operatively. Follow up with your primary care provider to discuss obesity related conditions by one month post op.   Call [phone] or [facility] with questions or concerns at any time.

## 2022-06-20 ENCOUNTER — TELEPHONE (OUTPATIENT)
Dept: SURGERY | Facility: CLINIC | Age: 42
End: 2022-06-20
Payer: COMMERCIAL

## 2022-06-20 ENCOUNTER — PATIENT OUTREACH (OUTPATIENT)
Dept: FAMILY MEDICINE | Facility: CLINIC | Age: 42
End: 2022-06-20
Payer: COMMERCIAL

## 2022-06-20 NOTE — TELEPHONE ENCOUNTER
Called pt to check on postop status.  Pt responses below:    Surgery Date: 6/16/22  Surgery Type: RNY and reversal of Nissen  Surgeon: Dr. Zambrano    Fluid Intake: 32 oz of water, Crystal Light or pro scoop  - last 2 days just survived; however was drinking a lot over the weekend.     Pain Assessment:    Pain level: 0-4/10 less if sitting and more if up  Location: incisions  Flexeril - 2 at most (day and night) and Tyl. 1000 mg TID   No narcotics since Sunday AM.    Medications:  RX for PPI?  Omeprazole yes has 3 months worth  Do you understand how to take your medications postop: Yes    acetaminophen 325 MG tablet - taking 1000 mg TID  Also known as: TYLENOL  INSTRUCTIONS: Take 2 tablets (650 mg) by mouth  every 4 hours as needed for pain  LAST TAKEN: 975 mg on June 16, 2022 6:08 AM  cyclobenzaprine 5 MG tablet - taking BID - during day and HS  Also known as: FLEXERIL  INSTRUCTIONS: Take 1-2 tablets (5-10 mg) by  mouth 3 times daily as needed for muscle spasms  (pain)  LAST TAKEN: 10 mg on June 16, 2022 11:57 PM  hyoscyamine 0.125 MG sublingual tablet - not taking or needed at this time  Also known as: LEVSIN/SL  INSTRUCTIONS: Place 1 tablet (125 mcg) under the  tongue every 4 hours as needed for cramping  (spasms)  omeprazole 20 MG DR capsule  Also known as: priLOSEC  INSTRUCTIONS: Take 1 capsule (20 mg) by mouth  every morning (before breakfast)  LAST TAKEN: 20 mg on June 17, 2022 5:48 AM  ondansetron 4 MG ODT tab - not needed  Also known as: ZOFRAN ODT  INSTRUCTIONS: Take 1 tablet (4 mg) by mouth  every 6 hours as needed for nausea or vomiting  LAST TAKEN: Ask your nurse or doctor  oxyCODONE 5 MG tablet - stopped Sunday AM 6/19/22  Also known as: ROXICODONE  INSTRUCTIONS: Take 1 tablet (5 mg) by mouth  every 4 hours as needed for moderate to severe  pain  LAST TAKEN: 5 mg on June 17, 2022 10:02 AM  Morning Afternoon Evening Bedtime As Needed  CALCIUM CITRATE PO  INSTRUCTIONS: Take 1 capsule by mouth  daily  Morning Afternoon Evening Bedtime As Needed  cholecalciferol 1250 mcg (55242 units) capsule  Also known as: VITAMIN D3  INSTRUCTIONS: Take 1,250 mcg by mouth every 7  days  multivitamin w/minerals tablet  INSTRUCTIONS: Take 1 tablet by mouth daily  Address: [address] [place] [address]  Phone: [phone]    Reviewed postop med changes:  yes  Have you restarted all of the medications that weren't changed after surgery?  Not vitamins yet. Discussed how to resume PO  Do you have any questions about how to take your medications?  no    Diet: Full liquids  How tolerated? yes    Nausea: no    Vomiting: no    NSAIDs: no  Smoking: no    Fever: no    Incisions: look fine    BMs: yesterday and today  Last BM: NL color and form  Bloating/cramping:  No   Informed pt it is normal to have no stool for up to a week if passing gas and not having bloating/cramping.    Urinary:  No issues    Sleeping/Rest: good - through the night except for to get up and urinate    Activity: walking around the house - too hot to be outside and stairs - up every hour    Activity caution:  Advised pt to be careful to avoid straining abdominal muscles during recovery.    Incentive Spirometer: yes every 3 times daily up to 4000    Other symptoms (CP/SOB/dizzy/rapid HR): no    Plan/Advice:   Advised pt to contact PCP and schedule hospital follow up visit within 1 week of discharge.   Discuss medications or recommendations from discharging provider.  1 week followup appointment verified. 6/23/22 0900.  Use of incentive spirometer reinforced.  Call clinic with any questions or concerns.    Reviewed that clinic staff are available on call during nights and weekends for postoperative concerns.  Number given.    No further questions or concerns now. Pt agrees to call if having any questions or concerns.  Randee Cobian, MS, RD, RN

## 2022-06-20 NOTE — TELEPHONE ENCOUNTER
"    Called #   Telephone Information:   Mobile [phone]     ED/Discharge Protocol    \"Hi, my name is Denise Leal RN, a registered nurse, and I am calling on behalf of Dr. Montalvo's office at [place].  I am calling to follow up and see how things are going for you after your recent visit.\"    \"I see that you were in the (ER/UC/IP) on 6/16/2022.    How are you doing now that you are home?\" I am doing well     Is patient experiencing symptoms that may require a hospital visit?  None     Discharge Instructions    \"Let's review your discharge instructions.  What is/are the follow-up recommendations?  Pt. Response: I am seeing my surgeon for the next several weeks     \"Were you instructed to make a follow-up appointment?\"  Pt. Response: Yes.  Has appointment been made?   Yes      \"When you see the provider, I would recommend that you bring your discharge instructions with you.    Medications    \"How many new medications are you on since your hospitalization/ED visit?\"    0-1  \"How many of your current medicines changed (dose, timing, name, etc.) while you were in the hospital/ED visit?\"   0-1  \"Do you have questions about your medications?\"   No  \"Were you newly diagnosed with heart failure, COPD, diabetes or did you have a heart attack?\"   No  For patients on insulin: \"Did you start on insulin in the hospital or did you have your insulin dose changed?\"   No  Post Discharge Medication Reconciliation Status: discharge medications reconciled, continue medications without change.    Was MTM referral placed (*Make sure to put transitions as reason for referral)?   No    Call Summary    \"Do you have any questions or concerns about your condition or care plan at the moment?\"    No  Triage nurse advice given: if anything changes or you have questions please call clinic     Patient was in ER 2 in the past year (assess appropriateness of ER visits.)      \"If you have questions or things don't continue to improve, we " "encourage you contact us through the main clinic number,  [phone].  Even if the clinic is not open, triage nurses are available 24/7 to help you.     We would like you to know that our clinic has extended hours (provide information).  We also have urgent care (provide details on closest location and hours/contact info)\"      \"Thank you for your time and take care!\"                "

## 2022-06-23 ENCOUNTER — OFFICE VISIT (OUTPATIENT)
Dept: SURGERY | Facility: CLINIC | Age: 42
End: 2022-06-23

## 2022-06-23 ENCOUNTER — OFFICE VISIT (OUTPATIENT)
Dept: SURGERY | Facility: CLINIC | Age: 42
End: 2022-06-23
Payer: COMMERCIAL

## 2022-06-23 VITALS
TEMPERATURE: 98.5 F | HEART RATE: 85 BPM | DIASTOLIC BLOOD PRESSURE: 79 MMHG | RESPIRATION RATE: 16 BRPM | WEIGHT: 260.5 LBS | SYSTOLIC BLOOD PRESSURE: 130 MMHG | BODY MASS INDEX: 40.8 KG/M2 | OXYGEN SATURATION: 96 %

## 2022-06-23 DIAGNOSIS — E66.01 MORBID OBESITY WITH BMI OF 40.0-44.9, ADULT (H): ICD-10-CM

## 2022-06-23 DIAGNOSIS — K91.2 MALNUTRITION FOLLOWING GASTROINTESTINAL SURGERY: ICD-10-CM

## 2022-06-23 DIAGNOSIS — E66.01 MORBID OBESITY (H): ICD-10-CM

## 2022-06-23 DIAGNOSIS — Z98.84 BARIATRIC SURGERY STATUS: Primary | ICD-10-CM

## 2022-06-23 PROCEDURE — 99024 POSTOP FOLLOW-UP VISIT: CPT | Performed by: PHYSICIAN ASSISTANT

## 2022-06-23 PROCEDURE — 99207 PR NO CHARGE NURSE ONLY: CPT

## 2022-06-23 RX ORDER — CYCLOBENZAPRINE HCL 5 MG
5-10 TABLET ORAL 3 TIMES DAILY PRN
Qty: 15 TABLET | Refills: 0 | Status: SHIPPED | OUTPATIENT
Start: 2022-06-23 | End: 2022-06-30

## 2022-06-23 ASSESSMENT — PAIN SCALES - GENERAL: PAINLEVEL: MILD PAIN (2)

## 2022-06-23 NOTE — ASSESSMENT & PLAN NOTE
Start taking recommended post-op vitamins within first 6 weeks post op   Bring home vitamins to be reviewed at 2nd week post up nurse visit  Labs to be ordered per protocol at 3 mo po.

## 2022-06-23 NOTE — PATIENT INSTRUCTIONS
Today you were seen for your 1 week post op.    Return to clinic: in 1 week for diet/nurse visit.  Bring post op vitamins along.  Call with questions or concerns at any time. [phone]    GENERAL POST OP GUIDELINES    2 most important things to remember for the 1st month is to DRINK and MOVE.     Drink-   Keep a water bottle with you throughout the day (Have 20 oz down by lunch, supper, bedtime)  Sip on fluids every 15 minutes or more  Goal 50-60 oz of fluid daily  YOU DO NOT HAVE TO SEPARATE FLUIDS WITH YOUR LIQUID MEALS. (A liquid is a liquid)    Move-   Move in house every hour while awake to decrease risk of developing a blood clot.  Activity: Start 5 minutes of walking to start and increase when able    Continue to do deep breathing exercises twice daily or use your spirometer to avoid pneumonia.  Wear binder to support abdominal muscles if desired and gradually wean off over the next few weeks.       Medications:   Continue with recommended antacid medication for the next 3 months.   Postoperative vitamins within in the first 6 weeks.     Start recommended vitamins in the first 6 weeks.   Introduce them 1 at a time.   Diet:    Continue on liquid diet  You'll advance your diet per Dietitian at your 2 week appointment.     Pain Management:  You can take Acetaminophen (Tylenol) for pain. Max amount 3000 mg per day.  1000 mg three times as needed daily or 650 mg as needed 4 times daily     Follow up:    Return to clinic at 2 weeks, 4 weeks, 3 mo, 6 mo, and annually  Make follow up appointment to meet with psychologist at 1 and 3 months post operatively.   Follow up with your primary care provider to discuss obesity related conditions by one month post op.

## 2022-06-27 LAB
PATH REPORT.COMMENTS IMP SPEC: NORMAL
PATH REPORT.COMMENTS IMP SPEC: NORMAL
PATH REPORT.FINAL DX SPEC: NORMAL
PATH REPORT.GROSS SPEC: NORMAL
PATH REPORT.MICROSCOPIC SPEC OTHER STN: NORMAL
PATH REPORT.RELEVANT HX SPEC: NORMAL
PHOTO IMAGE: NORMAL

## 2022-06-27 PROCEDURE — 88300 SURGICAL PATH GROSS: CPT | Mod: 26 | Performed by: PATHOLOGY

## 2022-06-28 ENCOUNTER — TELEPHONE (OUTPATIENT)
Dept: SURGERY | Facility: CLINIC | Age: 42
End: 2022-06-28

## 2022-06-28 NOTE — TELEPHONE ENCOUNTER
"Pt had gastric bypass 6/16/22.  Was seen for 1st week PO 6/23/22 and doing well.    Pt calls today in NAD however states that has been having pain since Sunday afternoon 6/26/22.    Can't remember anything she did to irritate area.    States pain is located on left side of abdomen above umbilicus and radiates to the back a bit.  States that when she presses deeply on it has no pain.  However when gets up and moves it goes to 4-5/10.  When just sitting as she is now it is 0-2/10.  If supports area tightly feels better.  Just put the binder back on this AM.     States that the pain was pretty bad yesterday and even more so last night.  Yesterday 5-6/10 and last night 7-8/10.  Was almost ready to go to the ED but didn't as had a little one to consider where that one would go.    States that last night when pain was occurring at high level she was perspiring and had increased heart rate and felt nauseated.  Took a flexeril and did nothing.  Oxy took it about MN down to 4-5/10.  Had also tried heat and ice.     Now is at 0-2/10 when sitting.  When up and walked in kitchen this am was 4-5/10 and felt sharp upon getting up.     No vomiting.  Only nausea with intense pain.    No BM in 3 days.  Has had 2 stool softeners/day since Sunday  Has had a Miralax dose Sunday and Monday.  Passing gas - \"feels like there is something in there\".  Getting in 50-60 oz daily.      Spoke with HELLEN Bonner and plan:  Rest but still get up and move but not strenuous.  Careful with movement so as not to irritate muscle.   Continue heat or ice - can use Icy Hot but keep at least an inch away from incisions and no NSAID'S or ASA in patches.   Continue to wear binder.  If pain gets worse along with N/V and increase HR or bleeding from any end or light headed or dizzy to ED.  Pt to MC HR when gets up and watch on .    Push fluids.  One-2 doses Miralax daily.  Can use enema.  Call if on results.    Pt states feels good with plan and Patient " verbalized understanding and is agreeable to plan.  Randee Cobian, MS, RD, RN

## 2022-06-29 ENCOUNTER — TELEPHONE (OUTPATIENT)
Dept: SURGERY | Facility: CLINIC | Age: 42
End: 2022-06-29

## 2022-06-29 NOTE — TELEPHONE ENCOUNTER
Called pt to check in on how she is doing from our discussion yesterday related to PO pain.  LM to see  msg and respond or call back.  Randee Cobian, MS, RD, RN

## 2022-06-30 ENCOUNTER — OFFICE VISIT (OUTPATIENT)
Dept: SURGERY | Facility: CLINIC | Age: 42
End: 2022-06-30
Payer: COMMERCIAL

## 2022-06-30 VITALS
OXYGEN SATURATION: 96 % | BODY MASS INDEX: 39.91 KG/M2 | SYSTOLIC BLOOD PRESSURE: 118 MMHG | TEMPERATURE: 97.9 F | DIASTOLIC BLOOD PRESSURE: 76 MMHG | WEIGHT: 254.8 LBS | RESPIRATION RATE: 14 BRPM | HEART RATE: 93 BPM

## 2022-06-30 VITALS — WEIGHT: 254.8 LBS | HEIGHT: 68 IN | BODY MASS INDEX: 38.62 KG/M2

## 2022-06-30 DIAGNOSIS — Z98.84 BARIATRIC SURGERY STATUS: Primary | ICD-10-CM

## 2022-06-30 DIAGNOSIS — G89.18 POSTOPERATIVE PAIN: ICD-10-CM

## 2022-06-30 DIAGNOSIS — Z98.84 BARIATRIC SURGERY STATUS: ICD-10-CM

## 2022-06-30 DIAGNOSIS — E66.01 MORBID OBESITY (H): ICD-10-CM

## 2022-06-30 DIAGNOSIS — K91.2 MALNUTRITION FOLLOWING GASTROINTESTINAL SURGERY: ICD-10-CM

## 2022-06-30 DIAGNOSIS — E66.01 MORBID OBESITY WITH BMI OF 40.0-44.9, ADULT (H): ICD-10-CM

## 2022-06-30 PROCEDURE — 99207 PR NO CHARGE NURSE ONLY: CPT

## 2022-06-30 PROCEDURE — 97803 MED NUTRITION INDIV SUBSEQ: CPT | Performed by: DIETITIAN, REGISTERED

## 2022-06-30 RX ORDER — CYCLOBENZAPRINE HCL 5 MG
5-10 TABLET ORAL 2 TIMES DAILY PRN
Qty: 15 TABLET | Refills: 0 | Status: SHIPPED | OUTPATIENT
Start: 2022-06-30 | End: 2022-09-16

## 2022-06-30 NOTE — PROGRESS NOTES
[facility]  2 Week Surgical Follow-Up     Current PCP:  Mulu Montalvo     HISTORY OF PRESENT ILLNESS:  Luda Rai returns today for her follow-up appointment status post Surgery Type: Bypass DOS: 06/16/22.  She is accompanied by Self.   Her daily fluid intake in oz is: 60-70 ounces.   Feels well emotionally Yes.   Patient reports using: Alcohol - No     Cigarettes - No  Pain:    She is currently using Pain Meds: Flexeril 10 mg 2 times daily and Tylenol 650 mg 2-3 times daily, Ice mostly and some heat for pain relief. She rates her pain at a Pain Scale: 2 7/8/-10 on the pain scale. The pain is located left side abdomen and radiates to back. States pain is aching when sitting to ripping when up and moving or bending. States starts to perspire when pain level get higher. Refill Needed: Yes - Flexeril  Activity:  The patient is considered a fall risk: No.   What are you doing for physical activity? Activity: up as able due to muscle strain abdomen  Patient plans to walk and then use recumbent bike, exercise bands, and weights after restrictions lifted.  Review of Systems:   GI:  Nausea: No   Vomiting: No   GERD: No  Diarrhea: No    Constipation: No   Patient's Last BM: yesterday after enema was good amount and looser and brown     Neuro/CV/Pulmonary:   Dizzy: No   Light Headed: No   SOB: No   Chest Pain: No   IS: was getting to 4000 and stopped 2 days ago.   Vascular:    LE Edema: No  Shannan's Negative  :  Form of birth control is Contraception: tubal  Symptoms of UTI:  Dysuria: No  Endo: Diabetes: No  BS check (freq): na                  Avg. BS Level: na  PHYSICAL EXAMINATION:    VITALS:  /76 (BP Location: Left arm, Patient Position: Sitting, Cuff Size: Adult Large)   Pulse 93   Temp 97.9  F (36.6  C)   Resp 14   Wt 254 lb 12.8 oz (115.6 kg)   LMP 06/13/2022 (Exact Date)   SpO2 96%   Breastfeeding No   BMI 39.91 kg/m                    LUNGS:  clear to auscultation  ABDOMEN:  Abdomen soft, non-tender. BS normal.   INCISION: dry and intact - No s/sx of infection - all incisions look great.    MEDICATIONS/VITAMINS/ALLERGIES REVIEWED: Yes  ASA Hx: No    ASSESSMENT:    1.  DOS: 06/16/22 status post gastric bypass surgery.    INTERVENTIONS:      Symptoms given re: signs and symptoms of DVT and when to call clinic. Patient verbalized understanding.  Steri strip removed?  Yes       PLAN:    Make follow up appointment to meet with psychologist and 1 and 3  months post operatively.     Follow up with your primary care provider to obesity related conditions by one month post op. Not necessarily a visit but send My Chart msg to keep in loop.    Use Tylenol 650 mg 4 times daily scheduled. Okay to buy OTC Tylenol.  Will discuss refill with provider or another option for left-sided discomfort.   Pt's preferred pharmacy is [facility] [address]    Discussed with Dr. Gamboa and KETURAH  No other recommendations at this time.  Give another 15 Flexeril.   This was ordered.  Pt notified.     Advance diet per Dietitian at your 2 week appointment.     Call with questions or concerns at any time.    Return to clinic in 4 weeks for nutrition visit.    Return to clinic postop month 3.    Guidelines provided re: how to increase activity/exercise?  Yes  Discussed when swimming is okay.   Randee Cobian, MS, RD, RN

## 2022-06-30 NOTE — PROGRESS NOTES
"BARIATRIC PROGRESS NOTE - 2 Week Post Op  DATE OF VISIT: June 30, 2022    Luda Rai  1980  female  [id number]  42 year old    ASSESSMENT:    REASON FOR VISIT:  Luda Rai is a 42 year old year old female presents today for a 2 Week Post Op nutrition follow-up appointment. Patient is accompanied by self      DIAGNOSIS:  Status: post gastric bypass surgery.   Obesity Grade II BMI 35-39.9    ANTHROPOMETRICS:  Height: 5' 8\"   Initial weight: 284 lbs    Current Weight: 254 lbs 12.8 oz   BMI: Body mass index is 38.74 kg/m .    VITAMINS AND MINERALS:   1 Multivitamin with Minerals (BA Ultra Solo)  500 mg Calcium With Vitamin D TID w/meals  10,000 International units Vitamin D    **Advised pt to discontinue Vitamin D and start Vitamin B12 SL      NUTRITION HISTORY:  Tolerating diet: Bariatric full liquid diet  Fluids/water intake: 60-70 ounces/day (water, protein drink, SF sports drinks)  Small bites: Yes  Portion size: 1/4-1/2c  20-30 minute meals: Yes  Fluids and meals  by 30 minutes: Not needed at current diet stage  Chew foods thoroughly: Not needed at current diet stage  Breakfast: protein yogurt  Lunch: (same as breakfast)  Dinner: soup (butternut squash or asparagus)  Snacks: protein shakes (1-2), SF pudding  Nausea: none  Vomiting: none  Constipation: none; although notes less frequency w/BMs  Additional Information: Pt struggling with pain and discomfort but otherwise feeling well and tolerating diet. Anxious to advance textures. Notes that she felt better after SF sports drink vs plain water.      PHYSICAL ACTIVITY:  Trying to stay mobile and move around at regular intervals; note mobility somewhat limited d/t current pain levels    DIAGNOSIS:     Current Nutrition Diagnosis: Altered gastrointestinal function related to alternation in gastrointestinal structure as evidenced by history of gastric bypass.     INTERVENTION  Nutrition Prescription: Recommended bariatric puree " diet.    Goals:  Start puree diet at day 14 post op.  Continue MVI, calcium with vitamin D  Discontinue Vitamin D  Add Vitamin B12 500-1000mcg per day or 5000 mcg per week  Aim for 60 to 90 grams protein.  Continue 48 to 64 oz of fluid per day.    Implementation:  Discussed transition to puree diet.  Emphasized importance of adequate protein.  Reviewed required vitamins and mineral supplements.  Verbalizes good understanding of surgery diet guidelines.  Assessed learning needs and learning preferences.      NUTRITION MONITORING AND EVALUATION:   Monitor diet tolerance and weight loss.      Anticipated Compliance: good  Follow Up: Continue to monitor patient closely regarding weight loss and diet.  At 4 weeks Post Op    TIME SPENT WITH PATIENT: 20 minutes        Liana Crandall RD, LD  Clinical Dietitian

## 2022-07-18 ENCOUNTER — OFFICE VISIT (OUTPATIENT)
Dept: SURGERY | Facility: CLINIC | Age: 42
End: 2022-07-18
Payer: COMMERCIAL

## 2022-07-18 VITALS
OXYGEN SATURATION: 100 % | WEIGHT: 247.3 LBS | BODY MASS INDEX: 37.48 KG/M2 | HEART RATE: 82 BPM | SYSTOLIC BLOOD PRESSURE: 109 MMHG | HEIGHT: 68 IN | DIASTOLIC BLOOD PRESSURE: 71 MMHG | TEMPERATURE: 98.5 F

## 2022-07-18 DIAGNOSIS — S30.1XXA ABDOMINAL WALL SEROMA, INITIAL ENCOUNTER: Primary | ICD-10-CM

## 2022-07-18 DIAGNOSIS — Z98.84 BARIATRIC SURGERY STATUS: ICD-10-CM

## 2022-07-18 PROCEDURE — 99024 POSTOP FOLLOW-UP VISIT: CPT | Performed by: PHYSICIAN ASSISTANT

## 2022-07-18 NOTE — PROGRESS NOTES
"Bariatric PO Visit    HISTORY OF PRESENT ILLNESS:   Luda returns today to check on her incision. She is 4 weeks status post Laparoscopic Gastric Bypass surgery. Her left lower incision has been tender, erythematous, and itchy for the past 2 days.  Has some induration.  No fever.  \"Feels like there is a hard ball under the skin.\" wants to make sure this is normal.      PE:     /71   Pulse 82   Temp 98.5  F (36.9  C) (Oral)   Ht 5' 8\" (1.727 m)   Wt 247 lb 4.8 oz (112.2 kg)   LMP 06/13/2022 (Exact Date)   SpO2 100%   BMI 37.60 kg/m    GENERAL: No acute distress. Alert and oriented time 3.  HEART: Regular rate and rhythm. No murmurs, rubs or gallops  LUNGS:  Breathing unlabored. Clear to auscultation bilaterally.  ABDOMEN: Soft, Left lower incision 3 cm x 4 cm erythematous, Induration, warm to touch.  Painful to palpation. Remaining incisions clean,dry, and intact.   PSYCHOLOGICAL: Stable. Pleasant    ASSESSMENT:  1. 4 weeks status post Laparoscopic Gastric Sleeve.  2. Abdominal Wall Seroma    PLAN:    Procedure: I and D  Effected area cleaned with betadine x 3 then wiped away with alcoholol.  1 pecent lidocaine with epineprhine used to infiltrate the area with good anethesia.  Number 11 scapel used to open incision.  Mainly serosanguinous with slight purulent drainage was removed . No gauze was needed as area was small. Appropraite wound care dressing applied.  Pt tolerated preocedure well.    No Change: Post Op Global Period    "

## 2022-07-19 ENCOUNTER — VIRTUAL VISIT (OUTPATIENT)
Dept: SURGERY | Facility: CLINIC | Age: 42
End: 2022-07-19
Payer: COMMERCIAL

## 2022-07-19 VITALS — WEIGHT: 247.4 LBS | HEIGHT: 68 IN | BODY MASS INDEX: 37.5 KG/M2

## 2022-07-19 DIAGNOSIS — Z98.84 BARIATRIC SURGERY STATUS: ICD-10-CM

## 2022-07-19 DIAGNOSIS — E66.01 MORBID OBESITY WITH BMI OF 40.0-44.9, ADULT (H): ICD-10-CM

## 2022-07-19 DIAGNOSIS — E66.01 MORBID OBESITY (H): ICD-10-CM

## 2022-07-19 DIAGNOSIS — K91.2 MALNUTRITION FOLLOWING GASTROINTESTINAL SURGERY: ICD-10-CM

## 2022-07-19 PROCEDURE — 97803 MED NUTRITION INDIV SUBSEQ: CPT | Mod: GT | Performed by: DIETITIAN, REGISTERED

## 2022-07-19 NOTE — PATIENT INSTRUCTIONS
"Junior Lares!        Great chatting with you today! Here's a summary of your next diet stage (Stage 4). You ll stay in this phase for 2 months:    Your Stage 4 Diet: Soft Foods  [email or web address]     Over the next couple of months:    1. Aim for 600-800 calories  - Also: 60-90g protein and 10-15g fiber     2. Eat 3 food groups at each meal  - 1/2c protein, 1/4c vegetable/fruit, 1/4c fruit/starch  - I'll mail you a new sample meal plan    3.  Continue to eat slowly, chew well, and separate fluids and meals by 30 minutes     4. Limit \"snacks\" to milk or protein drinks     5. Avoid caffeine, carbonation and alcohol          Plan on following up in 2 months. This can be scheduled via our call center at . Of course, reach out sooner with any questions or concerns. Have a great day!           Liana Crandall, TORI, LD?  Clinical Dietitian      "

## 2022-07-19 NOTE — PROGRESS NOTES
"Luda is a 42 year old who is being evaluated via a billable video visit.      How would you like to obtain your AVS? MyChart  If the video visit is dropped, the invitation should be resent by: Text to cell phone: [phone]  Will anyone else be joining your video visit? No      Video-Visit Details    Video Start Time: 8:26am    Type of service:  Video Visit    Video End Time:8:50am    Originating Location (pt. Location): Home    Distant Location (provider location):  Provider Remote Setting    Platform used for Video Visit: [facility]      BARIATRIC PROGRESS NOTE - 4 Week Post Op  DATE OF VISIT: July 19, 2022    Luda Rai  1980  female  [id number]  42 year old    ASSESSMENT:    REASON FOR VISIT:  Luda Rai is a 42 year old year old female presents today for a 4 Week Post Op nutrition follow-up appointment. Patient is accompanied by self      DIAGNOSIS:  Status: post gastric bypass surgery.   Obesity Grade II BMI 35-39.9    ANTHROPOMETRICS:  Height: 68\"    Initial weight: 284 lbs    Current Weight: 247 lbs 6.4 oz    BMI: Body mass index is 37.62 kg/m .    VITAMINS AND MINERALS:   1 Multivitamin with Minerals (BA Ultra Solo)  500 mg Calcium With Vitamin D TID w/meals  1,000 International units SL Vitamin B12  MVT sufficient to meet additional Vitamin D, Vitamin B1 and Iron needs      NUTRITION HISTORY:  Tolerating diet: Bariatric pureed diet  Fluids/water intake: 60 ounces/day (water, SF sports drink, protein drinks, decaf coffee)  Small bites: Yes  Portion size: 1/2c or less  20-30 minute meals: Yes  Fluids and meals  by 30 minutes: Yes  Chew foods thoroughly: Yes  Breakfast: yogurt  protein shake  Lunch: chicken salad  tuna salad  cottage cheese  applesauce  peeled peach  yogurt   Dinner: (same as lunch)   Snacks: protein drink (1-2 per day)  Nausea: none  Vomiting: none  Constipation: none (taking stool softeners each morning)  Additional Information: Note clinic visit yesterday for " abdominal wall seroma and I&D. Pt otherwise feeling well and tolerating diet.       PHYSICAL ACTIVITY:  Type: walking/walking at mall   Frequency: 7 days a week.  Duration: 30 minutes.    DIAGNOSIS:   Previous Nutrition Diagnosis: Altered gastrointestinal function related to alternation in gastrointestinal structure as evidenced by history of gastric bypass.   No change    Previous Goals:  Start puree diet at day 14 post op. - met  Continue MVI, calcium with vitamin D - met  Discontinue Vitamin D - met  Add Vitamin B12 500-1000mcg per day or 5000 mcg per week - met  Aim for 60 to 90 grams protein. - met  Continue 48 to 64 oz of fluid per day. - met    Current Nutrition Diagnosis: Altered gastrointestinal function related to alternation in gastrointestinal structure as evidenced by history of gastric bypass.     INTERVENTION  Nutrition Prescription: Recommended bariatric soft diet.    Goals:  Start soft diet at day 28 post op.  Continue MVI, calcium with vitamin D, vitamin B12  Aim for 60 to 90 grams protein.  Continue 48 to 64 oz of fluid per day.    Implementation:  Discussed transition to soft diet.  Emphasized importance of adequate protein.  Reviewed required vitamins and mineral supplements.  Verbalizes good understanding of surgery diet guidelines.  Assessed learning needs and learning preferences.      NUTRITION MONITORING AND EVALUATION:   Monitor diet tolerance and weight loss.      Anticipated Compliance: good  Follow Up: Continue to monitor patient closely regarding weight loss and diet.  At 3 months Post Op    TIME SPENT WITH PATIENT: 24 minutes.      Liana Crandall RD, LD  Clinical Dietitian

## 2022-09-15 NOTE — PROGRESS NOTES
"Luda is a 42 year old who is being evaluated via a billable video visit.      How would you like to obtain your AVS? MyChart  If the video visit is dropped, the invitation should be resent by: Text to cell phone: [phone]  Will anyone else be joining your video visit? No        Video-Visit Details    Video Start Time: 8:55am    Type of service:  Video Visit    Video End Time:9:13am    Originating Location (pt. Location): Home    Distant Location (provider location): Provider Remote Setting    Platform used for Video Visit: [facility]      NUTRITION POST OP APPOINTMENT  DATE OF VISIT: September 15, 2022    Luda BARTON Quick  1980  female  [id number]  42 year old     ASSESSMENT:    REASON FOR VISIT:  Luda is a 42 year old year old female presents today for 3 month PO nutrition follow-up appointment. Patient is accompanied by self.    DIAGNOSIS:  Status post gastric bypass surgery.  Obesity Obesity Grade I BMI 30-34.9     ANTHROPOMETRICS:  Initial Weight: 284 lbs   Height: 68\"   Current Weight: 229 lbs 0 oz    BMI: Body mass index is 34.82 kg/m .    VITAMINS AND MINERALS:  1 Multivitamin with Minerals (HS; BA Ultra Solo)  500 mg Calcium With Vitamin D TID w/meals  1,000 mcg SL Vitamin B12    MVT sufficient to meet additional Vitamin D, Vitamin B1 and Iron needs        NUTRITION HISTORY:  Breakfast: eggs  high protein yogurt  english muffin + cheese + sausage + egg  Lunch: chicken/tuna/egg salad + 2 crackers +/- fruit  1/2 sandwich (deli turkey or ham, BLT)  soup  leftovers  Supper: spaghetti squash w/ricotta  taco salad on protein chips  crab cake + 1/4 side caesar salad   Snacks: [afternoon] yogurt, cheese or meat stick, fruit or vegetables, protein shake   Fluids consumed: Water/enhanced water (60-80oz), protein drink (q3-4 days)  Consuming liquid calories: No (protein drinks only)  Protein intake: 40-60 grams/day  Tolerate regular texture food: Yes  Any foods not tolerated details: Yes  If any food not " tolerated: dark meat chicken or turkey, watermelon, peaches  Portion size: 1/2-1 cup  Take 20-30 minutes to consume each meal: Yes   Eat protein foods first: Yes  Fluids and meals separate by at least 30 minutes: Yes   Chew foods thoroughly: Yes  Tolerating diet: Yes  Drinking high protein supplements: Yes; sometimes  Consuming snacks per day: 1  Additional Information: Pt feeling well and tolerating diet. She is pleased with the increase in energy she has, as well as the lack of any major issues/dietary sensitivities. Reviewed snacking guidelines. Recommended add in strength training.         PHYSICAL ACTIVITY:  Type: walking  Frequency (days per week): 7  Duration (min): 30    DIAGNOSIS:  Previous Nutrition Diagnosis: Altered gastrointestinal function related to alteration in gastrointestinal structure as evidenced by history of gastric bypass surgery.- no change    Previous goals:  Start soft diet at day 28 post op. - met  Continue MVI, calcium with vitamin D, vitamin B12 - met  Aim for 60 to 90 grams protein. - met  Continue 48 to 64 oz of fluid per day. - met    Current Nutrition Diagnosis: Altered gastrointestinal function related to alteration in gastrointestinal structure as evidenced by history of gastric bypass surgery.    INTERVENTION:   Nutrition Prescription: Eat 3 meals a day at regular intervals. Consume 60-90 grams of protein daily. Follow post-surgical vitamin and mineral protocol.  Assessed learning needs and learning preferences.    GOALS:  Replace snacks with milk or protein drinks  Add in strength training 2-3x/week  Continue to practice all post-op guidelines      Follow-Up:   Recommend standard post-op follow up visits to assist with lifestyle changes or per insurance.  Implementation: Discussed progress toward previous goals; reinforced importance of following bariatric lifestyle changes.    NUTRITION MONITORING AND EVALUATION:  Anticipated compliance: good  Verbalized good  understanding.    Follow up: Patient to follow up in 3 months, at 6 months post-op    TIME SPENT WITH PATIENT:  18 minutes      Liana Crandall RD, LD  Clinical Dietitian

## 2022-09-16 ENCOUNTER — VIRTUAL VISIT (OUTPATIENT)
Dept: SURGERY | Facility: CLINIC | Age: 42
End: 2022-09-16
Payer: COMMERCIAL

## 2022-09-16 VITALS — HEIGHT: 68 IN | WEIGHT: 229 LBS | BODY MASS INDEX: 34.71 KG/M2

## 2022-09-16 VITALS — WEIGHT: 229 LBS | BODY MASS INDEX: 34.71 KG/M2 | HEIGHT: 68 IN

## 2022-09-16 DIAGNOSIS — E66.09 CLASS 1 OBESITY DUE TO EXCESS CALORIES WITHOUT SERIOUS COMORBIDITY WITH BODY MASS INDEX (BMI) OF 34.0 TO 34.9 IN ADULT: ICD-10-CM

## 2022-09-16 DIAGNOSIS — E66.811 CLASS 1 OBESITY DUE TO EXCESS CALORIES WITHOUT SERIOUS COMORBIDITY WITH BODY MASS INDEX (BMI) OF 34.0 TO 34.9 IN ADULT: ICD-10-CM

## 2022-09-16 DIAGNOSIS — E66.01 MORBID OBESITY WITH BMI OF 40.0-44.9, ADULT (H): ICD-10-CM

## 2022-09-16 DIAGNOSIS — L65.0 TELOGEN EFFLUVIUM: ICD-10-CM

## 2022-09-16 DIAGNOSIS — K91.2 MALNUTRITION FOLLOWING GASTROINTESTINAL SURGERY: ICD-10-CM

## 2022-09-16 DIAGNOSIS — Z98.84 BARIATRIC SURGERY STATUS: ICD-10-CM

## 2022-09-16 DIAGNOSIS — R73.03 PREDIABETES: Primary | ICD-10-CM

## 2022-09-16 PROCEDURE — 99213 OFFICE O/P EST LOW 20 MIN: CPT | Mod: GT | Performed by: PHYSICIAN ASSISTANT

## 2022-09-16 PROCEDURE — 97803 MED NUTRITION INDIV SUBSEQ: CPT | Mod: GT | Performed by: DIETITIAN, REGISTERED

## 2022-09-16 NOTE — PATIENT INSTRUCTIONS
"Junior Lares!        Great chatting with you today! Here's a summary of your next diet stage (Stage 5). This will become your baseline diet, life-long.     Your Stage 5 Diet: Regular Foods  [email or web address]       Goals:    1. Aim for 600-800 calories  - Also: 60-90g protein and 10-15g fiber     2. Eat 3 food groups at each meal  - 1/2c protein, 1/4c vegetable/fruit, 1/4c fruit/starch  - I'll mail you a new sample meal plan    3.  Continue to eat slowly, chew well, and separate fluids and meals by 30 minutes     4. Limit \"snacks\" to milk or protein drinks   - Have up to 1 protein drink per day OR 2 cups of low-fat milk    5. Avoid caffeine, carbonation and alcohol    6. Add in strength training  - Aim to work all major muscle groups (upper body, lower body, core) 2-3x/week  - This is important for building muscle and subsequently increasing your metabolism        Plan on following up in 3 months. This can be scheduled via our call center at . Of course, reach out sooner with any questions or concerns. Have a great day!           Liana Crandall RD, LD  Clinical Dietitian      "

## 2022-09-16 NOTE — PATIENT INSTRUCTIONS
"Nice to talk with you today. Thank you for allowing me the privilege of caring for you. We hope we provided you with the excellent service you deserve.     To ensure the quality of our services you may receive a patient satisfaction survey from an independent monitoring company.  The greatest compliment you can give is \"Likely to Recommend\"    Below is our plan we discussed.-  HELLEN Dunbar      Continue current vitamins    2.  Stop omeprazole.  If she has symptoms restart, mychart and I will order a new Rx.      3.  Labs have been ordered in the system.You can have these drawn at any [facility] lab.  Please call [phone] set up an appointment.  Your results will be posted on [employer] as soon as they're complete.  After all are resulted, I will review and comment to you.    FOLLOW-UP:  Please call [phone] to schedule your next visit in 3 months    Bariatric Post Op Guidelines  General:    To avoid marginal ulcers avoid all forms of tobacco, alcohol in excess, caffeine, and NSAIDS (unless indicated for cardioprotection or othewise and opposed by a PPI).    Exercise is key for continued weight loss and weight maintenance. Aim for 30-60 minutes of physical activity most days of the week. Include cardiovascular and strength training.    Continue lifelong vitamins supplementation and annual lab follow up.  All  patients should supplement with the following bariatric postoperative vitamins:  2 Complete multivitamins with minerals (at different times than calcium)  Vitamin D 5000 Int Units/125 mg daily   Calcium 600 mg twice daily or 500 mg three times daily   Vitamin B12: 500 mcg sl daily or 1000 mcg Inj monthly  B complex daily or Thiamine 100 mg weekly  1 Iron/Vit C. Daily for females who menstruate and/or as directed    The bariatric team should be aware and evaluate all adverse gastrointestinal symptoms which can be a sign of complication. Inability to tolerate textured solid food (chicken, steak, fish) " may need to be evaluated by endoscopy.    There is a 10% increase of Alcohol Use Disorder in patients with bariatric surgery.   Most often occurring around 2 years post op.  Please call the clinic if you feel alcohol is interfering in your daily life.  We can help.     Follow up annually lifelong. Obesity is a chronic disease.  Weight gain can be expected. The goal of follow-up visits is to ensure adequate vitamin and protein absorption, evaluate food intake behavior, review exercise/activity level, and assist with weight regain.    Nutritional:  Eat 3 meals per day  (No snacks between meals.)  Do not skip meals.  This can cause overeating at the next meal and will prevent adequate protein and nutritional intake.    Aim for 60-80 grams of protein per day.  Always eat your protein first. This assists with optimal nutrition and helps you stay full longer.    Eat your protein first, and then follow with fiber.    Add fiber by including fruits, vegetables, whole grains, and beans.     Portions should be about 1 cup per meal. Use measuring cups to be accurate.  Continue to use saucer/salad plates, infant/toddler silverware to keep portion sizes small and take small bites.    Eat S-L-O-W-L-Y to make each meal last 20-30 minutes. Always stop eating when satisfied.    Aim for 64 oz. of calorie-free fluids daily.    Avoid drinking 30 min before, during, and 30 min after meal    Avoid high sugar and high fat foods to prevent high calorie intake. This will reduce your rate of weight loss and can cause weight regain.   Check nutrition labels for less than 10 grams of sugar and less than 10 grams of fat per serving.

## 2022-09-16 NOTE — PROGRESS NOTES
Luda is a 42 year old who is being evaluated via a billable video visit.      If the video visit is dropped, the invitation should be resent by: Text to cell phone: [phone]  Will anyone else be joining your video visit? No      Video-Visit Details    Type of service:  Video Visit    Video Start Time: 8:33 AM    Video End Time:8:45 AM    Originating Location (pt. Location): Home    Distant Location (provider location):  [facility] [place]     Platform used for Video Visit: [facility] Bariatric Surgery Note    RE: Luda Rai  MR#: [id number]  : 1980  VISIT DATE: 2022    Dear Mulu Montalvo,    I had the pleasure of seeing your patient, Luda Rai, in my post-bariatric surgery assessment clinic.    Assessment & Plan   Problem List Items Addressed This Visit     Class 1 obesity due to excess calories without serious comorbidity with body mass index (BMI) of 34.0 to 34.9 in adult     Patient was congratulated on wt loss success thus far. Healthy habits to assist with further weight loss were discussed.              Bariatric surgery status     2022 RYGBS and Takedown of Nissen fundoplication LEL           Malnutrition following gastrointestinal surgery     Continue taking recommended post-op vitamins.  Labs ordered per protocol.             Relevant Orders    Vitamin D Screen    Parathyroid Hormone Intact    Iron and Iron Binding Capacity    Ferritin    Vitamin B12    Prediabetes - Primary    Relevant Orders    Hemoglobin A1c    Telogen effluvium     Discussed patient information on telogen effluvium and discussed normal course of hair loss.                  PATIENT INSTRUCTIONS:  Have labs drawn  Continue current vitamins  Stop omeprazole.  If she has symptoms restart, mychart and I will order a new Rx.      FOLLOW-UP:  Return to clinic in 3 months.     24 minutes spent on the date of the encounter doing chart review, history and exam, result  review, counseling, developing plan of care, documentation, and further activities as noted        CHIEF COMPLAINT: Post-bariatric surgery follow-up    HISTORY OF PRESENT ILLNESS:  Questions Regarding Prior Weight Loss Surgery Reviewed With Patient 9/16/2022   I had the following weight loss procedure: Drea-en-y Gastric Bypass   What year was your surgery? 2022   How has your weight changed since your last visit? I have lost weight   Are you currently taking any weight loss medications? No   Do you currently have any of the following: None of the above   Have you been to the Emergency room since your last visit with us? No   Were you in the hospital since your last visit with us? No   Do you have any concerns today? No       Weight History:     9/16/2022   What is your highest lifetime weight? 282   What is your lowest weight since surgery? (In pounds) 229     Initial Weight (lbs): 284 lbs  Weight: 229 lb (103.9 kg) (pt reported)  Last Visits Weight: 247 lb 6.4 oz (112.2 kg)  Cumulative weight loss (lbs): 55  Weight Loss Percentage: 19.37%    Patient is taking the following bariatric postoperative vitamins:  1 Multivitamin with Minerals (BA Ultra Solo)  500 mg Calcium With Vitamin D TID w/meals  1000 mcg of Vitamin B12 sl daily    Questions Regarding Co-Morbidities and Health Concerns Reviewed With Patient 9/16/2022   Pre-diabetes: Never   Diabetes II: Never   High Blood Pressure: Never   High cholesterol: Never   Heartburn/Reflux: Never   Are you taking daily medication for heartburn, acid reflux, or GERD (acid reflux disease)? -   Sleep apnea: Never   PCOS: Improved   Back pain: Improved   Joint pain: Improved   Lower leg swelling: Never       Eating Habits 9/16/2022   How many meals do you eat per day? 3   Do you snack between meals? Sometimes   How much food are you eating at each meal? 1/2 cup to 1 cup   Are you able to separate your meals and liquids by at least 30 minutes? Yes   Are you able to avoid liquid  "calories? Yes       Exercise Questions Reviewed With Patient 9/16/2022   How often do you exercise? Daily   What is the duration of your exercise (in minutes)? 30 Minutes   What types of exercise do you do? walking   What keeps you from being more active?  I am as active as I can possbily be       Social History:      9/16/2022   Are you smoking? No   Are you drinking alcohol? No       Medications:  Current Outpatient Medications   Medication     BIOTIN PO     CALCIUM CITRATE PO     multivitamin w/minerals (THERA-VIT-M) tablet     omeprazole (PRILOSEC) 20 MG DR capsule     vitamin D3 (CHOLECALCIFEROL) 250 mcg (69414 units) capsule     No current facility-administered medications for this visit.         9/16/2022   Do you avoid NSAIDs such as (Ibuprofen, Aleve, Naproxen, Advil)?   Yes       ROS:  GI:      9/16/2022   Vomiting: Yes, random 1-2 times a week.  Chicken   Diarrhea: No   Constipation: No   Swallowing trouble: No   Abdominal pain: No   Heartburn: No   Rash in skin folds: No   Depression: No   Stress urinary incontinence No     Skin:   BAR RBS ROS - SKIN 9/16/2022   Rash in skin folds: No     Psych:      9/16/2022   Depression: No   Anxiety: No     Female Only:   BAR RBS ROS - FEMALE ONLY 9/16/2022   Female only: Regular menstrual cycles       LABS/IMAGING/MEDICAL RECORDS REVIEW:   Hemoglobin A1C   Date Value Ref Range Status   10/26/2021 5.7 (H) 0.0 - 5.6 % Final     Comment:     Normal <5.7%   Prediabetes 5.7-6.4%    Diabetes 6.5% or higher     Note: Adopted from ADA consensus guidelines.     Vitamin D, Total (25-Hydroxy)   Date Value Ref Range Status   10/26/2021 30 20 - 75 ug/L Final     Vitamin B12   Date Value Ref Range Status   06/08/2022 647 193 - 986 pg/mL Final     Hemoglobin   Date Value Ref Range Status   06/17/2022 12.4 11.7 - 15.7 g/dL Final       Age less than 45, no DEXA indicated.    PHYSICAL EXAMINATION:  Ht 5' 8\" (1.727 m)   Wt 229 lb (103.9 kg)   BMI 34.82 kg/m    GENERAL: Healthy, " alert and no distress  EYES: Eyes grossly normal to inspection.  No discharge or erythema, or obvious scleral/conjunctival abnormalities.  RESP: No audible wheeze, cough, or visible cyanosis.  No visible retractions or increased work of breathing.    SKIN: Visible skin clear. No significant rash, abnormal pigmentation or lesions.  NEURO: Cranial nerves grossly intact.  Mentation and speech appropriate for age.  PSYCH: Mentation appears normal, affect normal/bright, judgement and insight intact, normal speech and appearance well-groomed.    COUNSELING PROVIDED:  We reviewed the important post op bariatric recommendations:  eating 3 meals daily  eating protein first, getting >60gm protein daily  eating slowly, chewing food well  avoiding/limiting calorie containing beverages  avoiding fluids 30 minutes before, during, and after meals  limiting restaurant or cafeteria eating to twice a week or less  Pt reminded to avoid marginal ulcers she should avoid tobacco at all, alcohol in excess, caffeine, and NSAIDS (unless indicated for cardioprotection or othewise and opposed by a PPI).  Pt encouraged to establish and maintain a consistent physical activity routine, 6-8 hours of restorative sleep each night and optimal stress management.  Pt counseled on the importance of life long vitamin supplementation and life long follow up.    Sincerely,    Bettina Donovan PA-C

## 2022-09-22 ENCOUNTER — OFFICE VISIT (OUTPATIENT)
Dept: FAMILY MEDICINE | Facility: CLINIC | Age: 42
End: 2022-09-22
Payer: COMMERCIAL

## 2022-09-22 VITALS
OXYGEN SATURATION: 98 % | WEIGHT: 229 LBS | BODY MASS INDEX: 34.82 KG/M2 | SYSTOLIC BLOOD PRESSURE: 128 MMHG | DIASTOLIC BLOOD PRESSURE: 88 MMHG | TEMPERATURE: 98.2 F

## 2022-09-22 DIAGNOSIS — H66.91 ACUTE EAR INFECTION, RIGHT: Primary | ICD-10-CM

## 2022-09-22 DIAGNOSIS — Z23 NEED FOR VACCINATION: ICD-10-CM

## 2022-09-22 PROCEDURE — 99213 OFFICE O/P EST LOW 20 MIN: CPT | Mod: 25 | Performed by: FAMILY MEDICINE

## 2022-09-22 PROCEDURE — 90686 IIV4 VACC NO PRSV 0.5 ML IM: CPT | Performed by: FAMILY MEDICINE

## 2022-09-22 PROCEDURE — 91313 PR COVID VAC MODERNA BIVAL 50 MCG/0.5 ML IM: CPT | Performed by: FAMILY MEDICINE

## 2022-09-22 PROCEDURE — 0134A PR ADMIN COVID VAC MODERNA BIVAL 0.5ML ADDITIONAL: CPT | Performed by: FAMILY MEDICINE

## 2022-09-22 PROCEDURE — 90471 IMMUNIZATION ADMIN: CPT | Performed by: FAMILY MEDICINE

## 2022-09-22 RX ORDER — CIPROFLOXACIN AND DEXAMETHASONE 3; 1 MG/ML; MG/ML
SUSPENSION/ DROPS AURICULAR (OTIC)
COMMUNITY
Start: 2022-09-20 | End: 2023-05-30

## 2022-09-22 NOTE — PROGRESS NOTES
Assessment & Plan     Acute ear infection, right: no middle ear effusion or erythema/bulging TM noted on exam. She does have some purulent appearing material in the canal that would be consistent with otitis externa. She can finish course of ear drops but will also start oral Augmentin x 10 days. If symptoms not improving over the next 5-7 days, recommend follow up in clinic for follow up evaluation.  - amoxicillin-clavulanate (AUGMENTIN) 875-125 MG tablet; Take 1 tablet by mouth 2 times daily for 10 days    Need for vaccination  - COVID-19 mRNA BIVALENT vaccine (MODERNA BOOSTER) 50 MCG/0.5ML injection 50 mcg      No follow-ups on file.    Galindo Enamorado DO  [facility]   Luda is a 42 year old presenting for the following health issues:  Ear Problem      History of Present Illness       Reason for visit:  Ear  Symptom onset:  3-7 days ago  Symptom intensity:  Moderate  Symptom progression:  Worsening    She eats 4 or more servings of fruits and vegetables daily.She consumes 0 sweetened beverage(s) daily.She exercises with enough effort to increase her heart rate 30 to 60 minutes per day.  She exercises with enough effort to increase her heart rate 5 days per week.   She is taking medications regularly.       ED/ Followup:    Facility:  [facility]  Date of visit: 9/17 and 9/19/22  Reason for visit: RIGHT ear pain  Current Status: was given amoxicillin on 9/17 and ciprodex ear drops on 9/19 .  Still having pain      She was first seen at [facility] and diagnosed with right otitis media. Given Rx for amoxicillin. She was then seen two days later at [facility] for ongoing right ear pain and plugged sensation. At that time, pain was worsened by touching the ear. Had been taking Tylenol without relief. She has noticed some drainage from ear - feels wet. No itching. Feel like hearing is muted. Laying on her left ear feels better -- pressure helps.      Review of Systems   Constitutional, HEENT, cardiovascular, pulmonary, gi and gu systems are negative, except as otherwise noted.      Objective    /88   Temp 98.2  F (36.8  C)   Wt 103.9 kg (229 lb)   LMP 09/15/2022   SpO2 98%   BMI 34.82 kg/m    Body mass index is 34.82 kg/m .  Physical Exam   GENERAL: healthy, alert and no distress  HENT: normal cephalic/atraumatic, right ear: normal: no effusions, no erythema, normal landmarks, left ear: normal: no effusions, no erythema, normal landmarks and purulent drainage in canal, nose and mouth without ulcers or lesions, oropharynx clear and oral mucous membranes moist  NECK: no adenopathy and no asymmetry, masses, or scars  RESP: lungs clear to auscultation - no rales, rhonchi or wheezes  CV: regular rates and rhythm, normal S1 S2, no S3 or S4 and no murmur, click or rub  PSYCH: mentation appears normal, affect normal/bright

## 2022-09-30 ENCOUNTER — MYC MEDICAL ADVICE (OUTPATIENT)
Dept: FAMILY MEDICINE | Facility: CLINIC | Age: 42
End: 2022-09-30

## 2022-10-03 NOTE — TELEPHONE ENCOUNTER
My chart message sent     Awaiting reply     Denise Leal RN, BSN  [facility]

## 2022-11-04 ENCOUNTER — LAB (OUTPATIENT)
Dept: LAB | Facility: CLINIC | Age: 42
End: 2022-11-04
Payer: COMMERCIAL

## 2022-11-04 DIAGNOSIS — R73.03 PREDIABETES: ICD-10-CM

## 2022-11-04 DIAGNOSIS — K91.2 MALNUTRITION FOLLOWING GASTROINTESTINAL SURGERY: ICD-10-CM

## 2022-11-04 LAB
FERRITIN SERPL-MCNC: 17 NG/ML (ref 12–150)
HBA1C MFR BLD: 5.3 % (ref 0–5.6)
IRON BINDING CAPACITY (ROCHE): 338 UG/DL (ref 240–430)
IRON SATN MFR SERPL: 16 % (ref 15–46)
IRON SERPL-MCNC: 55 UG/DL (ref 37–145)
PTH-INTACT SERPL-MCNC: 33 PG/ML (ref 15–65)
VIT B12 SERPL-MCNC: 269 PG/ML (ref 232–1245)

## 2022-11-04 PROCEDURE — 83970 ASSAY OF PARATHORMONE: CPT

## 2022-11-04 PROCEDURE — 82728 ASSAY OF FERRITIN: CPT

## 2022-11-04 PROCEDURE — 83036 HEMOGLOBIN GLYCOSYLATED A1C: CPT

## 2022-11-04 PROCEDURE — 82306 VITAMIN D 25 HYDROXY: CPT

## 2022-11-04 PROCEDURE — 83550 IRON BINDING TEST: CPT

## 2022-11-04 PROCEDURE — 82607 VITAMIN B-12: CPT

## 2022-11-04 PROCEDURE — 36415 COLL VENOUS BLD VENIPUNCTURE: CPT

## 2022-11-04 PROCEDURE — 83540 ASSAY OF IRON: CPT

## 2022-11-05 LAB — DEPRECATED CALCIDIOL+CALCIFEROL SERPL-MC: 40 UG/L (ref 20–75)

## 2023-04-15 ENCOUNTER — HEALTH MAINTENANCE LETTER (OUTPATIENT)
Age: 43
End: 2023-04-15

## 2023-04-17 ENCOUNTER — TELEPHONE (OUTPATIENT)
Dept: SURGERY | Facility: CLINIC | Age: 43
End: 2023-04-17
Payer: COMMERCIAL

## 2023-04-17 DIAGNOSIS — K91.2 POSTSURGICAL MALABSORPTION: ICD-10-CM

## 2023-04-17 DIAGNOSIS — Z98.84 BARIATRIC SURGERY STATUS: ICD-10-CM

## 2023-04-17 NOTE — TELEPHONE ENCOUNTER
Pt scheduled yearly f/u and would like lab orders to be placed so she can do them before appt. Please send [email or web address] message once lab orders are entered.

## 2023-04-18 ENCOUNTER — MYC MEDICAL ADVICE (OUTPATIENT)
Dept: MIDWIFE SERVICES | Facility: CLINIC | Age: 43
End: 2023-04-18
Payer: COMMERCIAL

## 2023-04-18 DIAGNOSIS — R10.2 PELVIC PAIN IN FEMALE: Primary | ICD-10-CM

## 2023-04-18 NOTE — TELEPHONE ENCOUNTER
Please address the my chart message.  Last OV 1/2022.   Does have a physical scheduled with her PCP in 6/2023.    CATHY Sahu RN

## 2023-04-21 ENCOUNTER — HOSPITAL ENCOUNTER (OUTPATIENT)
Dept: MAMMOGRAPHY | Facility: CLINIC | Age: 43
Discharge: HOME | End: 2023-04-21
Attending: PHYSICIAN ASSISTANT | Admitting: PHYSICIAN ASSISTANT
Payer: COMMERCIAL

## 2023-04-21 DIAGNOSIS — Z12.31 VISIT FOR SCREENING MAMMOGRAM: ICD-10-CM

## 2023-04-21 PROCEDURE — 77067 SCR MAMMO BI INCL CAD: CPT

## 2023-05-03 ENCOUNTER — ANCILLARY PROCEDURE (OUTPATIENT)
Dept: ULTRASOUND IMAGING | Facility: CLINIC | Age: 43
End: 2023-05-03
Attending: REGISTERED NURSE
Payer: COMMERCIAL

## 2023-05-03 DIAGNOSIS — R10.2 PELVIC PAIN IN FEMALE: ICD-10-CM

## 2023-05-03 DIAGNOSIS — R10.84 ABDOMINAL PAIN, GENERALIZED: Primary | ICD-10-CM

## 2023-05-03 PROCEDURE — 76856 US EXAM PELVIC COMPLETE: CPT | Performed by: OBSTETRICS & GYNECOLOGY

## 2023-05-03 PROCEDURE — 76830 TRANSVAGINAL US NON-OB: CPT | Performed by: OBSTETRICS & GYNECOLOGY

## 2023-05-10 ENCOUNTER — HOSPITAL ENCOUNTER (OUTPATIENT)
Dept: MAMMOGRAPHY | Facility: CLINIC | Age: 43
Discharge: HOME | End: 2023-05-10
Attending: PHYSICIAN ASSISTANT
Payer: COMMERCIAL

## 2023-05-10 ENCOUNTER — HOSPITAL ENCOUNTER (OUTPATIENT)
Dept: ULTRASOUND IMAGING | Facility: CLINIC | Age: 43
Discharge: HOME | End: 2023-05-10
Attending: PHYSICIAN ASSISTANT
Payer: COMMERCIAL

## 2023-05-10 DIAGNOSIS — R92.8 ABNORMAL MAMMOGRAM: ICD-10-CM

## 2023-05-10 PROCEDURE — 77061 BREAST TOMOSYNTHESIS UNI: CPT | Mod: RT

## 2023-05-10 PROCEDURE — 76642 ULTRASOUND BREAST LIMITED: CPT | Mod: RT

## 2023-05-22 ENCOUNTER — HOSPITAL ENCOUNTER (OUTPATIENT)
Dept: ULTRASOUND IMAGING | Facility: CLINIC | Age: 43
Discharge: HOME | End: 2023-05-22
Attending: PHYSICIAN ASSISTANT
Payer: COMMERCIAL

## 2023-05-22 ENCOUNTER — HOSPITAL ENCOUNTER (OUTPATIENT)
Dept: MAMMOGRAPHY | Facility: CLINIC | Age: 43
Discharge: HOME | End: 2023-05-22
Attending: PHYSICIAN ASSISTANT
Payer: COMMERCIAL

## 2023-05-22 DIAGNOSIS — R92.8 ABNORMAL MAMMOGRAM: ICD-10-CM

## 2023-05-22 PROCEDURE — 88342 IMHCHEM/IMCYTCHM 1ST ANTB: CPT | Mod: 26 | Performed by: PATHOLOGY

## 2023-05-22 PROCEDURE — 19083 BX BREAST 1ST LESION US IMAG: CPT | Mod: RT

## 2023-05-22 PROCEDURE — 88360 TUMOR IMMUNOHISTOCHEM/MANUAL: CPT | Mod: 26 | Performed by: PATHOLOGY

## 2023-05-22 PROCEDURE — 88305 TISSUE EXAM BY PATHOLOGIST: CPT | Mod: TC | Performed by: PHYSICIAN ASSISTANT

## 2023-05-22 PROCEDURE — 88305 TISSUE EXAM BY PATHOLOGIST: CPT | Mod: 26 | Performed by: PATHOLOGY

## 2023-05-22 PROCEDURE — A4648 IMPLANTABLE TISSUE MARKER: HCPCS

## 2023-05-22 PROCEDURE — 250N000009 HC RX 250: Performed by: RADIOLOGY

## 2023-05-22 PROCEDURE — 999N000065 MA POST PROCEDURE RIGHT

## 2023-05-22 RX ADMIN — LIDOCAINE HYDROCHLORIDE 5 ML: 10 INJECTION, SOLUTION EPIDURAL; INFILTRATION; INTRACAUDAL; PERINEURAL at 13:02

## 2023-05-22 NOTE — DISCHARGE INSTRUCTIONS

## 2023-05-24 ENCOUNTER — TELEPHONE (OUTPATIENT)
Dept: MAMMOGRAPHY | Facility: CLINIC | Age: 43
End: 2023-05-24
Payer: COMMERCIAL

## 2023-05-24 LAB
PATH REPORT.COMMENTS IMP SPEC: ABNORMAL
PATH REPORT.COMMENTS IMP SPEC: ABNORMAL
PATH REPORT.COMMENTS IMP SPEC: YES
PATH REPORT.FINAL DX SPEC: ABNORMAL
PATH REPORT.GROSS SPEC: ABNORMAL
PATH REPORT.MICROSCOPIC SPEC OTHER STN: ABNORMAL
PATHOLOGY SYNOPTIC REPORT: ABNORMAL
PHOTO IMAGE: ABNORMAL

## 2023-05-24 NOTE — TELEPHONE ENCOUNTER
Pathology report reviewed with our breast radiologist Dr Mckeon, who confirmed the recent breast imaging is concordant with the final surgical pathology results below.    I phoned Ms. 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   Electronically signed by Galindo Avila MD on 5/24/2023 at 11:14 AM   Synoptic Checklist   INVASIVE CARCINOMA OF THE BREAST: Biopsy  Protocol posted: 9/21/2022  INVASIVE CARCINOMA OF THE BREAST: BIOPSY - All Specimens  SPECIMEN   Procedure  Needle biopsy    Specimen Laterality  Right    TUMOR   Histologic Type  Invasive carcinoma of no special type (ductal)    Histologic Grade ([place] Histologic Score)     Glandular (Acinar) / Tubular Differentiation  Score 3    Nuclear Pleomorphism  Score 2    Mitotic Rate  Score 1    Overall Grade  Grade 2 (scores of 6 or 7)    Ductal Carcinoma In Situ (DCIS)  Not identified    Lymphovascular Invasion  Not identified    .     Breast Biomarker Reporting Template  Protocol posted: 6/22/2022  BREAST: BIOMARKER REPORTING TEMPLATE - All Specimens  Test(s) Performed     Estrogen Receptor (ER) Status  Positive (greater than 10% of cells demonstrate nuclear positivity)    Percentage of Cells with Nuclear Positivity  81-90%    Average Intensity of Staining  Strong    Test Type  Food and Drug Administration (FDA) cleared (test / vendor): [employer] Stine    Primary Antibody  SP1    Scoring System  No separate scoring system used    Test(s) Performed     Progesterone Receptor (PgR) Status  Positive    Percentage of Cells with Nuclear Positivity  81-90%    Average Intensity of Staining  Strong    Test Type  Food and Drug Administration (FDA) cleared (test / vendor): Old Stine    Primary  Antibody  1E2    Scoring System  No separate scoring system used    Test(s) Performed     HER2 by Immunohistochemistry  Negative (Score 0)    Test Type  Food and Drug Administration (FDA) cleared (test / vendor): [employer]    Primary Antibody  4B5    Cold Ischemia and Fixation Times  Meet requirements specified in latest version of the ASCO / CAP Guidelines    METHODS            Patient states no problems with biopsy site.  Recommended follow up is surgical and oncology consults.   Surgical Consult has been arranged with Dr Gore on 5-30-23 at 1100. Oncology consult has been arranged with Dr Zeng on 5-30-23 [id number]  Patient has directions and phone numbers.    Questions were answered and I explained my role as Breast Care Nurse Coordinator in assisting her with appointments, resources and social support.  New diagnosis information packet will be available for patient at surgical consult.  I will follow up with the patient. She has my phone number if she has further questions.  Patient verbalized understanding and agrees with the plan of care.  Ordering provider- Mulu Montalvo has been notified of the results, recommendations for follow up and scheduled surgical consultation.  I will forward this note, along with the pathology results.      Chantel Aquino RN CBCN  Breast Care Nurse Coordinator  [facility]  [phone]

## 2023-05-30 ENCOUNTER — OFFICE VISIT (OUTPATIENT)
Dept: ONCOLOGY | Facility: CLINIC | Age: 43
End: 2023-05-30
Attending: SURGERY
Payer: COMMERCIAL

## 2023-05-30 ENCOUNTER — OFFICE VISIT (OUTPATIENT)
Dept: SURGERY | Facility: CLINIC | Age: 43
End: 2023-05-30
Attending: SURGERY
Payer: COMMERCIAL

## 2023-05-30 VITALS
WEIGHT: 211 LBS | SYSTOLIC BLOOD PRESSURE: 127 MMHG | DIASTOLIC BLOOD PRESSURE: 80 MMHG | HEART RATE: 68 BPM | OXYGEN SATURATION: 98 % | HEIGHT: 68 IN | TEMPERATURE: 98.1 F | BODY MASS INDEX: 31.98 KG/M2 | RESPIRATION RATE: 15 BRPM

## 2023-05-30 VITALS
BODY MASS INDEX: 31.98 KG/M2 | DIASTOLIC BLOOD PRESSURE: 80 MMHG | TEMPERATURE: 98.1 F | RESPIRATION RATE: 15 BRPM | HEIGHT: 68 IN | WEIGHT: 211 LBS | OXYGEN SATURATION: 98 % | SYSTOLIC BLOOD PRESSURE: 127 MMHG | HEART RATE: 68 BPM

## 2023-05-30 DIAGNOSIS — C50.911 MALIGNANT NEOPLASM OF RIGHT BREAST IN FEMALE, ESTROGEN RECEPTOR POSITIVE, UNSPECIFIED SITE OF BREAST (H): Primary | ICD-10-CM

## 2023-05-30 DIAGNOSIS — C50.912 INVASIVE DUCTAL CARCINOMA OF BREAST, STAGE 1, LEFT (H): Primary | ICD-10-CM

## 2023-05-30 DIAGNOSIS — Z17.0 MALIGNANT NEOPLASM OF RIGHT BREAST IN FEMALE, ESTROGEN RECEPTOR POSITIVE, UNSPECIFIED SITE OF BREAST (H): Primary | ICD-10-CM

## 2023-05-30 PROCEDURE — 99205 OFFICE O/P NEW HI 60 MIN: CPT | Performed by: INTERNAL MEDICINE

## 2023-05-30 PROCEDURE — 99215 OFFICE O/P EST HI 40 MIN: CPT | Performed by: SURGERY

## 2023-05-30 PROCEDURE — G0463 HOSPITAL OUTPT CLINIC VISIT: HCPCS | Performed by: SURGERY

## 2023-05-30 NOTE — PROGRESS NOTES
"Oncology Rooming Note    Oncology Rooming Note    May 30, 2023 11:23 AM   Luda Rai is a 43 year old female who presents for:    Chief Complaint   Patient presents with     New Patient     Right Invasive Ductal Carcinoma     Initial Vitals: /80   Pulse 68   Temp 98.1  F (36.7  C) (Oral)   Resp 15   Ht 1.727 m (5' 8\")   Wt 95.7 kg (211 lb)   SpO2 98%   BMI 32.08 kg/m   Estimated body mass index is 32.08 kg/m  as calculated from the following:    Height as of this encounter: 1.727 m (5' 8\").    Weight as of this encounter: 95.7 kg (211 lb). Body surface area is 2.14 meters squared.  Data Unavailable Comment: Data Unavailable   No LMP recorded.  Allergies reviewed: Yes  Medications reviewed: Yes    Medications: Medication refills not needed today.  Pharmacy name entered into [facility]: [id number] - [facility] [place] [address] AT [place] [address] & [address] [facility]    Clinical concerns: None       Fiona Irizarry MA              "

## 2023-05-30 NOTE — PROGRESS NOTES
Breast Patients      1-Do you have any of the following symptoms? Breast Pain and Lump(s) or Mass(es)  2-In which breast are you having the symptoms? right  3-Have you had a Mammogram? [facility] - Date:    4-Have you ever had a breast cyst drained? No  5-Have you ever had a breast biopsy? Yes:  Right  -  Date:    6-Have you ever had Breast Cancer? No   7-Is there a history of Breast Cancer in your family? No  8-Have you ever had Ovarian Cancer? No  9-Is there a history of Ovarian Cancer in your family? No  10-Is there a history of Colon Cancer in your family?  No  11-Is there a history of Uterine Cancer in your family?  No  12-Any known genetic abnormalities in your family?  No  13-Summarize your caffeine intake (i.e. coffee, tea, chocolate, soda etc.): one coffee per day      14-What age did your periods begin? 10   15-Date your last menstrual period began? 05/05/2023  16-Number of full-term pregnancies: 1   17-Your age when your first child was born? 36  18-Did you nurse your children? Yes  19-Are you pregnant now? No  20-Have you begun menopause? No  21-Have you had either ovary removed?No  22-Do you have breast implants? No   23-Have you used hormone replacement therapy?  No  24-Have you taken oral contraceptive pills?  Yes, For how many years?  10  25-Have you had an intrauterine device (IUD) placed?  Yes, For how many years?  4  26-What is your current bra size?  40D

## 2023-05-30 NOTE — PROGRESS NOTES
[facility]  New Patient Office Visit    Assessment:    Luda Rai is seen in consultation for right breast cancer, at the request of Mulu Montalvo PA-C.    Luda is a 43 year old female with right breast invasive ductal carcinoma, grade 2, ER +, PA +, [id number] - measuring 1.6 cm (US) at 11:00 and 10 cm from the nipple. Currently stage 1. By clinical exam the tumor feels closer to 3-4cm in size. There is a palpable lymph node bilaterally    Plan:  We have had a detailed discussion on treatment of breast cancer which may include a combination of surgery, chemotherapy, endocrine therapy, radiation of which the use and timing depending on the specifics of their cancer.     Surgical options were discussed including lumpectomy, mastectomy, bilateral mastectomies, sentinel lymph node biopsy and reconstructive options have been offered and discussed at length.  I feel the cosmetic outcome with lumpectomy would be acceptable.    We have discussed the indication, alternatives, risks and expected recovery.  Specifically, we have discussed local recurrence of cancer, risk of future second primary breast cancer, incisions, scarring, breast deformity, volume loss, possible need for axillary lymphadenectomy, postoperative infection, anesthesia, bleeding, postoperative restrictions and physical limitations.  We have discussed the recommended interventions and treatments for these outcomes.    All questions have been answered to the best of my ability.    Breast MRI:  yes- given heterogeneously dense breast tissue and clinical exam not being compatible with ultrasound for size, as well as palpable lymph node   Oncology consult:  yes-she will meet with Dr Zeng today and I have discussed with Dr Zeng  Plastic surgery consult:  no  Radiation oncology consult:  yes-she is aware radiation would be recommended to follow lumpectomy    Will discuss further when MRI complete.  Tentatively elects for right lumpectomy  and right sentinel lymph node biopsy.    Recommended time off work postop:  1 wks    HPI:  Luda Rai is a 43 year old female who presents to discuss her recently diagnosed invasive ductal carcinoma.  This was diagnosed via left breast ultrasound and bilateral mammography and core needle biopsy.     Breast mass noted:  right   Skin rashes, dimpling or nipple changes:  none  Nipple discharge:  none  Breast pain:   Yes  Perform self breast exams: Yes    Previous breast biopsies: No  Previous cyst aspiration: No  Previous other breast surgery: No    Hormonal history:    First menstrual period: 10 years old  First live birth: 36 years old  pre menopausal  HRT No  Fertility treatment: Yes - IVF    Family History:    Family History   Problem Relation Age of Onset     Hypertension Mother      Diabetes Father      Cerebrovascular Disease Father      Breast Cancer No family hx of      Ovarian Cancer No family hx of        Imaging:   All imaging studies reviewed by me.    Recent Results (from the past 744 hour(s))              MA Diagnostic Right w/Jaky    Narrative    MA DIAGNOSTIC RIGHT W/ JAKY,   US BREAST RIGHT LIMITED 1-3 QUADRANTS -  5/10/2023 9:41 AM    HISTORY:  Recalled from screening of 4/21/2023 regarding asymmetry in  the right posterior lateral breast.    COMPARISON:  4/21/2023, 3/11/2021    BREAST DENSITY: Heterogeneously dense.    FINDINGS:  Diagnostic views of the right breast were obtained with  tomosynthesis. The screening detected asymmetry is redemonstrated. The  margin is spiculated. There are no associated calcifications. No other  suspicious abnormality in the right breast.    Further evaluation with targeted right breast ultrasound shows a  corresponding hypoechoic mass at the 11:00 position, 10 cm from the  nipple, measuring 1.1 x 1.2 x 1.6 cm. Survey of the axilla shows no  evidence of adenopathy.      Impression    IMPRESSION: BI-RADS CATEGORY: 4 - Suspicious.  Hypoechoic mass at the 11:00  position of the right breast corresponds  to the screening detected abnormality. Ultrasound-guided core biopsy  is recommended. Results and recommendation for biopsy were discussed  with the patient during her appointment.    RECOMMENDED FOLLOW-UP: Biopsy.    AN DEL ROSARIO MD         SYSTEM ID:  [id number]     US Breast Right    Narrative    MA DIAGNOSTIC RIGHT W/ JAKY,   US BREAST RIGHT LIMITED 1-3 QUADRANTS -  5/10/2023 9:41 AM    HISTORY:  Recalled from screening of 4/21/2023 regarding asymmetry in  the right posterior lateral breast.    COMPARISON:  4/21/2023, 3/11/2021    BREAST DENSITY: Heterogeneously dense.    FINDINGS:  Diagnostic views of the right breast were obtained with  tomosynthesis. The screening detected asymmetry is redemonstrated. The  margin is spiculated. There are no associated calcifications. No other  suspicious abnormality in the right breast.    Further evaluation with targeted right breast ultrasound shows a  corresponding hypoechoic mass at the 11:00 position, 10 cm from the  nipple, measuring 1.1 x 1.2 x 1.6 cm. Survey of the axilla shows no  evidence of adenopathy.      Impression    IMPRESSION: BI-RADS CATEGORY: 4 - Suspicious.  Hypoechoic mass at the 11:00 position of the right breast corresponds  to the screening detected abnormality. Ultrasound-guided core biopsy  is recommended. Results and recommendation for biopsy were discussed  with the patient during her appointment.    RECOMMENDED FOLLOW-UP: Biopsy.    AN DEL ROSARIO MD         SYSTEM ID:  [id number]     US Breast Biopsy Core Needle Right    Narrative    ULTRASOUND-GUIDED RIGHT BREAST CORE BIOPSY;   CLIP PLACEMENT;   POSTPROCEDURE DIGITAL MAMMOGRAM RIGHT BREAST; 5/22/2023 1:10 PM    INDICATION FOR PROCEDURE: Right breast mass at 11:00 10 cm from the  nipple.     PROCEDURE: Approximately 5 mL lidocaine without epinephrine was  infiltrated for local anesthetic and a 13-gauge trocar was introduced  via lateral approach.  The needle  tip was placed adjacent to the  lesion. A series of 3 samples were obtained with a 14-gauge  core-cutting needle. A clip was then deployed to shlomo the lesion.   There was less than 5 cc of blood loss.    Postbiopsy unilateral digital mammogram of the right breast showed the  clip to be at the expected biopsy site.  The patient tolerated the  procedure without difficulty and there was no significant pain or  immediate complication at the end of the procedure.       Impression    IMPRESSION: Successful right breast ultrasound-guided core biopsy and  clip placement.  Final pathology is pending.      ARTHUR GRANGER MD         SYSTEM ID:  [id number]     MA Post Procedure Right    Narrative    ULTRASOUND-GUIDED RIGHT BREAST CORE BIOPSY;   CLIP PLACEMENT;   POSTPROCEDURE DIGITAL MAMMOGRAM RIGHT BREAST; 5/22/2023 1:10 PM    INDICATION FOR PROCEDURE: Right breast mass at 11:00 10 cm from the  nipple.     PROCEDURE: Approximately 5 mL lidocaine without epinephrine was  infiltrated for local anesthetic and a 13-gauge trocar was introduced  via lateral approach.  The needle tip was placed adjacent to the  lesion. A series of 3 samples were obtained with a 14-gauge  core-cutting needle. A clip was then deployed to shlomo the lesion.   There was less than 5 cc of blood loss.    Postbiopsy unilateral digital mammogram of the right breast showed the  clip to be at the expected biopsy site.  The patient tolerated the  procedure without difficulty and there was no significant pain or  immediate complication at the end of the procedure.       Impression    IMPRESSION: Successful right breast ultrasound-guided core biopsy and  clip placement.  Final pathology is pending.      ARTHUR GRANGER MD         SYSTEM ID:  [id number]       Percutaneous core needle biopsy:   Final Diagnosis   Right breast, 11:00, 10 cm from nipple, ultrasound-guided needle core biopsy:  --Invasive ductal carcinoma, [place] grade 2 (of 3), spanning at least 0.8 cm in  greatest contiguous focus.        SPECIMEN   Procedure  Needle biopsy    Specimen Laterality  Right    TUMOR   Histologic Type  Invasive carcinoma of no special type (ductal)    Histologic Grade (Tom Histologic Score)     Glandular (Acinar) / Tubular Differentiation  Score 3    Nuclear Pleomorphism  Score 2    Mitotic Rate  Score 1    Overall Grade  Grade 2 (scores of 6 or 7)    Ductal Carcinoma In Situ (DCIS)  Not identified    Lymphovascular Invasion  Not identified    .     Breast Biomarker Reporting Template  Protocol posted: 6/22/2022  BREAST: BIOMARKER REPORTING TEMPLATE - All Specimens  Test(s) Performed     Estrogen Receptor (ER) Status  Positive (greater than 10% of cells demonstrate nuclear positivity)    Percentage of Cells with Nuclear Positivity  81-90%    Average Intensity of Staining  Strong    Test Type  Food and Drug Administration (FDA) cleared (test / vendor): [employer] [facility]    Primary Antibody  SP1    Scoring System  No separate scoring system used    Test(s) Performed     Progesterone Receptor (PgR) Status  Positive    Percentage of Cells with Nuclear Positivity  81-90%    Average Intensity of Staining  Strong    Test Type  Food and Drug Administration (FDA) cleared (test / vendor): [employer]    Primary Antibody  1E2    Scoring System  No separate scoring system used    Test(s) Performed     HER2 by Immunohistochemistry  Negative (Score 0)    Test Type  Food and Drug Administration (FDA) cleared (test / vendor): [employer]    Primary Antibody  4B5    Cold Ischemia and Fixation Times  Meet requirements specified in latest version of the ASCO / CAP Guidelines          Past Medical History:  Past Medical History:   Diagnosis Date     Bone and joint disord back, pelvis, leg complicat preg, childb, puerp     Knee replacement 2020     Endocrine problem     Pcos     Esophageal reflux 2005    Had Nissen Fundiplication 2006     Osteoporosis     Knee         Past Surgical History:  Past Surgical History:  "  Procedure Laterality Date      SECTION  10/2016     DISCECTOMY LUMBAR ANTERIOR  2011    Diskectomy     LAPAROSCOPIC BYPASS GASTRIC N/A 2022    Procedure: laparoscopic gastric bypass, Laparoscopic reversal of Nissen fundoplication, laparoscopic lysis of adhesion, partial gastrectomy;  Surgeon: Panchito Zambrano MD;  Location: [facility]     NISSEN FUNDOPLICATION      starting of Up's     SALPINGECTOMY Left 2019    Fallopian tube (including fimbria) with extensive intraluminal hemorrhage,     SALPINGECTOMY Right 2019    tubal pregnancy from IVF     TOTAL KNEE ARTHROPLASTY Right     Knee replacement        Social History:  ,1 child   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      PE:  Vitals: /80   Pulse 68   Temp 98.1  F (36.7  C) (Oral)   Resp 15   Ht 1.727 m (5' 8\")   Wt 95.7 kg (211 lb)   SpO2 98%   BMI 32.08 kg/m    General appearance: well-nourished, sitting comfortably, no apparent distress  HEENT:  Head normocephalic and atraumatic, pupils equal and round, conjunctivae clear  Lungs: Respirations unlabored  Musculoskeletal:  Normal station and gait, extremities without edema  Neurologic: alert, speech is clear, moves all extremities with good strength  Psychiatric: Mood and affect are appropriate  Skin: Without lesions, rashes, or jaundice  Breast:    Symmetrical with no skin or nipple changes.     Contour is normal.   Parenchyma is heterogeneously dense, upper outer quadrants.   Masses- right, 11 o'clock - 3-4 cm diameter    Lymph:       No supraclavicular/infraclavicular adenopathy.   Axillary adenopathy: soft mobile lymph node bilateral      This note may have been created using voice recognition software. Undetected word substitutions or other errors may have occurred.     60 minutes spent on the date of the encounter doing " chart review, history and exam, documentation and further activities as noted above      Farnaz Gore MD  05/30/23 10:52 AM     Please route or send letter to:  Referring Provider

## 2023-05-30 NOTE — PROGRESS NOTES
Visit Date: 05/30/2023    The patient is a 43-year-old premenopausal patient who is being seen at the multidisciplinary breast clinic alongside Dr. Gore.  She is being seen for medical oncology consultation.  I have discussed her case with Dr. Gore today.    CHIEF COMPLAINT:  Invasive ductal carcinoma of the right breast, estrogen and progesterone receptor positive, HE-2 receptor negative.    HISTORY OF PRESENT ILLNESS:  The patient is a 43-year-old premenopausal patient who had a routine screening mammogram done at the end of 04/2023.  She was having some intermittent discomfort on the right breast.  Her breast tissue is heterogeneously dense, and there was a focus of asymmetry in the right posterior lateral breast on one of the views, so she was called back for a diagnostic mammogram and ultrasound.  This was done on 05/10/2023.  On ultrasound, she had at the 11 o'clock position of the right breast, 10 cm from the nipple, a mass, which measured 1.6 x 1.1 cm.  There was nothing seen in the right axilla.  A biopsy was recommended.  She went on to have a biopsy done on 05/22/2023 which revealed invasive ductal carcinoma at the 11 o'clock position of the right breast in the upper outer quadrant.  Grade was grade II, and her tumor was strongly positive for estrogen and progesterone receptors, HER-2 receptor negative.  She is here today prior to having any surgery done for a medical oncology consultation.    FAMILY HISTORY:  Negative for breast cancer.    PAST MEDICAL AND SURGICAL HISTORY:    1.  New diagnosis of sided breast cancer as outlined above.  2.  Bariatric surgery approximately a year ago.  3.  History of knee replacement.  4.  History of 2 ectopic pregnancies.  5.  History of Nissen fundoplication procedure, which was since reversed with her bariatric surgery.    MEDICATIONS AND ALLERGIES:  Outlined in the nursing records.    SOCIAL HISTORY:  The patient has 1 child.  She had the child with IVF.  She  has had multiple IVF procedures.  She is premenopausal.  Works as an  for [employer] full time.  She is a nonsmoker, drinks alcohol rarely.    COMPREHENSIVE REVIEW OF SYSTEMS:  A 12-point comprehensive review of systems has been done with her.  She denies any respiratory, cardiovascular, genitourinary, musculoskeletal, gastrointestinal or neurological symptoms that are worrisome.    PHYSICAL EXAMINATION:    GENERAL:  She is well-appearing lady in no acute distress.  VITAL SIGNS:  Stable.  Weight is 211 pounds, height is 5 feet 8 inches.  The patient is afebrile.  NECK:  No masses or goiter. Trachea is central.  NEUROLOGIC:  She is alert and oriented x3.  CHEST:  Clear to auscultation and percussion bilaterally.  HEART:  Sounds 1, 2 normal, no added sounds, no murmurs.  BREASTS:  Right breast has a palpable mass in the upper outer quadrant.  It is movable.  She also has a movable lymph node in the right axilla.  Left breast normal, no palpable masses.  Left axilla negative.  GASTROINTESTINAL:  Abdomen soft and nontender.  No hepatosplenomegaly.  She has got signs of a previous abdominal procedure for her bariatric surgery.  No inguinal adenopathy.  EXTREMITIES:  Legs without tenderness or edema.  SKIN:  The patient has no skin lesions.    PSYCHIATRIC:  Normal.    IMPRESSION:  This is a 43-year-old premenopausal patient with a new diagnosis of right sided breast cancer.  It was a grade II tumor strongly positive for estrogen and progesterone receptors.  In her medical history, she has had multiple treatments of in vitro fertilization and has had 1 successful pregnancy.  She has also had 2 ectopic pregnancies.  There is no family history of breast cancer. With her young age at diagnosis, at some point we will send her for a genetics referral to see if they would recommend any genetic testing.    PLAN:  I have discussed with her today the findings, and we have discussed both local treatment of breast cancer,  which would include a lumpectomy followed by radiation therapy or a mastectomy, and she has elected to do a lumpectomy and radiation therapy.  Because of her dense breast tissue, she will be due for an magnetic resonance imaging scan of the breast to make sure that we are not looking at anything more than what was seen on her mammogram. Dr. Gore has ordered her MRI scan of the breast today. With regard to systemic therapy, she will be a very good candidate for adjuvant hormonal treatment.  We will send the tumor out for Oncotype once it is removed, and that will determine whether or not she will be a candidate for adjuvant chemotherapy.  I have discussed both local and systemic therapy of breast cancer with her today at length.  She is present today with her .  She understands my discussion with her today and is willing to proceed.  We will start with an MRI scan of the breast, and then I will review that with Dr. Gore.  If that looks favorable, we will proceed with her lumpectomy, and she will see me back 2-3 weeks after the lumpectomy with an Oncotype score.  All of the above has been discussed with her.    Adolfo Zeng MD        D: 2023   T: 2023   MT: LA    Name:     SAPPHIRE CHASE  MRN:      [id number]        Account:    [id number]   :      1980           Visit Date: 2023     Document: [id number]

## 2023-05-30 NOTE — LETTER
May 30, 2023    RE:   Luda Rai 1980      Dear Colleague,    Thank you for referring your patient, Luda Rai, to [facility].  Please see a copy of my visit note below.    [facility]  New Patient Office Visit    Assessment:    Luda Rai is seen in consultation for right breast cancer, at the request of Mulu Montalvo PA-C.    Luda is a 43 year old female with right breast invasive ductal carcinoma, grade 2, ER +, HI +, [id number] - measuring 1.6 cm (US) at 11:00 and 10 cm from the nipple. Currently stage 1. By clinical exam the tumor feels closer to 3-4cm in size. There is a palpable lymph node bilaterally    Plan:  We have had a detailed discussion on treatment of breast cancer which may include a combination of surgery, chemotherapy, endocrine therapy, radiation of which the use and timing depending on the specifics of their cancer.     Surgical options were discussed including lumpectomy, mastectomy, bilateral mastectomies, sentinel lymph node biopsy and reconstructive options have been offered and discussed at length.  I feel the cosmetic outcome with lumpectomy would be acceptable.    We have discussed the indication, alternatives, risks and expected recovery.  Specifically, we have discussed local recurrence of cancer, risk of future second primary breast cancer, incisions, scarring, breast deformity, volume loss, possible need for axillary lymphadenectomy, postoperative infection, anesthesia, bleeding, postoperative restrictions and physical limitations.  We have discussed the recommended interventions and treatments for these outcomes.    All questions have been answered to the best of my ability.    Breast MRI:  yes- given heterogeneously dense breast tissue and clinical exam not being compatible with ultrasound for size, as well as palpable lymph node   Oncology consult:  yes-she will meet with Dr Zeng today and I have discussed with Dr Zeng  Plastic surgery  consult:  no  Radiation oncology consult:  yes-she is aware radiation would be recommended to follow lumpectomy    Will discuss further when MRI complete.  Tentatively elects for right lumpectomy and right sentinel lymph node biopsy.    Recommended time off work postop:  1 wks    HPI:  Luda Rai is a 43 year old female who presents to discuss her recently diagnosed invasive ductal carcinoma.  This was diagnosed via left breast ultrasound and bilateral mammography and core needle biopsy.     Breast mass noted:  right   Skin rashes, dimpling or nipple changes:  none        Imaging:   All imaging studies reviewed by me.    Recent Results (from the past 744 hour(s))              MA Diagnostic Right w/Jaky    Narrative    MA DIAGNOSTIC RIGHT W/ JAKY,   US BREAST RIGHT LIMITED 1-3 QUADRANTS -  5/10/2023 9:41 AM    HISTORY:  Recalled from screening of 4/21/2023 regarding asymmetry in  the right posterior lateral breast.    COMPARISON:  4/21/2023, 3/11/2021    BREAST DENSITY: Heterogeneously dense.    FINDINGS:  Diagnostic views of the right breast were obtained with  tomosynthesis. The screening detected asymmetry is redemonstrated. The  margin is spiculated. There are no associated calcifications. No other  suspicious abnormality in the right breast.    Further evaluation with targeted right breast ultrasound shows a  corresponding hypoechoic mass at the 11:00 position, 10 cm from the  nipple, measuring 1.1 x 1.2 x 1.6 cm. Survey of the axilla shows no  evidence of adenopathy.      Impression    IMPRESSION: BI-RADS CATEGORY: 4 - Suspicious.  Hypoechoic mass at the 11:00 position of the right breast corresponds  to the screening detected abnormality. Ultrasound-guided core biopsy  is recommended. Results and recommendation for biopsy were discussed  with the patient during her appointment.    RECOMMENDED FOLLOW-UP: Biopsy.    AN DEL ROSARIO MD         SYSTEM ID:  [id number]     US Breast Right    Narrative    MA  DIAGNOSTIC RIGHT W/ JAKY,   US BREAST RIGHT LIMITED 1-3 QUADRANTS -  5/10/2023 9:41 AM    HISTORY:  Recalled from screening of 4/21/2023 regarding asymmetry in  the right posterior lateral breast.    COMPARISON:  4/21/2023, 3/11/2021    BREAST DENSITY: Heterogeneously dense.    FINDINGS:  Diagnostic views of the right breast were obtained with  tomosynthesis. The screening detected asymmetry is redemonstrated. The  margin is spiculated. There are no associated calcifications. No other  suspicious abnormality in the right breast.    Further evaluation with targeted right breast ultrasound shows a  corresponding hypoechoic mass at the 11:00 position, 10 cm from the  nipple, measuring 1.1 x 1.2 x 1.6 cm. Survey of the axilla shows no  evidence of adenopathy.      Impression    IMPRESSION: BI-RADS CATEGORY: 4 - Suspicious.  Hypoechoic mass at the 11:00 position of the right breast corresponds  to the screening detected abnormality. Ultrasound-guided core biopsy  is recommended. Results and recommendation for biopsy were discussed  with the patient during her appointment.    RECOMMENDED FOLLOW-UP: Biopsy.    AN DEL ROSARIO MD         SYSTEM ID:  [id number]     US Breast Biopsy Core Needle Right    Narrative    ULTRASOUND-GUIDED RIGHT BREAST CORE BIOPSY;   CLIP PLACEMENT;   POSTPROCEDURE DIGITAL MAMMOGRAM RIGHT BREAST; 5/22/2023 1:10 PM    INDICATION FOR PROCEDURE: Right breast mass at 11:00 10 cm from the  nipple.     PROCEDURE: Approximately 5 mL lidocaine without epinephrine was  infiltrated for local anesthetic and a 13-gauge trocar was introduced  via lateral approach.  The needle tip was placed adjacent to the  lesion. A series of 3 samples were obtained with a 14-gauge  core-cutting needle. A clip was then deployed to shlomo the lesion.   There was less than 5 cc of blood loss.    Postbiopsy unilateral digital mammogram of the right breast showed the  clip to be at the expected biopsy site.  The patient tolerated  the  procedure without difficulty and there was no significant pain or  immediate complication at the end of the procedure.       Impression    IMPRESSION: Successful right breast ultrasound-guided core biopsy and  clip placement.  Final pathology is pending.      ARTHUR GRANGER MD         SYSTEM ID:  [id number]     MA Post Procedure Right    Narrative    ULTRASOUND-GUIDED RIGHT BREAST CORE BIOPSY;   CLIP PLACEMENT;   POSTPROCEDURE DIGITAL MAMMOGRAM RIGHT BREAST; 5/22/2023 1:10 PM    INDICATION FOR PROCEDURE: Right breast mass at 11:00 10 cm from the  nipple.     PROCEDURE: Approximately 5 mL lidocaine without epinephrine was  infiltrated for local anesthetic and a 13-gauge trocar was introduced  via lateral approach.  The needle tip was placed adjacent to the  lesion. A series of 3 samples were obtained with a 14-gauge  core-cutting needle. A clip was then deployed to shlomo the lesion.   There was less than 5 cc of blood loss.    Postbiopsy unilateral digital mammogram of the right breast showed the  clip to be at the expected biopsy site.  The patient tolerated the  procedure without difficulty and there was no significant pain or  immediate complication at the end of the procedure.       Impression    IMPRESSION: Successful right breast ultrasound-guided core biopsy and  clip placement.  Final pathology is pending.      ARTHUR GRANGER MD         SYSTEM ID:  [id number]       Percutaneous core needle biopsy:   Final Diagnosis   Right breast, 11:00, 10 cm from nipple, ultrasound-guided needle core biopsy:  --Invasive ductal carcinoma, [place] grade 2 (of 3), spanning at least 0.8 cm in greatest contiguous focus.        SPECIMEN   Procedure  Needle biopsy    Specimen Laterality  Right    TUMOR   Histologic Type  Invasive carcinoma of no special type (ductal)    Histologic Grade (Tom Histologic Score)     Glandular (Acinar) / Tubular Differentiation  Score 3    Nuclear Pleomorphism  Score 2    Mitotic Rate  Score 1  "   Overall Grade  Grade 2 (scores of 6 or 7)    Ductal Carcinoma In Situ (DCIS)  Not identified    Lymphovascular Invasion  Not identified    .     Breast Biomarker Reporting Template  Protocol posted: 6/22/2022  BREAST: BIOMARKER REPORTING TEMPLATE - All Specimens  Test(s) Performed     Estrogen Receptor (ER) Status  Positive (greater than 10% of cells demonstrate nuclear positivity)    Percentage of Cells with Nuclear Positivity  81-90%    Average Intensity of Staining  Strong    Test Type  Food and Drug Administration (FDA) cleared (test / vendor): [place]    Primary Antibody  SP1    Scoring System  No separate scoring system used    Test(s) Performed     Progesterone Receptor (PgR) Status  Positive    Percentage of Cells with Nuclear Positivity  81-90%    Average Intensity of Staining  Strong    Test Type  Food and Drug Administration (FDA) cleared (test / vendor): [place]    Primary Antibody  1E2    Scoring System  No separate scoring system used    Test(s) Performed     HER2 by Immunohistochemistry  Negative (Score 0)    Test Type  Food and Drug Administration (FDA) cleared (test / vendor): [place]    Primary Antibody  4B5    Cold Ischemia and Fixation Times  Meet requirements specified in latest version of the ASCO / CAP Guidelines          Social History:  ,1 child   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    PE:  Vitals: /80   Pulse 68   Temp 98.1  F (36.7  C) (Oral)   Resp 15   Ht 1.727 m (5' 8\")   Wt 95.7 kg (211 lb)   SpO2 98%   BMI 32.08 kg/m    General appearance: well-nourished, sitting comfortably, no apparent distress  HEENT:  Head normocephalic and atraumatic, pupils equal and round, conjunctivae clear      Lungs: Respirations unlabored  Musculoskeletal:  Normal station and gait, extremities without edema  Neurologic: alert, speech is clear, " moves all extremities with good strength  Psychiatric: Mood and affect are appropriate  Skin: Without lesions, rashes, or jaundice  Breast:    Symmetrical with no skin or nipple changes.     Contour is normal.   Parenchyma is heterogeneously dense, upper outer quadrants.   Masses- right, 11 o'clock - 3-4 cm diameter    Lymph:       No supraclavicular/infraclavicular adenopathy.   Axillary adenopathy: soft mobile lymph node bilateral        Again, thank you for allowing me to participate in the care of your patient.      Sincerely,      Farnaz Gore MD

## 2023-05-30 NOTE — LETTER
"    5/30/2023         RE: Luda Rai  [address] [place] [address]        Dear Colleague,    Thank you for referring your patient, Luda Rai, to the [facility] BREAST [facility]. Please see a copy of my visit note below.    Oncology Rooming Note    Oncology Rooming Note    May 30, 2023 11:23 AM   Luda Rai is a 43 year old female who presents for:    Chief Complaint   Patient presents with     New Patient     Right Invasive Ductal Carcinoma     Initial Vitals: /80   Pulse 68   Temp 98.1  F (36.7  C) (Oral)   Resp 15   Ht 1.727 m (5' 8\")   Wt 95.7 kg (211 lb)   SpO2 98%   BMI 32.08 kg/m   Estimated body mass index is 32.08 kg/m  as calculated from the following:    Height as of this encounter: 1.727 m (5' 8\").    Weight as of this encounter: 95.7 kg (211 lb). Body surface area is 2.14 meters squared.  Data Unavailable Comment: Data Unavailable   No LMP recorded.  Allergies reviewed: Yes  Medications reviewed: Yes    Medications: Medication refills not needed today.  Pharmacy name entered into [employer]: [id number] - [place], [place] - [address] AT [place] [address] & [address]    Clinical concerns: None       Fiona Irizarry MA                Visit Date: 05/30/2023    The patient is a 43-year-old premenopausal patient who is being seen at the multidisciplinary breast clinic alongside Dr. Gore.  She is being seen for medical oncology consultation.  I have discussed her case with Dr. Gore today.    CHIEF COMPLAINT:  Invasive ductal carcinoma of the right breast, estrogen and progesterone receptor positive, HE-2 receptor negative.    HISTORY OF PRESENT ILLNESS:  The patient is a 43-year-old premenopausal patient who had a routine screening mammogram done at the end of 04/2023.  She was having some intermittent discomfort on the right breast.  Her breast tissue is heterogeneously dense, and there was a focus of asymmetry in the right posterior " lateral breast on one of the views, so she was called back for a diagnostic mammogram and ultrasound.  This was done on 05/10/2023.  On ultrasound, she had at the 11 o'clock position of the right breast, 10 cm from the nipple, a mass, which measured 1.6 x 1.1 cm.  There was nothing seen in the right axilla.  A biopsy was recommended.  She went on to have a biopsy done on 05/22/2023 which revealed invasive ductal carcinoma at the 11 o'clock position of the right breast in the upper outer quadrant.  Grade was grade II, and her tumor was strongly positive for estrogen and progesterone receptors, HER-2 receptor negative.  She is here today prior to having any surgery done for a medical oncology consultation.    FAMILY HISTORY:  Negative for breast cancer.    PAST MEDICAL AND SURGICAL HISTORY:    1.  New diagnosis of sided breast cancer as outlined above.  2.  Bariatric surgery approximately a year ago.  3.  History of knee replacement.  4.  History of 2 ectopic pregnancies.  5.  History of Nissen fundoplication procedure, which was since reversed with her bariatric surgery.    MEDICATIONS AND ALLERGIES:  Outlined in the nursing records.    SOCIAL HISTORY:  The patient has 1 child.  She had the child with IVF.  She has had multiple IVF procedures.  She is premenopausal.  Works as an  for [employer] full time.  She is a nonsmoker, drinks alcohol rarely.    COMPREHENSIVE REVIEW OF SYSTEMS:  A 12-point comprehensive review of systems has been done with her.  She denies any respiratory, cardiovascular, genitourinary, musculoskeletal, gastrointestinal or neurological symptoms that are worrisome.    PHYSICAL EXAMINATION:    GENERAL:  She is well-appearing lady in no acute distress.  VITAL SIGNS:  Stable.  Weight is 211 pounds, height is 5 feet 8 inches.  The patient is afebrile.  NECK:  No masses or goiter. Trachea is central.  NEUROLOGIC:  She is alert and oriented x3.  CHEST:  Clear to auscultation and percussion  bilaterally.  HEART:  Sounds 1, 2 normal, no added sounds, no murmurs.  BREASTS:  Right breast has a palpable mass in the upper outer quadrant.  It is movable.  She also has a movable lymph node in the right axilla.  Left breast normal, no palpable masses.  Left axilla negative.  GASTROINTESTINAL:  Abdomen soft and nontender.  No hepatosplenomegaly.  She has got signs of a previous abdominal procedure for her bariatric surgery.  No inguinal adenopathy.  EXTREMITIES:  Legs without tenderness or edema.  SKIN:  The patient has no skin lesions.    PSYCHIATRIC:  Normal.    IMPRESSION:  This is a 43-year-old premenopausal patient with a new diagnosis of right sided breast cancer.  It was a grade II tumor strongly positive for estrogen and progesterone receptors.  In her medical history, she has had multiple treatments of in vitro fertilization and has had 1 successful pregnancy.  She has also had 2 ectopic pregnancies.  There is no family history of breast cancer. With her young age at diagnosis, at some point we will send her for a genetics referral to see if they would recommend any genetic testing.    PLAN:  I have discussed with her today the findings, and we have discussed both local treatment of breast cancer, which would include a lumpectomy followed by radiation therapy or a mastectomy, and she has elected to do a lumpectomy and radiation therapy.  Because of her dense breast tissue, she will be due for an magnetic resonance imaging scan of the breast to make sure that we are not looking at anything more than what was seen on her mammogram. Dr. Gore has ordered her MRI scan of the breast today. With regard to systemic therapy, she will be a very good candidate for adjuvant hormonal treatment.  We will send the tumor out for Oncotype once it is removed, and that will determine whether or not she will be a candidate for adjuvant chemotherapy.  I have discussed both local and systemic therapy of breast cancer with  her today at length.  She is present today with her .  She understands my discussion with her today and is willing to proceed.  We will start with an MRI scan of the breast, and then I will review that with Dr. Gore.  If that looks favorable, we will proceed with her lumpectomy, and she will see me back 2-3 weeks after the lumpectomy with an Oncotype score.  All of the above has been discussed with her.    Adolfo Zeng MD        D: 2023   T: 2023   MT: LA    Name:     SAPPHIRE CHASE  MRN:      [id number]        Account:    [id number]   :      1980           Visit Date: 2023     Document: [id number]      Again, thank you for allowing me to participate in the care of your patient.        Sincerely,        Adolfo Zeng MD

## 2023-06-01 ENCOUNTER — TELEPHONE (OUTPATIENT)
Dept: SURGERY | Facility: CLINIC | Age: 43
End: 2023-06-01
Payer: COMMERCIAL

## 2023-06-01 DIAGNOSIS — C50.912 INVASIVE DUCTAL CARCINOMA OF BREAST, STAGE 1, LEFT (H): Primary | ICD-10-CM

## 2023-06-01 NOTE — TELEPHONE ENCOUNTER
Type of surgery: RIGHT BREAST LUMPECTOMY, RIGHT SENTINEL ALEXANDRO BIOPSY   Location of surgery: [facility]  Date and time of surgery: 6/26/2023 @ 7:30 AM   Surgeon: Farnaz Gore MD    Pre-Op Appt Date: PATIENT TO SCHEDULE    Post-Op Appt Date: PATIENT TO SCHEDULE     Packet sent out: Yes  Pre-cert/Authorization completed:  Not Applicable  Date: 6/1/2023         RIGHT BREAST LUMPECTOMY, RIGHT SENTINEL ALEXANDRO BIOPSY       GENERAL PT INST TO HAVE H&P WITH  MIN REQ PA ASSIST JLS NMS  SN INJ 6/26/23 7:00  AM NMS

## 2023-06-04 ENCOUNTER — ANCILLARY PROCEDURE (OUTPATIENT)
Dept: MRI IMAGING | Facility: CLINIC | Age: 43
End: 2023-06-04
Attending: SURGERY
Payer: COMMERCIAL

## 2023-06-04 DIAGNOSIS — C50.912 INVASIVE DUCTAL CARCINOMA OF BREAST, STAGE 1, LEFT (H): ICD-10-CM

## 2023-06-04 PROCEDURE — A9585 GADOBUTROL INJECTION: HCPCS | Performed by: RADIOLOGY

## 2023-06-04 PROCEDURE — 77049 MRI BREAST C-+ W/CAD BI: CPT | Performed by: RADIOLOGY

## 2023-06-04 RX ORDER — GADOBUTROL 604.72 MG/ML
10 INJECTION INTRAVENOUS ONCE
Status: COMPLETED | OUTPATIENT
Start: 2023-06-04 | End: 2023-06-04

## 2023-06-04 RX ADMIN — GADOBUTROL 9.5 ML: 604.72 INJECTION INTRAVENOUS at 09:43

## 2023-06-06 ENCOUNTER — TELEPHONE (OUTPATIENT)
Dept: MAMMOGRAPHY | Facility: CLINIC | Age: 43
End: 2023-06-06
Payer: COMMERCIAL

## 2023-06-06 NOTE — TELEPHONE ENCOUNTER
Contacted patient at the request of Dr Gore to help her schedule an US of the left axilla with possible biopsy if necessary.      Luda is scheduled for MRI guided bx at [facility] on 6-7-23.  She is relieved  to also have the lymph node looked at.  She understands that if abnormal on US we will proceed with US core biopsy.    She agrees to check in at the breast center on 6-9-23 at 750 for a 800 am scan.

## 2023-06-07 ENCOUNTER — ANCILLARY PROCEDURE (OUTPATIENT)
Dept: MAMMOGRAPHY | Facility: CLINIC | Age: 43
End: 2023-06-07
Attending: SURGERY
Payer: COMMERCIAL

## 2023-06-07 ENCOUNTER — ANCILLARY PROCEDURE (OUTPATIENT)
Dept: MRI IMAGING | Facility: CLINIC | Age: 43
End: 2023-06-07
Attending: SURGERY
Payer: COMMERCIAL

## 2023-06-07 DIAGNOSIS — R92.8 ABNORMAL FINDING ON BREAST IMAGING: ICD-10-CM

## 2023-06-07 PROCEDURE — A9585 GADOBUTROL INJECTION: HCPCS | Performed by: RADIOLOGY

## 2023-06-07 PROCEDURE — 88305 TISSUE EXAM BY PATHOLOGIST: CPT | Performed by: PATHOLOGY

## 2023-06-07 PROCEDURE — 19085 BX BREAST 1ST LESION MR IMAG: CPT | Mod: LT | Performed by: RADIOLOGY

## 2023-06-07 RX ORDER — GADOBUTROL 604.72 MG/ML
10 INJECTION INTRAVENOUS ONCE
Status: COMPLETED | OUTPATIENT
Start: 2023-06-07 | End: 2023-06-07

## 2023-06-07 RX ADMIN — GADOBUTROL 9.5 ML: 604.72 INJECTION INTRAVENOUS at 13:52

## 2023-06-09 ENCOUNTER — LAB (OUTPATIENT)
Dept: LAB | Facility: CLINIC | Age: 43
End: 2023-06-09
Payer: COMMERCIAL

## 2023-06-09 ENCOUNTER — TUMOR CONFERENCE (OUTPATIENT)
Dept: ONCOLOGY | Facility: CLINIC | Age: 43
End: 2023-06-09
Payer: COMMERCIAL

## 2023-06-09 ENCOUNTER — TELEPHONE (OUTPATIENT)
Dept: MAMMOGRAPHY | Facility: CLINIC | Age: 43
End: 2023-06-09

## 2023-06-09 ENCOUNTER — TELEPHONE (OUTPATIENT)
Dept: SURGERY | Facility: CLINIC | Age: 43
End: 2023-06-09

## 2023-06-09 ENCOUNTER — HOSPITAL ENCOUNTER (OUTPATIENT)
Dept: ULTRASOUND IMAGING | Facility: CLINIC | Age: 43
Discharge: HOME | End: 2023-06-09
Attending: SURGERY
Payer: COMMERCIAL

## 2023-06-09 DIAGNOSIS — R92.8 ABNORMAL FINDING ON BREAST IMAGING: ICD-10-CM

## 2023-06-09 DIAGNOSIS — K91.2 POSTSURGICAL MALABSORPTION: ICD-10-CM

## 2023-06-09 LAB
ERYTHROCYTE [DISTWIDTH] IN BLOOD BY AUTOMATED COUNT: 13.2 % (ref 10–15)
FERRITIN SERPL-MCNC: 18 NG/ML (ref 6–175)
HCT VFR BLD AUTO: 38.3 % (ref 35–47)
HGB BLD-MCNC: 12.2 G/DL (ref 11.7–15.7)
IRON BINDING CAPACITY (ROCHE): 357 UG/DL (ref 240–430)
IRON SATN MFR SERPL: 11 % (ref 15–46)
IRON SERPL-MCNC: 41 UG/DL (ref 37–145)
MCH RBC QN AUTO: 27.9 PG (ref 26.5–33)
MCHC RBC AUTO-ENTMCNC: 31.9 G/DL (ref 31.5–36.5)
MCV RBC AUTO: 88 FL (ref 78–100)
PLATELET # BLD AUTO: 171 10E3/UL (ref 150–450)
PTH-INTACT SERPL-MCNC: 35 PG/ML (ref 15–65)
RBC # BLD AUTO: 4.37 10E6/UL (ref 3.8–5.2)
VIT B12 SERPL-MCNC: 182 PG/ML (ref 232–1245)
WBC # BLD AUTO: 6.2 10E3/UL (ref 4–11)

## 2023-06-09 PROCEDURE — 82306 VITAMIN D 25 HYDROXY: CPT

## 2023-06-09 PROCEDURE — 36415 COLL VENOUS BLD VENIPUNCTURE: CPT

## 2023-06-09 PROCEDURE — 85027 COMPLETE CBC AUTOMATED: CPT

## 2023-06-09 PROCEDURE — 82607 VITAMIN B-12: CPT

## 2023-06-09 PROCEDURE — 82728 ASSAY OF FERRITIN: CPT

## 2023-06-09 PROCEDURE — 76882 US LMTD JT/FCL EVL NVASC XTR: CPT | Mod: LT

## 2023-06-09 PROCEDURE — 83540 ASSAY OF IRON: CPT

## 2023-06-09 PROCEDURE — 83970 ASSAY OF PARATHORMONE: CPT

## 2023-06-09 PROCEDURE — 83550 IRON BINDING TEST: CPT

## 2023-06-09 NOTE — TELEPHONE ENCOUNTER
I called Luda to discuss findings and plans moving forward. 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 I recommend excision of this at the time of her right breast lumpectomy surgery later this month. This will require localizer placement prior to surgery. She is agreeable. The left axillary ultrasound was normal and we will not need to do anything further for this.   My office will call her in the next few days to get a localizer time and date set up prior to the surgery  Farnaz Gore MD

## 2023-06-09 NOTE — TELEPHONE ENCOUNTER
Spoke with patient regarding left breast biopsy results, which indicate a benign papilloma. Notified patient that the radiologist's recommendation is continued surgical management of right breast cancer and left breast papilloma. Patient verbalized understanding of these results and all questions answered to her satisfaction.

## 2023-06-09 NOTE — TUMOR CONFERENCE
Tumor Conference Information  Tumor Conference: Breast  Specialties Present: Medical oncology, Radiation oncology, Pathology, Radiology, Surgery  Patient Status: Prospective  Stage: T1No  Treatment to Date: Biopsy  Clinical Trials: Discussed (see comment)  Genetic Testing Discussed/Recommended?: Yes  Supportive Care Services Discussed/Recommended?: Yes  Recommended Plan: Follows evidence-based guidelines  Did the review exceed 30 minutes?: did not     plan for lumpectomy and SLN, MRI bx = papilloma, will be removed at time of surgery.  Plan for oncotype to determine treatment after surgery.     Documentation / Disclaimer Cancer Tumor Board Note  Cancer tumor board recommendations do not override what is determined to be reasonable care and treatment, which is dependent on the circumstances of a patient's case; the patient's medical, social, and personal concerns; and the clinical judgment of the oncologist [physician].

## 2023-06-10 LAB — DEPRECATED CALCIDIOL+CALCIFEROL SERPL-MC: 36 UG/L (ref 20–75)

## 2023-06-12 DIAGNOSIS — C50.912 INVASIVE DUCTAL CARCINOMA OF BREAST, STAGE 1, LEFT (H): Primary | ICD-10-CM

## 2023-06-15 ENCOUNTER — OFFICE VISIT (OUTPATIENT)
Dept: FAMILY MEDICINE | Facility: CLINIC | Age: 43
End: 2023-06-15
Payer: COMMERCIAL

## 2023-06-15 VITALS
OXYGEN SATURATION: 98 % | SYSTOLIC BLOOD PRESSURE: 130 MMHG | BODY MASS INDEX: 32.8 KG/M2 | RESPIRATION RATE: 16 BRPM | HEIGHT: 68 IN | TEMPERATURE: 97.6 F | HEART RATE: 96 BPM | WEIGHT: 216.4 LBS | DIASTOLIC BLOOD PRESSURE: 58 MMHG

## 2023-06-15 DIAGNOSIS — C50.912 INVASIVE DUCTAL CARCINOMA OF BREAST, FEMALE, LEFT (H): ICD-10-CM

## 2023-06-15 DIAGNOSIS — E66.811 OBESITY, CLASS I, BMI 30.0-34.9 (SEE ACTUAL BMI): ICD-10-CM

## 2023-06-15 DIAGNOSIS — Z01.818 PREOP GENERAL PHYSICAL EXAM: Primary | ICD-10-CM

## 2023-06-15 PROBLEM — E66.01 MORBID OBESITY WITH BMI OF 40.0-44.9, ADULT (H): Status: RESOLVED | Noted: 2022-06-16 | Resolved: 2023-06-15

## 2023-06-15 PROCEDURE — 99213 OFFICE O/P EST LOW 20 MIN: CPT | Performed by: PHYSICIAN ASSISTANT

## 2023-06-15 NOTE — PROGRESS NOTES
[address] [address]  Phone: [phone]  Primary Provider: Maia Montalvo  Pre-op Performing Provider: MAIA MONTALVO      PREOPERATIVE EVALUATION:  Today's date: 6/15/2023    Luda Rai is a 43 year old female who presents for a preoperative evaluation.      6/15/2023     2:38 PM   Additional Questions   Roomed by Cristal Munguia CMA   Accompanied by Self     Surgical Information:  Surgery/Procedure: LUMPECTOMY, BREAST, WITH SENTINEL LYMPH NODE BIOPSY,LEFT BREAST LOCALIZED EXCISIONAL BIOPSY Right  Surgery Location: [facility]  Surgeon: Farnaz Gore MD  Surgery Date: 6/26/2023  Time of Surgery: 8:00 AM   Where patient plans to recover: At home with family  Fax number for surgical facility: Note does not need to be faxed, will be available electronically in Epic.    Assessment & Plan     The proposed surgical procedure is considered INTERMEDIATE risk.    Preop general physical exam  Invasive ductal carcinoma of breast, female, left (H)  Cleared for surgical procedure without further diagnostics    Obesity, Class I, BMI 30.0-34.9 (see actual BMI)  Positioning considerations during procedure        - No identified additional risk factors other than previously addressed    Morning of procedure: Hold all vitamins and supplements    For 7 days prior to procedure: Hold all vitamins/supplements.  Hold all NSAID medications (ibuprofen/motrin/aleve) and aspirin.  Tylenol products OK.        RECOMMENDATION:  APPROVAL GIVEN to proceed with proposed procedure, without further diagnostic evaluation.          Maia Montalvo MBA, MS, PA-C  [facility]      Subjective       HPI related to upcoming procedure:  The patient is a 43-year-old premenopausal patient who had a routine screening mammogram done at the end of 04/2023.  She was having some intermittent discomfort on the right breast.  Her breast tissue  is heterogeneously dense, and there was a focus of asymmetry in the right posterior lateral breast on one of the views, so she was called back for a diagnostic mammogram and ultrasound.  This was done on 05/10/2023.  On ultrasound, she had at the 11 o'clock position of the right breast, 10 cm from the nipple, a mass, which measured 1.6 x 1.1 cm.  There was nothing seen in the right axilla.  A biopsy was recommended.  She went on to have a biopsy done on 05/22/2023 which revealed invasive ductal carcinoma at the 11 o'clock position of the right breast in the upper outer quadrant.  Grade was grade II, and her tumor was strongly positive for estrogen and progesterone receptors, HER-2 receptor negative.  She is here today prior to having any surgery done for a medical oncology consultation.        6/15/2023     2:36 PM   Preop Questions   1. Have you ever had a heart attack or stroke? No   2. Have you ever had surgery on your heart or blood vessels, such as a stent placement, a coronary artery bypass, or surgery on an artery in your head, neck, heart, or legs? No   3. Do you have chest pain with activity? No   4. Do you have a history of  heart failure? No   5. Do you currently have a cold, bronchitis or symptoms of other infection? No   6. Do you have a cough, shortness of breath, or wheezing? No   7. Do you or anyone in your family have previous history of blood clots? No   8. Do you or does anyone in your family have a serious bleeding problem such as prolonged bleeding following surgeries or cuts? No   9. Have you ever had problems with anemia or been told to take iron pills? No   10. Have you had any abnormal blood loss such as black, tarry or bloody stools, or abnormal vaginal bleeding? No   11. Have you ever had a blood transfusion? No   12. Are you willing to have a blood transfusion if it is medically needed before, during, or after your surgery? Yes   13. Have you or any of your relatives ever had problems  with anesthesia? No   14. Do you have sleep apnea, excessive snoring or daytime drowsiness? No   15. Do you have any artifical heart valves or other implanted medical devices like a pacemaker, defibrillator, or continuous glucose monitor? No   16. Do you have artificial joints? YES - right knee   17. Are you allergic to latex? No   18. Is there any chance that you may be pregnant? No       Health Care Directive:  Patient does not have a Health Care Directive or Living Will: Discussed advance care planning with patient; however, patient declined at this time.    Preoperative Review of :   reviewed - no record of controlled substances prescribed.      Status of Chronic Conditions:  See problem list for active medical problems.  Problems all longstanding and stable, except as noted/documented.  See ROS for pertinent symptoms related to these conditions.      Review of Systems  CONSTITUTIONAL: NEGATIVE for fever, chills, change in weight  INTEGUMENTARY/SKIN: NEGATIVE for worrisome rashes, moles or lesions  EYES: NEGATIVE for vision changes or irritation  ENT/MOUTH: NEGATIVE for ear, mouth and throat problems  RESP: NEGATIVE for significant cough or SOB  CV: NEGATIVE for chest pain, palpitations or peripheral edema  GI: NEGATIVE for nausea, abdominal pain, heartburn, or change in bowel habits  : NEGATIVE for frequency, dysuria, or hematuria  MUSCULOSKELETAL: NEGATIVE for significant arthralgias or myalgia  NEURO: NEGATIVE for weakness, dizziness or paresthesias  ENDOCRINE: NEGATIVE for temperature intolerance, skin/hair changes  HEME: NEGATIVE for bleeding problems  PSYCHIATRIC: NEGATIVE for changes in mood or affect    Patient Active Problem List    Diagnosis Date Noted     Invasive ductal carcinoma of breast, female, left (H) 06/19/2023     Priority: Medium     Obesity, Class I, BMI 30.0-34.9 (see actual BMI) 06/19/2023     Priority: Medium     Prediabetes 09/16/2022     Priority: Medium     Telogen effluvium  2022     Priority: Medium     Abdominal wall seroma, initial encounter 2022     Priority: Medium     Bariatric surgery status 2022     Priority: Medium     2022 RYGBS and Takedown of Nissen fundoplication LEL         Malnutrition following gastrointestinal surgery 2022     Priority: Medium     Low vitamin B12 level 2020     Priority: Medium     Mammographic microcalcification 2020     Priority: Medium     Fatty liver 2018     Priority: Medium     Primary osteoarthritis of left knee 2016     Priority: Medium     Dyslipidemia 2016     Priority: Medium     Vitamin B12 deficiency 2016     Priority: Medium     Osteoarthrosis, unspecified whether generalized or localized, lower leg 10/30/2014     Priority: Medium     IMO Regulatory Load OCT 2020  Replacing diagnoses that were inactivated after the 10/1/2021 regulatory import.       PCOS (polycystic ovarian syndrome) 2014     Priority: Medium     Hypovitaminosis D 2014     Priority: Medium      Past Medical History:   Diagnosis Date     Bone and joint disord back, pelvis, leg complicat preg, childb, puerp     Knee replacement      Endocrine problem     Pcos     Esophageal reflux 2005    Had Nissen Fundiplication 2006     Invasive ductal carcinoma of breast, female, left (H) 2023     Osteoporosis     Knee     Past Surgical History:   Procedure Laterality Date      SECTION  10/2016     DISCECTOMY LUMBAR ANTERIOR      Diskectomy     LAPAROSCOPIC BYPASS GASTRIC N/A 2022    Procedure: laparoscopic gastric bypass, Laparoscopic reversal of Nissen fundoplication, laparoscopic lysis of adhesion, partial gastrectomy;  Surgeon: Panchito Zambrano MD;  Location: [facility] OR     NISSEN FUNDOPLICATION      starting of Up's     SALPINGECTOMY Left 2019    Fallopian tube (including fimbria) with extensive intraluminal hemorrhage,     SALPINGECTOMY Right 2019    tubal pregnancy  "from IVF     TOTAL KNEE ARTHROPLASTY Right 2020    Knee replacement     Current Outpatient Medications   Medication Sig Dispense Refill     BIOTIN PO Take 1 capsule by mouth daily       CALCIUM CITRATE PO Take 500 mg by mouth daily B-L-D       multivitamin w/minerals (THERA-VIT-M) tablet Take 1 tablet by mouth At Bedtime Abel Adv Chew         Allergies   Allergen Reactions     Asa [Aspirin]      Gastric bypass     Morphine And Related GI Disturbance     Nsaids Other (See Comments)     Avoid due to bariatric surgery 6/16/2022     Rocephin [Ceftriaxone] Hives        Social History     Tobacco Use     Smoking status: Never     Passive exposure: Never     Smokeless tobacco: Never   Vaping Use     Vaping status: Never Used     Passive vaping exposure: Yes   Substance Use Topics     Alcohol use: Not Currently     Comment: Rare     Family History   Problem Relation Age of Onset     Hypertension Mother      Diabetes Father      Cerebrovascular Disease Father      Breast Cancer No family hx of      Ovarian Cancer No family hx of      History   Drug Use Unknown         Objective     /58   Pulse 96   Temp 97.6  F (36.4  C) (Tympanic)   Resp 16   Ht 1.727 m (5' 8\")   Wt 98.2 kg (216 lb 6.4 oz)   LMP 06/03/2023 (Exact Date)   SpO2 98%   BMI 32.90 kg/m      Physical Exam    GENERAL APPEARANCE: healthy, alert and no distress     EYES: EOMI, PERRL     HENT: ear canals and TM's normal and nose and mouth without ulcers or lesions     NECK: no adenopathy, no asymmetry, masses, or scars and thyroid normal to palpation     RESP: lungs clear to auscultation - no rales, rhonchi or wheezes     CV: regular rates and rhythm, normal S1 S2, no S3 or S4 and no murmur, click or rub     ABDOMEN:  soft, nontender, no HSM or masses and bowel sounds normal     MS: extremities normal- no gross deformities noted, no evidence of inflammation in joints, FROM in all extremities.     SKIN: no suspicious lesions or rashes     NEURO: Normal " strength and tone, sensory exam grossly normal, mentation intact and speech normal     PSYCH: mentation appears normal. and affect normal/bright     LYMPHATICS: No cervical adenopathy    Recent Labs   Lab Test 06/09/23  1338 11/04/22  1120 06/17/22  0708 06/16/22  1625 06/08/22  1022 10/26/21  0734   HGB 12.2  --  12.4  --  13.5 13.5     --   --   --  156 171   NA  --   --  135  --  137 136   POTASSIUM  --   --  3.8  --  4.0 4.0   CR  --   --   --  0.62 0.58 0.76   A1C  --  5.3  --   --   --  5.7*        Diagnostics:  No labs were ordered during this visit.  CBC recently.  Will do HCG at annual physical next week.  No EKG required, no history of coronary heart disease, significant arrhythmia, peripheral arterial disease or other structural heart disease.    Revised Cardiac Risk Index (RCRI):  The patient has the following serious cardiovascular risks for perioperative complications:   - No serious cardiac risks = 0 points     RCRI Interpretation: 0 points: Class I (very low risk - 0.4% complication rate)           Signed Electronically by: Mulu Montalvo PA-C  Copy of this evaluation report is provided to requesting physician.

## 2023-06-15 NOTE — PATIENT INSTRUCTIONS
For informational purposes only. Not to replace the advice of your health care provider. Copyright   2003,  [facility]. All rights reserved. Clinically reviewed by Lori Anaya MD. [employer] [id number] - REV .  Preparing for Your Surgery  Getting started  A nurse will call you to review your health history and instructions. They will give you an arrival time based on your scheduled surgery time. Please be ready to share:  Your doctor's clinic name and phone number  Your medical, surgical, and anesthesia history  A list of allergies and sensitivities  A list of medicines, including herbal treatments and over-the-counter drugs  Whether the patient has a legal guardian (ask how to send us the papers in advance)  Please tell us if you're pregnant--or if there's any chance you might be pregnant. Some surgeries may injure a fetus (unborn baby), so they require a pregnancy test. Surgeries that are safe for a fetus don't always need a test, and you can choose whether to have one.   If you have a child who's having surgery, please ask for a copy of Preparing for Your Child's Surgery.    Preparing for surgery  Within 10 to 30 days of surgery: Have a pre-op exam (sometimes called an H&P, or History and Physical). This can be done at a clinic or pre-operative center.  If you're having a , you may not need this exam. Talk to your care team.  At your pre-op exam, talk to your care team about all medicines you take. If you need to stop any medicines before surgery, ask when to start taking them again.  We do this for your safety. Many medicines can make you bleed too much during surgery. Some change how well surgery (anesthesia) drugs work.  Call your insurance company to let them know you're having surgery. (If you don't have insurance, call [phone].)  Call your clinic if there's any change in your health. This includes signs of a cold or flu (sore throat, runny nose, cough, rash, fever). It  also includes a scrape or scratch near the surgery site.  If you have questions on the day of surgery, call your hospital or surgery center.  Eating and drinking guidelines  For your safety: Unless your surgeon tells you otherwise, follow the guidelines below.  Eat and drink as usual until 8 hours before you arrive for surgery. After that, no food or milk.  Drink clear liquids until 2 hours before you arrive. These are liquids you can see through, like water, Gatorade, and Propel Water. They also include plain black coffee and tea (no cream or milk), candy, and breath mints. You can spit out gum when you arrive.  If you drink alcohol: Stop drinking it the night before surgery.  If your care team tells you to take medicine on the morning of surgery, it's okay to take it with a sip of water.  Preventing infection  Shower or bathe the night before and morning of your surgery. Follow the instructions your clinic gave you. (If no instructions, use regular soap.)  Don't shave or clip hair near your surgery site. We'll remove the hair if needed.  Don't smoke or vape the morning of surgery. You may chew nicotine gum up to 2 hours before surgery. A nicotine patch is okay.  Note: Some surgeries require you to completely quit smoking and nicotine. Check with your surgeon.  Your care team will make every effort to keep you safe from infection. We will:  Clean our hands often with soap and water (or an alcohol-based hand rub).  Clean the skin at your surgery site with a special soap that kills germs.  Give you a special gown to keep you warm. (Cold raises the risk of infection.)  Wear special hair covers, masks, gowns and gloves during surgery.  Give antibiotic medicine, if prescribed. Not all surgeries need antibiotics.  What to bring on the day of surgery  Photo ID and insurance card  Copy of your health care directive, if you have one  Glasses and hearing aids (bring cases)  You can't wear contacts during surgery  Inhaler and  eye drops, if you use them (tell us about these when you arrive)  CPAP machine or breathing device, if you use them  A few personal items, if spending the night  If you have . . .  A pacemaker, ICD (cardiac defibrillator) or other implant: Bring the ID card.  An implanted stimulator: Bring the remote control.  A legal guardian: Bring a copy of the certified (court-stamped) guardianship papers.  Please remove any jewelry, including body piercings. Leave jewelry and other valuables at home.  If you're going home the day of surgery  You must have a responsible adult drive you home. They should stay with you overnight as well.  If you don't have someone to stay with you, and you aren't safe to go home alone, we may keep you overnight. Insurance often won't pay for this.  After surgery  If it's hard to control your pain or you need more pain medicine, please call your surgeon's office.  Questions?   If you have any questions for your care team, list them here: _________________________________________________________________________________________________________________________________________________________________________ ____________________________________ ____________________________________ ____________________________________    How to Take Your Medication Before Surgery  - STOP taking all vitamins and herbal supplements 14 days before surgery.      Morning of procedure: Hold all vitamins and supplements    For 7 days prior to procedure: Hold all vitamins/supplements.  Hold all NSAID medications (ibuprofen/motrin/aleve) and aspirin.  Tylenol products OK.

## 2023-06-16 ENCOUNTER — ANCILLARY PROCEDURE (OUTPATIENT)
Dept: MAMMOGRAPHY | Facility: CLINIC | Age: 43
End: 2023-06-16
Attending: SURGERY
Payer: COMMERCIAL

## 2023-06-16 DIAGNOSIS — C50.912 INVASIVE DUCTAL CARCINOMA OF BREAST, STAGE 1, LEFT (H): ICD-10-CM

## 2023-06-16 PROCEDURE — A4648 IMPLANTABLE TISSUE MARKER: HCPCS

## 2023-06-16 PROCEDURE — 250N000009 HC RX 250: Performed by: SURGERY

## 2023-06-16 PROCEDURE — 19281 PERQ DEVICE BREAST 1ST IMAG: CPT | Mod: LT

## 2023-06-16 RX ADMIN — LIDOCAINE HYDROCHLORIDE 10 ML: 10 INJECTION, SOLUTION INFILTRATION; PERINEURAL at 10:00

## 2023-06-19 PROBLEM — C50.912 INVASIVE DUCTAL CARCINOMA OF BREAST, FEMALE, LEFT (H): Status: ACTIVE | Noted: 2023-06-19

## 2023-06-19 PROBLEM — E66.811 OBESITY, CLASS I, BMI 30.0-34.9 (SEE ACTUAL BMI): Status: ACTIVE | Noted: 2023-06-19

## 2023-06-22 ENCOUNTER — OFFICE VISIT (OUTPATIENT)
Dept: FAMILY MEDICINE | Facility: CLINIC | Age: 43
End: 2023-06-22
Payer: COMMERCIAL

## 2023-06-22 ENCOUNTER — TELEPHONE (OUTPATIENT)
Dept: MAMMOGRAPHY | Facility: CLINIC | Age: 43
End: 2023-06-22

## 2023-06-22 VITALS
BODY MASS INDEX: 32.92 KG/M2 | TEMPERATURE: 97.9 F | HEIGHT: 68 IN | RESPIRATION RATE: 16 BRPM | DIASTOLIC BLOOD PRESSURE: 70 MMHG | SYSTOLIC BLOOD PRESSURE: 120 MMHG | WEIGHT: 217.2 LBS | HEART RATE: 86 BPM | OXYGEN SATURATION: 96 %

## 2023-06-22 DIAGNOSIS — E55.9 HYPOVITAMINOSIS D: ICD-10-CM

## 2023-06-22 DIAGNOSIS — C50.912 INVASIVE DUCTAL CARCINOMA OF BREAST, FEMALE, LEFT (H): ICD-10-CM

## 2023-06-22 DIAGNOSIS — K91.2 MALNUTRITION FOLLOWING GASTROINTESTINAL SURGERY: ICD-10-CM

## 2023-06-22 DIAGNOSIS — Z00.00 ROUTINE GENERAL MEDICAL EXAMINATION AT A HEALTH CARE FACILITY: Primary | ICD-10-CM

## 2023-06-22 DIAGNOSIS — K76.0 FATTY LIVER: ICD-10-CM

## 2023-06-22 DIAGNOSIS — Z13.29 SCREENING FOR THYROID DISORDER: ICD-10-CM

## 2023-06-22 DIAGNOSIS — Z98.84 BARIATRIC SURGERY STATUS: ICD-10-CM

## 2023-06-22 DIAGNOSIS — E53.8 VITAMIN B12 DEFICIENCY: ICD-10-CM

## 2023-06-22 DIAGNOSIS — E78.2 MIXED HYPERLIPIDEMIA: ICD-10-CM

## 2023-06-22 DIAGNOSIS — Z12.4 SCREENING FOR MALIGNANT NEOPLASM OF CERVIX: ICD-10-CM

## 2023-06-22 DIAGNOSIS — Z01.818 PRE-OP EXAM: ICD-10-CM

## 2023-06-22 DIAGNOSIS — E66.811 OBESITY, CLASS I, BMI 30.0-34.9 (SEE ACTUAL BMI): ICD-10-CM

## 2023-06-22 PROBLEM — R79.89 LOW VITAMIN B12 LEVEL: Status: RESOLVED | Noted: 2020-05-20 | Resolved: 2023-06-22

## 2023-06-22 PROBLEM — R92.0 MAMMOGRAPHIC MICROCALCIFICATION: Status: RESOLVED | Noted: 2020-05-20 | Resolved: 2023-06-22

## 2023-06-22 LAB
ALBUMIN SERPL BCG-MCNC: 4.2 G/DL (ref 3.5–5.2)
ALP SERPL-CCNC: 67 U/L (ref 35–104)
ALT SERPL W P-5'-P-CCNC: 8 U/L (ref 0–50)
ANION GAP SERPL CALCULATED.3IONS-SCNC: 10 MMOL/L (ref 7–15)
AST SERPL W P-5'-P-CCNC: 16 U/L (ref 0–45)
BILIRUB SERPL-MCNC: 0.8 MG/DL
BUN SERPL-MCNC: 10.1 MG/DL (ref 6–20)
CALCIUM SERPL-MCNC: 9.1 MG/DL (ref 8.6–10)
CHLORIDE SERPL-SCNC: 103 MMOL/L (ref 98–107)
CHOLEST SERPL-MCNC: 182 MG/DL
CREAT SERPL-MCNC: 0.62 MG/DL (ref 0.51–0.95)
DEPRECATED HCO3 PLAS-SCNC: 27 MMOL/L (ref 22–29)
GFR SERPL CREATININE-BSD FRML MDRD: >90 ML/MIN/1.73M2
GLUCOSE SERPL-MCNC: 93 MG/DL (ref 70–99)
HCG INTACT+B SERPL-ACNC: <1 MIU/ML
HDLC SERPL-MCNC: 53 MG/DL
LDLC SERPL CALC-MCNC: 108 MG/DL
NONHDLC SERPL-MCNC: 129 MG/DL
POTASSIUM SERPL-SCNC: 4.2 MMOL/L (ref 3.4–5.3)
PROT SERPL-MCNC: 7 G/DL (ref 6.4–8.3)
SODIUM SERPL-SCNC: 140 MMOL/L (ref 136–145)
TRIGL SERPL-MCNC: 104 MG/DL
TSH SERPL DL<=0.005 MIU/L-ACNC: 1.16 UIU/ML (ref 0.3–4.2)

## 2023-06-22 PROCEDURE — 36415 COLL VENOUS BLD VENIPUNCTURE: CPT | Performed by: PHYSICIAN ASSISTANT

## 2023-06-22 PROCEDURE — 80061 LIPID PANEL: CPT | Performed by: PHYSICIAN ASSISTANT

## 2023-06-22 PROCEDURE — 84702 CHORIONIC GONADOTROPIN TEST: CPT | Performed by: PHYSICIAN ASSISTANT

## 2023-06-22 PROCEDURE — 99396 PREV VISIT EST AGE 40-64: CPT | Performed by: PHYSICIAN ASSISTANT

## 2023-06-22 PROCEDURE — 80053 COMPREHEN METABOLIC PANEL: CPT | Performed by: PHYSICIAN ASSISTANT

## 2023-06-22 PROCEDURE — 87624 HPV HI-RISK TYP POOLED RSLT: CPT | Performed by: PHYSICIAN ASSISTANT

## 2023-06-22 PROCEDURE — G0145 SCR C/V CYTO,THINLAYER,RESCR: HCPCS | Performed by: PHYSICIAN ASSISTANT

## 2023-06-22 PROCEDURE — 84443 ASSAY THYROID STIM HORMONE: CPT | Performed by: PHYSICIAN ASSISTANT

## 2023-06-22 PROCEDURE — 99214 OFFICE O/P EST MOD 30 MIN: CPT | Mod: 25 | Performed by: PHYSICIAN ASSISTANT

## 2023-06-22 ASSESSMENT — ENCOUNTER SYMPTOMS
MYALGIAS: 0
FREQUENCY: 0
HEARTBURN: 0
BREAST MASS: 1
CHILLS: 0
HEMATOCHEZIA: 0
NERVOUS/ANXIOUS: 0
DIZZINESS: 0
CONSTIPATION: 0
EYE PAIN: 0
WEAKNESS: 0
PARESTHESIAS: 0
SORE THROAT: 0
NAUSEA: 0
DIARRHEA: 0
FEVER: 0
ABDOMINAL PAIN: 0
DYSURIA: 0
SHORTNESS OF BREATH: 0
PALPITATIONS: 0
HEADACHES: 0
HEMATURIA: 0
ARTHRALGIAS: 0
JOINT SWELLING: 0
COUGH: 0

## 2023-06-22 NOTE — H&P (VIEW-ONLY)
SUBJECTIVE:   CC: Luda is an 43 year old who presents for preventive health visit.       6/22/2023     7:29 AM   Additional Questions   Roomed by Blanca GAYE   Accompanied by Self     Healthy Habits:     Getting at least 3 servings of Calcium per day:  Yes    Bi-annual eye exam:  Yes    Dental care twice a year:  Yes    Sleep apnea or symptoms of sleep apnea:  None    Diet:  Regular (no restrictions)    Frequency of exercise:  2-3 days/week    Duration of exercise:  Less than 15 minutes    Taking medications regularly:  Yes    Medication side effects:  None    PHQ-2 Total Score: 0    Additional concerns today:  No      Left breast Invasive ductal carcinoma  Found on routine screening mammogram done at the end of 04/2023.  She was having some intermittent discomfort on the right breast.  Her breast tissue is heterogeneously dense, and there was a focus of asymmetry in the right posterior lateral breast on one of the views, so she was called back for a diagnostic mammogram and ultrasound.  This was done on 05/10/2023.  On ultrasound, she had at the 11 o'clock position of the right breast, 10 cm from the nipple, a mass, which measured 1.6 x 1.1 cm.  There was nothing seen in the right axilla.  A biopsy was recommended.  She went on to have a biopsy done on 05/22/2023 which revealed invasive ductal carcinoma at the 11 o'clock position of the right breast in the upper outer quadrant.  Grade was grade II, and her tumor was strongly positive for estrogen and progesterone receptors, HER-2 receptor negative.  Scheduled for lumpectomy with Dr. Gore on 6/26.    PCOS (polycystic ovarian syndrome)  Has been on metformin in the past.  Not currently taking since her bariatric surgery.    Bariatric surgery status  6/16/2022 RYGBS and Takedown of Nissen fundoplication LEL.  Doing well.  Was found to have low B12 last month and asked to increase to double her dose daily.  Is waiting until after surgery to do this increase as she  is holding all vitamins/supplements.      Wt Readings from Last 5 Encounters:   23 98.5 kg (217 lb 3.2 oz)   06/15/23 98.2 kg (216 lb 6.4 oz)   23 95.7 kg (211 lb)   23 95.7 kg (211 lb)   22 103.9 kg (229 lb)           Have you ever done Advance Care Planning? (For example, a Health Directive, POLST, or a discussion with a medical provider or your loved ones about your wishes): No, advance care planning information given to patient to review.  Patient declined advance care planning discussion at this time.    Social History     Tobacco Use     Smoking status: Never     Passive exposure: Never     Smokeless tobacco: Never   Substance Use Topics     Alcohol use: Not Currently     Comment: Rare             2023     7:31 AM   Alcohol Use   Prescreen: >3 drinks/day or >7 drinks/week? No          No data to display              Reviewed orders with patient.  Reviewed health maintenance and updated orders accordingly - Yes  BP Readings from Last 3 Encounters:   23 120/70   06/15/23 130/58   23 127/80    Wt Readings from Last 3 Encounters:   23 98.5 kg (217 lb 3.2 oz)   06/15/23 98.2 kg (216 lb 6.4 oz)   23 95.7 kg (211 lb)                  Patient Active Problem List   Diagnosis     Dyslipidemia     Fatty liver     Osteoarthrosis, unspecified whether generalized or localized, lower leg     PCOS (polycystic ovarian syndrome)     Primary osteoarthritis of left knee     Vitamin B12 deficiency     Hypovitaminosis D     Bariatric surgery status     Malnutrition following gastrointestinal surgery     Abdominal wall seroma, initial encounter     Prediabetes     Telogen effluvium     Invasive ductal carcinoma of breast, female, left (H)     Obesity, Class I, BMI 30.0-34.9 (see actual BMI)     Past Surgical History:   Procedure Laterality Date      SECTION  10/2016     DISCECTOMY LUMBAR ANTERIOR  2011    Diskectomy     LAPAROSCOPIC BYPASS GASTRIC N/A 2022     Procedure: laparoscopic gastric bypass, Laparoscopic reversal of Nissen fundoplication, laparoscopic lysis of adhesion, partial gastrectomy;  Surgeon: Panchito Zambrano MD;  Location: [facility] OR     NISSEN FUNDOPLICATION      starting of Up's     SALPINGECTOMY Left 2019    Fallopian tube (including fimbria) with extensive intraluminal hemorrhage,     SALPINGECTOMY Right 2019    tubal pregnancy from IVF     TOTAL KNEE ARTHROPLASTY Right 2020    Knee replacement       Social History     Tobacco Use     Smoking status: Never     Passive exposure: Never     Smokeless tobacco: Never   Substance Use Topics     Alcohol use: Not Currently     Comment: Rare     Family History   Problem Relation Age of Onset     Hypertension Mother      Diabetes Father      Cerebrovascular Disease Father      Breast Cancer No family hx of      Ovarian Cancer No family hx of          Current Outpatient Medications   Medication Sig Dispense Refill     BIOTIN PO Take 1 capsule by mouth daily       CALCIUM CITRATE PO Take 500 mg by mouth daily B-L-D       multivitamin w/minerals (THERA-VIT-M) tablet Take 1 tablet by mouth At Bedtime Abel Adv Chew         Breast Cancer Screenin/8/2022     9:52 AM   Breast CA Risk Assessment (FHS-7)   Do you have a family history of breast, colon, or ovarian cancer? No / Unknown         Mammogram Screening - Alternate mammogram schedule due to breast cancer history  Pertinent mammograms are reviewed under the imaging tab.    History of abnormal Pap smear: NO - age 30-65 PAP every 5 years with negative HPV co-testing recommended     Reviewed and updated as needed this visit by clinical staff   Tobacco  Allergies  Meds  Problems  Med Hx  Surg Hx  Fam Hx          Reviewed and updated as needed this visit by Provider   Tobacco  Allergies  Meds  Problems  Med Hx  Surg Hx  Fam Hx             Review of Systems   Constitutional: Negative for chills and fever.   HENT: Negative for  "congestion, ear pain, hearing loss and sore throat.    Eyes: Negative for pain and visual disturbance.   Respiratory: Negative for cough and shortness of breath.    Cardiovascular: Negative for chest pain, palpitations and peripheral edema.   Gastrointestinal: Negative for abdominal pain, constipation, diarrhea, heartburn, hematochezia and nausea.   Breasts:  Positive for breast mass. Negative for tenderness and discharge.   Genitourinary: Negative for dysuria, frequency, genital sores, hematuria, pelvic pain, urgency, vaginal bleeding and vaginal discharge.   Musculoskeletal: Negative for arthralgias, joint swelling and myalgias.   Skin: Negative for rash.   Neurological: Negative for dizziness, weakness, headaches and paresthesias.   Psychiatric/Behavioral: Negative for mood changes. The patient is not nervous/anxious.           OBJECTIVE:   /70   Pulse 86   Temp 97.9  F (36.6  C) (Tympanic)   Resp 16   Ht 1.727 m (5' 8\")   Wt 98.5 kg (217 lb 3.2 oz)   LMP 06/03/2023 (Exact Date)   SpO2 96%   BMI 33.03 kg/m    Physical Exam  GENERAL: healthy, alert and no distress  EYES: Eyes grossly normal to inspection, PERRL and conjunctivae and sclerae normal  HENT: ear canals and TM's normal, nose and mouth without ulcers or lesions  NECK: no adenopathy, no asymmetry, masses, or scars and thyroid normal to palpation  RESP: lungs clear to auscultation - no rales, rhonchi or wheezes  CV: regular rate and rhythm, normal S1 S2, no S3 or S4, no murmur, click or rub, no peripheral edema and peripheral pulses strong  ABDOMEN: soft, nontender, no hepatosplenomegaly, no masses and bowel sounds normal   (female): normal female external genitalia, normal urethral meatus, vaginal mucosa pink, moist, well rugated, and normal cervix/adnexa/uterus without masses or discharge  MS: no gross musculoskeletal defects noted, no edema  SKIN: no suspicious lesions or rashes  NEURO: Normal strength and tone, mentation intact and " "speech normal  PSYCH: mentation appears normal, affect normal/bright    Diagnostic Test Results:  No results found for any visits on 06/22/23.    ASSESSMENT/PLAN:   Luda was seen today for physical.    Diagnoses and all orders for this visit:    Routine general medical examination at a health care facility    Invasive ductal carcinoma of breast, female, left (H)  Lumpectomy planned next week.  Follow-up with surgery/oncology recommendations as directed.    Mixed hyperlipidemia  Managed with diet.  Labs for surveillance  -     Lipid panel reflex to direct LDL Non-fasting    Obesity, Class I, BMI 30.0-34.9 (see actual BMI)  Bariatric surgery status  Malnutrition following gastrointestinal surgery  Vitamin B12 deficiency  Hypovitaminosis D  Patient doing well.  Applauded weight loss efforts.  Will increase B12 per bariatric surgery recommendation after surgical procedure mentioned above and recheck levels in 2 to 3 months to ensure absorbing oral formulation.  -     Vitamin B12; Future  -     PRIMARY CARE FOLLOW-UP SCHEDULING; Future    Fatty liver  Likely improving with weight loss.  Labs for surveillance  -     COMPREHENSIVE METABOLIC PANEL    Pre-op exam  Screening for preop exam  -     hCG Quantitative Pregnancy    Screening for malignant neoplasm of cervix  Routine screening  -     PAP screen with HPV - recommended age 30 - 65 years    Screening for thyroid disorder  Routine screening  -     TSH WITH FREE T4 REFLEX        Patient has been advised of split billing requirements and indicates understanding: Yes      COUNSELING:  Reviewed preventive health counseling, as reflected in patient instructions       Regular exercise       Healthy diet/nutrition      BMI:   Estimated body mass index is 33.03 kg/m  as calculated from the following:    Height as of this encounter: 1.727 m (5' 8\").    Weight as of this encounter: 98.5 kg (217 lb 3.2 oz).   Weight management plan: Patient is under the care of bariatric " surgery      She reports that she has never smoked. She has never been exposed to tobacco smoke. She has never used smokeless tobacco.      Mulu Montalvo PA-C  [facility] [place]

## 2023-06-22 NOTE — PROGRESS NOTES
SUBJECTIVE:   CC: Luda is an 43 year old who presents for preventive health visit.       6/22/2023     7:29 AM   Additional Questions   Roomed by Blanca GAYE   Accompanied by Self     Healthy Habits:     Getting at least 3 servings of Calcium per day:  Yes    Bi-annual eye exam:  Yes    Dental care twice a year:  Yes    Sleep apnea or symptoms of sleep apnea:  None    Diet:  Regular (no restrictions)    Frequency of exercise:  2-3 days/week    Duration of exercise:  Less than 15 minutes    Taking medications regularly:  Yes    Medication side effects:  None    PHQ-2 Total Score: 0    Additional concerns today:  No      Left breast Invasive ductal carcinoma  Found on routine screening mammogram done at the end of 04/2023.  She was having some intermittent discomfort on the right breast.  Her breast tissue is heterogeneously dense, and there was a focus of asymmetry in the right posterior lateral breast on one of the views, so she was called back for a diagnostic mammogram and ultrasound.  This was done on 05/10/2023.  On ultrasound, she had at the 11 o'clock position of the right breast, 10 cm from the nipple, a mass, which measured 1.6 x 1.1 cm.  There was nothing seen in the right axilla.  A biopsy was recommended.  She went on to have a biopsy done on 05/22/2023 which revealed invasive ductal carcinoma at the 11 o'clock position of the right breast in the upper outer quadrant.  Grade was grade II, and her tumor was strongly positive for estrogen and progesterone receptors, HER-2 receptor negative.  Scheduled for lumpectomy with Dr. Gore on 6/26.    PCOS (polycystic ovarian syndrome)  Has been on metformin in the past.  Not currently taking since her bariatric surgery.    Bariatric surgery status  6/16/2022 RYGBS and Takedown of Nissen fundoplication LEL.  Doing well.  Was found to have low B12 last month and asked to increase to double her dose daily.  Is waiting until after surgery to do this increase as she  is holding all vitamins/supplements.      Wt Readings from Last 5 Encounters:   23 98.5 kg (217 lb 3.2 oz)   06/15/23 98.2 kg (216 lb 6.4 oz)   23 95.7 kg (211 lb)   23 95.7 kg (211 lb)   22 103.9 kg (229 lb)           Have you ever done Advance Care Planning? (For example, a Health Directive, POLST, or a discussion with a medical provider or your loved ones about your wishes): No, advance care planning information given to patient to review.  Patient declined advance care planning discussion at this time.    Social History     Tobacco Use     Smoking status: Never     Passive exposure: Never     Smokeless tobacco: Never   Substance Use Topics     Alcohol use: Not Currently     Comment: Rare             2023     7:31 AM   Alcohol Use   Prescreen: >3 drinks/day or >7 drinks/week? No          No data to display              Reviewed orders with patient.  Reviewed health maintenance and updated orders accordingly - Yes  BP Readings from Last 3 Encounters:   23 120/70   06/15/23 130/58   23 127/80    Wt Readings from Last 3 Encounters:   23 98.5 kg (217 lb 3.2 oz)   06/15/23 98.2 kg (216 lb 6.4 oz)   23 95.7 kg (211 lb)                  Patient Active Problem List   Diagnosis     Dyslipidemia     Fatty liver     Osteoarthrosis, unspecified whether generalized or localized, lower leg     PCOS (polycystic ovarian syndrome)     Primary osteoarthritis of left knee     Vitamin B12 deficiency     Hypovitaminosis D     Bariatric surgery status     Malnutrition following gastrointestinal surgery     Abdominal wall seroma, initial encounter     Prediabetes     Telogen effluvium     Invasive ductal carcinoma of breast, female, left (H)     Obesity, Class I, BMI 30.0-34.9 (see actual BMI)     Past Surgical History:   Procedure Laterality Date      SECTION  10/2016     DISCECTOMY LUMBAR ANTERIOR  2011    Diskectomy     LAPAROSCOPIC BYPASS GASTRIC N/A 2022     Procedure: laparoscopic gastric bypass, Laparoscopic reversal of Nissen fundoplication, laparoscopic lysis of adhesion, partial gastrectomy;  Surgeon: Panchito Zambrano MD;  Location: [facility] OR     NISSEN FUNDOPLICATION      starting of Up's     SALPINGECTOMY Left 2019    Fallopian tube (including fimbria) with extensive intraluminal hemorrhage,     SALPINGECTOMY Right 2019    tubal pregnancy from IVF     TOTAL KNEE ARTHROPLASTY Right 2020    Knee replacement       Social History     Tobacco Use     Smoking status: Never     Passive exposure: Never     Smokeless tobacco: Never   Substance Use Topics     Alcohol use: Not Currently     Comment: Rare     Family History   Problem Relation Age of Onset     Hypertension Mother      Diabetes Father      Cerebrovascular Disease Father      Breast Cancer No family hx of      Ovarian Cancer No family hx of          Current Outpatient Medications   Medication Sig Dispense Refill     BIOTIN PO Take 1 capsule by mouth daily       CALCIUM CITRATE PO Take 500 mg by mouth daily B-L-D       multivitamin w/minerals (THERA-VIT-M) tablet Take 1 tablet by mouth At Bedtime Abel Adv Chew         Breast Cancer Screenin/8/2022     9:52 AM   Breast CA Risk Assessment (FHS-7)   Do you have a family history of breast, colon, or ovarian cancer? No / Unknown         Mammogram Screening - Alternate mammogram schedule due to breast cancer history  Pertinent mammograms are reviewed under the imaging tab.    History of abnormal Pap smear: NO - age 30-65 PAP every 5 years with negative HPV co-testing recommended     Reviewed and updated as needed this visit by clinical staff   Tobacco  Allergies  Meds  Problems  Med Hx  Surg Hx  Fam Hx          Reviewed and updated as needed this visit by Provider   Tobacco  Allergies  Meds  Problems  Med Hx  Surg Hx  Fam Hx             Review of Systems   Constitutional: Negative for chills and fever.   HENT: Negative for  "congestion, ear pain, hearing loss and sore throat.    Eyes: Negative for pain and visual disturbance.   Respiratory: Negative for cough and shortness of breath.    Cardiovascular: Negative for chest pain, palpitations and peripheral edema.   Gastrointestinal: Negative for abdominal pain, constipation, diarrhea, heartburn, hematochezia and nausea.   Breasts:  Positive for breast mass. Negative for tenderness and discharge.   Genitourinary: Negative for dysuria, frequency, genital sores, hematuria, pelvic pain, urgency, vaginal bleeding and vaginal discharge.   Musculoskeletal: Negative for arthralgias, joint swelling and myalgias.   Skin: Negative for rash.   Neurological: Negative for dizziness, weakness, headaches and paresthesias.   Psychiatric/Behavioral: Negative for mood changes. The patient is not nervous/anxious.           OBJECTIVE:   /70   Pulse 86   Temp 97.9  F (36.6  C) (Tympanic)   Resp 16   Ht 1.727 m (5' 8\")   Wt 98.5 kg (217 lb 3.2 oz)   LMP 06/03/2023 (Exact Date)   SpO2 96%   BMI 33.03 kg/m    Physical Exam  GENERAL: healthy, alert and no distress  EYES: Eyes grossly normal to inspection, PERRL and conjunctivae and sclerae normal  HENT: ear canals and TM's normal, nose and mouth without ulcers or lesions  NECK: no adenopathy, no asymmetry, masses, or scars and thyroid normal to palpation  RESP: lungs clear to auscultation - no rales, rhonchi or wheezes  CV: regular rate and rhythm, normal S1 S2, no S3 or S4, no murmur, click or rub, no peripheral edema and peripheral pulses strong  ABDOMEN: soft, nontender, no hepatosplenomegaly, no masses and bowel sounds normal   (female): normal female external genitalia, normal urethral meatus, vaginal mucosa pink, moist, well rugated, and normal cervix/adnexa/uterus without masses or discharge  MS: no gross musculoskeletal defects noted, no edema  SKIN: no suspicious lesions or rashes  NEURO: Normal strength and tone, mentation intact and " "speech normal  PSYCH: mentation appears normal, affect normal/bright    Diagnostic Test Results:  No results found for any visits on 06/22/23.    ASSESSMENT/PLAN:   Luda was seen today for physical.    Diagnoses and all orders for this visit:    Routine general medical examination at a health care facility    Invasive ductal carcinoma of breast, female, left (H)  Lumpectomy planned next week.  Follow-up with surgery/oncology recommendations as directed.    Mixed hyperlipidemia  Managed with diet.  Labs for surveillance  -     Lipid panel reflex to direct LDL Non-fasting    Obesity, Class I, BMI 30.0-34.9 (see actual BMI)  Bariatric surgery status  Malnutrition following gastrointestinal surgery  Vitamin B12 deficiency  Hypovitaminosis D  Patient doing well.  Applauded weight loss efforts.  Will increase B12 per bariatric surgery recommendation after surgical procedure mentioned above and recheck levels in 2 to 3 months to ensure absorbing oral formulation.  -     Vitamin B12; Future  -     PRIMARY CARE FOLLOW-UP SCHEDULING; Future    Fatty liver  Likely improving with weight loss.  Labs for surveillance  -     COMPREHENSIVE METABOLIC PANEL    Pre-op exam  Screening for preop exam  -     hCG Quantitative Pregnancy    Screening for malignant neoplasm of cervix  Routine screening  -     PAP screen with HPV - recommended age 30 - 65 years    Screening for thyroid disorder  Routine screening  -     TSH WITH FREE T4 REFLEX        Patient has been advised of split billing requirements and indicates understanding: Yes      COUNSELING:  Reviewed preventive health counseling, as reflected in patient instructions       Regular exercise       Healthy diet/nutrition      BMI:   Estimated body mass index is 33.03 kg/m  as calculated from the following:    Height as of this encounter: 1.727 m (5' 8\").    Weight as of this encounter: 98.5 kg (217 lb 3.2 oz).   Weight management plan: Patient is under the care of bariatric " surgery      She reports that she has never smoked. She has never been exposed to tobacco smoke. She has never used smokeless tobacco.      Mulu Montalvo PA-C  [facility] [place]

## 2023-06-22 NOTE — TELEPHONE ENCOUNTER
Patient calling in with questions regarding [facility] paper work for radiation.  She travels for work and is trying to get everything organized before her surgery next week.  We talked about the steps needed before radiation would start.  She understands and will wait until after surgery and her next treatments have been decided.

## 2023-06-22 NOTE — RESULT ENCOUNTER NOTE
Luda  I have reviewed your recent test results:    -Cholesterol levels (LDL,HDL, Triglycerides) are markedly improved from 1 year ago! keep up the good work!   ADVISE: rechecking in 1 year.   -Liver and gallbladder tests are normal (ALT,AST, Alk phos, bilirubin), kidney function is normal (Cr, GFR), sodium is normal, potassium is normal, calcium is normal, glucose is normal.  -TSH (thyroid stimulating hormone) level is normal which indicates normal thyroid function.    For additional lab test information, [email or web address] is an excellent reference.     If you have any questions please do not hesitate to contact our office via phone ([phone]) or MyChart.    Healthy regards,     Mulu Montalvo MBA, MS, PA-C  [facility]

## 2023-06-26 ENCOUNTER — HOSPITAL ENCOUNTER (OUTPATIENT)
Dept: NUCLEAR MEDICINE | Facility: CLINIC | Age: 43
Setting detail: NUCLEAR MEDICINE
Discharge: HOME | End: 2023-06-26
Attending: SURGERY
Payer: COMMERCIAL

## 2023-06-26 ENCOUNTER — ANESTHESIA (OUTPATIENT)
Dept: SURGERY | Facility: CLINIC | Age: 43
End: 2023-06-26
Payer: COMMERCIAL

## 2023-06-26 ENCOUNTER — HOSPITAL ENCOUNTER (OUTPATIENT)
Facility: CLINIC | Age: 43
Discharge: HOME | End: 2023-06-26
Attending: SURGERY | Admitting: SURGERY
Payer: COMMERCIAL

## 2023-06-26 ENCOUNTER — ANESTHESIA EVENT (OUTPATIENT)
Dept: SURGERY | Facility: CLINIC | Age: 43
End: 2023-06-26
Payer: COMMERCIAL

## 2023-06-26 ENCOUNTER — APPOINTMENT (OUTPATIENT)
Dept: SURGERY | Facility: PHYSICIAN GROUP | Age: 43
End: 2023-06-26
Payer: COMMERCIAL

## 2023-06-26 ENCOUNTER — HOSPITAL ENCOUNTER (OUTPATIENT)
Dept: MAMMOGRAPHY | Facility: CLINIC | Age: 43
Discharge: HOME | End: 2023-06-26
Attending: SURGERY | Admitting: SURGERY
Payer: COMMERCIAL

## 2023-06-26 VITALS
SYSTOLIC BLOOD PRESSURE: 140 MMHG | TEMPERATURE: 99.3 F | WEIGHT: 219.9 LBS | HEART RATE: 69 BPM | RESPIRATION RATE: 14 BRPM | OXYGEN SATURATION: 100 % | HEIGHT: 68 IN | DIASTOLIC BLOOD PRESSURE: 75 MMHG | BODY MASS INDEX: 33.33 KG/M2

## 2023-06-26 DIAGNOSIS — C50.912 INVASIVE DUCTAL CARCINOMA OF BREAST, STAGE 1, LEFT (H): ICD-10-CM

## 2023-06-26 DIAGNOSIS — C50.912 INVASIVE DUCTAL CARCINOMA OF BREAST, FEMALE, LEFT (H): Primary | ICD-10-CM

## 2023-06-26 LAB
BKR LAB AP GYN ADEQUACY: NORMAL
BKR LAB AP GYN INTERPRETATION: NORMAL
BKR LAB AP HPV REFLEX: NORMAL
BKR LAB AP PREVIOUS ABNORMAL: NORMAL
PATH REPORT.COMMENTS IMP SPEC: NORMAL
PATH REPORT.COMMENTS IMP SPEC: NORMAL
PATH REPORT.RELEVANT HX SPEC: NORMAL

## 2023-06-26 PROCEDURE — 250N000011 HC RX IP 250 OP 636: Performed by: NURSE ANESTHETIST, CERTIFIED REGISTERED

## 2023-06-26 PROCEDURE — 38792 RA TRACER ID OF SENTINL NODE: CPT | Mod: RT

## 2023-06-26 PROCEDURE — 250N000011 HC RX IP 250 OP 636: Performed by: ANESTHESIOLOGY

## 2023-06-26 PROCEDURE — A9520 TC99 TILMANOCEPT DIAG 0.5MCI: HCPCS | Performed by: SURGERY

## 2023-06-26 PROCEDURE — 19125 EXCISION BREAST LESION: CPT | Mod: LT | Performed by: SURGERY

## 2023-06-26 PROCEDURE — 88305 TISSUE EXAM BY PATHOLOGIST: CPT | Mod: TC | Performed by: SURGERY

## 2023-06-26 PROCEDURE — 38900 IO MAP OF SENT LYMPH NODE: CPT | Performed by: SURGERY

## 2023-06-26 PROCEDURE — 99207 PR NO CHARGE LOS: CPT | Mod: RT | Performed by: PHYSICIAN ASSISTANT

## 2023-06-26 PROCEDURE — 370N000017 HC ANESTHESIA TECHNICAL FEE, PER MIN: Performed by: SURGERY

## 2023-06-26 PROCEDURE — 999N000104 MA BREAST SPECIMEN LEFT OR: Mod: TC

## 2023-06-26 PROCEDURE — 710N000009 HC RECOVERY PHASE 1, LEVEL 1, PER MIN: Performed by: SURGERY

## 2023-06-26 PROCEDURE — 999N000141 HC STATISTIC PRE-PROCEDURE NURSING ASSESSMENT: Performed by: SURGERY

## 2023-06-26 PROCEDURE — 343N000001 HC RX 343: Performed by: SURGERY

## 2023-06-26 PROCEDURE — 258N000003 HC RX IP 258 OP 636: Performed by: ANESTHESIOLOGY

## 2023-06-26 PROCEDURE — 710N000012 HC RECOVERY PHASE 2, PER MINUTE: Performed by: SURGERY

## 2023-06-26 PROCEDURE — 250N000009 HC RX 250: Performed by: NURSE ANESTHETIST, CERTIFIED REGISTERED

## 2023-06-26 PROCEDURE — 272N000001 HC OR GENERAL SUPPLY STERILE: Performed by: SURGERY

## 2023-06-26 PROCEDURE — 360N000082 HC SURGERY LEVEL 2 W/ FLUORO, PER MIN: Performed by: SURGERY

## 2023-06-26 PROCEDURE — 19301 PARTIAL MASTECTOMY: CPT | Mod: RT | Performed by: SURGERY

## 2023-06-26 PROCEDURE — 250N000011 HC RX IP 250 OP 636: Mod: JZ | Performed by: SURGERY

## 2023-06-26 PROCEDURE — 250N000025 HC SEVOFLURANE, PER MIN: Performed by: SURGERY

## 2023-06-26 PROCEDURE — 38525 BIOPSY/REMOVAL LYMPH NODES: CPT | Mod: RT | Performed by: SURGERY

## 2023-06-26 RX ORDER — PROPOFOL 10 MG/ML
INJECTION, EMULSION INTRAVENOUS PRN
Status: DISCONTINUED | OUTPATIENT
Start: 2023-06-26 | End: 2023-06-26

## 2023-06-26 RX ORDER — FENTANYL CITRATE 50 UG/ML
50 INJECTION, SOLUTION INTRAMUSCULAR; INTRAVENOUS EVERY 5 MIN PRN
Status: DISCONTINUED | OUTPATIENT
Start: 2023-06-26 | End: 2023-06-26 | Stop reason: HOSPADM

## 2023-06-26 RX ORDER — CLINDAMYCIN PHOSPHATE 900 MG/50ML
900 INJECTION, SOLUTION INTRAVENOUS
Status: COMPLETED | OUTPATIENT
Start: 2023-06-26 | End: 2023-06-26

## 2023-06-26 RX ORDER — OXYCODONE HYDROCHLORIDE 5 MG/1
5 TABLET ORAL
Status: DISCONTINUED | OUTPATIENT
Start: 2023-06-26 | End: 2023-06-26 | Stop reason: HOSPADM

## 2023-06-26 RX ORDER — ONDANSETRON 2 MG/ML
4 INJECTION INTRAMUSCULAR; INTRAVENOUS EVERY 30 MIN PRN
Status: DISCONTINUED | OUTPATIENT
Start: 2023-06-26 | End: 2023-06-26 | Stop reason: HOSPADM

## 2023-06-26 RX ORDER — OXYCODONE HYDROCHLORIDE 5 MG/1
5 TABLET ORAL
Status: DISCONTINUED | OUTPATIENT
Start: 2023-06-26 | End: 2023-06-26 | Stop reason: ALTCHOICE

## 2023-06-26 RX ORDER — ONDANSETRON 2 MG/ML
INJECTION INTRAMUSCULAR; INTRAVENOUS PRN
Status: DISCONTINUED | OUTPATIENT
Start: 2023-06-26 | End: 2023-06-26

## 2023-06-26 RX ORDER — FENTANYL CITRATE 50 UG/ML
25 INJECTION, SOLUTION INTRAMUSCULAR; INTRAVENOUS EVERY 5 MIN PRN
Status: DISCONTINUED | OUTPATIENT
Start: 2023-06-26 | End: 2023-06-26 | Stop reason: HOSPADM

## 2023-06-26 RX ORDER — LIDOCAINE HYDROCHLORIDE 20 MG/ML
INJECTION, SOLUTION INFILTRATION; PERINEURAL PRN
Status: DISCONTINUED | OUTPATIENT
Start: 2023-06-26 | End: 2023-06-26

## 2023-06-26 RX ORDER — FENTANYL CITRATE 50 UG/ML
INJECTION, SOLUTION INTRAMUSCULAR; INTRAVENOUS PRN
Status: DISCONTINUED | OUTPATIENT
Start: 2023-06-26 | End: 2023-06-26

## 2023-06-26 RX ORDER — SODIUM CHLORIDE, SODIUM LACTATE, POTASSIUM CHLORIDE, CALCIUM CHLORIDE 600; 310; 30; 20 MG/100ML; MG/100ML; MG/100ML; MG/100ML
INJECTION, SOLUTION INTRAVENOUS CONTINUOUS
Status: DISCONTINUED | OUTPATIENT
Start: 2023-06-26 | End: 2023-06-26 | Stop reason: HOSPADM

## 2023-06-26 RX ORDER — OXYCODONE HYDROCHLORIDE 5 MG/1
5-10 TABLET ORAL EVERY 4 HOURS PRN
Qty: 6 TABLET | Refills: 0 | Status: SHIPPED | OUTPATIENT
Start: 2023-06-26 | End: 2023-07-21

## 2023-06-26 RX ORDER — ONDANSETRON 4 MG/1
4 TABLET, ORALLY DISINTEGRATING ORAL EVERY 30 MIN PRN
Status: DISCONTINUED | OUTPATIENT
Start: 2023-06-26 | End: 2023-06-26 | Stop reason: HOSPADM

## 2023-06-26 RX ORDER — DEXAMETHASONE SODIUM PHOSPHATE 4 MG/ML
INJECTION, SOLUTION INTRA-ARTICULAR; INTRALESIONAL; INTRAMUSCULAR; INTRAVENOUS; SOFT TISSUE PRN
Status: DISCONTINUED | OUTPATIENT
Start: 2023-06-26 | End: 2023-06-26

## 2023-06-26 RX ORDER — NALOXONE HYDROCHLORIDE 0.4 MG/ML
0.4 INJECTION, SOLUTION INTRAMUSCULAR; INTRAVENOUS; SUBCUTANEOUS
Status: DISCONTINUED | OUTPATIENT
Start: 2023-06-26 | End: 2023-06-26 | Stop reason: HOSPADM

## 2023-06-26 RX ORDER — ACETAMINOPHEN 325 MG/1
650 TABLET ORAL
Status: DISCONTINUED | OUTPATIENT
Start: 2023-06-26 | End: 2023-06-26 | Stop reason: HOSPADM

## 2023-06-26 RX ORDER — NALOXONE HYDROCHLORIDE 0.4 MG/ML
0.2 INJECTION, SOLUTION INTRAMUSCULAR; INTRAVENOUS; SUBCUTANEOUS
Status: DISCONTINUED | OUTPATIENT
Start: 2023-06-26 | End: 2023-06-26 | Stop reason: HOSPADM

## 2023-06-26 RX ORDER — PROPOFOL 10 MG/ML
INJECTION, EMULSION INTRAVENOUS CONTINUOUS PRN
Status: DISCONTINUED | OUTPATIENT
Start: 2023-06-26 | End: 2023-06-26

## 2023-06-26 RX ORDER — OXYCODONE HYDROCHLORIDE 10 MG/1
10 TABLET ORAL
Status: DISCONTINUED | OUTPATIENT
Start: 2023-06-26 | End: 2023-06-26 | Stop reason: ALTCHOICE

## 2023-06-26 RX ORDER — LIDOCAINE 40 MG/G
CREAM TOPICAL
Status: DISCONTINUED | OUTPATIENT
Start: 2023-06-26 | End: 2023-06-26 | Stop reason: HOSPADM

## 2023-06-26 RX ORDER — CLINDAMYCIN PHOSPHATE 900 MG/50ML
900 INJECTION, SOLUTION INTRAVENOUS SEE ADMIN INSTRUCTIONS
Status: DISCONTINUED | OUTPATIENT
Start: 2023-06-26 | End: 2023-06-26 | Stop reason: HOSPADM

## 2023-06-26 RX ORDER — BUPIVACAINE HYDROCHLORIDE 2.5 MG/ML
INJECTION, SOLUTION EPIDURAL; INFILTRATION; INTRACAUDAL PRN
Status: DISCONTINUED | OUTPATIENT
Start: 2023-06-26 | End: 2023-06-26 | Stop reason: HOSPADM

## 2023-06-26 RX ADMIN — FENTANYL CITRATE 100 MCG: 50 INJECTION, SOLUTION INTRAMUSCULAR; INTRAVENOUS at 07:37

## 2023-06-26 RX ADMIN — DEXAMETHASONE SODIUM PHOSPHATE 8 MG: 4 INJECTION, SOLUTION INTRA-ARTICULAR; INTRALESIONAL; INTRAMUSCULAR; INTRAVENOUS; SOFT TISSUE at 07:47

## 2023-06-26 RX ADMIN — ROCURONIUM BROMIDE 5 MG: 50 INJECTION, SOLUTION INTRAVENOUS at 07:37

## 2023-06-26 RX ADMIN — HYDROMORPHONE HYDROCHLORIDE 0.5 MG: 1 INJECTION, SOLUTION INTRAMUSCULAR; INTRAVENOUS; SUBCUTANEOUS at 07:59

## 2023-06-26 RX ADMIN — SODIUM CHLORIDE, POTASSIUM CHLORIDE, SODIUM LACTATE AND CALCIUM CHLORIDE: 600; 310; 30; 20 INJECTION, SOLUTION INTRAVENOUS at 07:32

## 2023-06-26 RX ADMIN — HYDROMORPHONE HYDROCHLORIDE 0.5 MG: 1 INJECTION, SOLUTION INTRAMUSCULAR; INTRAVENOUS; SUBCUTANEOUS at 08:28

## 2023-06-26 RX ADMIN — TILMANOCEPT 0.55 MILLICURIE: KIT at 07:05

## 2023-06-26 RX ADMIN — FENTANYL CITRATE 25 MCG: 50 INJECTION, SOLUTION INTRAMUSCULAR; INTRAVENOUS at 10:07

## 2023-06-26 RX ADMIN — PROPOFOL 75 MCG/KG/MIN: 10 INJECTION, EMULSION INTRAVENOUS at 07:43

## 2023-06-26 RX ADMIN — ONDANSETRON 4 MG: 2 INJECTION INTRAMUSCULAR; INTRAVENOUS at 09:15

## 2023-06-26 RX ADMIN — MIDAZOLAM 2 MG: 1 INJECTION INTRAMUSCULAR; INTRAVENOUS at 07:32

## 2023-06-26 RX ADMIN — CLINDAMYCIN PHOSPHATE 900 MG: 900 INJECTION, SOLUTION INTRAVENOUS at 06:53

## 2023-06-26 RX ADMIN — PROPOFOL 200 MG: 10 INJECTION, EMULSION INTRAVENOUS at 07:37

## 2023-06-26 RX ADMIN — FENTANYL CITRATE 25 MCG: 50 INJECTION, SOLUTION INTRAMUSCULAR; INTRAVENOUS at 09:57

## 2023-06-26 RX ADMIN — LIDOCAINE HYDROCHLORIDE 50 MG: 20 INJECTION, SOLUTION INFILTRATION; PERINEURAL at 07:37

## 2023-06-26 RX ADMIN — Medication 100 MG: at 07:37

## 2023-06-26 ASSESSMENT — ACTIVITIES OF DAILY LIVING (ADL)
ADLS_ACUITY_SCORE: 35

## 2023-06-26 ASSESSMENT — ENCOUNTER SYMPTOMS: DYSRHYTHMIAS: 0

## 2023-06-26 NOTE — ANESTHESIA CARE TRANSFER NOTE
Patient: Luda Rai    Procedure: Procedure(s):  LEFT BREAST LOCALIZED EXCISIONAL BIOPSY  RIGHT BREAST LUMPECTOMY , RIGHT SENTINEL NODE BIOPSY       Diagnosis: Invasive ductal carcinoma of breast, stage 1, left (H) [C50.912]  Diagnosis Additional Information: No value filed.    Anesthesia Type:   General     Note:    Oropharynx: oropharynx clear of all foreign objects and spontaneously breathing  Level of Consciousness: awake  Oxygen Supplementation: face mask  Level of Supplemental Oxygen (L/min / FiO2): 8  Independent Airway: airway patency satisfactory and stable  Dentition: dentition unchanged  Vital Signs Stable: post-procedure vital signs reviewed and stable  Report to RN Given: handoff report given  Patient transferred to: PACU  Comments: Pt to recovery.  Spontaneous respirations and exchanging well.  Pt making needs known.  Report given to RN.    Handoff Report: Identifed the Patient, Identified the Reponsible Provider, Reviewed the pertinent medical history, Discussed the surgical course, Reviewed Intra-OP anesthesia mangement and issues during anesthesia, Set expectations for post-procedure period and Allowed opportunity for questions and acknowledgement of understanding      Vitals:  Vitals Value Taken Time   /69 06/26/23 0946   Temp     Pulse 80 06/26/23 0948   Resp 13 06/26/23 0948   SpO2 100 % 06/26/23 0948   Vitals shown include unvalidated device data.    Electronically Signed By: MAXIM Diamond CRNA  June 26, 2023  9:49 AM

## 2023-06-26 NOTE — PROGRESS NOTES
Injected 550 uCi (microcuries) [id number]-LYMPHOSEEK in the RIGHT Breast at the 10 o'clock position above the areola, intradermally, for [place] Node Biopsy. Injection Completed @ 07:05am. LEORA

## 2023-06-26 NOTE — ANESTHESIA PREPROCEDURE EVALUATION
Anesthesia Pre-Procedure Evaluation    Patient: Luda Rai   MRN: [id number] : 1980        Procedure : Procedure(s):  LEFT BREAST LOCALIZED EXCISIONAL BIOPSY  RIGHT BREAST LUMPECTOMY , RIGHT SENTINEL NODE BIOPSY          Past Medical History:   Diagnosis Date     Bone and joint disord back, pelvis, leg complicat preg, childb, puerp     Knee replacement      Endocrine problem     Pcos     Esophageal reflux 2005    Had Nissen Fundiplication      Invasive ductal carcinoma of breast, female, left (H) 2023     Osteoporosis     Knee      Past Surgical History:   Procedure Laterality Date      SECTION  10/2016     DISCECTOMY LUMBAR ANTERIOR      Diskectomy     LAPAROSCOPIC BYPASS GASTRIC N/A 2022    Procedure: laparoscopic gastric bypass, Laparoscopic reversal of Nissen fundoplication, laparoscopic lysis of adhesion, partial gastrectomy;  Surgeon: Panchito Zambrano MD;  Location: [facility]     NISSEN FUNDOPLICATION      starting of Up's     SALPINGECTOMY Left 2019    Fallopian tube (including fimbria) with extensive intraluminal hemorrhage,     SALPINGECTOMY Right 2019    tubal pregnancy from IVF     TOTAL KNEE ARTHROPLASTY Right     Knee replacement      Allergies   Allergen Reactions     Asa [Aspirin]      Gastric bypass     Morphine And Related GI Disturbance     Nsaids Other (See Comments)     Avoid due to bariatric surgery 2022     Rocephin [Ceftriaxone] Hives      Social History     Tobacco Use     Smoking status: Never     Passive exposure: Never     Smokeless tobacco: Never   Substance Use Topics     Alcohol use: Not Currently     Comment: Rare      Wt Readings from Last 1 Encounters:   23 99.7 kg (219 lb 14.4 oz)        Anesthesia Evaluation   Pt has had prior anesthetic. Type: General.    No history of anesthetic complications       ROS/MED HX  ENT/Pulmonary:    (-) sleep apnea   Neurologic:  - neg neurologic ROS     Cardiovascular:     (+)  Dyslipidemia ----- (-) hypertension, CAD, arrhythmias and pulmonary hypertension   METS/Exercise Tolerance:     Hematologic:    (-) anemia   Musculoskeletal:   (+) arthritis,     GI/Hepatic: Comment: S/P Nissen w/ subsequent takedown    (+) GERD, liver disease (Steatosis),     Renal/Genitourinary: Comment: PCOS      Endo:  - neg endo ROS   (+) Obesity,     Psychiatric/Substance Use:    (-) psychiatric history   Infectious Disease:       Malignancy:       Other:            Physical Exam    Airway        Mallampati: II   TM distance: > 3 FB   Neck ROM: full   Mouth opening: > 3 cm    Respiratory Devices and Support         Dental           Cardiovascular   cardiovascular exam normal          Pulmonary   pulmonary exam normal            Other findings: Lab Test        06/09/23     06/17/22     06/08/22     10/26/21                       1338          0708          1022          0734          WBC          6.2           --          6.0          5.9           HGB          12.2         12.4         13.5         13.5          MCV          88            --          86           87            PLT          171           --          156          171            Lab Test        06/22/23     06/17/22     06/17/22     06/16/22     06/08/22     10/26/21                       0827          0708          0540          1625          1022          0734          NA           140          135           --           --          137          136           POTASSIUM    4.2          3.8           --           --          4.0          4.0           CHLORIDE     103          104           --           --          106          105           CO2          27           28            --           --          26           25            BUN          10.1          --           --           --          11           10            CR           0.62          --           --          0.62         0.58         0.76          ANIONGAP     10           3              --           --          5            6             TAY          9.1           --           --           --          9.1          8.5           GLC          93            --          118*          --          97           122*            OUTSIDE LABS:  CBC:   Lab Results   Component Value Date    WBC 6.2 06/09/2023    WBC 6.0 06/08/2022    HGB 12.2 06/09/2023    HGB 12.4 06/17/2022    HCT 38.3 06/09/2023    HCT 40.6 06/08/2022     06/09/2023     06/08/2022     BMP:   Lab Results   Component Value Date     06/22/2023     06/17/2022    POTASSIUM 4.2 06/22/2023    POTASSIUM 3.8 06/17/2022    CHLORIDE 103 06/22/2023    CHLORIDE 104 06/17/2022    CO2 27 06/22/2023    CO2 28 06/17/2022    BUN 10.1 06/22/2023    BUN 11 06/08/2022    CR 0.62 06/22/2023    CR 0.62 06/16/2022    GLC 93 06/22/2023     (H) 06/17/2022     COAGS: No results found for: PTT, INR, FIBR  POC:   Lab Results   Component Value Date    HCG Negative 06/16/2022     HEPATIC:   Lab Results   Component Value Date    ALBUMIN 4.2 06/22/2023    PROTTOTAL 7.0 06/22/2023    ALT 8 06/22/2023    AST 16 06/22/2023    ALKPHOS 67 06/22/2023    BILITOTAL 0.8 06/22/2023     OTHER:   Lab Results   Component Value Date    A1C 5.3 11/04/2022    TAY 9.1 06/22/2023    LIPASE 227 05/18/2020    AMYLASE 44 05/18/2020    TSH 1.16 06/22/2023    CRP 13.0 (H) 05/18/2020    SED 17 05/18/2020       Anesthesia Plan    ASA Status:  2      Anesthesia Type: General.     - Airway: ETT   Induction: Propofol.   Maintenance: Balanced.        Consents    Anesthesia Plan(s) and associated risks, benefits, and realistic alternatives discussed. Questions answered and patient/representative(s) expressed understanding.     - Discussed: Risks, Benefits and Alternatives for the PROCEDURE were discussed     - Discussed with:  Patient      - Extended Intubation/Ventilatory Support Discussed: No.      - Patient is DNR/DNI Status: No    Use of blood products  discussed: No .     Postoperative Care    Pain management: IV analgesics, Oral pain medications.   PONV prophylaxis: Ondansetron (or other 5HT-3), Background Propofol Infusion     Comments:                Barrett Williamson MD

## 2023-06-26 NOTE — ANESTHESIA POSTPROCEDURE EVALUATION
Patient: Luda Rai    Procedure: Procedure(s):  LEFT BREAST LOCALIZED EXCISIONAL BIOPSY  RIGHT BREAST LUMPECTOMY , RIGHT SENTINEL NODE BIOPSY       Anesthesia Type:  General    Note:  Disposition: Outpatient   Postop Pain Control: Uneventful            Sign Out: Well controlled pain   PONV: No   Neuro/Psych: Uneventful            Sign Out: Acceptable/Baseline neuro status   Airway/Respiratory: Uneventful            Sign Out: Acceptable/Baseline resp. status   CV/Hemodynamics: Uneventful            Sign Out: Acceptable CV status; No obvious hypovolemia; No obvious fluid overload   Other NRE: NONE   DID A NON-ROUTINE EVENT OCCUR? No           Last vitals:  Vitals Value Taken Time   /66 06/26/23 1030   Temp 98.1  F (36.7  C) 06/26/23 1030   Pulse 60 06/26/23 1046   Resp 11 06/26/23 1046   SpO2 99 % 06/26/23 1046   Vitals shown include unvalidated device data.    Electronically Signed By: Barrett Williamson MD  June 26, 2023  5:16 PM

## 2023-06-26 NOTE — OP NOTE
General Surgery Operative Note      Pre-operative diagnosis: Invasive ductal carcinoma of breast, stage 1, right (H) [C50.912]   Post-operative diagnosis: Same   Procedure: Right breast lumpectomy  Right axillary sentinel lymph node biopsy  Left tag localized excisional biopsy   Surgeon: Farnaz Gore MD   Assistant(s): Esther Amaral PA-C  The Physician Assistant was medically necessary for their expertise in prepping, suctioning, suturing and retraction.   Anesthesia: general    Estimated blood loss:  Complications: 10 cc  none   Specimens: ID Type Source Tests Collected by Time Destination   1 : Right Breast Lumpectomy Tissue Breast, Right SURGICAL PATHOLOGY EXAM Farnaz Gore MD 6/26/2023  8:11 AM    2 : Right [place] Node #1 Tissue Lymph Node(s) [place], Breast, Right SURGICAL PATHOLOGY EXAM Farnaz Goer MD 6/26/2023  8:28 AM    3 : Right [place] Node #2 Tissue Lymph Node(s) [place], Breast, Right SURGICAL PATHOLOGY EXAM Farnaz Gore MD 6/26/2023  8:33 AM    4 : Right [place] Node #3 Tissue Lymph Node(s) [place], Breast, Right SURGICAL PATHOLOGY EXAM Farnaz Gore MD 6/26/2023  8:41 AM    5 : Right Breast Lumpectomy New Superior Margin Tissue Breast, Right SURGICAL PATHOLOGY EXAM Farnaz Gore MD 6/26/2023  8:43 AM    6 : Right Breast Lumpectomy New Lateral Margin Tissue Breast, Right SURGICAL PATHOLOGY EXAM Farnaz Gore MD 6/26/2023  8:44 AM    7 : Left Breast Excisional Biopsy Tissue Breast, Left SURGICAL PATHOLOGY EXAM Farnaz Gore MD 6/26/2023  9:07 AM           Indications for operation: This is a 43 year old female who presented with a right breast mass, with core biopsy showing invasive ductal carcinoma, ER/AL+, Her2-. 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  We discussed indications for surgery and she opted to proceed with right lumpectomy with sentinel node biopsy as well as excisional biopsy of the contralateral  papilloma.      Description of procedure:   After induction of anesthesia, the bilateral arms, breasts, and chest were prepped and draped in standard fashion.   We began on the right side. The lumpectomy was performed by creating an incision over the breast in the 11 o'clock position over the palpable cancer mass. Skin flaps were raised circumferentially. We dissected through the anterior breast tissue until we encountered the mass. The mass and surrounding tissue were excised. The specimen was inked for margins on the back table.   The lumpectomy specimen was then sent to pathology for gross evaluation.  SLN: Prior to prep, methylene blue dye was injected into the tissue below the areola. Using a hand-held gamma probe, a hot spot was identified in the axilla. An incision was created directly overlying this area. Dissection was carried through the clavipectoral fascia. The sentinel node was identified by its blue color and high radioactivity counts and excised from the superiomedial axilla using electrocautery. Additional nodes were found with the probed and all contained blue dye. A total of 3 Level I axillary sentinel nodes were removed and submitted to pathology for permanent evaluation. The highest node, ex vivo, had a radioactivity count of 1670. The other node(s) had counts of 387 and 110. The axilla then had radioactivity counts less than 10% of the sentinel node.   Pathology had then reviewed the gross specimen for margins which showed a 1.9cm tumor, within 2mm of the superior and lateral margisn. Additional superior and lateral margin taken and sent to pathology for permanent evaluation. The lateral margin was generous as there was a palpable firmness in this area, to ensure this area was removed in case of a second mass was present. 0.5% marcaine plain was injected along the incision and subcutaneous tissue. Clips were placed in the 4 quadrants of the lumpectomy cavity.  Then attention was turned to the  left breast. Using the tag placed an incision was created in the 10 o'clock position. We dissected through the breast tissue to the tag and excised this tissue. The specimen was marked with a short stitch superior and a long stitch lateral. The tag and clip were seen right at the posterior margin of the lump that was removed so additional posterior tissue was taken and marked with black ink as the new posterior margin  Hemostasis was achieved with cautery.  The incisions were anesthetized with 0.5% marcaine.   The right axillary clavipectoral fascia was re-approximated with a 3-0 vicryl stitch. The right breast tissue cavity was closed down to eliminate dead space with 3-0 vicryl interrupted sutures x 3. The left breast tissue cavity was closed down with 1 3-0 vicryl stitch.  Both skin incisions were closed with a 3-0 interrupted deep and 4-0 Vicryl subcuticular closure in a running fashion.   Dermabond was applied. At the end of the operation, all sponge, instrument, and needle counts were correct.    [place] Node Biopsy for Breast Cancer - Right  Operation performed with curative intent Yes   Tracer(s) used to identify sentinel nodes in the upfront surgery (non-neoadjuvant) setting Dye and Radioactive tracer   Tracer(s) used to identify sentinel nodes in the neoadjuvant setting N/A   All nodes (colored or non-colored) present at the end of a dye-filled lymphatic channel were removed Yes   All significantly radioactive nodes were removed Yes   All palpably suspicious nodes were removed N/A   Biopsy-proven positive nodes marked with clips prior to chemotherapy were identified and removed N/A         Farnaz Gore MD

## 2023-06-26 NOTE — INTERVAL H&P NOTE
"I have reviewed the surgical (or preoperative) H&P that is linked to this encounter, and examined the patient. There are no significant changes    Clinical Conditions Present on Arrival:  Clinically Significant Risk Factors Present on Admission                  # Obesity: Estimated body mass index is 33.44 kg/m  as calculated from the following:    Height as of this encounter: 1.727 m (5' 8\").    Weight as of this encounter: 99.7 kg (219 lb 14.4 oz).       "

## 2023-06-26 NOTE — DISCHARGE INSTRUCTIONS
HOME CARE FOLLOWING LUMPECTOMY  LUCY Patel, LYNETTE Starks C. Pratt, J. Shaheen    APPOINTMENT WITH YOUR SURGEON:  All patients are to schedule a post-op appointment with their general surgeon.  Once you are home, call the office to schedule the appointment for 2-3 weeks from the date of your surgery.  You may need to be seen sooner if you have a drain in place.      Appointments to see your general surgeon at any of our locations can be made by calling:  #[phone]    You will receive a phone call from your surgeon with these results.  You will be able to ask questions and receive more in-depth explanation at your post-op appointment.  This appointment will also be when you discuss further evaluation, treatment, and referral to oncology and/or radiation oncology if this is necessary.  Occasionally special testing must be done on the surgical specimen which may delay the posting of your final pathology report.  You may call for your final pathology report after 1p.m. five working days after surgery to check on its status.    SUPPORT:  Wear a bra for support and comfort for 3-7 days, day and night.    INCISIONAL CARE:  If you have a Dermabond dressing (a type of skin glue), you may shower immediately.  Sutures will absorb and do not need to be removed.  If present, leave Dermabond glue in place until it wears/flakes off.  You may expect a small amount of drainage from your incision.  A lump/ridge under the incision is normal and will gradually resolve.    BATHING:  You are allowed to bath (shallow water in a tub only) or shower after your surgery.  Mild soap is OK to use near these sites.  When bathing, do not allow the incision or drain site to become submersed in water as this may increase the risk of infection.    ACTIVITY:  Cautiously resume exercise and strenuous activities such as jogging, tennis, aerobics, etc. Also, be careful of stretching activities with operative side for two  weeks.    If you had a  sentinel node biopsy  at the time of your surgery:  You have no restrictions in addition to those noted above.    DIET:  No restrictions.  Increased fluid intake is recommended. While taking pain medications, increase dietary fiber or add a fiber supplementation like Metamucil or Citrucel to help prevent constipation - a possible side effect of pain medications.    DISCOMFORT:  Local anesthetic placed at surgery should provide relief for 4-8 hours.  Begin taking pain pills before discomfort is severe.  Take the pain medication with some food, when possible, to minimize side effects.  Intermittent use of ice packs may help during the first 48 hours.  Expect gradual improvement.  Recommend the following over the counter medications:  - Tylenol (acetaminophen) 500-1000mg every 6 hours (max 4,000mg per day)  - Take with food if GI upset occurs  - If additional pain medication is needed, take the narcotic that you were prescribed.      RETURN APPOINTMENT:  Schedule a follow-up visit 2-3 weeks post-op.  Office Phone:  [phone]     CONTACT US IF THE FOLLOWING DEVELOPS:   1. A fever that is above 101     2. If there is a large amount of drainage, bleeding, or swelling.   3. Severe pain that is not relieved by your prescription.   4. Drainage that is thick, cloudy, yellow, green or white.   5. Any other questions not answered by  Frequently Asked Questions  sheet.      FREQUENTLY ASKED QUESTIONS:    Q:  How should my incision look?    A:  Normally your incision will appear slightly swollen with light redness directly along the incision itself as it heals.  It may feel like a bump or ridge as the healing/scarring happens, and over time (3-4 months) this bump or ridge feeling should slowly go away.  In general, clear or pink watery drainage can be normal at first as your incision heals, but should decrease over time.    Q:  How do I know if my incision is infected?  A:  Look at your incision for  signs of infection, like redness around the incision spreading to surrounding skin, or drainage of cloudy or foul-smelling drainage.  If you feel warm, check your temperature to see if you are running a fever.    **If any of these things occur, please notify the nurse at our office.  We may need you to come into the office for an incision check.      Q:  How do I take care of my incision?  A:  If you have a dressing in place - Starting the day after surgery, replace the dressing 1-2 times a day until there is no further drainage from the incision.  At that time, a dressing is no longer needed.  Try to minimize tape on the skin if irritation is occurring at the tape sites.  If you have significant irritation from tape on the skin, please call the office to discuss other method of dressing your incision.    Small pieces of tape called  steri-strips  may be present directly overlying your incision; these may be removed 10 days after surgery unless otherwise specified by your surgeon.  If these tapes start to loosen at the ends, you may trim them back until they fall off or are removed.    A:  If you had  Dermabond  tissue glue used as a dressing (this causes your incision to look shiny with a clear covering over it) - This type of dressing wears off with time and does not require more dressings over the top unless it is draining around the glue as it wears off.  Do not apply ointments or lotions over the incisions until the glue has completely worn off.    Q:  There is a piece of tape or a sticky  lead  still on my skin.  Can I remove this?  A:  Sometimes the sticky  leads  used for monitoring during surgery or for evaluation in the emergency department are not all removed while you are in the hospital.  These sometimes have a tab or metal dot on them.  You can easily remove these on your own, like taking off a band-aid.  If there is a gel substance under the  lead , simply wipe/clean it off with a washcloth or paper  towel.      Q:  What can I do to minimize constipation (very hard stools, or lack of stools)?  A:  Stay well hydrated.  Increase your dietary fiber intake or take a fiber supplement -with plenty of water.  Walk around frequently.  You may consider an over-the-counter stool-softener.  Your Pharmacist can assist you with choosing one that is stocked at your pharmacy.  Constipation is also one of the most common side effects of pain medication.  If you are using pain medication, be pro-active and try to PREVENT problems with constipation by taking the steps above BEFORE constipation becomes a problem.    Q:  What do I do if I need more pain medications?  A:  Call the office to receive refills.  Be aware that certain pain meds cannot be called into a pharmacy and actually require a paper prescription.  A change may be made in your pain med as you progress thru your recovery period or if you have side effects to certain meds.    --Pain meds are NOT refilled after 5pm on weekdays, and NOT AT ALL on the weekends, so please look ahead to prevent problems.      Q:  Why am I having a hard time sleeping now that I am at home?  A:  Many medications you receive while you are in the hospital can impact your sleep for a number of days after your surgery/hospitalization.  Decreased level of activity and naps during the day may also make sleeping at night difficult.  Try to minimize day-time naps, and get up frequently during the day to walk around your home during your recovery time.  Sleep aides may be of some help, but are not recommended for long-term use.      Q:  I am having some back discomfort.  What should I do?  A:  This may be related to certain positioning that was required for your surgery, extended periods of time in bed, or other changes in your overall activity level.  You may try ice, heat, acetaminophen, or ibuprofen to treat this temporarily.  Note that many pain medications have acetaminophen in them and would  state this on the prescription bottle.  Be sure not to exceed the maximum of 4000mg per day of acetaminophen.     **If the pain you are having does not resolve, is severe, or is a flare of back pain you have had on other occasions prior to surgery, please contact your primary physician for further recommendations or for an appointment to be examined at their office.    Q:  Why am I having headaches?  A:  Headaches can be caused by many things:  caffeine withdrawal, use of pain meds, dehydration, high blood pressure, lack of sleep, over-activity/exhaustion, flare-up of usual migraine headaches.  If you feel this is related to muscle tension (a band-like feeling around the head, or a pressure at the low-back of the head) you may try ice or heat to this area.  You may need to drink more fluids (try electrolyte drink like Gatorade), rest, or take your usual migraine medications.   **If your headaches do not resolve, worsen, are accompanied by other symptoms, or if your blood pressure is high, please call your primary physician for recommendation and/or examination.    Q:  I am unable to urinate.  What do I do?  A:  A small percentage of people can have difficulty urinating initially after surgery.  This includes being able to urinate only a very small amount at a time and feeling discomfort or pressure in the very low abdomen.  This is called  urinary retention , and is actually an urgent situation.  Proceed to your nearest Emergency department for evaluation (not an Urgent Care Center).  Sometimes the bladder does not work correctly after certain medications you receive during surgery, or related to certain procedures.  You may need to have a catheter placed until your bladder recovers.  When planning to go to an Emergency department, it may help to call the ER to let them know you are coming in for this problem after a surgery.  This may help you get in quicker to be evaluated.  **If you have symptoms of a urinary  tract infection, please contact your primary physician for the proper evaluation and treatment.    If you have other questions, please call the office Monday thru Friday between 8am and 4:30pm to discuss with the nurse or physician assistant.  #[phone]    There is a surgeon ON CALL on weekday evenings and over the weekend in case of urgent need only, and may be contacted at the same number.    If you are having an emergency, call 911 or proceed to your nearest emergency department.      GENERAL ANESTHESIA OR SEDATION ADULT DISCHARGE INSTRUCTIONS   SPECIAL PRECAUTIONS FOR 24 HOURS AFTER SURGERY    IT IS NOT UNUSUAL TO FEEL LIGHT-HEADED OR FAINT, UP TO 24 HOURS AFTER SURGERY OR WHILE TAKING PAIN MEDICATION.  IF YOU HAVE THESE SYMPTOMS; SIT FOR A FEW MINUTES BEFORE STANDING AND HAVE SOMEONE ASSIST YOU WHEN YOU GET UP TO WALK OR USE THE BATHROOM.    YOU SHOULD REST AND RELAX FOR THE NEXT 24 HOURS AND YOU MUST MAKE ARRANGEMENTS TO HAVE SOMEONE STAY WITH YOU FOR AT LEAST 24 HOURS AFTER YOUR DISCHARGE.  AVOID HAZARDOUS AND STRENUOUS ACTIVITIES.  DO NOT MAKE IMPORTANT DECISIONS FOR 24 HOURS.    DO NOT DRIVE ANY VEHICLE OR OPERATE MECHANICAL EQUIPMENT FOR 24 HOURS FOLLOWING THE END OF YOUR SURGERY.  EVEN THOUGH YOU MAY FEEL NORMAL, YOUR REACTIONS MAY BE AFFECTED BY THE MEDICATION YOU HAVE RECEIVED.    DO NOT DRINK ALCOHOLIC BEVERAGES FOR 24 HOURS FOLLOWING YOUR SURGERY.    DRINK CLEAR LIQUIDS (APPLE JUICE, GINGER ALE, 7-UP, BROTH, ETC.).  PROGRESS TO YOUR REGULAR DIET AS YOU FEEL ABLE.    YOU MAY HAVE A DRY MOUTH, A SORE THROAT, MUSCLES ACHES OR TROUBLE SLEEPING.  THESE SHOULD GO AWAY AFTER 24 HOURS.    CALL YOUR DOCTOR FOR ANY OF THE FOLLOWING:  SIGNS OF INFECTION (FEVER, GROWING TENDERNESS AT THE SURGERY SITE, A LARGE AMOUNT OF DRAINAGE OR BLEEDING, SEVERE PAIN, FOUL-SMELLING DRAINAGE, REDNESS OR SWELLING.    IT HAS BEEN OVER 8 TO 10 HOURS SINCE SURGERY AND YOU ARE STILL NOT ABLE TO URINATE (PASS WATER).

## 2023-06-28 LAB
HUMAN PAPILLOMA VIRUS 16 DNA: NEGATIVE
HUMAN PAPILLOMA VIRUS 18 DNA: NEGATIVE
HUMAN PAPILLOMA VIRUS FINAL DIAGNOSIS: NORMAL
HUMAN PAPILLOMA VIRUS OTHER HR: NEGATIVE
PATH REPORT.COMMENTS IMP SPEC: ABNORMAL
PATH REPORT.COMMENTS IMP SPEC: ABNORMAL
PATH REPORT.COMMENTS IMP SPEC: YES
PATH REPORT.FINAL DX SPEC: ABNORMAL
PATH REPORT.GROSS SPEC: ABNORMAL
PATH REPORT.INTRAOP OBS SPEC DOC: ABNORMAL
PATH REPORT.MICROSCOPIC SPEC OTHER STN: ABNORMAL
PATH REPORT.RELEVANT HX SPEC: ABNORMAL
PATHOLOGY SYNOPTIC REPORT: ABNORMAL
PHOTO IMAGE: ABNORMAL

## 2023-06-28 PROCEDURE — 88307 TISSUE EXAM BY PATHOLOGIST: CPT | Mod: 26 | Performed by: PATHOLOGY

## 2023-06-28 PROCEDURE — 88329 PATH CONSLTJ DRG SURG: CPT | Performed by: PATHOLOGY

## 2023-06-28 PROCEDURE — 88305 TISSUE EXAM BY PATHOLOGIST: CPT | Mod: 26 | Performed by: PATHOLOGY

## 2023-06-29 PROBLEM — Z12.4 CERVICAL CANCER SCREENING: Status: ACTIVE | Noted: 2023-06-29

## 2023-06-30 ENCOUNTER — PATIENT OUTREACH (OUTPATIENT)
Dept: ONCOLOGY | Facility: CLINIC | Age: 43
End: 2023-06-30
Payer: COMMERCIAL

## 2023-06-30 ENCOUNTER — MEDICAL CORRESPONDENCE (OUTPATIENT)
Dept: HEALTH INFORMATION MANAGEMENT | Facility: CLINIC | Age: 43
End: 2023-06-30
Payer: COMMERCIAL

## 2023-06-30 NOTE — PROGRESS NOTES
Writer submitted oncotype order #[id number] electronically via [facility] website. Writer faxed patient face sheet and pathology report to [facility] at [phone] with receipt confirmed via [employer]. Will await results. Follow-up with Dr. Zeng scheduled 7/20/23.    Cintia Griggs, RN, BSN, OCN  Nurse Care Coordinator  [facility] -- [place]  P: [phone]     F: [phone]

## 2023-07-07 NOTE — PROGRESS NOTES
Writer called [facility] to complete prior authorization for Luda's oncotype testing. Case #[id number] opened with call reference #[id number]. Writer was instructed that outreach will occur within 48 hours for supporting documentation.    Cintia Griggs RN, BSN, OCN  Nurse Care Coordinator  [facility] -- [place]  P: [phone]     F: [phone]

## 2023-07-10 NOTE — PROGRESS NOTES
Writer received voicemail from Jessica with [facility] requesting supporting clinical documentation for Luda's oncotype order including physician order description, H&P, treatment plan, and applicable test results. These can be faxed Attn: Jessica RANGEL to [phone]. Follow-up questions can be directed to [phone].    Cintia Griggs, RN, BSN, OCN  Nurse Care Coordinator  [facility] -- [place]  P: [phone]     F: [phone]

## 2023-07-11 NOTE — PROGRESS NOTES
Writer faxed Dr. Zeng's most recent clinic note plus Luda's recent pathology report to Jessica at [facility] at [phone] with receipt confirmed via [employer]ax.    Cintia Griggs, RN, BSN, OCN  Nurse Care Coordinator  [facility] -- [place]  P: [phone]     F: [phone]

## 2023-07-13 ENCOUNTER — TRANSFERRED RECORDS (OUTPATIENT)
Dept: HEALTH INFORMATION MANAGEMENT | Facility: CLINIC | Age: 43
End: 2023-07-13
Payer: COMMERCIAL

## 2023-07-13 ENCOUNTER — PATIENT OUTREACH (OUTPATIENT)
Dept: ONCOLOGY | Facility: CLINIC | Age: 43
End: 2023-07-13
Payer: COMMERCIAL

## 2023-07-13 NOTE — PROGRESS NOTES
Writer received copy of Luda's oncotype report from [facility] indicating a recurrence score result of 21 with 9-year recurrence risk (with AI or tamoxifen alone) of 7% and group average absolute chemotherapy benefit of <1%. Copy sent for scanning. Follow-up currently scheduled with Dr. Zeng on 7/20/23 at 9:30am.    Cintia Griggs, RN, BSN, OCN  Nurse Care Coordinator  [facility] -- [place]  P: [phone]     F: [phone]

## 2023-07-15 NOTE — PROGRESS NOTES
"Luda is a 43 year old who is being evaluated via a billable video visit.      The patient has been notified of following:     \"This video visit will be conducted via a call between you and your physician/provider. We have found that certain health care needs can be provided without the need for an in-person physical exam.  This service lets us provide the care you need with a video conversation.  If a prescription is necessary we can send it directly to your pharmacy.  If lab work is needed we can place an order for that and you can then stop by our lab to have the test done at a later time.    Video visits are billed at different rates depending on your insurance coverage.  Please reach out to your insurance provider with any questions.    If during the course of the call the physician/provider feels a video visit is not appropriate, you will not be charged for this service.\"    Patient has given verbal consent for Video visit? Yes    How would you like to obtain your AVS? MyChart    If the video visit is dropped, the invitation should be resent by: Text to cell phone: [phone]    Will anyone else be joining your video visit? No    I    Video-Visit Details    Type of service:  Video Visit    Video Start Time: 9:22 AM    Video End Time:9:22 AM    Originating Location (pt. Location): Home    Distant Location (provider location):  [facility] [place]     Platform used for Video Visit: [facility] Bariatric Surgery Note    RE: Luda Rai  MR#: [id number]  : 1980  VISIT DATE: 2023    Dear Mulu Montalvo,    I had the pleasure of seeing your patient, Luda Rai, in my post-bariatric surgery assessment clinic.    Assessment & Plan   Problem List Items Addressed This Visit     Class 1 obesity due to excess calories with serious comorbidity and body mass index (BMI) of 34.0 to 34.9 in adult     Patient was congratulated on wt loss success thus far. Healthy " "habits to assist with further weight loss were discussed. Luda will start Naltrexone and metformin to help with cravings and constant food chatter.  Risks/ benefits and possible side effects were discussed and questions were answered. Written information was given.              Relevant Medications    metFORMIN (GLUCOPHAGE XR) 500 MG 24 hr tablet    naltrexone (DEPADE/REVIA) 50 MG tablet    PCOS (polycystic ovarian syndrome) - Primary     Restart Metformin         Relevant Medications    metFORMIN (GLUCOPHAGE XR) 500 MG 24 hr tablet    Vitamin B12 deficiency     Will start B12 injection and recheck labs in November.          Relevant Medications    tuberculin-allergy syringes 27G X 1/2\" 1 ML MISC    syringe/needle, disp, 25G X 1\" 3 ML MISC    cyanocobalamin (CYANOCOBALAMIN) 1000 MCG/ML injection    Other Relevant Orders    Vitamin B12    Bariatric surgery status     6/16/2022 RYGBS LEL         Malnutrition following gastrointestinal surgery     Continue taking recommended post-op vitamins.  Marc add iron and vit C daily.   Will recheck Iron and B12 in Nov.           Invasive ductal carcinoma of breast, female, left (H)   Other Visit Diagnoses     Postsurgical malabsorption        Relevant Medications    tuberculin-allergy syringes 27G X 1/2\" 1 ML MISC    syringe/needle, disp, 25G X 1\" 3 ML MISC    cyanocobalamin (CYANOCOBALAMIN) 1000 MCG/ML injection    Other Relevant Orders    Vitamin B12    Iron and Iron Binding Capacity    Ferritin           PATIENT INSTRUCTIONS:  Labs ordered. Call [phone] to schedule 1 week before appt in November.  Continue your bariatric vitamins   Add vitamin B12 injection subcutaneous once a month  See dietician now.  Will be good to make sure you are getting enough protein in during chemo.   Start Naltrexone.   Start Metformin    FOLLOW-UP:  Call [phone] to schedule next visit November.    40 minutes spent on the date of the encounter doing chart review, history and exam, result " review, counseling, developing plan of care, documentation, and further activities as noted        CHIEF COMPLAINT: Post-bariatric surgery follow-up    HISTORY OF PRESENT ILLNESS:      7/14/2023    10:50 AM   Questions Regarding Prior Weight Loss Surgery Reviewed With Patient   I had the following weight loss procedure Drea-en-y Gastric Bypass   What year was your surgery? 2022   How has your weight changed since your last visit? I have stayed about the same   Do you currently have any of the following None of the above   Do you have any concerns today? Pcos i still have issues with food craving     Is obsessed with foods.  Almost addicted to food items.  Crazy cravings.  Had this prior to surgery too.  Feels since she has PCOS she still has cravings despite ahving bariatric surgery.  Metformin seemed to help this in the past.      Recently diagnosed with breast cancer.  Had lumpectomy 2 weeks ago.  Will start Chemo and will be on something for the next 5 years .  Very concerned about chem related weight gain that may occur .    Weight History:      7/14/2023    10:50 AM   --   What is your highest lifetime weight? 280   What is your lowest weight since surgery? (In pounds) 209     Initial Weight (lbs): 284 lbs  Weight: 220 lb (99.8 kg) (Pt reported)  Last Visits Weight: 229 lb (103.9 kg)  Cumulative weight loss (lbs): 64  Weight Loss Percentage: 22.54%    VITAMINS AND MINERALS:  1 Multivitamin with Minerals (HS; BA Ultra Solo) (Reordered and just started) (was on a difference MVI)  500 mg Calcium With Vitamin D TID w/meals   1,000 mcg SL Vitamin B12- not taking.   MVT sufficient to meet additional Vitamin D, Vitamin B1 and Iron needs    Labs show B12 needs to be increased, iron supplement added and vit D increased.         7/14/2023    10:50 AM   Questions Regarding Co-Morbidities and Health Concerns Reviewed With Patient   Pre-diabetes Improved   Diabetes II Never   High Blood Pressure Never   High cholesterol  "Improved   Heartburn/Reflux Improved   Sleep apnea Never   PCOS Stayed the same   Back pain Gone away   Joint pain Improved   Lower leg swelling Never           7/14/2023    10:50 AM   Eating Habits   How many meals do you eat per day? 3   Do you snack between meals? Sometimes   How much food are you eating at each meal? 1/2 cup to 1 cup   Are you able to separate your meals and liquids by at least 30 minutes? Yes   Are you able to avoid liquid calories? Yes           7/14/2023    10:50 AM   Exercise Questions Reviewed With Patient   How often do you exercise? 1 to 2 times per week   What is the duration of your exercise (in minutes)? 30 Minutes   What types of exercise do you do? walking    other   What keeps you from being more active? I have recently been sick    I have just had surgery on one or more of my joints    Too tired       Social History:      7/14/2023    10:50 AM   --   Are you smoking? No   Are you drinking alcohol? No       Medications:  Current Outpatient Medications   Medication     BIOTIN PO     CALCIUM CITRATE PO     cyanocobalamin (CYANOCOBALAMIN) 1000 MCG/ML injection     metFORMIN (GLUCOPHAGE XR) 500 MG 24 hr tablet     multivitamin w/minerals (THERA-VIT-M) tablet     naltrexone (DEPADE/REVIA) 50 MG tablet     syringe/needle, disp, 25G X 1\" 3 ML MISC     tuberculin-allergy syringes 27G X 1/2\" 1 ML MISC     No current facility-administered medications for this visit.         7/14/2023    10:50 AM   --   Do you avoid NSAIDs such as (Ibuprofen, Aleve, Naproxen, Advil)? Yes       ROS:  GI:       7/14/2023    10:50 AM   --   Vomiting No   Diarrhea No   Constipation No   Swallowing trouble No   Abdominal pain No   Heartburn No     Skin:       7/14/2023    10:50 AM   BAR RBS ROS - SKIN   Rash in skin folds No     Psych:       7/14/2023    10:50 AM   --   Depression No   Anxiety No     Female Only:       7/14/2023    10:50 AM   BAR RBS ROS -    Female only Polycystic ovarian syndrome (PCOS) " "  Stress urinary incontinence No       LABS/IMAGING/MEDICAL RECORDS REVIEW:   Hemoglobin A1C   Date Value Ref Range Status   11/04/2022 5.3 0.0 - 5.6 % Final     Comment:     Normal <5.7%   Prediabetes 5.7-6.4%    Diabetes 6.5% or higher     Note: Adopted from ADA consensus guidelines.     Vitamin D, Total (25-Hydroxy)   Date Value Ref Range Status   06/09/2023 36 20 - 75 ug/L Final     Parathyroid Hormone Intact   Date Value Ref Range Status   06/09/2023 35 15 - 65 pg/mL Final     Vitamin B12   Date Value Ref Range Status   06/09/2023 182 (L) 232 - 1,245 pg/mL Final     Hemoglobin   Date Value Ref Range Status   06/09/2023 12.2 11.7 - 15.7 g/dL Final     Ferritin   Date Value Ref Range Status   06/09/2023 18 6 - 175 ng/mL Final     Iron   Date Value Ref Range Status   06/09/2023 41 37 - 145 ug/dL Final     Iron Binding Capacity   Date Value Ref Range Status   06/09/2023 357 240 - 430 ug/dL Final     Iron Sat Index   Date Value Ref Range Status   06/09/2023 11 (L) 15 - 46 % Final   11/04/2022 16 15 - 46 % Final       PHYSICAL EXAMINATION:  Ht 5' 7\" (1.702 m)   Wt 220 lb (99.8 kg)   BMI 34.46 kg/m    GENERAL: Healthy, alert and no distress  EYES: Eyes grossly normal to inspection.  No discharge or erythema, or obvious scleral/conjunctival abnormalities.  RESP: No audible wheeze, cough, or visible cyanosis.  No visible retractions or increased work of breathing.    SKIN: Visible skin clear. No significant rash, abnormal pigmentation or lesions.  NEURO: Cranial nerves grossly intact.  Mentation and speech appropriate for age.  PSYCH: Mentation appears normal, affect normal/bright, judgement and insight intact, normal speech and appearance well-groomed.    COUNSELING PROVIDED:  We reviewed the important post op bariatric recommendations:  eating 3 meals daily  eating protein first, getting >60gm protein daily  eating slowly, chewing food well  avoiding/limiting calorie containing beverages  avoiding fluids 30 minutes " before, during, and after meals  limiting restaurant or cafeteria eating to twice a week or less  Pt reminded to avoid marginal ulcers she should avoid tobacco at all, alcohol in excess, caffeine, and NSAIDS (unless indicated for cardioprotection or otherwise and opposed by a PPI).  Pt encouraged to establish and maintain a consistent physical activity routine, 6-8 hours of restorative sleep each night and optimal stress management.  Pt counseled on the importance of life long vitamin supplementation and life long follow up.    Sincerely,    Bettina Donovan PA-C

## 2023-07-20 ENCOUNTER — VIRTUAL VISIT (OUTPATIENT)
Dept: ONCOLOGY | Facility: CLINIC | Age: 43
End: 2023-07-20
Attending: INTERNAL MEDICINE
Payer: COMMERCIAL

## 2023-07-20 ENCOUNTER — PATIENT OUTREACH (OUTPATIENT)
Dept: ONCOLOGY | Facility: CLINIC | Age: 43
End: 2023-07-20
Payer: COMMERCIAL

## 2023-07-20 VITALS — BODY MASS INDEX: 33.43 KG/M2 | HEIGHT: 67 IN | WEIGHT: 213 LBS

## 2023-07-20 DIAGNOSIS — Z17.0 MALIGNANT NEOPLASM OF RIGHT BREAST IN FEMALE, ESTROGEN RECEPTOR POSITIVE, UNSPECIFIED SITE OF BREAST (H): Primary | ICD-10-CM

## 2023-07-20 DIAGNOSIS — C50.911 MALIGNANT NEOPLASM OF RIGHT BREAST IN FEMALE, ESTROGEN RECEPTOR POSITIVE, UNSPECIFIED SITE OF BREAST (H): Primary | ICD-10-CM

## 2023-07-20 DIAGNOSIS — C50.912 INVASIVE DUCTAL CARCINOMA OF BREAST, FEMALE, LEFT (H): Primary | ICD-10-CM

## 2023-07-20 PROCEDURE — 99417 PROLNG OP E/M EACH 15 MIN: CPT | Mod: VID | Performed by: INTERNAL MEDICINE

## 2023-07-20 PROCEDURE — 99215 OFFICE O/P EST HI 40 MIN: CPT | Mod: VID | Performed by: INTERNAL MEDICINE

## 2023-07-20 RX ORDER — ALBUTEROL SULFATE 0.83 MG/ML
2.5 SOLUTION RESPIRATORY (INHALATION)
Status: CANCELLED | OUTPATIENT
Start: 2023-08-11

## 2023-07-20 RX ORDER — DIPHENHYDRAMINE HYDROCHLORIDE 50 MG/ML
50 INJECTION INTRAMUSCULAR; INTRAVENOUS
Status: CANCELLED
Start: 2023-08-11

## 2023-07-20 RX ORDER — HEPARIN SODIUM (PORCINE) LOCK FLUSH IV SOLN 100 UNIT/ML 100 UNIT/ML
5 SOLUTION INTRAVENOUS
Status: CANCELLED | OUTPATIENT
Start: 2023-08-11

## 2023-07-20 RX ORDER — ALBUTEROL SULFATE 90 UG/1
1-2 AEROSOL, METERED RESPIRATORY (INHALATION)
Status: CANCELLED
Start: 2023-08-11

## 2023-07-20 RX ORDER — METHYLPREDNISOLONE SODIUM SUCCINATE 125 MG/2ML
125 INJECTION, POWDER, LYOPHILIZED, FOR SOLUTION INTRAMUSCULAR; INTRAVENOUS
Status: CANCELLED
Start: 2023-08-11

## 2023-07-20 RX ORDER — HEPARIN SODIUM,PORCINE 10 UNIT/ML
5-20 VIAL (ML) INTRAVENOUS DAILY PRN
Status: CANCELLED | OUTPATIENT
Start: 2023-08-11

## 2023-07-20 RX ORDER — ONDANSETRON 2 MG/ML
8 INJECTION INTRAMUSCULAR; INTRAVENOUS ONCE
Status: CANCELLED | OUTPATIENT
Start: 2023-08-11

## 2023-07-20 RX ORDER — LORAZEPAM 2 MG/ML
0.5 INJECTION INTRAMUSCULAR EVERY 4 HOURS PRN
Status: CANCELLED | OUTPATIENT
Start: 2023-08-11

## 2023-07-20 RX ORDER — EPINEPHRINE 1 MG/ML
0.3 INJECTION, SOLUTION INTRAMUSCULAR; SUBCUTANEOUS EVERY 5 MIN PRN
Status: CANCELLED | OUTPATIENT
Start: 2023-08-11

## 2023-07-20 RX ORDER — MEPERIDINE HYDROCHLORIDE 25 MG/ML
25 INJECTION INTRAMUSCULAR; INTRAVENOUS; SUBCUTANEOUS EVERY 30 MIN PRN
Status: CANCELLED | OUTPATIENT
Start: 2023-08-11

## 2023-07-20 ASSESSMENT — PAIN SCALES - GENERAL: PAINLEVEL: NO PAIN (0)

## 2023-07-20 NOTE — NURSING NOTE
Is the patient currently in the state of [place]? YES    Visit mode:VIDEO    If the visit is dropped, the patient can be reconnected by: VIDEO VISIT: Text to cell phone: [phone]    Will anyone else be joining the visit? NO      How would you like to obtain your AVS? MyChart    Are changes needed to the allergy or medication list? NO    Reason for visit: HATTIE Pablo

## 2023-07-20 NOTE — LETTER
"    7/20/2023         RE: Luda Rai  [address]  [facility] [address]        Dear Colleague,    Thank you for referring your patient, Luda Rai, to the [facility]. Please see a copy of my visit note below.    Virtual Visit Details    Type of service:  Video Visit     Originating Location (pt. Location): {video visit patient location:260648::\"Home\"}  {PROVIDER LOCATION On-site should be selected for visits conducted from your clinic location or adjoining [facility] hospital, academic office, or other nearby [facility] building. Off-site should be selected for all other provider locations, including home:792988}  Distant Location (provider location):  {virtual location provider:056537}  Platform used for Video Visit: {Virtual Visit Platforms:927340::\"Bioceptive\"}    Visit Date: 07/20/2023    The patient is a 43-year-old patient who has been evaluated today by a video visit due to public health emergency with coronavirus.  She has given consent.    VIDEO PLATFORM:  Strap.    PATIENT LOCATION:  Home.    PHYSICIAN LOCATION:  [facility].    CHIEF COMPLAINT:     1.  Invasive ductal carcinoma of the right breast at the 11 o'clock position T2 N0 M0, ER/OR positive, HER-2 receptor negative.  2.  Status post lumpectomy.  3.  Here today for pathology results and Oncotype results.    HISTORY OF PRESENTING COMPLAINT:  The patient is a 43-year-old premenopausal patient who went for a screening mammogram in 04/2023 because she was having discomfort in the right breast.  A suspicious lesion was seen at the 11 o'clock position of the breast and a biopsy revealed an invasive ductal carcinoma, grade II, ER/OR positive, HER-2 receptor negative.  I saw her in initial consultation on 05/30/2023.  She then went on to have a right-sided lumpectomy with Dr. Gore and she is back today for results of her pathology and also Oncotype results.  The surgery was done on 06/26/2023.  I have " reviewed the pathology today.  It showed invasive ductal carcinoma, grade II in the upper outer quadrant of the right breast at the 11 o'clock position.  The size of the tumor was 2.4 cm.  It was a single focus associated with DCIS.  All the margins were negative, and she had 3 sentinel lymph nodes, which were negative.  Stage of disease is a T2 N0, ER/MI positive, HER-2 receptor negative tumor.    The tumor then went out for Oncotype testing and came back in the intermediate risk range with a score of 21.  We have discussed both the pathology and the Oncotype at length today, and we have discussed treatment plan.    PAST MEDICAL HISTORY:    1.  Right-sided breast cancer.  2.  Bariatric surgery.  3.  History of knee replacement.  4.  History of ectopic pregnancy.  5.  History of Nissen fundoplication procedure.    MEDICATIONS AND ALLERGIES:  Outlined in the nursing records.    SOCIAL HISTORY:  The patient has 1 child, she has had with IVF.  She has now finished with her family and not planning on any more children.  She is premenopausal and works as an .  She is a nonsmoker.    COMPREHENSIVE REVIEW OF SYSTEMS:  A 12-point comprehensive review of systems has been done with her today.  She has recovered well from the right-sided lumpectomy.  She denies any cough, shortness of breath, bone pain, or worrisome symptomatology.    PHYSICAL EXAMINATION:  Overall, she looks well.  EYES:  Have no redness or discharge.  MUSCULOSKELETAL:  She is moving all extremities.  NEUROLOGICAL:  She is alert and oriented x3.  RESPIRATORY:  No cough or labored breathing.  SKIN:  Has no rashes or lesions.  PSYCHIATRIC:  Exam is normal.    Rest of her comprehensive physical exam is deferred today because this is a video visit with video visit restrictions.    DATA REVIEW:  I have personally reviewed all of her pathology today and discussed it with her.  I have also reviewed the Oncotype, which came back with a recurrence score  of 21 in the intermediate risk range.  I have also reviewed lab work that she had done on 06/22/2023, which looked good.    IMPRESSION:  This is a 43-year-old premenopausal patient with a diagnosis of a T2 N0 infiltrating ductal carcinoma of the right breast at the 11 o'clock position, ER/LA positive, HER-2 receptor negative with intermediate score on her Oncotype.  We had a long discussion today with regard to her pathology as well as her Oncotype results and I have given her recommendations for treatment.  For systemic treatment, I have recommended that she do adjuvant hormonal therapy and she would be a good candidate for that based on the receptor studies.  I have also gone through with her the studies that have been done with TAILORx as well as RxPONDER with regard to premenopausal woman who come back with a recurrence score between 0-25, and whether or not to recommend adjuvant chemotherapy.  I have recommended that she consider adjuvant chemotherapy given her young age and the size of her tumor.  I have recommended 4 cycles of Taxotere and Cytoxan.  We would then follow that with adjuvant radiation therapy for local control and then 5-10 years of hormonal treatment.    I have discussed the risks, benefits, and side effects of the chemotherapy portion at length today.  We specifically discussed the need for an implantable port for delivery of chemotherapy.  We have discussed the risk of allergic reaction to chemotherapy.  We have discussed the side effect profile of Taxotere to include peripheral neuropathy, skin rash, bony aching, lowering of the blood counts, risk of infection, nausea, vomiting, hair loss, and the other minor side effects associated with it.  I have also discussed with her the potential for doing cold capping and she is interested in cold capping so we will give her information on that.  The plan would be to do 4 cycles of Taxotere and Cytoxan every 3 weeks.  The patient understands the  rationale for recommending adjuvant chemotherapy and she is willing to proceed.  She has given consent for chemotherapy.  We will plan to ask Dr. Gore to put an implantable port and try and get started in the next couple of weeks.  We have also talked about radiation therapy and hormonal treatment today.  I have answered all of her questions to the best of my ability.    Total time spent today has been 60 minutes with the consultation, review of the records, review of the Oncotype, and review of her current results.    Adolfo Zeng MD        D: 2023   T: 2023   MT: [place]    Name:     SAPPHIRE CHASE  MRN:      [id number]        Account:    [id number]   :      1980           Visit Date: 2023     Document: [id number]       Again, thank you for allowing me to participate in the care of your patient.        Sincerely,        Adolfo Zeng MD

## 2023-07-20 NOTE — PROGRESS NOTES
Visit Date: 07/20/2023    The patient is a 43-year-old patient who has been evaluated today by a video visit due to public health emergency with coronavirus.  She has given consent.    VIDEO PLATFORM:  InfluxDB.    PATIENT LOCATION:  Home.    PHYSICIAN LOCATION:  [facility].    CHIEF COMPLAINT:     1.  Invasive ductal carcinoma of the right breast at the 11 o'clock position T2 N0 M0, ER/TX positive, HER-2 receptor negative.  2.  Status post lumpectomy.  3.  Here today for pathology results and Oncotype results.    HISTORY OF PRESENTING COMPLAINT:  The patient is a 43-year-old premenopausal patient who went for a screening mammogram in 04/2023 because she was having discomfort in the right breast.  A suspicious lesion was seen at the 11 o'clock position of the breast and a biopsy revealed an invasive ductal carcinoma, grade II, ER/TX positive, HER-2 receptor negative.  I saw her in initial consultation on 05/30/2023.  She then went on to have a right-sided lumpectomy with Dr. Gore and she is back today for results of her pathology and also Oncotype results.  The surgery was done on 06/26/2023.  I have reviewed the pathology today.  It showed invasive ductal carcinoma, grade II in the upper outer quadrant of the right breast at the 11 o'clock position.  The size of the tumor was 2.4 cm.  It was a single focus associated with DCIS.  All the margins were negative, and she had 3 sentinel lymph nodes, which were negative.  Stage of disease is a T2 N0, ER/TX positive, HER-2 receptor negative tumor.    The tumor then went out for Oncotype testing and came back in the intermediate risk range with a score of 21.  We have discussed both the pathology and the Oncotype at length today, and we have discussed treatment plan.    PAST MEDICAL HISTORY:    1.  Right-sided breast cancer.  2.  Bariatric surgery.  3.  History of knee replacement.  4.  History of ectopic pregnancy.  5.  History of Nissen fundoplication  procedure.    MEDICATIONS AND ALLERGIES:  Outlined in the nursing records.    SOCIAL HISTORY:  The patient has 1 child, she has had with IVF.  She has now finished with her family and not planning on any more children.  She is premenopausal and works as an .  She is a nonsmoker.    COMPREHENSIVE REVIEW OF SYSTEMS:  A 12-point comprehensive review of systems has been done with her today.  She has recovered well from the right-sided lumpectomy.  She denies any cough, shortness of breath, bone pain, or worrisome symptomatology.    PHYSICAL EXAMINATION:  Overall, she looks well.  EYES:  Have no redness or discharge.  MUSCULOSKELETAL:  She is moving all extremities.  NEUROLOGICAL:  She is alert and oriented x3.  RESPIRATORY:  No cough or labored breathing.  SKIN:  Has no rashes or lesions.  PSYCHIATRIC:  Exam is normal.    Rest of her comprehensive physical exam is deferred today because this is a video visit with video visit restrictions.    DATA REVIEW:  I have personally reviewed all of her pathology today and discussed it with her.  I have also reviewed the Oncotype, which came back with a recurrence score of 21 in the intermediate risk range.  I have also reviewed lab work that she had done on 06/22/2023, which looked good.    IMPRESSION:  This is a 43-year-old premenopausal patient with a diagnosis of a T2 N0 infiltrating ductal carcinoma of the right breast at the 11 o'clock position, ER/[place] positive, HER-2 receptor negative with intermediate score on her Oncotype.  We had a long discussion today with regard to her pathology as well as her Oncotype results and I have given her recommendations for treatment.  For systemic treatment, I have recommended that she do adjuvant hormonal therapy and she would be a good candidate for that based on the receptor studies.  I have also gone through with her the studies that have been done with TAILORx as well as RxPONDER with regard to premenopausal woman who come  back with a recurrence score between 0-25, and whether or not to recommend adjuvant chemotherapy.  I have recommended that she consider adjuvant chemotherapy given her young age and the size of her tumor.  I have recommended 4 cycles of Taxotere and Cytoxan.  We would then follow that with adjuvant radiation therapy for local control and then 5-10 years of hormonal treatment.    I have discussed the risks, benefits, and side effects of the chemotherapy portion at length today.  We specifically discussed the need for an implantable port for delivery of chemotherapy.  We have discussed the risk of allergic reaction to chemotherapy.  We have discussed the side effect profile of Taxotere to include peripheral neuropathy, skin rash, bony aching, lowering of the blood counts, risk of infection, nausea, vomiting, hair loss, and the other minor side effects associated with it.  I have also discussed with her the potential for doing cold capping and she is interested in cold capping so we will give her information on that.  The plan would be to do 4 cycles of Taxotere and Cytoxan every 3 weeks.  The patient understands the rationale for recommending adjuvant chemotherapy and she is willing to proceed.  She has given consent for chemotherapy.  We will plan to ask Dr. Gore to put an implantable port and try and get started in the next couple of weeks.  We have also talked about radiation therapy and hormonal treatment today.  I have answered all of her questions to the best of my ability.    Total time spent today has been 60 minutes with the consultation, review of the records, review of the Oncotype, and review of her current results.    Adolfo Zeng MD        D: 2023   T: 2023   MT: [place]    Name:     SAPPHIRE CHASEVictorina  MRN:      [id number]        Account:    [id number]   :      1980           Visit Date: 2023     Document: [id number]

## 2023-07-20 NOTE — LETTER
"    7/20/2023         RE: Luda Rai  [address]  [facility] [address]        Dear Colleague,    Thank you for referring your patient, Luda Rai, to the [facility]. Please see a copy of my visit note below.    Virtual Visit Details    Type of service:  Video Visit     Originating Location (pt. Location): {video visit patient location:690360::\"Home\"}  {PROVIDER LOCATION On-site should be selected for visits conducted from your clinic location or adjoining [facility] hospital, academic office, or other nearby [facility] building. Off-site should be selected for all other provider locations, including home:526931}  Distant Location (provider location):  {virtual location provider:856629}  Platform used for Video Visit: {Virtual Visit Platforms:867820::\"[email or web address]\"}    Visit Date: 07/20/2023    The patient is a 43-year-old patient who has been evaluated today by a video visit due to public health emergency with coronavirus.  She has given consent.    VIDEO PLATFORM:  Kasenna.    PATIENT LOCATION:  Home.    PHYSICIAN LOCATION:  [facility].    CHIEF COMPLAINT:     1.  Invasive ductal carcinoma of the right breast at the 11 o'clock position T2 N0 M0, ER/WV positive, HER-2 receptor negative.  2.  Status post lumpectomy.  3.  Here today for pathology results and Oncotype results.    HISTORY OF PRESENTING COMPLAINT:  The patient is a 43-year-old premenopausal patient who went for a screening mammogram in 04/2023 because she was having discomfort in the right breast.  A suspicious lesion was seen at the 11 o'clock position of the breast and a biopsy revealed an invasive ductal carcinoma, grade II, ER/WV positive, HER-2 receptor negative.  I saw her in initial consultation on 05/30/2023.  She then went on to have a right-sided lumpectomy with Dr. Gore and she is back today for results of her pathology and also Oncotype results.  The surgery was done on 06/26/2023.  I have " reviewed the pathology today.  It showed invasive ductal carcinoma, grade II in the upper outer quadrant of the right breast at the 11 o'clock position.  The size of the tumor was 2.4 cm.  It was a single focus associated with DCIS.  All the margins were negative, and she had 3 sentinel lymph nodes, which were negative.  Stage of disease is a T2 N0, ER/VA positive, HER-2 receptor negative tumor.    The tumor then went out for Oncotype testing and came back in the intermediate risk range with a score of 21.  We have discussed both the pathology and the Oncotype at length today, and we have discussed treatment plan.    PAST MEDICAL HISTORY:    1.  Right-sided breast cancer.  2.  Bariatric surgery.  3.  History of knee replacement.  4.  History of ectopic pregnancy.  5.  History of Nissen fundoplication procedure.    MEDICATIONS AND ALLERGIES:  Outlined in the nursing records.    SOCIAL HISTORY:  The patient has 1 child, she has had with IVF.  She has now finished with her family and not planning on any more children.  She is premenopausal and works as an .  She is a nonsmoker.    COMPREHENSIVE REVIEW OF SYSTEMS:  A 12-point comprehensive review of systems has been done with her today.  She has recovered well from the right-sided lumpectomy.  She denies any cough, shortness of breath, bone pain, or worrisome symptomatology.    PHYSICAL EXAMINATION:  Overall, she looks well.  EYES:  Have no redness or discharge.  MUSCULOSKELETAL:  She is moving all extremities.  NEUROLOGICAL:  She is alert and oriented x3.  RESPIRATORY:  No cough or labored breathing.  SKIN:  Has no rashes or lesions.  PSYCHIATRIC:  Exam is normal.    Rest of her comprehensive physical exam is deferred today because this is a video visit with video visit restrictions.    DATA REVIEW:  I have personally reviewed all of her pathology today and discussed it with her.  I have also reviewed the Oncotype, which came back with a recurrence score  of 21 in the intermediate risk range.  I have also reviewed lab work that she had done on 06/22/2023, which looked good.    IMPRESSION:  This is a 43-year-old premenopausal patient with a diagnosis of a T2 N0 infiltrating ductal carcinoma of the right breast at the 11 o'clock position, ER/HI positive, HER-2 receptor negative with intermediate score on her Oncotype.  We had a long discussion today with regard to her pathology as well as her Oncotype results and I have given her recommendations for treatment.  For systemic treatment, I have recommended that she do adjuvant hormonal therapy and she would be a good candidate for that based on the receptor studies.  I have also gone through with her the studies that have been done with TAILORx as well as RxPONDER with regard to premenopausal woman who come back with a recurrence score between 0-25, and whether or not to recommend adjuvant chemotherapy.  I have recommended that she consider adjuvant chemotherapy given her young age and the size of her tumor.  I have recommended 4 cycles of Taxotere and Cytoxan.  We would then follow that with adjuvant radiation therapy for local control and then 5-10 years of hormonal treatment.    I have discussed the risks, benefits, and side effects of the chemotherapy portion at length today.  We specifically discussed the need for an implantable port for delivery of chemotherapy.  We have discussed the risk of allergic reaction to chemotherapy.  We have discussed the side effect profile of Taxotere to include peripheral neuropathy, skin rash, bony aching, lowering of the blood counts, risk of infection, nausea, vomiting, hair loss, and the other minor side effects associated with it.  I have also discussed with her the potential for doing cold capping and she is interested in cold capping so we will give her information on that.  The plan would be to do 4 cycles of Taxotere and Cytoxan every 3 weeks.  The patient understands the  rationale for recommending adjuvant chemotherapy and she is willing to proceed.  She has given consent for chemotherapy.  We will plan to ask Dr. Gore to put an implantable port and try and get started in the next couple of weeks.  We have also talked about radiation therapy and hormonal treatment today.  I have answered all of her questions to the best of my ability.    Total time spent today has been 60 minutes with the consultation, review of the records, review of the Oncotype, and review of her current results.    Adolfo Zeng MD        D: 2023   T: 2023   MT: [place]    Name:     SAPPHIRE CHASE  MRN:      [id number]        Account:    [id number]   :      1980           Visit Date: 2023     Document: [id number]       Again, thank you for allowing me to participate in the care of your patient.        Sincerely,        Adolfo Zeng MD

## 2023-07-20 NOTE — PROGRESS NOTES
Patient called back to report that she will be working with the Penguin cold caps and will have the assistance of a friend or family member. Port placement instruction were reviewed with the patient. Patient was informed that her first treatment has been scheduled for 8/11 and that her further appointment are still being worked on.  Mallory Saldaña RN on 7/20/2023 at 2:02 PM

## 2023-07-20 NOTE — PROGRESS NOTES
[facility]: Cancer Care Plan of Care Education Note                                    Discussion with Patient:                                                      Port placement, getting ready for chemotherapy, Taxotere, Cyclophosphamide and onpro information sent to patient via [employer]    Patient will research penguin cold caps and Brenda cold capping. She will call back with her decision.     Patient would like to connect via phone with the RNCC on 7/26 or 7/27 to further discuss chemotherapy. She will be out of town on 7/24 and 7/25.      Assessment:                                                      Assessment completed with:: Patient    Current living arrangement       Plan of Care Education   Yearly learning assessment completed?: No  Diagnosis:: Ductal Carcinoma  Does patient understand diagnosis?: Yes  Tx plan/regimen:: TC  Vascular access:: Port    Evaluation of Learning  Method:: Literature        Intervention/Education provided during outreach:                                                           Confirmed patient has clinic and triage numbers    Signature:  Mallory Saldaña RN

## 2023-07-21 ENCOUNTER — TELEPHONE (OUTPATIENT)
Dept: SURGERY | Facility: CLINIC | Age: 43
End: 2023-07-21

## 2023-07-21 ENCOUNTER — VIRTUAL VISIT (OUTPATIENT)
Dept: SURGERY | Facility: CLINIC | Age: 43
End: 2023-07-21
Payer: COMMERCIAL

## 2023-07-21 ENCOUNTER — MYC MEDICAL ADVICE (OUTPATIENT)
Dept: FAMILY MEDICINE | Facility: CLINIC | Age: 43
End: 2023-07-21

## 2023-07-21 VITALS — HEIGHT: 67 IN | WEIGHT: 220 LBS | BODY MASS INDEX: 34.53 KG/M2

## 2023-07-21 DIAGNOSIS — Z98.84 BARIATRIC SURGERY STATUS: ICD-10-CM

## 2023-07-21 DIAGNOSIS — E53.8 VITAMIN B12 DEFICIENCY: ICD-10-CM

## 2023-07-21 DIAGNOSIS — E66.09 CLASS 1 OBESITY DUE TO EXCESS CALORIES WITH SERIOUS COMORBIDITY AND BODY MASS INDEX (BMI) OF 34.0 TO 34.9 IN ADULT: ICD-10-CM

## 2023-07-21 DIAGNOSIS — E28.2 PCOS (POLYCYSTIC OVARIAN SYNDROME): Primary | ICD-10-CM

## 2023-07-21 DIAGNOSIS — K91.2 MALNUTRITION FOLLOWING GASTROINTESTINAL SURGERY: ICD-10-CM

## 2023-07-21 DIAGNOSIS — E66.811 CLASS 1 OBESITY DUE TO EXCESS CALORIES WITH SERIOUS COMORBIDITY AND BODY MASS INDEX (BMI) OF 34.0 TO 34.9 IN ADULT: ICD-10-CM

## 2023-07-21 DIAGNOSIS — C50.912 INVASIVE DUCTAL CARCINOMA OF BREAST, FEMALE, LEFT (H): ICD-10-CM

## 2023-07-21 DIAGNOSIS — K91.2 POSTSURGICAL MALABSORPTION: ICD-10-CM

## 2023-07-21 LAB — SPECIMEN STATUS: NORMAL

## 2023-07-21 PROCEDURE — 99215 OFFICE O/P EST HI 40 MIN: CPT | Mod: VID | Performed by: PHYSICIAN ASSISTANT

## 2023-07-21 RX ORDER — CYANOCOBALAMIN 1000 UG/ML
1 INJECTION, SOLUTION INTRAMUSCULAR; SUBCUTANEOUS
Qty: 3 ML | Refills: 3 | Status: SHIPPED | OUTPATIENT
Start: 2023-07-21 | End: 2024-06-11

## 2023-07-21 RX ORDER — METFORMIN HCL 500 MG
TABLET, EXTENDED RELEASE 24 HR ORAL
Qty: 360 TABLET | Refills: 3 | Status: SHIPPED | OUTPATIENT
Start: 2023-07-21 | End: 2023-12-05

## 2023-07-21 RX ORDER — NALTREXONE HYDROCHLORIDE 50 MG/1
TABLET, FILM COATED ORAL
Qty: 30 TABLET | Refills: 3 | Status: SHIPPED | OUTPATIENT
Start: 2023-07-21 | End: 2023-12-04

## 2023-07-21 NOTE — PATIENT INSTRUCTIONS
" To ensure the quality you may receive a patient satisfaction survey. The greatest compliment you can give is \"Likely to Recommend\"    Nice to talk with you today. Thank you for allowing me the privilege of caring for you. Below is the plan discussed.-  . Bettina Donovan PA-C    Plan:  Labs ordered. Call [phone] to schedule 1 week before appt in November.  Continue your bariatric vitamins   Add vitamin B12 injection subcutaneous once a month  See dietician now.  Will be good to make sure you are getting enough protein in during chemo.     Start Naltrexone.   Directions: Take 1/2 tab 1-2 hours prior to biggest cravings or extra hunger for a week.  If tolerating, increase to 1 tablet or split your dose into 1/2 tablet twice daily.      Start Metformin  Directions: Take 1 tablet by mouth daily with a meal for 1 week, then increase to 2 tablets daily with a meal, hen increase to 3 tablets daily with a meal, if tolerating can increase to 4 tablets daily with a meal or at bedtime.       FOLLOW-UP:  Call [phone] to schedule next visit November.    Bariatric Post Op Guidelines  General:    To avoid marginal ulcers avoid all forms of tobacco, alcohol in excess, caffeine, and NSAIDS     Exercise is key for weight loss and weight maintenance. Aim for 30-60 minutes of physical activity most days.  Include cardiovascular and strength training.    Continue lifelong vitamins supplementation and annual lab follow up.  All  patients should supplement with the following bariatric postoperative vitamins:  2 Complete multivitamins with minerals (at different times than calcium)  Vitamin D 5000 Int Units/125 mg daily   Calcium 600 mg twice daily or 500 mg three times daily   Vitamin B12: 500 mcg sl daily or 1000 mcg Inj monthly  B complex daily or Thiamine 100 mg weekly  1 Iron/Vit C. Daily for females who menstruate and/or as directed    The bariatric team should be aware and evaluate all GI symptoms which can be a sign of " complications. Inability to tolerate textured solid food (chicken, steak, fish) may need to be evaluated by endoscopy.    There is a 10% increase of Alcohol Use Disorder in patients with bariatric surgery.   Most often occurring around 2 years post op.  Call if you feel alcohol is interfering in your daily life.  We can help.     Follow up annually lifelong. Obesity is a chronic disease.  Weight gain can be expected. The goal of follow-up visits is to ensure adequate vitamin and protein absorption, evaluate food intake behavior, review exercise/activity level, and assist with weight regain.    Nutritional:  Eat 3 meals per day  (No snacks between meals.)  Do not skip meals.  This can cause overeating at the next meal and will prevent adequate protein and nutritional intake.    Aim for 60-80 grams of protein per day.  Always eat your protein first. This assists with optimal nutrition and helps you stay full longer.    Eat your protein first, and then follow with fiber.    Add fiber by including fruits, vegetables, whole grains, and beans.     Portions should be about 1 cup per meal. Use measuring cups to be accurate.  Continue to use saucer/salad plates, infant/toddler silverware to keep portion sizes small and take small bites.    Eat S-L-O-W-L-Y to make each meal last 20-30 minutes. Always stop eating when satisfied.    Aim for 64 oz. of calorie-free fluids daily.    Avoid drinking 30 min before, during, and 30 min after meal    Avoid high sugar and high fat foods to prevent high calorie intake. This will reduce your rate of weight loss and can cause weight regain.   Check nutrition labels for less than 10 grams of sugar and less than 10 grams of fat per serving.    Naltrexone    Naltrexone is a medication that is used most often to help people who are troubled by dependence on prescription pain killers or alcohol. It has also been found to help with weight loss. Although it's not currently FDA approved for weight  "loss, it has been used safely for a number of years to help people who are carrying extra weight.     Just how Naltrexone helps with weight loss has not been exactly determined.  It seems to work by quieting down brain signals related to strong food cravings. Many of our patients use the word \"addiction\" to describe their feelings and constant thoughts about food. It makes sense then to treat the feeling of dependence on food, outside of real hunger, with a medication designed to help with other sorts of dependence.     Our patients on Naltrexone find that they:    >feel less interest in food   >think less about food and eating and have more time to think of other things   >find it easier to push the plate away   >have an easier time eating less    For some of our patients, these feelings are very immediate. Other patients, don't feel much of a change but find they've lost weight. Like all weight loss medications, Naltrexone works best when you help it work. This means:  1. Having less tempting high calorie (fattening) food around the house or office. (For people with strong cravings this is very important.)   2. Staying away from situations or people that may trigger your cravings .   3. Eating out only one time or less each week.  4. Eating your meals at a table with the TV or computer off.    Side-effects. Naltrexone is generally well tolerated. The main side-effect we see is  nausea or a woozy feeling. A small number of people feel quite ill. Most people have a mild reaction and some people have no reaction at all.  The good news is that this feeling does go away.     In order to avoid nausea, please start the medication with half a pill for the first few days. Go on to a full pill if you are feeling well.      If you  are nauseated on 1/2 a pill it is okay to cut back to 1/4 pill ( a very small amount). Take this for a couple of days and work your way back up to a 1/2 pill and then a whole pill. Taking the " medication at night or with food  to start also may help prevent the feeling of nausea.       WARNING: This medication blocks the action of opioid type pain medications.    If you routinely take narcotic pain medication like Codeine, Oxycontin,Percocet,Morphine,Dilaudid or Methodone, do not take this until you have talked with weight management staff.   2.  If you are planning surgery you should stop Naltrexone 4 days prior to the surgery.   3.  If you have an injury that requires pain medication, make sure the health care staff knows you take Naltrexone.       For any questions/concerns contact [facility] [phone]

## 2023-07-21 NOTE — ASSESSMENT & PLAN NOTE
Continue taking recommended post-op vitamins.  Marc add iron and vit C daily.   Will recheck Iron and B12 in Nov.

## 2023-07-21 NOTE — TELEPHONE ENCOUNTER
Type of surgery: POWER PORT A CATHETER PLACMENT        Location of surgery: [facility]  Date and time of surgery: 8/10/2023 @ 2:30 PM   Surgeon: Farnaz Gore MD    Pre-Op Appt Date: PATIENT TO SCHEDULE    Post-Op Appt Date: .PATIENT TO SCHEDULE     Packet sent out: Yes  Pre-cert/Authorization completed:  Not Applicable  Date: 7/21/2023       POWER PORT A CATHETER PLACMENT      GENERAL PT INST TO HAVE H&P WITH OPCP 60 MIN REQ NON JLS NMS

## 2023-07-21 NOTE — ASSESSMENT & PLAN NOTE
Patient was congratulated on wt loss success thus far. Healthy habits to assist with further weight loss were discussed. Luda will start Naltrexone and metformin to help with cravings and constant food chatter.  Risks/ benefits and possible side effects were discussed and questions were answered. Written information was given.

## 2023-07-21 NOTE — PATIENT INSTRUCTIONS
Port placement 8/10  RTC Nuvia and chemotherapy C1 8/11  RTC Dr. Zeng and chemotherapy C2 8/30  Mallory Saldaña RN on 7/21/2023 at 1:35 PM

## 2023-07-24 NOTE — TELEPHONE ENCOUNTER
This is an FYI     Denise Leal RN, BSN  [facility]

## 2023-07-26 ENCOUNTER — PATIENT OUTREACH (OUTPATIENT)
Dept: ONCOLOGY | Facility: CLINIC | Age: 43
End: 2023-07-26
Payer: COMMERCIAL

## 2023-07-26 DIAGNOSIS — C50.911 MALIGNANT NEOPLASM OF RIGHT BREAST IN FEMALE, ESTROGEN RECEPTOR POSITIVE, UNSPECIFIED SITE OF BREAST (H): Primary | ICD-10-CM

## 2023-07-26 DIAGNOSIS — Z17.0 MALIGNANT NEOPLASM OF RIGHT BREAST IN FEMALE, ESTROGEN RECEPTOR POSITIVE, UNSPECIFIED SITE OF BREAST (H): Primary | ICD-10-CM

## 2023-07-26 RX ORDER — PROCHLORPERAZINE MALEATE 10 MG
10 TABLET ORAL EVERY 6 HOURS PRN
Qty: 30 TABLET | Refills: 2 | Status: SHIPPED | OUTPATIENT
Start: 2023-07-26 | End: 2023-12-04

## 2023-07-26 RX ORDER — ONDANSETRON 8 MG/1
8 TABLET, FILM COATED ORAL EVERY 8 HOURS PRN
Qty: 30 TABLET | Refills: 2 | Status: SHIPPED | OUTPATIENT
Start: 2023-07-26 | End: 2023-12-04

## 2023-07-26 RX ORDER — DEXAMETHASONE 4 MG/1
8 TABLET ORAL 2 TIMES DAILY WITH MEALS
Qty: 6 TABLET | Refills: 3 | Status: SHIPPED | OUTPATIENT
Start: 2023-07-26 | End: 2023-12-04

## 2023-07-26 NOTE — PROGRESS NOTES
[facility]: Cancer Care Plan of Care Education Note                                    Discussion with Patient:                                                      Writer connected over the phone to provide chemotherapy education on TC + Neulasta. ChemoCare notes on docetaxel and cyclophosphamide previously sent to Luda via Lean[employer]um.     Antiemetics: Pre-med emend and zofran, PRN zofran and compazine  Steroid use/side effect: Scheduled and pre-med dexamethasone  Medication understanding/side effect: Docetaxel and cyclophosphamide  Port concerns: Placement scheduled with Dr. Gore on 8/10/23, education previously provided to Luda by VALENTIN Saldaña.    Assessment:                                                      Assessment completed with:: Patient    Current living arrangement  I live in a private home with family    Plan of Care Education   Yearly learning assessment completed?: Yes (see Education tab)  Diagnosis:: IDC of left female breast  Does patient understand diagnosis?: Yes  Tx plan/regimen:: TC Q21 days x 4  Does patient understand treatment plan/regimen?: Yes  Preparing for treatment:: Reviewed treatment preparation information with patient (vascular access, day of chemo, visitor policy, what to bring, etc.)  Vascular access:: Port (Placement scheduled with Dr. Gore on 8/10/23)  Side effect education:: Diarrhea/Constipation;Fatigue;Hair loss;Mylosuppression;Nausea/Vomiting;Neuropathy;Sexual health;Skin changes;Mouth sores;Urinary;Lab value monitoring (anemia, neutropenia, thrombocytopenia)  Supportive services:: Social work;Oncology behavioral health;Nutrition  Transportation means:: Regular car  Safety/self care at home reviewed with patient:: Yes  Informal Support system:: Family  Coping - concerns/fears reviewed with patient:: Yes  Plan of Care:: RYLEE follow-up appointment;Lab appointment;Treatment schedule  When to call provider:: Bleeding;Uncontrolled  diarrhea/constipation;Increased shortness of breath;Uncontrolled nausea/vomiting;New/worsening pain;Shaking chills;Temperature >100.4F  Reasons for deferring treatment reviewed with patient:: Yes    Evaluation of Learning  Patient Education Provided: Yes  Readiness:: Acceptance  Method:: Booklet/Handout;Explanation;Teach Back  Response:: Verbalizes understanding    Intervention/Education provided during outreach:                                                       Writer reviewed general chemotherapy education with Luda including infusion procedures, what to wear/bring, nutrition, and infection prevention protocols. Luda will have transportation to and from her first infusion appointment by her , Glen. Writer introduced available ancillary services including oncology dietician, , and psychologist. Luda will get in touch with writer if any of these services could be of benefit to her.     Writer reviewed ChemoCare notes on docetaxel, cyclophosphamide, and neulasta with Luda including administration, pre-medications, mechanism of action, and common side effects. Luda verbalized understanding to alert the care team of any side effects including vomiting, diarrhea, neuropathy, or urinary changes. Luda was counseled on PRN zofran and compazine and will  the prescriptions at [facility] in [place]. Luda understands to take 8mg dexamethasone the night before and morning of taxotere infusion. Luda will bring the bottles to her first infusion appointment to further review with pharmacy staff.     Writer reviewed anticipated myelosuppression with Luda and proper infection prevention practices. Writer encouraged Luda to check her temperature frequently and to call the clinic right away with any fevers or signs/symptoms of infection. Writer educated on lab monitoring and the potential to defer or postpone treatments for patient safety if lab parameters are not met. Luda  verbalized understanding of all of the above.     Luda asked thoughtful questions which were all answered to her satisfaction. Writer formally introduced RNCC role and the role of the triage team. Writer confirmed that Luda has the clinic phone number to call with any further questions or concerns ahead of upcoming appointments. She understands that this number can be used 24/7 for triage.     Luda is scheduled for port placement with Dr. Gore on 8/10/23 and cycle 1 TC on 8/11/23 at 7:30am following follow-up with MARY Castañeda.     Cintia Griggs, RN, BSN, OCN  Nurse Care Coordinator  [facility]  P: [phone]     F: [phone]

## 2023-07-27 ENCOUNTER — PATIENT OUTREACH (OUTPATIENT)
Dept: ONCOLOGY | Facility: CLINIC | Age: 43
End: 2023-07-27
Payer: COMMERCIAL

## 2023-07-27 NOTE — PROGRESS NOTES
Writer emailed completed and MD-signed FMLA forms for Luda and , Glen, back to Luda per her request. Copy of Luda's FMLA paperwork was sent for scanning.    Cintia Griggs, RN, BSN, OCN  Nurse Care Coordinator  [facility]  P: [phone]     F: [phone]

## 2023-07-27 NOTE — PROGRESS NOTES
Writer received [facility] paperwork for Luda and , Glen. Writer will connect with Dr. Zeng for completion.    Cintia Griggs, RN, BSN, OCN  Nurse Care Coordinator  [facility] [place]  P: [phone]     F: [phone]

## 2023-08-02 ENCOUNTER — PATIENT OUTREACH (OUTPATIENT)
Dept: ONCOLOGY | Facility: CLINIC | Age: 43
End: 2023-08-02

## 2023-08-02 ENCOUNTER — OFFICE VISIT (OUTPATIENT)
Dept: FAMILY MEDICINE | Facility: CLINIC | Age: 43
End: 2023-08-02
Payer: COMMERCIAL

## 2023-08-02 VITALS
HEART RATE: 85 BPM | SYSTOLIC BLOOD PRESSURE: 108 MMHG | WEIGHT: 226 LBS | HEIGHT: 67 IN | BODY MASS INDEX: 35.47 KG/M2 | TEMPERATURE: 97.6 F | OXYGEN SATURATION: 98 % | RESPIRATION RATE: 18 BRPM | DIASTOLIC BLOOD PRESSURE: 62 MMHG

## 2023-08-02 DIAGNOSIS — C50.912 INVASIVE DUCTAL CARCINOMA OF BREAST, FEMALE, LEFT (H): ICD-10-CM

## 2023-08-02 DIAGNOSIS — Z01.818 PREOP GENERAL PHYSICAL EXAM: Primary | ICD-10-CM

## 2023-08-02 DIAGNOSIS — E66.812 CLASS 2 SEVERE OBESITY DUE TO EXCESS CALORIES WITH SERIOUS COMORBIDITY AND BODY MASS INDEX (BMI) OF 35.0 TO 35.9 IN ADULT (H): ICD-10-CM

## 2023-08-02 DIAGNOSIS — E66.01 CLASS 2 SEVERE OBESITY DUE TO EXCESS CALORIES WITH SERIOUS COMORBIDITY AND BODY MASS INDEX (BMI) OF 35.0 TO 35.9 IN ADULT (H): ICD-10-CM

## 2023-08-02 PROCEDURE — 99214 OFFICE O/P EST MOD 30 MIN: CPT | Performed by: NURSE PRACTITIONER

## 2023-08-02 NOTE — PATIENT INSTRUCTIONS
For informational purposes only. Not to replace the advice of your health care provider. Copyright   2003,  [place] [employer] [facility]. All rights reserved. Clinically reviewed by Lori Anaya MD. [employer] [id number] - REV .  Preparing for Your Surgery  Getting started  A nurse will call you to review your health history and instructions. They will give you an arrival time based on your scheduled surgery time. Please be ready to share:  Your doctor's clinic name and phone number  Your medical, surgical, and anesthesia history  A list of allergies and sensitivities  A list of medicines, including herbal treatments and over-the-counter drugs  Whether the patient has a legal guardian (ask how to send us the papers in advance)  Please tell us if you're pregnant--or if there's any chance you might be pregnant. Some surgeries may injure a fetus (unborn baby), so they require a pregnancy test. Surgeries that are safe for a fetus don't always need a test, and you can choose whether to have one.   If you have a child who's having surgery, please ask for a copy of Preparing for Your Child's Surgery.    Preparing for surgery  Within 10 to 30 days of surgery: Have a pre-op exam (sometimes called an H&P, or History and Physical). This can be done at a clinic or pre-operative center.  If you're having a , you may not need this exam. Talk to your care team.  At your pre-op exam, talk to your care team about all medicines you take. If you need to stop any medicines before surgery, ask when to start taking them again.  We do this for your safety. Many medicines can make you bleed too much during surgery. Some change how well surgery (anesthesia) drugs work.  Call your insurance company to let them know you're having surgery. (If you don't have insurance, call [phone].)  Call your clinic if there's any change in your health. This includes signs of a cold or flu (sore throat, runny nose, cough, rash, fever). It also  includes a scrape or scratch near the surgery site.  If you have questions on the day of surgery, call your hospital or surgery center.  Eating and drinking guidelines  For your safety: Unless your surgeon tells you otherwise, follow the guidelines below.  Eat and drink as usual until 8 hours before you arrive for surgery. After that, no food or milk.  Drink clear liquids until 2 hours before you arrive. These are liquids you can see through, like water, Gatorade, and Propel Water. They also include plain black coffee and tea (no cream or milk), candy, and breath mints. You can spit out gum when you arrive.  If you drink alcohol: Stop drinking it the night before surgery.  If your care team tells you to take medicine on the morning of surgery, it's okay to take it with a sip of water.  Preventing infection  Shower or bathe the night before and morning of your surgery. Follow the instructions your clinic gave you. (If no instructions, use regular soap.)  Don't shave or clip hair near your surgery site. We'll remove the hair if needed.  Don't smoke or vape the morning of surgery. You may chew nicotine gum up to 2 hours before surgery. A nicotine patch is okay.  Note: Some surgeries require you to completely quit smoking and nicotine. Check with your surgeon.  Your care team will make every effort to keep you safe from infection. We will:  Clean our hands often with soap and water (or an alcohol-based hand rub).  Clean the skin at your surgery site with a special soap that kills germs.  Give you a special gown to keep you warm. (Cold raises the risk of infection.)  Wear special hair covers, masks, gowns and gloves during surgery.  Give antibiotic medicine, if prescribed. Not all surgeries need antibiotics.  What to bring on the day of surgery  Photo ID and insurance card  Copy of your health care directive, if you have one  Glasses and hearing aids (bring cases)  You can't wear contacts during surgery  Inhaler and eye  drops, if you use them (tell us about these when you arrive)  CPAP machine or breathing device, if you use them  A few personal items, if spending the night  If you have . . .  A pacemaker, ICD (cardiac defibrillator) or other implant: Bring the ID card.  An implanted stimulator: Bring the remote control.  A legal guardian: Bring a copy of the certified (court-stamped) guardianship papers.  Please remove any jewelry, including body piercings. Leave jewelry and other valuables at home.  If you're going home the day of surgery  You must have a responsible adult drive you home. They should stay with you overnight as well.  If you don't have someone to stay with you, and you aren't safe to go home alone, we may keep you overnight. Insurance often won't pay for this.  After surgery  If it's hard to control your pain or you need more pain medicine, please call your surgeon's office.  Questions?   If you have any questions for your care team, list them here: _________________________________________________________________________________________________________________________________________________________________________ ____________________________________ ____________________________________ ____________________________________    How to Take Your Medication Before Surgery    - HOLD (do not take) your METFORMIN on the morning of surgery.  - STOP taking all vitamins and herbal supplements 14 days before surgery.  -HOLD Naltrexone 4 days before surgery.

## 2023-08-02 NOTE — PROGRESS NOTES
"Writer received the below from Luda via email:    \"I have also talked to my insurance ([facility]) about cold capping and they need prior authorization from the doctor before possibly approving to pay. Is this something you can help me with? The insurance company said the physician would need to fill out a PA form online or call their [facility] PA line. I am sure you all have way better things to do than deal with insurances, so I am sorry in advance!         Ade cold capping gave me the following information.         1. You must have your Diagnosis Code from your Physician. (ICD-10). Common codes are,     ICD-10 Z51-11 Anti Neoplastic Chemotherapy     ICD-10 L65.9 Chemo Induced Alopecia     ICD-10 D09.9 Malignant Neoplasms     ICD-10 C50.919 Malignant Neoplasm of breast         Step 2. Call your Insurance provider to check your benefits & obtain a claim form - It may be worth asking your provider if they require preauthorization for Scalp Cooling/Cold Capping. You will need to provide them with ICD-10 code from your Physician. Now you need to check if they will cover you for the following codes,      - Cooling Caps, Cold/Hot Wrap or Pack   - Wig any type, Cranial/Scalp Prothesis   - Durable Medical Equipment Misc.  48362 - Hot/Cold Packs  86348 - Constant Attendance Physical Medicine\"      Cintia Griggs, RN, BSN, OCN  Nurse Care Coordinator  [facility] [place]  P: [phone]     F: [phone]         "

## 2023-08-02 NOTE — PROGRESS NOTES
[address] [address]  Phone: [phone]  Primary Provider: Mulu Montalvo  Pre-op Performing Provider: BRINDA RAHMAN    PREOPERATIVE EVALUATION:  Today's date: 8/2/2023    Luda Rai is a 43 year old female who presents for a preoperative evaluation.      8/2/2023    10:28 AM   Additional Questions   Roomed by Mulu VASQUEZ       Surgical Information:  Surgery/Procedure: INSERTION, VASCULAR ACCESS PORT   Surgery Location: [facility]   Surgeon: Dr. Gore  Surgery Date: 8/10/23  Time of Surgery: 2:50 PM  Where patient plans to recover: At home with family  Fax number for surgical facility: Note does not need to be faxed, will be available electronically in Epic.    Assessment & Plan     The proposed surgical procedure is considered LOW risk.    Luda was seen today for pre-op exam.    Diagnoses and all orders for this visit:    Preop general physical exam  Invasive ductal carcinoma of breast, female, left (H)  Class 2 severe obesity due to excess calories with serious comorbidity and body mass index (BMI) of 35.0 to 35.9 in adult (H)       - No identified additional risk factors other than previously addressed    Antiplatelet or Anticoagulation Medication Instructions:   - Patient is on no antiplatelet or anticoagulation medications.    Additional Medication Instructions:   - metformin: HOLD day of surgery.   - naltrexone: HOLD 4 days prior to surgery.   - Herbal medications and vitamins: HOLD 14 days prior to surgery.    RECOMMENDATION:    APPROVAL GIVEN to proceed with proposed procedure, without further diagnostic evaluation.        Subjective       HPI related to upcoming procedure:    43-year-old premenopausal patient who had a routine screening mammogram done at the end of 04/2023.  She was having some intermittent discomfort on the right breast.  Her breast tissue is heterogeneously dense, and there was a focus of  asymmetry in the right posterior lateral breast on one of the views, so she was called back for a diagnostic mammogram and ultrasound.  This was done on 05/10/2023.  On ultrasound, she had at the 11 o'clock position of the right breast, 10 cm from the nipple, a mass, which measured 1.6 x 1.1 cm.  There was nothing seen in the right axilla.  A biopsy was recommended.  She went on to have a biopsy done on 05/22/2023 which revealed invasive ductal carcinoma at the 11 o'clock position of the right breast in the upper outer quadrant.  Grade was grade II, and her tumor was strongly positive for estrogen and progesterone receptors, HER-2 receptor negative.      She is here today for pre-operative exam prior to insertion of vascular access port on 8/10/23.           8/2/2023    10:20 AM   Preop Questions   1. Have you ever had a heart attack or stroke? No   2. Have you ever had surgery on your heart or blood vessels, such as a stent placement, a coronary artery bypass, or surgery on an artery in your head, neck, heart, or legs? No   3. Do you have chest pain with activity? No   4. Do you have a history of  heart failure? No   5. Do you currently have a cold, bronchitis or symptoms of other infection? No   6. Do you have a cough, shortness of breath, or wheezing? No   7. Do you or anyone in your family have previous history of blood clots? No   8. Do you or does anyone in your family have a serious bleeding problem such as prolonged bleeding following surgeries or cuts? No   9. Have you ever had problems with anemia or been told to take iron pills? YES - Normal hemoglobin, slightly low iron saturation level, planning to start iron supplement per bariatric surgery provider   10. Have you had any abnormal blood loss such as black, tarry or bloody stools, or abnormal vaginal bleeding? No   11. Have you ever had a blood transfusion? No   12. Are you willing to have a blood transfusion if it is medically needed before, during, or  after your surgery? Yes   13. Have you or any of your relatives ever had problems with anesthesia? No   14. Do you have sleep apnea, excessive snoring or daytime drowsiness? No   15. Do you have any artifical heart valves or other implanted medical devices like a pacemaker, defibrillator, or continuous glucose monitor? No   16. Do you have artificial joints? YES - right knee replacement in 2020   17. Are you allergic to latex? No   18. Is there any chance that you may be pregnant? No       Health Care Directive:  Patient does not have a Health Care Directive or Living Will: Discussed advance care planning with patient; however, patient declined at this time.    Preoperative Review of :   reviewed - no record of controlled substances prescribed.    Status of Chronic Conditions:  See problem list for active medical problems.  Problems all longstanding and stable, except as noted/documented.  See ROS for pertinent symptoms related to these conditions.    Review of Systems    CONSTITUTIONAL: NEGATIVE for fever, chills, change in weight  INTEGUMENTARY/SKIN: NEGATIVE for worrisome rashes, moles or lesions  EYES: NEGATIVE for vision changes or irritation  ENT/MOUTH: NEGATIVE for mouth and throat problems, POSITIVE for right ear pain - chronic issue, No URI symptoms  RESP: NEGATIVE for significant cough or SOB  CV: NEGATIVE for chest pain, palpitations or peripheral edema  GI: NEGATIVE for nausea, abdominal pain, heartburn, or change in bowel habits  : NEGATIVE for frequency, dysuria, or hematuria  MUSCULOSKELETAL: NEGATIVE for significant arthralgias or myalgia  NEURO: NEGATIVE for weakness, dizziness or paresthesias  ENDOCRINE: NEGATIVE for temperature intolerance, skin/hair changes  HEME: NEGATIVE for bleeding problems  PSYCHIATRIC: NEGATIVE for changes in mood or affect    Patient Active Problem List    Diagnosis Date Noted    Malignant neoplasm of right breast in female, estrogen receptor positive, unspecified  site of breast (H) 07/20/2023     Priority: Medium     Check 11.23 for f/u Zeng      Cervical cancer screening 06/29/2023     Priority: Medium     06/22/23 NIL Pap, Neg HR HPV Plan cotest in 3 years per provider, pt was Pt is newly diagnosed with breast CA.      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     Priority: Medium    Abdominal wall seroma, initial encounter 07/18/2022     Priority: Medium    Bariatric surgery status 06/23/2022     Priority: Medium     6/16/2022 RYGBS and Takedown of Nissen fundoplication LEL        Malnutrition following gastrointestinal surgery 06/23/2022     Priority: Medium    Fatty liver 03/06/2018     Priority: Medium    Primary osteoarthritis of left knee 06/07/2016     Priority: Medium    Dyslipidemia 01/12/2016     Priority: Medium    Class 1 obesity due to excess calories with serious comorbidity and body mass index (BMI) of 34.0 to 34.9 in adult 01/12/2016     Priority: Medium    Vitamin B12 deficiency 01/12/2016     Priority: Medium    Osteoarthrosis, unspecified whether generalized or localized, lower leg 10/30/2014     Priority: Medium     IMO Regulatory Load OCT 2020  Replacing diagnoses that were inactivated after the 10/1/2021 regulatory import.      PCOS (polycystic ovarian syndrome) 01/13/2014     Priority: Medium    Hypovitaminosis D 01/13/2014     Priority: Medium      Past Medical History:   Diagnosis Date    Bone and joint disord back, pelvis, leg complicat preg, childb, puerp     Knee replacement 2020    Endocrine problem     Pcos    Esophageal reflux 2005    Had Nissen Fundiplication 2006    Invasive ductal carcinoma of breast, female, left (H) 6/19/2023    Osteoporosis     Knee     Past Surgical History:    Procedure Laterality Date    BIOPSY, BREAST, WITH RADIOFREQUENCY IDENTIFICATION AND SENTINEL LYMPH NODE BIOPSY Left 2023    Procedure: LEFT BREAST LOCALIZED EXCISIONAL BIOPSY;  Surgeon: Farnaz Gore MD;  Location: [facility] OR     SECTION  10/2016    DISCECTOMY LUMBAR ANTERIOR      Diskectomy    LAPAROSCOPIC BYPASS GASTRIC N/A 2022    Procedure: laparoscopic gastric bypass, Laparoscopic reversal of Nissen fundoplication, laparoscopic lysis of adhesion, partial gastrectomy;  Surgeon: Panchito Zambrano MD;  Location: [facility] OR    LUMPECTOMY BREAST WITH SENTINEL NODE, COMBINED Right 2023    Procedure: RIGHT BREAST LUMPECTOMY , RIGHT SENTINEL NODE BIOPSY;  Surgeon: Farnaz Gore MD;  Location: [facility] OR    NISSEN FUNDOPLICATION      starting of Up's    SALPINGECTOMY Left 2019    Fallopian tube (including fimbria) with extensive intraluminal hemorrhage,    SALPINGECTOMY Right 2019    tubal pregnancy from IVF    TOTAL KNEE ARTHROPLASTY Right     Knee replacement     Current Outpatient Medications   Medication Sig Dispense Refill    BIOTIN PO Take 1 capsule by mouth daily      CALCIUM CITRATE PO Take 500 mg by mouth daily B-L-D      cyanocobalamin (CYANOCOBALAMIN) 1000 MCG/ML injection Inject 1 mL (1,000 mcg) Subcutaneous every 30 days 3 mL 3    metFORMIN (GLUCOPHAGE XR) 500 MG 24 hr tablet Take 1 tablet (500 mg) by mouth daily (with dinner) for 7 days, THEN 2 tablets (1,000 mg) daily (with dinner) for 7 days, THEN 3 tablets (1,500 mg) daily (with dinner) for 7 days, THEN 4 tablets (2,000 mg) daily (with dinner) for 7 days. 360 tablet 3    multivitamin w/minerals (THERA-VIT-M) tablet Take 1 tablet by mouth At Bedtime Abel Adv Chew      naltrexone (DEPADE/REVIA) 50 MG tablet Take 1/2 tablet 1-2 hours before biggest craving time for 7 days, then increase to a full tablet 30 tablet 3    ondansetron (ZOFRAN) 8 MG tablet Take 1 tablet (8 mg) by mouth every 8 hours as needed for nausea  "(vomiting) 30 tablet 2    prochlorperazine (COMPAZINE) 10 MG tablet Take 1 tablet (10 mg) by mouth every 6 hours as needed for nausea or vomiting 30 tablet 2    syringe/needle, disp, 25G X 1\" 3 ML MISC Use for B-12 injection 3 each 3    tuberculin-allergy syringes 27G X 1/2\" 1 ML MISC Use for B12 injections. 3 each 3    dexamethasone (DECADRON) 4 MG tablet Take 2 tablets (8 mg) by mouth 2 times daily (with meals) for 3 doses Start evening of Docetaxel infusion and continue for a total of 3 doses. 6 tablet 3       Allergies   Allergen Reactions    Asa [Aspirin]      Gastric bypass    Morphine And Related GI Disturbance    Nsaids Other (See Comments)     Avoid due to bariatric surgery 6/16/2022    Rocephin [Ceftriaxone] Hives        Social History     Tobacco Use    Smoking status: Never     Passive exposure: Never    Smokeless tobacco: Never   Substance Use Topics    Alcohol use: Not Currently     Comment: Rare     Family History   Problem Relation Age of Onset    Hypertension Mother     Diabetes Father     Cerebrovascular Disease Father     Breast Cancer No family hx of     Ovarian Cancer No family hx of      History   Drug Use Unknown         Objective     /62   Pulse 85   Temp 97.6  F (36.4  C)   Resp 18   Ht 1.702 m (5' 7\")   Wt 102.5 kg (226 lb)   LMP 07/31/2023 (Exact Date)   SpO2 98%   BMI 35.40 kg/m      Physical Exam     GENERAL APPEARANCE: healthy, alert and no distress     EYES: PERRL     HENT: ear canals and TM's normal and nose and mouth without ulcers or lesions     NECK: no adenopathy, no asymmetry, masses, or scars and thyroid normal to palpation     RESP: lungs clear to auscultation - no rales, rhonchi or wheezes     CV: regular rates and rhythm, normal S1 S2, no S3 or S4 and no murmur, click or rub     ABDOMEN:  soft, nontender, no HSM or masses and bowel sounds normal     MS: extremities normal- no gross deformities noted, no evidence of inflammation in joints, FROM in all " extremities.     SKIN: no suspicious lesions or rashes     NEURO: Normal strength and tone, sensory exam grossly normal, mentation intact and speech normal     PSYCH: mentation appears normal. and affect normal/bright     LYMPHATICS: No cervical adenopathy    Recent Labs   Lab Test 06/22/23  0827 06/09/23  1338 11/04/22  1120 06/17/22  0708 06/16/22  1625 06/08/22  1022 10/26/21  0734   HGB  --  12.2  --  12.4  --  13.5 13.5   PLT  --  171  --   --   --  156 171     --   --  135  --  137 136   POTASSIUM 4.2  --   --  3.8  --  4.0 4.0   CR 0.62  --   --   --  0.62 0.58 0.76   A1C  --   --  5.3  --   --   --  5.7*        Diagnostics:  No labs were ordered during this visit.   No EKG required, no history of coronary heart disease, significant arrhythmia, peripheral arterial disease or other structural heart disease.    Revised Cardiac Risk Index (RCRI):  The patient has the following serious cardiovascular risks for perioperative complications:   - No serious cardiac risks = 0 points     RCRI Interpretation: 0 points: Class I (very low risk - 0.4% complication rate)         Signed Electronically by: MAXIM Echeverria CNP  Copy of this evaluation report is provided to requesting physician.

## 2023-08-03 NOTE — PROGRESS NOTES
Writer submitted PA application for [employer] to [employer].         Decision ID # [id number] indicates no prior authorization is required. Luda was updated and given a copy the PA application and determination via [employer].    Cintia Griggs RN, BSN, OCN  Nurse Care Coordinator  [facility] [place]  P: [phone]     F: [phone]

## 2023-08-10 ENCOUNTER — APPOINTMENT (OUTPATIENT)
Dept: SURGERY | Facility: PHYSICIAN GROUP | Age: 43
End: 2023-08-10
Payer: COMMERCIAL

## 2023-08-10 ENCOUNTER — HOSPITAL ENCOUNTER (OUTPATIENT)
Facility: CLINIC | Age: 43
Discharge: HOME | End: 2023-08-10
Attending: SURGERY | Admitting: SURGERY
Payer: COMMERCIAL

## 2023-08-10 ENCOUNTER — ANESTHESIA EVENT (OUTPATIENT)
Dept: SURGERY | Facility: CLINIC | Age: 43
End: 2023-08-10
Payer: COMMERCIAL

## 2023-08-10 ENCOUNTER — ANESTHESIA (OUTPATIENT)
Dept: SURGERY | Facility: CLINIC | Age: 43
End: 2023-08-10
Payer: COMMERCIAL

## 2023-08-10 ENCOUNTER — APPOINTMENT (OUTPATIENT)
Dept: GENERAL RADIOLOGY | Facility: CLINIC | Age: 43
End: 2023-08-10
Attending: SURGERY
Payer: COMMERCIAL

## 2023-08-10 VITALS
TEMPERATURE: 98.2 F | OXYGEN SATURATION: 98 % | WEIGHT: 222.5 LBS | RESPIRATION RATE: 18 BRPM | SYSTOLIC BLOOD PRESSURE: 154 MMHG | HEART RATE: 80 BPM | BODY MASS INDEX: 34.92 KG/M2 | DIASTOLIC BLOOD PRESSURE: 86 MMHG | HEIGHT: 67 IN

## 2023-08-10 PROCEDURE — 710N000012 HC RECOVERY PHASE 2, PER MINUTE: Performed by: SURGERY

## 2023-08-10 PROCEDURE — 710N000009 HC RECOVERY PHASE 1, LEVEL 1, PER MIN: Performed by: SURGERY

## 2023-08-10 PROCEDURE — C1788 PORT, INDWELLING, IMP: HCPCS | Performed by: SURGERY

## 2023-08-10 PROCEDURE — 250N000009 HC RX 250: Performed by: SURGERY

## 2023-08-10 PROCEDURE — 258N000003 HC RX IP 258 OP 636: Performed by: NURSE ANESTHETIST, CERTIFIED REGISTERED

## 2023-08-10 PROCEDURE — 250N000011 HC RX IP 250 OP 636: Performed by: SURGERY

## 2023-08-10 PROCEDURE — 999N000179 XR SURGERY CARM FLUORO LESS THAN 5 MIN W STILLS: Mod: TC

## 2023-08-10 PROCEDURE — 250N000011 HC RX IP 250 OP 636: Mod: JZ | Performed by: NURSE ANESTHETIST, CERTIFIED REGISTERED

## 2023-08-10 PROCEDURE — 250N000013 HC RX MED GY IP 250 OP 250 PS 637: Performed by: ANESTHESIOLOGY

## 2023-08-10 PROCEDURE — 370N000017 HC ANESTHESIA TECHNICAL FEE, PER MIN: Performed by: SURGERY

## 2023-08-10 PROCEDURE — 36561 INSERT TUNNELED CV CATH: CPT | Performed by: SURGERY

## 2023-08-10 PROCEDURE — 76998 US GUIDE INTRAOP: CPT | Mod: 59 | Performed by: SURGERY

## 2023-08-10 PROCEDURE — 272N000001 HC OR GENERAL SUPPLY STERILE: Performed by: SURGERY

## 2023-08-10 PROCEDURE — 250N000009 HC RX 250: Performed by: NURSE ANESTHETIST, CERTIFIED REGISTERED

## 2023-08-10 PROCEDURE — 250N000025 HC SEVOFLURANE, PER MIN: Performed by: SURGERY

## 2023-08-10 PROCEDURE — 77001 FLUOROGUIDE FOR VEIN DEVICE: CPT | Mod: 59 | Performed by: SURGERY

## 2023-08-10 PROCEDURE — 360N000082 HC SURGERY LEVEL 2 W/ FLUORO, PER MIN: Performed by: SURGERY

## 2023-08-10 PROCEDURE — 999N000141 HC STATISTIC PRE-PROCEDURE NURSING ASSESSMENT: Performed by: SURGERY

## 2023-08-10 DEVICE — CATH PORT MRI POWERPORT 8FR SL 1808000
Type: IMPLANTABLE DEVICE | Site: CHEST | Status: NON-FUNCTIONAL
Removed: 2023-12-11

## 2023-08-10 RX ORDER — LIDOCAINE HYDROCHLORIDE 20 MG/ML
INJECTION, SOLUTION INFILTRATION; PERINEURAL PRN
Status: DISCONTINUED | OUTPATIENT
Start: 2023-08-10 | End: 2023-08-10

## 2023-08-10 RX ORDER — DEXAMETHASONE SODIUM PHOSPHATE 4 MG/ML
INJECTION, SOLUTION INTRA-ARTICULAR; INTRALESIONAL; INTRAMUSCULAR; INTRAVENOUS; SOFT TISSUE PRN
Status: DISCONTINUED | OUTPATIENT
Start: 2023-08-10 | End: 2023-08-10

## 2023-08-10 RX ORDER — ACETAMINOPHEN 325 MG/1
650 TABLET ORAL
Status: DISCONTINUED | OUTPATIENT
Start: 2023-08-10 | End: 2023-08-10 | Stop reason: HOSPADM

## 2023-08-10 RX ORDER — HYDROMORPHONE HCL IN WATER/PF 6 MG/30 ML
0.4 PATIENT CONTROLLED ANALGESIA SYRINGE INTRAVENOUS EVERY 5 MIN PRN
Status: DISCONTINUED | OUTPATIENT
Start: 2023-08-10 | End: 2023-08-10 | Stop reason: HOSPADM

## 2023-08-10 RX ORDER — OXYCODONE HYDROCHLORIDE 5 MG/1
5 TABLET ORAL
Status: DISCONTINUED | OUTPATIENT
Start: 2023-08-10 | End: 2023-08-10 | Stop reason: HOSPADM

## 2023-08-10 RX ORDER — HEPARIN SODIUM (PORCINE) LOCK FLUSH IV SOLN 100 UNIT/ML 100 UNIT/ML
SOLUTION INTRAVENOUS PRN
Status: DISCONTINUED | OUTPATIENT
Start: 2023-08-10 | End: 2023-08-10 | Stop reason: HOSPADM

## 2023-08-10 RX ORDER — SODIUM CHLORIDE, SODIUM LACTATE, POTASSIUM CHLORIDE, CALCIUM CHLORIDE 600; 310; 30; 20 MG/100ML; MG/100ML; MG/100ML; MG/100ML
INJECTION, SOLUTION INTRAVENOUS CONTINUOUS
Status: DISCONTINUED | OUTPATIENT
Start: 2023-08-10 | End: 2023-08-10 | Stop reason: HOSPADM

## 2023-08-10 RX ORDER — CLINDAMYCIN PHOSPHATE 900 MG/50ML
900 INJECTION, SOLUTION INTRAVENOUS SEE ADMIN INSTRUCTIONS
Status: DISCONTINUED | OUTPATIENT
Start: 2023-08-10 | End: 2023-08-10 | Stop reason: HOSPADM

## 2023-08-10 RX ORDER — PROPOFOL 10 MG/ML
INJECTION, EMULSION INTRAVENOUS PRN
Status: DISCONTINUED | OUTPATIENT
Start: 2023-08-10 | End: 2023-08-10

## 2023-08-10 RX ORDER — ONDANSETRON 2 MG/ML
4 INJECTION INTRAMUSCULAR; INTRAVENOUS EVERY 30 MIN PRN
Status: DISCONTINUED | OUTPATIENT
Start: 2023-08-10 | End: 2023-08-10 | Stop reason: HOSPADM

## 2023-08-10 RX ORDER — ONDANSETRON 4 MG/1
4 TABLET, ORALLY DISINTEGRATING ORAL EVERY 30 MIN PRN
Status: DISCONTINUED | OUTPATIENT
Start: 2023-08-10 | End: 2023-08-10 | Stop reason: HOSPADM

## 2023-08-10 RX ORDER — CLINDAMYCIN PHOSPHATE 900 MG/50ML
900 INJECTION, SOLUTION INTRAVENOUS
Status: COMPLETED | OUTPATIENT
Start: 2023-08-10 | End: 2023-08-10

## 2023-08-10 RX ORDER — LIDOCAINE 40 MG/G
CREAM TOPICAL
Status: DISCONTINUED | OUTPATIENT
Start: 2023-08-10 | End: 2023-08-10 | Stop reason: HOSPADM

## 2023-08-10 RX ORDER — ONDANSETRON 2 MG/ML
INJECTION INTRAMUSCULAR; INTRAVENOUS PRN
Status: DISCONTINUED | OUTPATIENT
Start: 2023-08-10 | End: 2023-08-10

## 2023-08-10 RX ORDER — ALBUTEROL SULFATE 0.83 MG/ML
2.5 SOLUTION RESPIRATORY (INHALATION) EVERY 4 HOURS PRN
Status: DISCONTINUED | OUTPATIENT
Start: 2023-08-10 | End: 2023-08-10 | Stop reason: HOSPADM

## 2023-08-10 RX ORDER — FENTANYL CITRATE 50 UG/ML
INJECTION, SOLUTION INTRAMUSCULAR; INTRAVENOUS PRN
Status: DISCONTINUED | OUTPATIENT
Start: 2023-08-10 | End: 2023-08-10

## 2023-08-10 RX ORDER — BUPIVACAINE HYDROCHLORIDE AND EPINEPHRINE 2.5; 5 MG/ML; UG/ML
INJECTION, SOLUTION EPIDURAL; INFILTRATION; INTRACAUDAL; PERINEURAL PRN
Status: DISCONTINUED | OUTPATIENT
Start: 2023-08-10 | End: 2023-08-10 | Stop reason: HOSPADM

## 2023-08-10 RX ORDER — FENTANYL CITRATE 50 UG/ML
25 INJECTION, SOLUTION INTRAMUSCULAR; INTRAVENOUS EVERY 5 MIN PRN
Status: DISCONTINUED | OUTPATIENT
Start: 2023-08-10 | End: 2023-08-10 | Stop reason: HOSPADM

## 2023-08-10 RX ORDER — FENTANYL CITRATE 50 UG/ML
50 INJECTION, SOLUTION INTRAMUSCULAR; INTRAVENOUS EVERY 5 MIN PRN
Status: DISCONTINUED | OUTPATIENT
Start: 2023-08-10 | End: 2023-08-10 | Stop reason: HOSPADM

## 2023-08-10 RX ORDER — HYDRALAZINE HYDROCHLORIDE 20 MG/ML
2.5-5 INJECTION INTRAMUSCULAR; INTRAVENOUS EVERY 10 MIN PRN
Status: DISCONTINUED | OUTPATIENT
Start: 2023-08-10 | End: 2023-08-10 | Stop reason: HOSPADM

## 2023-08-10 RX ORDER — OXYCODONE HYDROCHLORIDE 5 MG/1
10 TABLET ORAL
Status: DISCONTINUED | OUTPATIENT
Start: 2023-08-10 | End: 2023-08-10 | Stop reason: HOSPADM

## 2023-08-10 RX ORDER — HYDROMORPHONE HCL IN WATER/PF 6 MG/30 ML
0.2 PATIENT CONTROLLED ANALGESIA SYRINGE INTRAVENOUS EVERY 5 MIN PRN
Status: DISCONTINUED | OUTPATIENT
Start: 2023-08-10 | End: 2023-08-10 | Stop reason: HOSPADM

## 2023-08-10 RX ORDER — SODIUM CHLORIDE, SODIUM LACTATE, POTASSIUM CHLORIDE, CALCIUM CHLORIDE 600; 310; 30; 20 MG/100ML; MG/100ML; MG/100ML; MG/100ML
INJECTION, SOLUTION INTRAVENOUS CONTINUOUS PRN
Status: DISCONTINUED | OUTPATIENT
Start: 2023-08-10 | End: 2023-08-10

## 2023-08-10 RX ORDER — LABETALOL HYDROCHLORIDE 5 MG/ML
10 INJECTION, SOLUTION INTRAVENOUS
Status: DISCONTINUED | OUTPATIENT
Start: 2023-08-10 | End: 2023-08-10 | Stop reason: HOSPADM

## 2023-08-10 RX ADMIN — ONDANSETRON 4 MG: 2 INJECTION INTRAMUSCULAR; INTRAVENOUS at 16:02

## 2023-08-10 RX ADMIN — Medication 1 LOZENGE: at 16:59

## 2023-08-10 RX ADMIN — MIDAZOLAM 2 MG: 1 INJECTION INTRAMUSCULAR; INTRAVENOUS at 15:53

## 2023-08-10 RX ADMIN — SODIUM CHLORIDE, POTASSIUM CHLORIDE, SODIUM LACTATE AND CALCIUM CHLORIDE: 600; 310; 30; 20 INJECTION, SOLUTION INTRAVENOUS at 15:49

## 2023-08-10 RX ADMIN — FENTANYL CITRATE 50 MCG: 50 INJECTION, SOLUTION INTRAMUSCULAR; INTRAVENOUS at 16:46

## 2023-08-10 RX ADMIN — DEXAMETHASONE SODIUM PHOSPHATE 8 MG: 4 INJECTION, SOLUTION INTRA-ARTICULAR; INTRALESIONAL; INTRAMUSCULAR; INTRAVENOUS; SOFT TISSUE at 16:02

## 2023-08-10 RX ADMIN — PROPOFOL 100 MG: 10 INJECTION, EMULSION INTRAVENOUS at 16:03

## 2023-08-10 RX ADMIN — FENTANYL CITRATE 50 MCG: 50 INJECTION, SOLUTION INTRAMUSCULAR; INTRAVENOUS at 16:44

## 2023-08-10 RX ADMIN — FENTANYL CITRATE 100 MCG: 50 INJECTION, SOLUTION INTRAMUSCULAR; INTRAVENOUS at 16:02

## 2023-08-10 RX ADMIN — CLINDAMYCIN PHOSPHATE 900 MG: 900 INJECTION, SOLUTION INTRAVENOUS at 14:12

## 2023-08-10 RX ADMIN — PROPOFOL 200 MG: 10 INJECTION, EMULSION INTRAVENOUS at 16:02

## 2023-08-10 RX ADMIN — LIDOCAINE HYDROCHLORIDE 50 MG: 20 INJECTION, SOLUTION INFILTRATION; PERINEURAL at 16:02

## 2023-08-10 ASSESSMENT — ACTIVITIES OF DAILY LIVING (ADL)
ADLS_ACUITY_SCORE: 35

## 2023-08-10 NOTE — ANESTHESIA POSTPROCEDURE EVALUATION
Patient: Luda Rai    Procedure: Procedure(s):  INSERTION, VASCULAR ACCESS PORT       Anesthesia Type:  General    Note:  Disposition: Outpatient   Postop Pain Control: Uneventful            Sign Out: Well controlled pain   PONV: No   Neuro/Psych: Uneventful            Sign Out: Acceptable/Baseline neuro status   Airway/Respiratory: Uneventful            Sign Out: Acceptable/Baseline resp. status   CV/Hemodynamics: Uneventful            Sign Out: Acceptable CV status   Other NRE: NONE   DID A NON-ROUTINE EVENT OCCUR? No           Last vitals:  Vitals Value Taken Time   /82 08/10/23 1705   Temp     Pulse 87 08/10/23 1710   Resp 14 08/10/23 1710   SpO2 96 % 08/10/23 1710   Vitals shown include unvalidated device data.    Electronically Signed By: Matheus Stapleton MD  August 10, 2023  5:14 PM

## 2023-08-10 NOTE — ANESTHESIA CARE TRANSFER NOTE
Patient: Luda Rai    Procedure: Procedure(s):  INSERTION, VASCULAR ACCESS PORT       Diagnosis: Invasive ductal carcinoma of breast, female, left (H) [C50.912]  Diagnosis Additional Information: No value filed.    Anesthesia Type:   General     Note:    Oropharynx: oropharynx clear of all foreign objects  Level of Consciousness: awake  Oxygen Supplementation: face mask  Level of Supplemental Oxygen (L/min / FiO2): 6  Independent Airway: airway patency satisfactory and stable  Dentition: dentition unchanged  Vital Signs Stable: post-procedure vital signs reviewed and stable  Report to RN Given: handoff report given  Patient transferred to: PACU    Handoff Report: Identifed the Patient, Identified the Reponsible Provider, Reviewed the pertinent medical history, Discussed the surgical course, Reviewed Intra-OP anesthesia mangement and issues during anesthesia, Set expectations for post-procedure period and Allowed opportunity for questions and acknowledgement of understanding      Vitals:  Vitals Value Taken Time   /92 08/10/23 1647   Temp 37    Pulse 79 08/10/23 1650   Resp 17 08/10/23 1650   SpO2 100 % 08/10/23 1650   Vitals shown include unvalidated device data.    Electronically Signed By: MAXIM Oliva CRNA  August 10, 2023  4:51 PM

## 2023-08-10 NOTE — DISCHARGE INSTRUCTIONS
HOME CARE FOLLOWING PORT PLACEMENT  LUCY Patel, KERRI Joel R. O Donnell, J. Shaheen    INCISIONAL CARE:  If you have a Dermabond dressing (a type of skin glue), you may shower immediately.  Sutures which are beneath the skin will absorb and do not need to be removed.  If present, leave Dermabond glue in place until it wears/flakes off.  You may expect a small amount of drainage from your incision.  A lump/ridge under the incision is normal and will gradually resolve.  If it becomes red or very uncomfortable, contact the nurse at your surgeon's office to discuss whether this needs to be evaluated.    ACTIVITY:  Cautiously resume exercise and strenuous activities such as jogging, tennis, aerobics, etc. Also, be careful of stretching activities which affect the area of surgery for two weeks.    DIET:  Start with liquids and gradually resume your regular diet as tolerated.  Increased fluid intake is recommended. While taking pain medications, consider use of a stool softener, increase your fiber in your diet, or add a fiber supplement (like Metamucil, Citrucel) to help prevent constipation - a possible side effect of pain medications.    DISCOMFORT:  Local anesthetic placed at surgery should provide relief for 4-8 hours.  Begin taking pain pills before discomfort is severe.  Take the pain medication with some food, when possible, to minimize side effects.  Intermittent use of ice packs may help during the first 1-3 weeks after surgery.  Expect gradual improvement.    - Ibuprofen (motrin) 400-600mg every 6 hours (max 2,400mg per day)   - Tylenol (acetaminophen) 500-1000mg every 6 hours (max 4,000mg per day)  - Take with food if GI upset occurs  DO NOT TAKE Anti-inflammatory medications such as ibuprofen if your primary physician has advised against doing so, or if you have acid reflux, ulcer, or bleeding disorder, or take blood-thinner medications.  Call your primary physician or the surgery  office if you have medication questions.      FOLLOW-UP AFTER SURGERY:  -follow up with your oncology team as scheduled    -CONTACT US IF THE FOLLOWING DEVELOPS:   1. A fever that is above 101     2. Increased redness, warmth, drainage, bleeding, or swelling.   3. Pain that is not relieved by rest/ice and your prescription.   4.  Increasing pain after 48 hours.   5. Drainage that is thick, cloudy, yellow, green or white.   6. Any other questions or concerns.      FREQUENTLY ASKED QUESTIONS:    Q:  How should my incision look?    A:  Normally your incision will appear slightly swollen with light redness directly along the incision itself as it heals.  It may feel like a bump or ridge as the healing/scarring happens, and over time (3-4 months) this bump or ridge feeling should slowly go away.  In general, clear or pink watery drainage can be normal at first as your incision heals, but should decrease over time.    Q:  How do I know if my incision is infected?  A:  Look at your incision for signs of infection, like redness around the incision spreading to surrounding skin, or drainage of cloudy or foul-smelling drainage.  If you feel warm, check your temperature to see if you are running a fever.    **If any of these things occur, please notify the nurse at our office or the oncology office.  We may need you to come into the office for an incision check.      Q:  How do I take care of my incision?  A:  Small pieces of tape called  steri-strips  may be present directly overlying your incision; these may be removed 10 days after surgery unless otherwise specified by your surgeon.  If these tapes start to loosen at the ends, you may trim them back until they fall off or are removed.    A:  If you had  Dermabond  tissue glue used as a dressing (this causes your incision to look shiny with a clear covering over it) - This type of dressing wears off with time and does not require more dressings over the top unless it is  draining around the glue as it wears off.  Do not apply ointments or lotions over the incisions until the glue has completely worn off.    Q:  There is a piece of tape or a sticky  lead  still on my skin.  Can I remove this?  A:  Sometimes the sticky  leads  used for monitoring during surgery or for evaluation in the emergency department are not all removed while you are in the hospital.  These sometimes have a tab or metal dot on them.  You can easily remove these on your own, like taking off a band-aid.  If there is a gel substance under the  lead , simply wipe/clean it off with a washcloth or paper towel.      Q:  What can I do to minimize constipation (very hard stools, or lack of stools)?  A:  Stay well hydrated.  Increase your dietary fiber intake or take a fiber supplement -with plenty of water.  Walk around frequently.  You may consider an over-the-counter stool-softener.  Your Pharmacist can assist you with choosing one that is stocked at your pharmacy.  Constipation is also one of the most common side effects of pain medication.  If you are using pain medication, be pro-active and try to PREVENT problems with constipation by taking the steps above BEFORE constipation becomes a problem.    Q:  Why am I having a hard time sleeping now that I am at home?  A:  Many medications you receive while you are in the hospital can impact your sleep for a number of days after your surgery/hospitalization.  Decreased level of activity and naps during the day may also make sleeping at night difficult.  Try to minimize day-time naps, and get up frequently during the day to walk around your home during your recovery time.  Sleep aides may be of some help, but are not recommended for long-term use.      Q:  I am having some back discomfort.  What should I do?  A:  This may be related to certain positioning that was required for your surgery, extended periods of time in bed, or other changes in your overall activity level.   You may try ice, heat, acetaminophen, or ibuprofen to treat this temporarily.  Note that many pain medications have acetaminophen in them and would state this on the prescription bottle.  Be sure not to exceed the maximum of 4000mg per day of acetaminophen.     **If the pain you are having does not resolve, is severe, or is a flare of back pain you have had on other occasions prior to surgery, please contact your primary physician for further recommendations or for an appointment to be examined at their office.    Q:  Why am I having headaches?  A:  Headaches can be caused by many things:  caffeine withdrawal, use of pain meds, dehydration, high blood pressure, lack of sleep, over-activity/exhaustion, flare-up of usual migraine headaches.  If you feel this is related to muscle tension (a band-like feeling around the head, or a pressure at the low-back of the head) you may try ice or heat to this area.  You may need to drink more fluids (try electrolyte drink like Gatorade), rest, or take your usual migraine medications.   **If your headaches do not resolve, worsen, are accompanied by other symptoms, or if your blood pressure is high, please call your primary physician for recommendation and/or examination.    Q:  I am unable to urinate.  What do I do?  A:  A small percentage of people can have difficulty urinating initially after surgery.  This includes being able to urinate only a very small amount at a time and feeling discomfort or pressure in the very low abdomen.  This is called  urinary retention , and is actually an urgent situation.  Proceed to your nearest Emergency department for evaluation (not an Urgent Care Center).  Sometimes the bladder does not work correctly after certain medications you receive during surgery, or related to certain procedures.  You may need to have a catheter placed until your bladder recovers.  When planning to go to an Emergency department, it may help to call the ER to let them  know you are coming in for this problem after a surgery.  This may help you get in quicker to be evaluated.  **If you have symptoms of a urinary tract infection, please contact your primary physician for the proper evaluation and treatment.        If you have other questions, please call the office Monday thru Friday between 8am and 5pm to discuss with the nurse or physician assistant.  [phone]    There is a surgeon ON CALL on weekday evenings and over the weekend in case of urgent need only, and may be contacted at the same number.    If you are having an emergency, call 911 or proceed to your nearest emergency department.      GENERAL ANESTHESIA OR SEDATION ADULT DISCHARGE INSTRUCTIONS   SPECIAL PRECAUTIONS FOR 24 HOURS AFTER SURGERY    IT IS NOT UNUSUAL TO FEEL LIGHT-HEADED OR FAINT, UP TO 24 HOURS AFTER SURGERY OR WHILE TAKING PAIN MEDICATION.  IF YOU HAVE THESE SYMPTOMS; SIT FOR A FEW MINUTES BEFORE STANDING AND HAVE SOMEONE ASSIST YOU WHEN YOU GET UP TO WALK OR USE THE BATHROOM.    YOU SHOULD REST AND RELAX FOR THE NEXT 24 HOURS AND YOU MUST MAKE ARRANGEMENTS TO HAVE SOMEONE STAY WITH YOU FOR AT LEAST 24 HOURS AFTER YOUR DISCHARGE.  AVOID HAZARDOUS AND STRENUOUS ACTIVITIES.  DO NOT MAKE IMPORTANT DECISIONS FOR 24 HOURS.    DO NOT DRIVE ANY VEHICLE OR OPERATE MECHANICAL EQUIPMENT FOR 24 HOURS FOLLOWING THE END OF YOUR SURGERY.  EVEN THOUGH YOU MAY FEEL NORMAL, YOUR REACTIONS MAY BE AFFECTED BY THE MEDICATION YOU HAVE RECEIVED.    DO NOT DRINK ALCOHOLIC BEVERAGES FOR 24 HOURS FOLLOWING YOUR SURGERY.    DRINK CLEAR LIQUIDS (APPLE JUICE, GINGER ALE, 7-UP, BROTH, ETC.).  PROGRESS TO YOUR REGULAR DIET AS YOU FEEL ABLE.    YOU MAY HAVE A DRY MOUTH, A SORE THROAT, MUSCLES ACHES OR TROUBLE SLEEPING.  THESE SHOULD GO AWAY AFTER 24 HOURS.    CALL YOUR DOCTOR FOR ANY OF THE FOLLOWING:  SIGNS OF INFECTION (FEVER, GROWING TENDERNESS AT THE SURGERY SITE, A LARGE AMOUNT OF DRAINAGE OR BLEEDING, SEVERE PAIN,  FOUL-SMELLING DRAINAGE, REDNESS OR SWELLING.    IT HAS BEEN OVER 8 TO 10 HOURS SINCE SURGERY AND YOU ARE STILL NOT ABLE TO URINATE (PASS WATER).      Maximum acetaminophen (Tylenol) dose from all sources should not exceed 4 grams (4000 mg) per day.

## 2023-08-10 NOTE — OP NOTE
General Surgery Operative Note     Pre-operative diagnosis: Invasive ductal carcinoma of breast, female, right (H) [C50.912]   Post-operative diagnosis: Same    Procedure: Left internal jugular Power Port placement    Surgeon: Farnaz Gore MD   Assistant(s): NONE   Anesthesia: General    Estimated blood loss:   3 cc           INDICATION FOR OPERATION: Patient is a 43 year old female with right breast cancer s/p lumpectomy who is planned to undergo adjuvant chemotherapy and presents today for port placement. She has signed informed consent after discussing risks and benefits of the procedure.    DESCRIPTION OF PROCEDURE:    The patient was placed supine on the operating table. IV anesthesia was induced. After  reaffirmation  of  the  patient's  identity,  the  procedure  to  be  performed,  and  the  site  of  the  procedure,  ultrasound  was  brought  on  to  the  field  and  the  left  internal  jugular  vein  was  assessed.    This  was  found  to  be  large  and  easily  compressible.    An  introducer  needle  was  inserted  into  the  internal  jugular  vein  using  ultrasound  guidance.    Entry  into  the  vein  was  confirmed  by  the  presence  of  dark,  nonpulsatile  blood.    A  guide  wire  was  advanced  through  the needle, and the  introducer  needle  was  removed,  leaving  the  wire  in  place.    Fluoroscopy  was  brought  onto  the  field  and  used  to  confirm  the  passage  of  the  wire  through  the  superior  vena  cava  and  into  the  right  ventricle.            The  proposed  pocket  site  in  the  left  upper  chest  was  infiltrated  with  local  anesthetic,  as  was  the  proposed  tunnel  path.    A  transverse  skin  incision  was  made  with  a  skin  knife.    This  was  taken  down  into  the  subcutaneous  adipose  to  the  level  of  the  fascia  with  electrocautery.    The  pocket  was  then  created  inferior  to  the  incision  with  electrocautery  and  blunt   dissection.    The  Port-A-Cath  catheter  was  brought  onto  the  field  and  passed  from  the  pocket  to  the  insertion  site  with  a  tunneling  trochar.    Fluoroscopy  was  used  to  visualize  passage  of  the  dilator-insertion  sheath  sytem  over  the  wire  and  into  the  superior  vena  cava.    The  dilator  and  wire  were  removed,  leaving  the  split-sheath  in  place.    The  catheter  was  passed  through  the  sheath  and,  using  fluoroscopic  guidance,  positioned  in  the  lower  superior  vena  cava.    The  catheter  was  tested  and  was  found  to  aspirate  and  flush  easily.          The  external  end  of  the  catheter  was  attached  to  the  port.    The  sytem  was  tested  for  patency  and  found  to  be  patent.    It  was  locked  with  heparinized  saline.   The  pocket  incision  was  closed  with  3-0  Vicryl  subdermal  stitches  and  a  running  4-0  monocryl  subcuticular  stitch.    The  neck  stab  incision  was  closed  with  a  single  interrupted  4-0 monocryl subcuticular stitch.    The  skin  was  cleaned  and  dried,  and  the  incisions  were  dressed  with  dermabond.    The  patient  was  then  roused  and  transferred  to  the  Post  Anesthesia  Care  Unit  in  stable  condition.  There were no complications. Sponge  and  needle  counts  were  correct  on  two  occasions  prior  to  the  completion  of  the  procedure.        Farnaz Gore MD

## 2023-08-10 NOTE — ANESTHESIA PREPROCEDURE EVALUATION
Anesthesia Pre-Procedure Evaluation    Patient: Luda Rai   MRN: [id number] : 1980        Procedure : Procedure(s):  INSERTION, VASCULAR ACCESS PORT          Past Medical History:   Diagnosis Date    Bone and joint disord back, pelvis, leg complicat preg, childb, puerp     Knee replacement     Endocrine problem     Pcos    Esophageal reflux     Had Nissen Fundiplication     Invasive ductal carcinoma of breast, female, left (H) 2023    Osteoporosis     Knee      Past Surgical History:   Procedure Laterality Date    BIOPSY, BREAST, WITH RADIOFREQUENCY IDENTIFICATION AND SENTINEL LYMPH NODE BIOPSY Left 2023    Procedure: LEFT BREAST LOCALIZED EXCISIONAL BIOPSY;  Surgeon: Farnaz Gore MD;  Location: [facility] OR     SECTION  10/2016    DISCECTOMY LUMBAR ANTERIOR      Diskectomy    LAPAROSCOPIC BYPASS GASTRIC N/A 2022    Procedure: laparoscopic gastric bypass, Laparoscopic reversal of Nissen fundoplication, laparoscopic lysis of adhesion, partial gastrectomy;  Surgeon: Panchito Zambrano MD;  Location: [facility] OR    LUMPECTOMY BREAST WITH SENTINEL NODE, COMBINED Right 2023    Procedure: RIGHT BREAST LUMPECTOMY , RIGHT SENTINEL NODE BIOPSY;  Surgeon: Farnaz Gore MD;  Location: [facility] OR    NISSEN FUNDOPLICATION      starting of Up's    SALPINGECTOMY Left 2019    Fallopian tube (including fimbria) with extensive intraluminal hemorrhage,    SALPINGECTOMY Right 2019    tubal pregnancy from IVF    TOTAL KNEE ARTHROPLASTY Right     Knee replacement      Allergies   Allergen Reactions    Asa [Aspirin]      Gastric bypass    Morphine And Related GI Disturbance    Nsaids Other (See Comments)     Avoid due to bariatric surgery 2022    Rocephin [Ceftriaxone] Hives      Social History     Tobacco Use    Smoking status: Never     Passive exposure: Never    Smokeless tobacco: Never   Substance Use Topics    Alcohol use: Not Currently     Comment: Rare      Wt  Readings from Last 1 Encounters:   08/10/23 100.9 kg (222 lb 8 oz)        Anesthesia Evaluation            ROS/MED HX  ENT/Pulmonary:  - neg pulmonary ROS     Neurologic:       Cardiovascular:  - neg cardiovascular ROS     METS/Exercise Tolerance:     Hematologic:       Musculoskeletal:       GI/Hepatic: Comment: History of bariatric surgery    (+) GERD,                   Renal/Genitourinary:       Endo:     (+)               Obesity,       Psychiatric/Substance Use:       Infectious Disease:       Malignancy:   (+) Malignancy, History of Breast.    Other:            Physical Exam    Airway        Mallampati: II   TM distance: > 3 FB   Neck ROM: full   Mouth opening: > 3 cm    Respiratory Devices and Support         Dental           Cardiovascular   cardiovascular exam normal          Pulmonary   pulmonary exam normal                OUTSIDE LABS:  CBC:   Lab Results   Component Value Date    WBC 6.2 06/09/2023    WBC 6.0 06/08/2022    HGB 12.2 06/09/2023    HGB 12.4 06/17/2022    HCT 38.3 06/09/2023    HCT 40.6 06/08/2022     06/09/2023     06/08/2022     BMP:   Lab Results   Component Value Date     06/22/2023     06/17/2022    POTASSIUM 4.2 06/22/2023    POTASSIUM 3.8 06/17/2022    CHLORIDE 103 06/22/2023    CHLORIDE 104 06/17/2022    CO2 27 06/22/2023    CO2 28 06/17/2022    BUN 10.1 06/22/2023    BUN 11 06/08/2022    CR 0.62 06/22/2023    CR 0.62 06/16/2022    GLC 93 06/22/2023     (H) 06/17/2022     COAGS: No results found for: PTT, INR, FIBR  POC:   Lab Results   Component Value Date    HCG Negative 06/16/2022     HEPATIC:   Lab Results   Component Value Date    ALBUMIN 4.2 06/22/2023    PROTTOTAL 7.0 06/22/2023    ALT 8 06/22/2023    AST 16 06/22/2023    ALKPHOS 67 06/22/2023    BILITOTAL 0.8 06/22/2023     OTHER:   Lab Results   Component Value Date    A1C 5.3 11/04/2022    TAY 9.1 06/22/2023    LIPASE 227 05/18/2020    AMYLASE 44 05/18/2020    TSH 1.16 06/22/2023    CRP 13.0  (H) 05/18/2020    SED 17 05/18/2020       Anesthesia Plan    ASA Status:  2    NPO Status:  NPO Appropriate    Anesthesia Type: General.     - Airway: LMA   Induction: Intravenous.   Maintenance: Balanced.        Consents    Anesthesia Plan(s) and associated risks, benefits, and realistic alternatives discussed. Questions answered and patient/representative(s) expressed understanding.     - Discussed:     - Discussed with:  Patient            Postoperative Care    Pain management: IV analgesics, Oral pain medications, Multi-modal analgesia.   PONV prophylaxis: Ondansetron (or other 5HT-3), Dexamethasone or Solumedrol     Comments:                Erica Lyn MD

## 2023-08-11 ENCOUNTER — ONCOLOGY VISIT (OUTPATIENT)
Dept: ONCOLOGY | Facility: CLINIC | Age: 43
End: 2023-08-11
Attending: PHYSICIAN ASSISTANT
Payer: COMMERCIAL

## 2023-08-11 ENCOUNTER — INFUSION THERAPY VISIT (OUTPATIENT)
Dept: INFUSION THERAPY | Facility: CLINIC | Age: 43
End: 2023-08-11
Attending: PHYSICIAN ASSISTANT
Payer: COMMERCIAL

## 2023-08-11 VITALS
DIASTOLIC BLOOD PRESSURE: 72 MMHG | OXYGEN SATURATION: 96 % | WEIGHT: 225.5 LBS | HEART RATE: 87 BPM | TEMPERATURE: 98.3 F | RESPIRATION RATE: 16 BRPM | SYSTOLIC BLOOD PRESSURE: 117 MMHG | BODY MASS INDEX: 35.31 KG/M2

## 2023-08-11 VITALS — BODY MASS INDEX: 35.19 KG/M2 | WEIGHT: 224.2 LBS | HEIGHT: 67 IN

## 2023-08-11 DIAGNOSIS — Z17.0 MALIGNANT NEOPLASM OF RIGHT BREAST IN FEMALE, ESTROGEN RECEPTOR POSITIVE, UNSPECIFIED SITE OF BREAST (H): Primary | ICD-10-CM

## 2023-08-11 DIAGNOSIS — C50.911 MALIGNANT NEOPLASM OF RIGHT BREAST IN FEMALE, ESTROGEN RECEPTOR POSITIVE, UNSPECIFIED SITE OF BREAST (H): Primary | ICD-10-CM

## 2023-08-11 LAB
ALBUMIN SERPL BCG-MCNC: 4.2 G/DL (ref 3.5–5.2)
ALP SERPL-CCNC: 79 U/L (ref 35–104)
ALT SERPL W P-5'-P-CCNC: 10 U/L (ref 0–50)
ANION GAP SERPL CALCULATED.3IONS-SCNC: 11 MMOL/L (ref 7–15)
AST SERPL W P-5'-P-CCNC: 15 U/L (ref 0–45)
BASOPHILS # BLD AUTO: 0 10E3/UL (ref 0–0.2)
BASOPHILS NFR BLD AUTO: 0 %
BILIRUB SERPL-MCNC: 0.9 MG/DL
BUN SERPL-MCNC: 6.8 MG/DL (ref 6–20)
CALCIUM SERPL-MCNC: 9.3 MG/DL (ref 8.6–10)
CHLORIDE SERPL-SCNC: 101 MMOL/L (ref 98–107)
CREAT SERPL-MCNC: 0.52 MG/DL (ref 0.51–0.95)
DEPRECATED HCO3 PLAS-SCNC: 24 MMOL/L (ref 22–29)
EOSINOPHIL # BLD AUTO: 0 10E3/UL (ref 0–0.7)
EOSINOPHIL NFR BLD AUTO: 0 %
ERYTHROCYTE [DISTWIDTH] IN BLOOD BY AUTOMATED COUNT: 13 % (ref 10–15)
GFR SERPL CREATININE-BSD FRML MDRD: >90 ML/MIN/1.73M2
GLUCOSE SERPL-MCNC: 195 MG/DL (ref 70–99)
HCT VFR BLD AUTO: 37.9 % (ref 35–47)
HGB BLD-MCNC: 12.5 G/DL (ref 11.7–15.7)
IMM GRANULOCYTES # BLD: 0.1 10E3/UL
IMM GRANULOCYTES NFR BLD: 1 %
LYMPHOCYTES # BLD AUTO: 1.1 10E3/UL (ref 0.8–5.3)
LYMPHOCYTES NFR BLD AUTO: 11 %
MCH RBC QN AUTO: 28.1 PG (ref 26.5–33)
MCHC RBC AUTO-ENTMCNC: 33 G/DL (ref 31.5–36.5)
MCV RBC AUTO: 85 FL (ref 78–100)
MONOCYTES # BLD AUTO: 0.3 10E3/UL (ref 0–1.3)
MONOCYTES NFR BLD AUTO: 3 %
NEUTROPHILS # BLD AUTO: 8.4 10E3/UL (ref 1.6–8.3)
NEUTROPHILS NFR BLD AUTO: 85 %
NRBC # BLD AUTO: 0 10E3/UL
NRBC BLD AUTO-RTO: 0 /100
PLATELET # BLD AUTO: 156 10E3/UL (ref 150–450)
POTASSIUM SERPL-SCNC: 3.9 MMOL/L (ref 3.4–5.3)
PROT SERPL-MCNC: 6.8 G/DL (ref 6.4–8.3)
RBC # BLD AUTO: 4.45 10E6/UL (ref 3.8–5.2)
SODIUM SERPL-SCNC: 136 MMOL/L (ref 136–145)
WBC # BLD AUTO: 9.8 10E3/UL (ref 4–11)

## 2023-08-11 PROCEDURE — 96367 TX/PROPH/DG ADDL SEQ IV INF: CPT

## 2023-08-11 PROCEDURE — 96375 TX/PRO/DX INJ NEW DRUG ADDON: CPT

## 2023-08-11 PROCEDURE — G0463 HOSPITAL OUTPT CLINIC VISIT: HCPCS | Mod: 25 | Performed by: PHYSICIAN ASSISTANT

## 2023-08-11 PROCEDURE — 96413 CHEMO IV INFUSION 1 HR: CPT

## 2023-08-11 PROCEDURE — 80053 COMPREHEN METABOLIC PANEL: CPT | Performed by: INTERNAL MEDICINE

## 2023-08-11 PROCEDURE — 99214 OFFICE O/P EST MOD 30 MIN: CPT | Performed by: PHYSICIAN ASSISTANT

## 2023-08-11 PROCEDURE — 258N000003 HC RX IP 258 OP 636: Performed by: INTERNAL MEDICINE

## 2023-08-11 PROCEDURE — 96417 CHEMO IV INFUS EACH ADDL SEQ: CPT

## 2023-08-11 PROCEDURE — 250N000011 HC RX IP 250 OP 636: Mod: JZ | Performed by: INTERNAL MEDICINE

## 2023-08-11 PROCEDURE — 85025 COMPLETE CBC W/AUTO DIFF WBC: CPT | Performed by: INTERNAL MEDICINE

## 2023-08-11 PROCEDURE — 36415 COLL VENOUS BLD VENIPUNCTURE: CPT | Performed by: INTERNAL MEDICINE

## 2023-08-11 RX ORDER — LIDOCAINE/PRILOCAINE 2.5 %-2.5%
CREAM (GRAM) TOPICAL PRN
Qty: 30 G | Refills: 0 | Status: SHIPPED | OUTPATIENT
Start: 2023-08-11 | End: 2023-12-04

## 2023-08-11 RX ORDER — HEPARIN SODIUM (PORCINE) LOCK FLUSH IV SOLN 100 UNIT/ML 100 UNIT/ML
5 SOLUTION INTRAVENOUS
Status: DISCONTINUED | OUTPATIENT
Start: 2023-08-11 | End: 2023-08-11 | Stop reason: HOSPADM

## 2023-08-11 RX ORDER — ONDANSETRON 2 MG/ML
8 INJECTION INTRAMUSCULAR; INTRAVENOUS ONCE
Status: COMPLETED | OUTPATIENT
Start: 2023-08-11 | End: 2023-08-11

## 2023-08-11 RX ADMIN — ONDANSETRON 8 MG: 2 INJECTION INTRAMUSCULAR; INTRAVENOUS at 09:11

## 2023-08-11 RX ADMIN — DOCETAXEL 160 MG: 20 INJECTION, SOLUTION, CONCENTRATE INTRAVENOUS at 09:40

## 2023-08-11 RX ADMIN — Medication 5 ML: at 11:28

## 2023-08-11 RX ADMIN — FOSAPREPITANT: 150 INJECTION, POWDER, LYOPHILIZED, FOR SOLUTION INTRAVENOUS at 09:11

## 2023-08-11 RX ADMIN — CYCLOPHOSPHAMIDE 1285 MG: 1 INJECTION, POWDER, FOR SOLUTION INTRAVENOUS; ORAL at 10:49

## 2023-08-11 RX ADMIN — SODIUM CHLORIDE 250 ML: 9 INJECTION, SOLUTION INTRAVENOUS at 09:11

## 2023-08-11 ASSESSMENT — PAIN SCALES - GENERAL: PAINLEVEL: NO PAIN (0)

## 2023-08-11 NOTE — LETTER
8/11/2023         RE: Luda Rai  [address]  [place] [address]        Dear Colleague,    Thank you for referring your patient, Luda Rai, to the [facility]. Please see a copy of my visit note below.    Oncology/Hematology Visit Note    Aug 11, 2023    Reason for visit: Follow up breast cancer    Oncology HPI: Luda Rai is a 43 year old female with right sided breast cancer. She went in for a screening mammogram in April and was having pain in the right breast. Suspicious lesion noted and biopsy revealed invasive ductal carcinoma, grade 2, ER/CA positive, HER2 negative.  She underwent right-sided lumpectomy on 6/26/2023.  Margins were negative and she had 3 sentinel lymph nodes negative.  Oncotype testing came back at 21 and Dr. Zeng recommended adjuvant chemotherapy with Taxotere/Cytoxan, then adjuvant radiation therapy followed by 5 to 10 years of hormonal therapy.  Plan for 4 cycles of Taxotere/Cytoxan.    She is here today for TC cycle 1.    Interval History: Luda is here unaccompanied today.  She had her port placed yesterday and she is healing well from her lumpectomy.  No recent headache or vision changes, vomiting or diarrhea, bleeding, pain.  She is anxious to get started.  She is choosing to do cold capping during this treatment.    Review of Systems: See interval hx. Denies fevers, chills, HA, dizziness, n/t, changes in vision, cough, sore throat, CP, SOB, abdominal pain, N/V, diarrhea, changes in urination, bleeding, bruising, rash.     PMHx and Social Hx reviewed per EPIC.      Medications:  Current Outpatient Medications   Medication Sig Dispense Refill     BIOTIN PO Take 1 capsule by mouth daily       CALCIUM CITRATE PO Take 500 mg by mouth daily B-L-D       cyanocobalamin (CYANOCOBALAMIN) 1000 MCG/ML injection Inject 1 mL (1,000 mcg) Subcutaneous every 30 days 3 mL 3     metFORMIN (GLUCOPHAGE XR) 500 MG 24 hr tablet Take 1 tablet  "(500 mg) by mouth daily (with dinner) for 7 days, THEN 2 tablets (1,000 mg) daily (with dinner) for 7 days, THEN 3 tablets (1,500 mg) daily (with dinner) for 7 days, THEN 4 tablets (2,000 mg) daily (with dinner) for 7 days. 360 tablet 3     multivitamin w/minerals (THERA-VIT-M) tablet Take 1 tablet by mouth At Bedtime Abel Adv Chew       naltrexone (DEPADE/REVIA) 50 MG tablet Take 1/2 tablet 1-2 hours before biggest craving time for 7 days, then increase to a full tablet 30 tablet 3     ondansetron (ZOFRAN) 8 MG tablet Take 1 tablet (8 mg) by mouth every 8 hours as needed for nausea (vomiting) 30 tablet 2     prochlorperazine (COMPAZINE) 10 MG tablet Take 1 tablet (10 mg) by mouth every 6 hours as needed for nausea or vomiting 30 tablet 2     syringe/needle, disp, 25G X 1\" 3 ML MISC Use for B-12 injection 3 each 3     tuberculin-allergy syringes 27G X 1/2\" 1 ML MISC Use for B12 injections. 3 each 3     dexamethasone (DECADRON) 4 MG tablet Take 2 tablets (8 mg) by mouth 2 times daily (with meals) for 3 doses Start evening of Docetaxel infusion and continue for a total of 3 doses. 6 tablet 3       Allergies   Allergen Reactions     Asa [Aspirin]      Gastric bypass     Morphine And Related GI Disturbance     Nsaids Other (See Comments)     Avoid due to bariatric surgery 6/16/2022     Rocephin [Ceftriaxone] Hives       EXAM:    /72   Pulse 87   Temp 98.3  F (36.8  C) (Oral)   Resp 16   Wt 102.3 kg (225 lb 8 oz)   LMP 07/31/2023 (Exact Date)   SpO2 96%   BMI 35.31 kg/m      GENERAL:  Female, in no acute distress.  Alert and oriented x3.   HEENT:  Normocephalic, atraumatic.  PERRL, oropharynx clear with no sores or thrush.   LYMPH NODES:  No palpable pre/post-auricular, cervical, axillary lymphadenopathy appreciated.  CV:  RRR, No murmurs, gallops, or rubs.   LUNGS:  Clear to auscultation bilaterally.   BREAST: not examined  ABDOMEN:  Soft, nontender and nondistended.  Bowel sounds heard x4.  No apparent " hepatosplenomegaly.   EXTREMITIES:  No clubbing, cyanosis, or edema.   SKIN: No rash, port left chest without erythema, pus or tenderness.   PSYCH: Calm and cooperative      Labs:    08/11/23 07:44   Sodium 136   Potassium 3.9   Chloride 101   Carbon Dioxide (CO2) 24   Urea Nitrogen 6.8   Creatinine 0.52   GFR Estimate >90   Calcium 9.3   Anion Gap 11   Albumin 4.2   Protein Total 6.8   Alkaline Phosphatase 79   ALT 10   AST 15   Bilirubin Total 0.9   Glucose 195 (H)   WBC 9.8   Hemoglobin 12.5   Hematocrit 37.9   Platelet Count 156   RBC Count 4.45   MCV 85   MCH 28.1   MCHC 33.0   RDW 13.0   % Neutrophils 85   % Lymphocytes 11   % Monocytes 3   % Eosinophils 0   % Basophils 0   Absolute Basophils 0.0   Absolute Eosinophils 0.0   Absolute Immature Granulocytes 0.1   Absolute Lymphocytes 1.1   Absolute Monocytes 0.3   % Immature Granulocytes 1   Absolute Neutrophils 8.4 (H)   Absolute NRBCs 0.0   NRBCs per 100 WBC 0     Imaging: n/a    Impression/Plan: Luda Rai is a 43 year old female with invasive ductal carcinoma of the right breast s/p lumpectomy, Oncotype 21, who will be starting adjuvant chemotherapy with Taxotere/Cytoxan.    Breast cancer: Now s/p right-sided lumpectomy, Oncotype score of 21, she will be starting Taxotere/Cytoxan today.  Labs look great and plan for C1 D1 today.  We discussed toxicities including nausea, vomiting, diarrhea, fatigue, fever, infection, cytopenias, neuropathy, bleeding, cystitis.  She is choosing to call during this treatment.  We will continue every 3 weeks and she will call sooner if needed.  -8/30 Dr. Zeng, TC cycle 2    Chart documentation with Dragon Voice recognition Software. Although reviewed after completion, some words and grammatical errors may remain.    30 minutes spent on the date of the encounter doing chart review, review of test results, interpretation of tests, patient visit, and documentation     Nuvia Castañeda PA-C  Hematology/Oncology  [facility]  "[facility]                  Oncology Rooming Note    August 11, 2023 8:03 AM   Luda Rai is a 43 year old female who presents for:    Chief Complaint   Patient presents with     Oncology Clinic Visit     Malignant neoplasm of right breast in female, estrogen receptor positive, unspecified site of breast (H)  Invasive ductal carcinoma of breast, female, left (H)          Initial Vitals: /72   Pulse 87   Temp 98.3  F (36.8  C) (Oral)   Resp 16   Wt 102.3 kg (225 lb 8 oz)   LMP 07/31/2023 (Exact Date)   SpO2 96%   BMI 35.31 kg/m   Estimated body mass index is 35.31 kg/m  as calculated from the following:    Height as of 8/10/23: 1.702 m (5' 7.01\").    Weight as of this encounter: 102.3 kg (225 lb 8 oz). Body surface area is 2.2 meters squared.  No Pain (0) Comment: Data Unavailable   Patient's last menstrual period was 07/31/2023 (exact date).  Allergies reviewed: Yes  Medications reviewed: Yes    Medications: Medication refills not needed today.  Pharmacy name entered into [employer]: [id number] [address] AT [place] [address] & [address]    Clinical concerns: follow up        Mary Ellen Wesley              Again, thank you for allowing me to participate in the care of your patient.        Sincerely,        Nuvia Castañeda PA-C  "

## 2023-08-11 NOTE — PROGRESS NOTES
Oncology/Hematology Visit Note    Aug 11, 2023    Reason for visit: Follow up breast cancer    Oncology HPI: Luda Rai is a 43 year old female with right sided breast cancer. She went in for a screening mammogram in April and was having pain in the right breast. Suspicious lesion noted and biopsy revealed invasive ductal carcinoma, grade 2, ER/ND positive, HER2 negative.  She underwent right-sided lumpectomy on 6/26/2023.  Margins were negative and she had 3 sentinel lymph nodes negative.  Oncotype testing came back at 21 and Dr. Zeng recommended adjuvant chemotherapy with Taxotere/Cytoxan, then adjuvant radiation therapy followed by 5 to 10 years of hormonal therapy.  Plan for 4 cycles of Taxotere/Cytoxan.    She is here today for TC cycle 1.    Interval History: Luda is here unaccompanied today.  She had her port placed yesterday and she is healing well from her lumpectomy.  No recent headache or vision changes, vomiting or diarrhea, bleeding, pain.  She is anxious to get started.  She is choosing to do cold capping during this treatment.    Review of Systems: See interval hx. Denies fevers, chills, HA, dizziness, n/t, changes in vision, cough, sore throat, CP, SOB, abdominal pain, N/V, diarrhea, changes in urination, bleeding, bruising, rash.     PMHx and Social Hx reviewed per EPIC.      Medications:  Current Outpatient Medications   Medication Sig Dispense Refill    BIOTIN PO Take 1 capsule by mouth daily      CALCIUM CITRATE PO Take 500 mg by mouth daily B-L-D      cyanocobalamin (CYANOCOBALAMIN) 1000 MCG/ML injection Inject 1 mL (1,000 mcg) Subcutaneous every 30 days 3 mL 3    metFORMIN (GLUCOPHAGE XR) 500 MG 24 hr tablet Take 1 tablet (500 mg) by mouth daily (with dinner) for 7 days, THEN 2 tablets (1,000 mg) daily (with dinner) for 7 days, THEN 3 tablets (1,500 mg) daily (with dinner) for 7 days, THEN 4 tablets (2,000 mg) daily (with dinner) for 7 days. 360 tablet 3    multivitamin w/minerals  "(THERA-VIT-M) tablet Take 1 tablet by mouth At Bedtime Abel Adv Chew      naltrexone (DEPADE/REVIA) 50 MG tablet Take 1/2 tablet 1-2 hours before biggest craving time for 7 days, then increase to a full tablet 30 tablet 3    ondansetron (ZOFRAN) 8 MG tablet Take 1 tablet (8 mg) by mouth every 8 hours as needed for nausea (vomiting) 30 tablet 2    prochlorperazine (COMPAZINE) 10 MG tablet Take 1 tablet (10 mg) by mouth every 6 hours as needed for nausea or vomiting 30 tablet 2    syringe/needle, disp, 25G X 1\" 3 ML MISC Use for B-12 injection 3 each 3    tuberculin-allergy syringes 27G X 1/2\" 1 ML MISC Use for B12 injections. 3 each 3    dexamethasone (DECADRON) 4 MG tablet Take 2 tablets (8 mg) by mouth 2 times daily (with meals) for 3 doses Start evening of Docetaxel infusion and continue for a total of 3 doses. 6 tablet 3       Allergies   Allergen Reactions    Asa [Aspirin]      Gastric bypass    Morphine And Related GI Disturbance    Nsaids Other (See Comments)     Avoid due to bariatric surgery 6/16/2022    Rocephin [Ceftriaxone] Hives       EXAM:    /72   Pulse 87   Temp 98.3  F (36.8  C) (Oral)   Resp 16   Wt 102.3 kg (225 lb 8 oz)   LMP 07/31/2023 (Exact Date)   SpO2 96%   BMI 35.31 kg/m      GENERAL:  Female, in no acute distress.  Alert and oriented x3.   HEENT:  Normocephalic, atraumatic.  PERRL, oropharynx clear with no sores or thrush.   LYMPH NODES:  No palpable pre/post-auricular, cervical, axillary lymphadenopathy appreciated.  CV:  RRR, No murmurs, gallops, or rubs.   LUNGS:  Clear to auscultation bilaterally.   BREAST: not examined  ABDOMEN:  Soft, nontender and nondistended.  Bowel sounds heard x4.  No apparent hepatosplenomegaly.   EXTREMITIES:  No clubbing, cyanosis, or edema.   SKIN: No rash, port left chest without erythema, pus or tenderness.   PSYCH: Calm and cooperative      Labs:    08/11/23 07:44   Sodium 136   Potassium 3.9   Chloride 101   Carbon Dioxide (CO2) 24   Urea " Nitrogen 6.8   Creatinine 0.52   GFR Estimate >90   Calcium 9.3   Anion Gap 11   Albumin 4.2   Protein Total 6.8   Alkaline Phosphatase 79   ALT 10   AST 15   Bilirubin Total 0.9   Glucose 195 (H)   WBC 9.8   Hemoglobin 12.5   Hematocrit 37.9   Platelet Count 156   RBC Count 4.45   MCV 85   MCH 28.1   MCHC 33.0   RDW 13.0   % Neutrophils 85   % Lymphocytes 11   % Monocytes 3   % Eosinophils 0   % Basophils 0   Absolute Basophils 0.0   Absolute Eosinophils 0.0   Absolute Immature Granulocytes 0.1   Absolute Lymphocytes 1.1   Absolute Monocytes 0.3   % Immature Granulocytes 1   Absolute Neutrophils 8.4 (H)   Absolute NRBCs 0.0   NRBCs per 100 WBC 0     Imaging: n/a    Impression/Plan: Luda Rai is a 43 year old female with invasive ductal carcinoma of the right breast s/p lumpectomy, Oncotype 21, who will be starting adjuvant chemotherapy with Taxotere/Cytoxan.    Breast cancer: Now s/p right-sided lumpectomy, Oncotype score of 21, she will be starting Taxotere/Cytoxan today.  Labs look great and plan for C1 D1 today.  We discussed toxicities including nausea, vomiting, diarrhea, fatigue, fever, infection, cytopenias, neuropathy, bleeding, cystitis.  She is choosing to call during this treatment.  We will continue every 3 weeks and she will call sooner if needed.  -8/30 Dr. Zeng TC cycle 2    Chart documentation with Dragon Voice recognition Software. Although reviewed after completion, some words and grammatical errors may remain.    30 minutes spent on the date of the encounter doing chart review, review of test results, interpretation of tests, patient visit, and documentation     Nuvia Castañeda PA-C  Hematology/Oncology  [facility]

## 2023-08-11 NOTE — PROGRESS NOTES
"Oncology Rooming Note    August 11, 2023 8:03 AM   Luda Rai is a 43 year old female who presents for:    Chief Complaint   Patient presents with    Oncology Clinic Visit     Malignant neoplasm of right breast in female, estrogen receptor positive, unspecified site of breast (H)  Invasive ductal carcinoma of breast, female, left (H)          Initial Vitals: /72   Pulse 87   Temp 98.3  F (36.8  C) (Oral)   Resp 16   Wt 102.3 kg (225 lb 8 oz)   LMP 07/31/2023 (Exact Date)   SpO2 96%   BMI 35.31 kg/m   Estimated body mass index is 35.31 kg/m  as calculated from the following:    Height as of 8/10/23: 1.702 m (5' 7.01\").    Weight as of this encounter: 102.3 kg (225 lb 8 oz). Body surface area is 2.2 meters squared.  No Pain (0) Comment: Data Unavailable   Patient's last menstrual period was 07/31/2023 (exact date).  Allergies reviewed: Yes  Medications reviewed: Yes    Medications: Medication refills not needed today.  Pharmacy name entered into [employer]: [facility] #[id number] [address] AT [place] [address] & [facility] [address]    Clinical concerns: follow up        Mary Ellen Wesley            "

## 2023-08-11 NOTE — PROGRESS NOTES
Infusion Nursing Note:  Luda Rai presents today for Cycle 1 Day 1: Taxotere and Cytoxan.    Patient seen by provider today: Yes: MARY Pinto   present during visit today: Not Applicable.    Note: Patient oriented to infusion center. Pt oriented to infusion/chemo process.  Pt is doing cold capping with family to help (RN informed patient (per charge RN) that we are unable to accommodate more infusion time to slow our process down her to complete this. Patient and family understood.)      Intravenous Access:  Implanted Port.    Treatment Conditions:  Lab Results   Component Value Date    HGB 12.5 08/11/2023    WBC 9.8 08/11/2023    ANEU 5.2 05/18/2020    ANEUTAUTO 8.4 (H) 08/11/2023     08/11/2023        Lab Results   Component Value Date     08/11/2023    POTASSIUM 3.9 08/11/2023    CR 0.52 08/11/2023    TAY 9.3 08/11/2023    BILITOTAL 0.9 08/11/2023    ALBUMIN 4.2 08/11/2023    ALT 10 08/11/2023    AST 15 08/11/2023       Results reviewed, labs MET treatment parameters, ok to proceed with treatment.      Post Infusion Assessment:  Patient tolerated infusion without incident.  Blood return noted pre and post infusion.  Site patent and intact, free from redness, edema or discomfort.  No evidence of extravasations.  Access discontinued per protocol.       Discharge Plan:   Discharge instructions reviewed with: Patient.  Patient and/or family verbalized understanding of discharge instructions and all questions answered.  AVS to patient via [employer].  Patient will return 8/30/23 for next appointment.   Patient discharged in stable condition accompanied by: .  Departure Mode: Ambulatory.      Kari Schoen, RN

## 2023-08-11 NOTE — PROGRESS NOTES
Nursing Note:  Luda Rai presents today for port labs.    Patient seen by provider today: No   present during visit today: Not Applicable.    Note: N/A.    Intravenous Access:  Lab draw site port, Needle type port, Gauge 20 3/4in.  Labs drawn without difficulty.  Implanted Port.    Discharge Plan:   Patient was sent to clinic for PA appointment.    MASON LEARY RN

## 2023-08-15 ENCOUNTER — INFUSION THERAPY VISIT (OUTPATIENT)
Dept: INFUSION THERAPY | Facility: CLINIC | Age: 43
End: 2023-08-15
Attending: PHYSICIAN ASSISTANT
Payer: COMMERCIAL

## 2023-08-15 ENCOUNTER — ONCOLOGY VISIT (OUTPATIENT)
Dept: ONCOLOGY | Facility: CLINIC | Age: 43
End: 2023-08-15
Attending: PHYSICIAN ASSISTANT
Payer: COMMERCIAL

## 2023-08-15 VITALS
HEART RATE: 87 BPM | DIASTOLIC BLOOD PRESSURE: 82 MMHG | OXYGEN SATURATION: 98 % | SYSTOLIC BLOOD PRESSURE: 132 MMHG | TEMPERATURE: 97.9 F

## 2023-08-15 VITALS
HEART RATE: 87 BPM | SYSTOLIC BLOOD PRESSURE: 132 MMHG | TEMPERATURE: 97.9 F | OXYGEN SATURATION: 98 % | DIASTOLIC BLOOD PRESSURE: 82 MMHG

## 2023-08-15 DIAGNOSIS — C50.911 MALIGNANT NEOPLASM OF RIGHT BREAST IN FEMALE, ESTROGEN RECEPTOR POSITIVE, UNSPECIFIED SITE OF BREAST (H): Primary | ICD-10-CM

## 2023-08-15 DIAGNOSIS — K21.9 GASTROESOPHAGEAL REFLUX DISEASE, UNSPECIFIED WHETHER ESOPHAGITIS PRESENT: ICD-10-CM

## 2023-08-15 DIAGNOSIS — Z17.0 MALIGNANT NEOPLASM OF RIGHT BREAST IN FEMALE, ESTROGEN RECEPTOR POSITIVE, UNSPECIFIED SITE OF BREAST (H): Primary | ICD-10-CM

## 2023-08-15 LAB
ALBUMIN SERPL BCG-MCNC: 4.3 G/DL (ref 3.5–5.2)
ALP SERPL-CCNC: 70 U/L (ref 35–104)
ALT SERPL W P-5'-P-CCNC: 10 U/L (ref 0–50)
ANION GAP SERPL CALCULATED.3IONS-SCNC: 10 MMOL/L (ref 7–15)
AST SERPL W P-5'-P-CCNC: 16 U/L (ref 0–45)
BASOPHILS # BLD MANUAL: 0 10E3/UL (ref 0–0.2)
BASOPHILS NFR BLD MANUAL: 0 %
BILIRUB SERPL-MCNC: 1.2 MG/DL
BUN SERPL-MCNC: 10 MG/DL (ref 6–20)
CALCIUM SERPL-MCNC: 9 MG/DL (ref 8.6–10)
CHLORIDE SERPL-SCNC: 102 MMOL/L (ref 98–107)
CREAT SERPL-MCNC: 0.52 MG/DL (ref 0.51–0.95)
DEPRECATED HCO3 PLAS-SCNC: 24 MMOL/L (ref 22–29)
EOSINOPHIL # BLD MANUAL: 0.2 10E3/UL (ref 0–0.7)
EOSINOPHIL NFR BLD MANUAL: 6 %
ERYTHROCYTE [DISTWIDTH] IN BLOOD BY AUTOMATED COUNT: 12.9 % (ref 10–15)
GFR SERPL CREATININE-BSD FRML MDRD: >90 ML/MIN/1.73M2
GLUCOSE SERPL-MCNC: 117 MG/DL (ref 70–99)
HCT VFR BLD AUTO: 39.3 % (ref 35–47)
HGB BLD-MCNC: 12.9 G/DL (ref 11.7–15.7)
LYMPHOCYTES # BLD MANUAL: 0.9 10E3/UL (ref 0.8–5.3)
LYMPHOCYTES NFR BLD MANUAL: 29 %
MCH RBC QN AUTO: 27.9 PG (ref 26.5–33)
MCHC RBC AUTO-ENTMCNC: 32.8 G/DL (ref 31.5–36.5)
MCV RBC AUTO: 85 FL (ref 78–100)
MONOCYTES # BLD MANUAL: 0 10E3/UL (ref 0–1.3)
MONOCYTES NFR BLD MANUAL: 1 %
NEUTROPHILS # BLD MANUAL: 2 10E3/UL (ref 1.6–8.3)
NEUTROPHILS NFR BLD MANUAL: 64 %
PLAT MORPH BLD: NORMAL
PLATELET # BLD AUTO: 112 10E3/UL (ref 150–450)
POTASSIUM SERPL-SCNC: 4.2 MMOL/L (ref 3.4–5.3)
PROT SERPL-MCNC: 6.8 G/DL (ref 6.4–8.3)
RBC # BLD AUTO: 4.63 10E6/UL (ref 3.8–5.2)
RBC MORPH BLD: NORMAL
SODIUM SERPL-SCNC: 136 MMOL/L (ref 136–145)
WBC # BLD AUTO: 3.1 10E3/UL (ref 4–11)

## 2023-08-15 PROCEDURE — 96360 HYDRATION IV INFUSION INIT: CPT

## 2023-08-15 PROCEDURE — 85027 COMPLETE CBC AUTOMATED: CPT | Performed by: PHYSICIAN ASSISTANT

## 2023-08-15 PROCEDURE — 258N000003 HC RX IP 258 OP 636: Performed by: PHYSICIAN ASSISTANT

## 2023-08-15 PROCEDURE — 85007 BL SMEAR W/DIFF WBC COUNT: CPT | Performed by: PHYSICIAN ASSISTANT

## 2023-08-15 PROCEDURE — 250N000011 HC RX IP 250 OP 636: Mod: JZ | Performed by: PHYSICIAN ASSISTANT

## 2023-08-15 PROCEDURE — 36415 COLL VENOUS BLD VENIPUNCTURE: CPT

## 2023-08-15 PROCEDURE — G0463 HOSPITAL OUTPT CLINIC VISIT: HCPCS | Performed by: PHYSICIAN ASSISTANT

## 2023-08-15 PROCEDURE — 80053 COMPREHEN METABOLIC PANEL: CPT | Performed by: PHYSICIAN ASSISTANT

## 2023-08-15 PROCEDURE — 99214 OFFICE O/P EST MOD 30 MIN: CPT | Performed by: PHYSICIAN ASSISTANT

## 2023-08-15 RX ORDER — HEPARIN SODIUM (PORCINE) LOCK FLUSH IV SOLN 100 UNIT/ML 100 UNIT/ML
5 SOLUTION INTRAVENOUS
Status: CANCELLED | OUTPATIENT
Start: 2023-08-15

## 2023-08-15 RX ORDER — HEPARIN SODIUM (PORCINE) LOCK FLUSH IV SOLN 100 UNIT/ML 100 UNIT/ML
5 SOLUTION INTRAVENOUS
Status: DISCONTINUED | OUTPATIENT
Start: 2023-08-15 | End: 2023-08-15 | Stop reason: HOSPADM

## 2023-08-15 RX ORDER — HEPARIN SODIUM,PORCINE 10 UNIT/ML
5-20 VIAL (ML) INTRAVENOUS DAILY PRN
Status: CANCELLED | OUTPATIENT
Start: 2023-08-15

## 2023-08-15 RX ADMIN — Medication 5 ML: at 11:16

## 2023-08-15 RX ADMIN — SODIUM CHLORIDE 1000 ML: 9 INJECTION, SOLUTION INTRAVENOUS at 10:09

## 2023-08-15 NOTE — LETTER
8/15/2023         RE: Luda Rai  [address]  [place] [address]        Dear Colleague,    Thank you for referring your patient, Luda Rai, to the [facility]. Please see a copy of my visit note below.    Oncology/Hematology Visit Note    Aug 15, 2023    Reason for visit: Follow up breast cancer    Oncology HPI: Luda Rai is a 43 year old female with right sided breast cancer. She went in for a screening mammogram in April and was having pain in the right breast. Suspicious lesion noted and biopsy revealed invasive ductal carcinoma, grade 2, ER/[place] positive, HER2 negative.  She underwent right-sided lumpectomy on 6/26/2023.  Margins were negative and she had 3 sentinel lymph nodes negative.  Oncotype testing came back at 21 and Dr. Zeng recommended adjuvant chemotherapy with Taxotere/Cytoxan, then adjuvant radiation therapy followed by 5 to 10 years of hormonal therapy.  TC C1 D1 completed on 8/11/2023.  Plan for 4 cycles total.    She is here today for sore throat and fatigue.     Interval History: Luda is here unaccompanied today and she really struggled on day 2 and 3 this past weekend after her first cycle of TC.  She has a history of bariatric surgery and she was having severe reflux over the weekend and improved slightly with her 's Pepcid.  She also took Tums with some relief.  Today, symptoms are overall little bit better.  She also had pretty severe fatigue, poor appetite and minimal oral intake.  This is also slowly improving today.  Intermittent sore throat, she believes it likely is from her reflux.  She has a history of this several years ago, but nothing recently.  She does not take a PPI or H2 blocker on a regular basis.  No fever, vomiting, diarrhea, abdominal pain.    Review of Systems: See interval hx. Denies fevers, chills, HA,  cough, CP, SOB, abdominal pain, N/V, diarrhea, changes in urination, bleeding.     PMHx and Social  "Hx reviewed per EPIC.      Medications:  Current Outpatient Medications   Medication Sig Dispense Refill     BIOTIN PO Take 1 capsule by mouth daily       CALCIUM CITRATE PO Take 500 mg by mouth daily B-L-D       cyanocobalamin (CYANOCOBALAMIN) 1000 MCG/ML injection Inject 1 mL (1,000 mcg) Subcutaneous every 30 days 3 mL 3     dexamethasone (DECADRON) 4 MG tablet Take 2 tablets (8 mg) by mouth 2 times daily (with meals) for 3 doses Start evening of Docetaxel infusion and continue for a total of 3 doses. 6 tablet 3     lidocaine-prilocaine (EMLA) 2.5-2.5 % external cream Apply topically as needed for moderate pain 30 g 0     metFORMIN (GLUCOPHAGE XR) 500 MG 24 hr tablet Take 1 tablet (500 mg) by mouth daily (with dinner) for 7 days, THEN 2 tablets (1,000 mg) daily (with dinner) for 7 days, THEN 3 tablets (1,500 mg) daily (with dinner) for 7 days, THEN 4 tablets (2,000 mg) daily (with dinner) for 7 days. 360 tablet 3     multivitamin w/minerals (THERA-VIT-M) tablet Take 1 tablet by mouth At Bedtime Abel Adv Chew       naltrexone (DEPADE/REVIA) 50 MG tablet Take 1/2 tablet 1-2 hours before biggest craving time for 7 days, then increase to a full tablet 30 tablet 3     ondansetron (ZOFRAN) 8 MG tablet Take 1 tablet (8 mg) by mouth every 8 hours as needed for nausea (vomiting) 30 tablet 2     prochlorperazine (COMPAZINE) 10 MG tablet Take 1 tablet (10 mg) by mouth every 6 hours as needed for nausea or vomiting 30 tablet 2     syringe/needle, disp, 25G X 1\" 3 ML MISC Use for B-12 injection 3 each 3     tuberculin-allergy syringes 27G X 1/2\" 1 ML MISC Use for B12 injections. 3 each 3       Allergies   Allergen Reactions     Asa [Aspirin]      Gastric bypass     Morphine And Related GI Disturbance     Nsaids Other (See Comments)     Avoid due to bariatric surgery 6/16/2022     Rocephin [Ceftriaxone] Hives       EXAM:    /82   Pulse 87   Temp 97.9  F (36.6  C) (Tympanic)   LMP 07/31/2023 (Exact Date)   SpO2 98% "     GENERAL:  Female, in no acute distress.  Alert and oriented x3.   HEENT:  Normocephalic, atraumatic.  Oropharynx with some mild erythema, but no tonsillar swelling, exudates or thrush.  CV:  RRR, No murmurs, gallops, or rubs.   LUNGS:  Clear to auscultation bilaterally.   BREAST: Not examined today  ABDOMEN:  Soft, nontender and nondistended.  Bowel sounds heard x4.  No apparent hepatosplenomegaly.   EXTREMITIES:  No clubbing, cyanosis, or edema.   SKIN: No rash  PSYCH: Calm and cooperative      Labs:    08/15/23 10:06   Sodium 136   Potassium 4.2   Chloride 102   Carbon Dioxide (CO2) 24   Urea Nitrogen 10.0   Creatinine 0.52   GFR Estimate >90   Calcium 9.0   Anion Gap 10   Albumin 4.3   Protein Total 6.8   Alkaline Phosphatase 70   ALT 10   AST 16   Bilirubin Total 1.2   Glucose 117 (H)   WBC 3.1 (L) (P)   Hemoglobin 12.9 (P)   Hematocrit 39.3 (P)   Platelet Count 112 (L) (P)   RBC Count 4.63 (P)   MCV 85 (P)   MCH 27.9 (P)   MCHC 32.8 (P)   RDW 12.9 (P)     Imaging: n/a    Impression/Plan: Luda Rai is a 43 year old female with invasive ductal carcinoma of the right breast s/p lumpectomy, Oncotype 21, currently on adjuvant chemotherapy with Taxotere/Cytoxan.    Breast cancer: Now s/p right-sided lumpectomy, Oncotype score of 21, Taxotere/Cytoxan C1 D1 completed on 8/11/2023.  Treatment went okay, but she has had severe reflux, fatigue and poor appetite on days 2 and 3, today is day 5.  Slowly improving, see below.  Labs look really good today and we will need to support her symptoms.  As below.  -8/30/2023 Dr. Zeng, TC cycle 2    GERD: History of bariatric surgery, not previously on PPI or H2 blocker.  Some improvement with husbands Pepcid and Tums over the last few days.  I have spoken to pharmacy and we will keep Dex 12 mg for premedication, but recommended Dex 8 mg on the night of chemo and the morning afterwards.  This will reduce 8 mg of dexamethasone total.  Discussed this in detail with the  patient.  I have written her prescription for omeprazole daily and we will add Pepcid to her premedications as well.    FEN: Poor appetite, minimal oral intake.  She received a liter of fluids today and electrolytes actually look okay.  We discussed increasing her intake with meals or protein supplements.  She will also push fluids and liquid IV, Gatorade or Powerade.  She received IVF today.        Chart documentation with [employer]. Although reviewed after completion, some words and grammatical errors may remain.    30 minutes spent on the date of the encounter doing chart review, review of test results, interpretation of tests, patient visit, and documentation       Nuvia Castañeda PA-C  Hematology/Oncology  [facility]                Patient seen in Fast Track vitals transferred.      Again, thank you for allowing me to participate in the care of your patient.        Sincerely,        Nuvia Castañeda PA-C

## 2023-08-15 NOTE — PROGRESS NOTES
"Infusion Nursing Note:  Luda Rai presents today for labs/IVF.    Patient seen by provider today: Yes: MARY Pinto   present during visit today: Not Applicable.    Note: Pt here on interim basis for hydration post 1st cycle of treatment. Pt has been experiencing significant heartburn/reflux which has also caused significant throat soreness.  Even water has been making her feel \"gross.\"  Nausea has been improving. No emesis. Was able to eat an egg this morning.  TUMS has been minimally effective. She started po OTC Famotidine yesterday    MARY Pinto, saw pt on interim basis for side effect management  Will prescribe Omeprozole for at home; adjustments to current treatment plan going forward; will decrease po Dex to only day before and day after treatment (will not take day of)      Intravenous Access:  Labs drawn without difficulty.  Implanted Port.    Treatment Conditions:  Not Applicable.      Post Infusion Assessment:  Patient tolerated infusion without incident.  Blood return noted pre and post infusion.  Site patent and intact, free from redness, edema or discomfort.  No evidence of extravasations.  Access discontinued per protocol.       Discharge Plan:   [facility]  Patient will return 8/30/23 for labs/RC/treatment for next appointment.   Patient discharged in stable condition accompanied by: self.  Departure Mode: Ambulatory.      Cristy Miranda RN   "

## 2023-08-15 NOTE — PROGRESS NOTES
Oncology/Hematology Visit Note    Aug 15, 2023    Reason for visit: Follow up breast cancer    Oncology HPI: Luda Rai is a 43 year old female with right sided breast cancer. She went in for a screening mammogram in April and was having pain in the right breast. Suspicious lesion noted and biopsy revealed invasive ductal carcinoma, grade 2, ER/IL positive, HER2 negative.  She underwent right-sided lumpectomy on 6/26/2023.  Margins were negative and she had 3 sentinel lymph nodes negative.  Oncotype testing came back at 21 and Dr. Zeng recommended adjuvant chemotherapy with Taxotere/Cytoxan, then adjuvant radiation therapy followed by 5 to 10 years of hormonal therapy.  TC C1 D1 completed on 8/11/2023.  Plan for 4 cycles total.    She is here today for sore throat and fatigue.     Interval History: Luda is here unaccompanied today and she really struggled on day 2 and 3 this past weekend after her first cycle of TC.  She has a history of bariatric surgery and she was having severe reflux over the weekend and improved slightly with her 's Pepcid.  She also took Tums with some relief.  Today, symptoms are overall little bit better.  She also had pretty severe fatigue, poor appetite and minimal oral intake.  This is also slowly improving today.  Intermittent sore throat, she believes it likely is from her reflux.  She has a history of this several years ago, but nothing recently.  She does not take a PPI or H2 blocker on a regular basis.  No fever, vomiting, diarrhea, abdominal pain.    Review of Systems: See interval hx. Denies fevers, chills, HA,  cough, CP, SOB, abdominal pain, N/V, diarrhea, changes in urination, bleeding.     PMHx and Social Hx reviewed per EPIC.      Medications:  Current Outpatient Medications   Medication Sig Dispense Refill    BIOTIN PO Take 1 capsule by mouth daily      CALCIUM CITRATE PO Take 500 mg by mouth daily B-L-D      cyanocobalamin (CYANOCOBALAMIN) 1000 MCG/ML  "injection Inject 1 mL (1,000 mcg) Subcutaneous every 30 days 3 mL 3    dexamethasone (DECADRON) 4 MG tablet Take 2 tablets (8 mg) by mouth 2 times daily (with meals) for 3 doses Start evening of Docetaxel infusion and continue for a total of 3 doses. 6 tablet 3    lidocaine-prilocaine (EMLA) 2.5-2.5 % external cream Apply topically as needed for moderate pain 30 g 0    metFORMIN (GLUCOPHAGE XR) 500 MG 24 hr tablet Take 1 tablet (500 mg) by mouth daily (with dinner) for 7 days, THEN 2 tablets (1,000 mg) daily (with dinner) for 7 days, THEN 3 tablets (1,500 mg) daily (with dinner) for 7 days, THEN 4 tablets (2,000 mg) daily (with dinner) for 7 days. 360 tablet 3    multivitamin w/minerals (THERA-VIT-M) tablet Take 1 tablet by mouth At Bedtime Abel Adv Chew      naltrexone (DEPADE/REVIA) 50 MG tablet Take 1/2 tablet 1-2 hours before biggest craving time for 7 days, then increase to a full tablet 30 tablet 3    ondansetron (ZOFRAN) 8 MG tablet Take 1 tablet (8 mg) by mouth every 8 hours as needed for nausea (vomiting) 30 tablet 2    prochlorperazine (COMPAZINE) 10 MG tablet Take 1 tablet (10 mg) by mouth every 6 hours as needed for nausea or vomiting 30 tablet 2    syringe/needle, disp, 25G X 1\" 3 ML MISC Use for B-12 injection 3 each 3    tuberculin-allergy syringes 27G X 1/2\" 1 ML MISC Use for B12 injections. 3 each 3       Allergies   Allergen Reactions    Asa [Aspirin]      Gastric bypass    Morphine And Related GI Disturbance    Nsaids Other (See Comments)     Avoid due to bariatric surgery 6/16/2022    Rocephin [Ceftriaxone] Hives       EXAM:    /82   Pulse 87   Temp 97.9  F (36.6  C) (Tympanic)   LMP 07/31/2023 (Exact Date)   SpO2 98%     GENERAL:  Female, in no acute distress.  Alert and oriented x3.   HEENT:  Normocephalic, atraumatic.  Oropharynx with some mild erythema, but no tonsillar swelling, exudates or thrush.  CV:  RRR, No murmurs, gallops, or rubs.   LUNGS:  Clear to auscultation " bilaterally.   BREAST: Not examined today  ABDOMEN:  Soft, nontender and nondistended.  Bowel sounds heard x4.  No apparent hepatosplenomegaly.   EXTREMITIES:  No clubbing, cyanosis, or edema.   SKIN: No rash  PSYCH: Calm and cooperative      Labs:    08/15/23 10:06   Sodium 136   Potassium 4.2   Chloride 102   Carbon Dioxide (CO2) 24   Urea Nitrogen 10.0   Creatinine 0.52   GFR Estimate >90   Calcium 9.0   Anion Gap 10   Albumin 4.3   Protein Total 6.8   Alkaline Phosphatase 70   ALT 10   AST 16   Bilirubin Total 1.2   Glucose 117 (H)   WBC 3.1 (L) (P)   Hemoglobin 12.9 (P)   Hematocrit 39.3 (P)   Platelet Count 112 (L) (P)   RBC Count 4.63 (P)   MCV 85 (P)   MCH 27.9 (P)   MCHC 32.8 (P)   RDW 12.9 (P)     Imaging: n/a    Impression/Plan: Luda Rai is a 43 year old female with invasive ductal carcinoma of the right breast s/p lumpectomy, Oncotype 21, currently on adjuvant chemotherapy with Taxotere/Cytoxan.    Breast cancer: Now s/p right-sided lumpectomy, Oncotype score of 21, Taxotere/Cytoxan C1 D1 completed on 8/11/2023.  Treatment went okay, but she has had severe reflux, fatigue and poor appetite on days 2 and 3, today is day 5.  Slowly improving, see below.  Labs look really good today and we will need to support her symptoms.  As below.  -8/30/2023 Dr. Zeng, TC cycle 2    GERD: History of bariatric surgery, not previously on PPI or H2 blocker.  Some improvement with husbands Pepcid and Tums over the last few days.  I have spoken to pharmacy and we will keep Dex 12 mg for premedication, but recommended Dex 8 mg on the night of chemo and the morning afterwards.  This will reduce 8 mg of dexamethasone total.  Discussed this in detail with the patient.  I have written her prescription for omeprazole daily and we will add Pepcid to her premedications as well.    FEN: Poor appetite, minimal oral intake.  She received a liter of fluids today and electrolytes actually look okay.  We discussed increasing her  intake with meals or protein supplements.  She will also push fluids and liquid IV, Gatorade or Powerade.  She received IVF today.        Chart documentation with Dragon Voice recognition Software. Although reviewed after completion, some words and grammatical errors may remain.    30 minutes spent on the date of the encounter doing chart review, review of test results, interpretation of tests, patient visit, and documentation       Nuvia Castañeda PA-C  Hematology/Oncology  [facility]

## 2023-08-17 ENCOUNTER — TRANSFERRED RECORDS (OUTPATIENT)
Dept: HEALTH INFORMATION MANAGEMENT | Facility: CLINIC | Age: 43
End: 2023-08-17
Payer: COMMERCIAL

## 2023-08-29 RX ORDER — ALBUTEROL SULFATE 90 UG/1
1-2 AEROSOL, METERED RESPIRATORY (INHALATION)
Status: CANCELLED
Start: 2023-08-30

## 2023-08-29 RX ORDER — HEPARIN SODIUM,PORCINE 10 UNIT/ML
5-20 VIAL (ML) INTRAVENOUS DAILY PRN
Status: CANCELLED | OUTPATIENT
Start: 2023-08-30

## 2023-08-29 RX ORDER — METHYLPREDNISOLONE SODIUM SUCCINATE 125 MG/2ML
125 INJECTION, POWDER, LYOPHILIZED, FOR SOLUTION INTRAMUSCULAR; INTRAVENOUS
Status: CANCELLED
Start: 2023-08-30

## 2023-08-29 RX ORDER — MEPERIDINE HYDROCHLORIDE 25 MG/ML
25 INJECTION INTRAMUSCULAR; INTRAVENOUS; SUBCUTANEOUS EVERY 30 MIN PRN
Status: CANCELLED | OUTPATIENT
Start: 2023-08-30

## 2023-08-29 RX ORDER — HEPARIN SODIUM (PORCINE) LOCK FLUSH IV SOLN 100 UNIT/ML 100 UNIT/ML
5 SOLUTION INTRAVENOUS
Status: CANCELLED | OUTPATIENT
Start: 2023-08-30

## 2023-08-29 RX ORDER — LORAZEPAM 2 MG/ML
0.5 INJECTION INTRAMUSCULAR EVERY 4 HOURS PRN
Status: CANCELLED | OUTPATIENT
Start: 2023-08-30

## 2023-08-29 RX ORDER — ALBUTEROL SULFATE 0.83 MG/ML
2.5 SOLUTION RESPIRATORY (INHALATION)
Status: CANCELLED | OUTPATIENT
Start: 2023-08-30

## 2023-08-29 RX ORDER — DIPHENHYDRAMINE HYDROCHLORIDE 50 MG/ML
50 INJECTION INTRAMUSCULAR; INTRAVENOUS
Status: CANCELLED
Start: 2023-08-30

## 2023-08-29 RX ORDER — ONDANSETRON 2 MG/ML
8 INJECTION INTRAMUSCULAR; INTRAVENOUS ONCE
Status: CANCELLED | OUTPATIENT
Start: 2023-08-30

## 2023-08-29 RX ORDER — EPINEPHRINE 1 MG/ML
0.3 INJECTION, SOLUTION INTRAMUSCULAR; SUBCUTANEOUS EVERY 5 MIN PRN
Status: CANCELLED | OUTPATIENT
Start: 2023-08-30

## 2023-08-30 ENCOUNTER — ONCOLOGY VISIT (OUTPATIENT)
Dept: ONCOLOGY | Facility: CLINIC | Age: 43
End: 2023-08-30
Attending: INTERNAL MEDICINE
Payer: COMMERCIAL

## 2023-08-30 ENCOUNTER — LAB (OUTPATIENT)
Dept: INFUSION THERAPY | Facility: CLINIC | Age: 43
End: 2023-08-30
Attending: INTERNAL MEDICINE
Payer: COMMERCIAL

## 2023-08-30 VITALS
BODY MASS INDEX: 35.15 KG/M2 | RESPIRATION RATE: 16 BRPM | DIASTOLIC BLOOD PRESSURE: 89 MMHG | OXYGEN SATURATION: 99 % | TEMPERATURE: 97.4 F | SYSTOLIC BLOOD PRESSURE: 159 MMHG | HEART RATE: 83 BPM | WEIGHT: 224.4 LBS

## 2023-08-30 DIAGNOSIS — Z17.0 MALIGNANT NEOPLASM OF RIGHT BREAST IN FEMALE, ESTROGEN RECEPTOR POSITIVE, UNSPECIFIED SITE OF BREAST (H): Primary | ICD-10-CM

## 2023-08-30 DIAGNOSIS — C50.911 MALIGNANT NEOPLASM OF RIGHT BREAST IN FEMALE, ESTROGEN RECEPTOR POSITIVE, UNSPECIFIED SITE OF BREAST (H): Primary | ICD-10-CM

## 2023-08-30 LAB
ALBUMIN SERPL BCG-MCNC: 4.1 G/DL (ref 3.5–5.2)
ALP SERPL-CCNC: 78 U/L (ref 35–104)
ALT SERPL W P-5'-P-CCNC: 10 U/L (ref 0–50)
ANION GAP SERPL CALCULATED.3IONS-SCNC: 11 MMOL/L (ref 7–15)
AST SERPL W P-5'-P-CCNC: 12 U/L (ref 0–45)
BASOPHILS # BLD MANUAL: 0 10E3/UL (ref 0–0.2)
BASOPHILS NFR BLD MANUAL: 0 %
BILIRUB SERPL-MCNC: 0.3 MG/DL
BUN SERPL-MCNC: 9.7 MG/DL (ref 6–20)
CALCIUM SERPL-MCNC: 9 MG/DL (ref 8.6–10)
CHLORIDE SERPL-SCNC: 103 MMOL/L (ref 98–107)
CREAT SERPL-MCNC: 0.63 MG/DL (ref 0.51–0.95)
DEPRECATED HCO3 PLAS-SCNC: 26 MMOL/L (ref 22–29)
EOSINOPHIL # BLD MANUAL: 0.1 10E3/UL (ref 0–0.7)
EOSINOPHIL NFR BLD MANUAL: 1 %
ERYTHROCYTE [DISTWIDTH] IN BLOOD BY AUTOMATED COUNT: 13.2 % (ref 10–15)
GFR SERPL CREATININE-BSD FRML MDRD: >90 ML/MIN/1.73M2
GLUCOSE SERPL-MCNC: 136 MG/DL (ref 70–99)
HCT VFR BLD AUTO: 35.7 % (ref 35–47)
HGB BLD-MCNC: 11.4 G/DL (ref 11.7–15.7)
LYMPHOCYTES # BLD MANUAL: 1.8 10E3/UL (ref 0.8–5.3)
LYMPHOCYTES NFR BLD MANUAL: 29 %
MCH RBC QN AUTO: 27.1 PG (ref 26.5–33)
MCHC RBC AUTO-ENTMCNC: 31.9 G/DL (ref 31.5–36.5)
MCV RBC AUTO: 85 FL (ref 78–100)
METAMYELOCYTES # BLD MANUAL: 0.1 10E3/UL
METAMYELOCYTES NFR BLD MANUAL: 1 %
MONOCYTES # BLD MANUAL: 0.1 10E3/UL (ref 0–1.3)
MONOCYTES NFR BLD MANUAL: 1 %
NEUTROPHILS # BLD MANUAL: 4.1 10E3/UL (ref 1.6–8.3)
NEUTROPHILS NFR BLD MANUAL: 68 %
PLAT MORPH BLD: ABNORMAL
PLATELET # BLD AUTO: 204 10E3/UL (ref 150–450)
POTASSIUM SERPL-SCNC: 4.1 MMOL/L (ref 3.4–5.3)
PROT SERPL-MCNC: 6.6 G/DL (ref 6.4–8.3)
RBC # BLD AUTO: 4.21 10E6/UL (ref 3.8–5.2)
RBC MORPH BLD: ABNORMAL
SODIUM SERPL-SCNC: 140 MMOL/L (ref 136–145)
WBC # BLD AUTO: 6.1 10E3/UL (ref 4–11)

## 2023-08-30 PROCEDURE — 96417 CHEMO IV INFUS EACH ADDL SEQ: CPT

## 2023-08-30 PROCEDURE — 80053 COMPREHEN METABOLIC PANEL: CPT | Performed by: INTERNAL MEDICINE

## 2023-08-30 PROCEDURE — 96377 APPLICATON ON-BODY INJECTOR: CPT | Mod: XS

## 2023-08-30 PROCEDURE — 250N000011 HC RX IP 250 OP 636: Performed by: INTERNAL MEDICINE

## 2023-08-30 PROCEDURE — 96375 TX/PRO/DX INJ NEW DRUG ADDON: CPT

## 2023-08-30 PROCEDURE — 96372 THER/PROPH/DIAG INJ SC/IM: CPT | Performed by: INTERNAL MEDICINE

## 2023-08-30 PROCEDURE — 85007 BL SMEAR W/DIFF WBC COUNT: CPT | Performed by: INTERNAL MEDICINE

## 2023-08-30 PROCEDURE — 36415 COLL VENOUS BLD VENIPUNCTURE: CPT | Performed by: INTERNAL MEDICINE

## 2023-08-30 PROCEDURE — 258N000003 HC RX IP 258 OP 636: Performed by: INTERNAL MEDICINE

## 2023-08-30 PROCEDURE — 85027 COMPLETE CBC AUTOMATED: CPT | Performed by: INTERNAL MEDICINE

## 2023-08-30 PROCEDURE — G0463 HOSPITAL OUTPT CLINIC VISIT: HCPCS | Mod: 25 | Performed by: INTERNAL MEDICINE

## 2023-08-30 PROCEDURE — 99214 OFFICE O/P EST MOD 30 MIN: CPT | Performed by: INTERNAL MEDICINE

## 2023-08-30 PROCEDURE — 96367 TX/PROPH/DG ADDL SEQ IV INF: CPT

## 2023-08-30 PROCEDURE — 96413 CHEMO IV INFUSION 1 HR: CPT

## 2023-08-30 RX ORDER — HEPARIN SODIUM,PORCINE 10 UNIT/ML
5-20 VIAL (ML) INTRAVENOUS DAILY PRN
Status: CANCELLED | OUTPATIENT
Start: 2023-08-30

## 2023-08-30 RX ORDER — ONDANSETRON 2 MG/ML
8 INJECTION INTRAMUSCULAR; INTRAVENOUS ONCE
Status: COMPLETED | OUTPATIENT
Start: 2023-08-30 | End: 2023-08-30

## 2023-08-30 RX ORDER — OMEPRAZOLE 40 MG/1
CAPSULE, DELAYED RELEASE ORAL
COMMUNITY
Start: 2023-08-17 | End: 2024-06-11

## 2023-08-30 RX ORDER — HEPARIN SODIUM (PORCINE) LOCK FLUSH IV SOLN 100 UNIT/ML 100 UNIT/ML
5 SOLUTION INTRAVENOUS
Status: DISCONTINUED | OUTPATIENT
Start: 2023-08-30 | End: 2023-08-30 | Stop reason: HOSPADM

## 2023-08-30 RX ORDER — DICYCLOMINE HYDROCHLORIDE 10 MG/1
CAPSULE ORAL PRN
COMMUNITY
Start: 2023-08-17 | End: 2023-09-22

## 2023-08-30 RX ADMIN — Medication 5 ML: at 12:40

## 2023-08-30 RX ADMIN — FAMOTIDINE 20 MG: 10 INJECTION INTRAVENOUS at 10:45

## 2023-08-30 RX ADMIN — SODIUM CHLORIDE 250 ML: 9 INJECTION, SOLUTION INTRAVENOUS at 10:23

## 2023-08-30 RX ADMIN — PEGFILGRASTIM 6 MG: KIT SUBCUTANEOUS at 12:11

## 2023-08-30 RX ADMIN — FOSAPREPITANT: 150 INJECTION, POWDER, LYOPHILIZED, FOR SOLUTION INTRAVENOUS at 10:23

## 2023-08-30 RX ADMIN — SODIUM CHLORIDE 160 MG: 9 INJECTION, SOLUTION INTRAVENOUS at 11:05

## 2023-08-30 RX ADMIN — ONDANSETRON 8 MG: 2 INJECTION INTRAMUSCULAR; INTRAVENOUS at 10:24

## 2023-08-30 RX ADMIN — CYCLOPHOSPHAMIDE 1285 MG: 1 INJECTION, POWDER, FOR SOLUTION INTRAVENOUS; ORAL at 12:07

## 2023-08-30 ASSESSMENT — PAIN SCALES - GENERAL: PAINLEVEL: NO PAIN (0)

## 2023-08-30 NOTE — LETTER
"    8/30/2023         RE: Luda Rai  [id number] [address] [facility] [address]        Dear Colleague,    Thank you for referring your patient, Luda Rai, to the [facility]. Please see a copy of my visit note below.    Oncology Rooming Note    August 30, 2023 9:06 AM   Luda Rai is a 43 year old female who presents for:    Chief Complaint   Patient presents with     Oncology Clinic Visit     Malignant neoplasm of right breast in female, estrogen receptor positive, unspecified site of breast (H)  Invasive ductal carcinoma of breast, female, left (H)       Initial Vitals: BP (!) 159/89   Pulse 83   Temp 97.4  F (36.3  C) (Oral)   Resp 16   Wt 101.8 kg (224 lb 6.4 oz)   LMP 07/31/2023 (Exact Date)   SpO2 99%   BMI 35.15 kg/m   Estimated body mass index is 35.15 kg/m  as calculated from the following:    Height as of 8/11/23: 1.702 m (5' 7\").    Weight as of this encounter: 101.8 kg (224 lb 6.4 oz). Body surface area is 2.19 meters squared.  No Pain (0) Comment: Data Unavailable   Patient's last menstrual period was 07/31/2023 (exact date).  Allergies reviewed: Yes  Medications reviewed: Yes    Medications: Medication refills not needed today.  Pharmacy name entered into [id number] - [facility] [address] [facility] [address]    Clinical concerns: follow up        Mary Ellen ESCOTO Maryjane Lares is a 43-year-old patient who is here today for interim follow-up.    CHIEF COMPLAINT:     1.  Invasive ductal carcinoma of the right breast at the 11 o'clock position T2 N0 M0, ER/TX positive, HER-2 receptor negative.  2.  Status post lumpectomy.  3. On Taxotere / Cytoxan adjuvant   4. Here for cycle #2       HISTORY OF PRESENTING COMPLAINT: The patient is a 43-year-old premenopausal patient who went for a screening mammogram in 04/2023 because she was having discomfort in the right breast. A suspicious lesion was seen at " the 11 o'clock position of the breast and a biopsy revealed an invasive ductal carcinoma, grade II, ER/GA positive, HER-2 receptor negative. I saw her in initial consultation on 05/30/2023. She then went on to have a right-sided lumpectomy with Dr. Gore   The pathology  showed invasive ductal carcinoma, grade II in the upper outer quadrant of the right breast at the 11 o'clock position.  The size of the tumor was 2.4 cm.  It was a single focus associated with DCIS.  All the margins were negative, and she had 3 sentinel lymph nodes, which were negative.  Stage of disease is a T2 N0, ER/GA positive, HER-2 receptor negative tumor.     The tumor then went out for Oncotype testing and came back in the intermediate risk range with a score of 21.      Luda started on adjuvant Taxotere and Cytoxan and has completed at cycle #1 and is here today for anticipation of cycle #2.  She tolerated the first cycle quite well but she did get some heartburn and needed to come in for fluids because her appetite was down .  She got some mild nausea.  She got nothing in the way of peripheral neuropathy constipation diarrhea or mouth sores or fevers.  She is using cold capping and that seems to be going well for her.  She is now on omeprazole and hopefully if that can keep her heartburn at bay then she may not need fluids this cycle.  We have discussed this in clinic today.      PAST MEDICAL HISTORY:    1.  Right-sided breast cancer.  2.  Bariatric surgery.  3.  History of knee replacement.  4.  History of ectopic pregnancy.  5.  History of Nissen fundoplication procedure.     MEDICATIONS AND ALLERGIES:  Outlined in the nursing records.     SOCIAL HISTORY:  The patient has 1 child, she has had with IVF.  She has now finished with her family and not planning on any more children.  She is premenopausal and works as an .  She is a nonsmoker.    A 12 point comprehensive review of systems has been done with her today and  overall she feels well she is got really no major problems she is ready to do her next cycle of chemotherapy.    Data review:  White cell count 6.1, hemoglobin 11.4, platelets 204    Physical exam:    Patient is alert and orientated x3  Respiratory exam normal  Cardiovascular exam normal  Neck is no masses or lymph nodes trachea central  Regular status post right-sided lumpectomy the scar looks good no further palpable masses right axilla negative  Implantable port site looks good  Left breast no palpable masses left axilla negative  GI exam normal  Skin is no rashes or lesions  Psychiatric exam normal.        Assessment and plan:  Luda is a 43-year-old premenopausal patient who was diagnosed with a T2N0 infiltrating ductal carcinoma of the right breast at the 11 o'clock position ER/IN positive HER2 receptor negative and an Oncotype score of 21.  We elected to do 4 cycles of adjuvant Taxotere Cytoxan today is cycle #2 and she is ready for treatment today.  We will monitor side effects on this cycle and we have made some adjustments to her steroid treatment also.  She has started on omeprazole.  She will be seen back in 3 weeks for cycle #3.  She seems to be managing her cold therapy well.  Her labs look good for chemotherapy today.  I have answered all of her questions to the best of my ability.    Total time spent today 30 minutes.    Adolfo Zeng MD         Again, thank you for allowing me to participate in the care of your patient.        Sincerely,        Adolfo Zeng MD

## 2023-08-30 NOTE — PROGRESS NOTES
Infusion Nursing Note:  Luda Rai presents today for C2D1 Taxotere, Cytoxan.    Patient seen by provider today: Yes: Dr. Zeng   present during visit today: Not Applicable.    Note: ONPRO  Was placed on patient's: back of left arm.    Was placed at 1211 PM    Podpal used: Yes    ONPRO injector device Lot number: [id number]    Patient education included: what patient can expect after application, what colored lights mean on the device, when to remove device, when and where to call with questions or issues, all patients questions answered, and that Neulasta administration will occur at 1511 on 8/31.    Patient tolerated administration well.  .      Intravenous Access:  Implanted Port- Accessed by FT RN.    Treatment Conditions:  Lab Results   Component Value Date    HGB 11.4 (L) 08/30/2023    WBC 6.1 08/30/2023    ANEU 4.1 08/30/2023    ANEUTAUTO 8.4 (H) 08/11/2023     08/30/2023        Results reviewed, labs MET treatment parameters, ok to proceed with treatment.      Post Infusion Assessment:  Patient tolerated infusion without incident.  Blood return noted pre and post infusion.  Site patent and intact, free from redness, edema or discomfort.  No evidence of extravasations.  Access discontinued per protocol.       Discharge Plan:   Discharge instructions reviewed with: Patient.  Patient and/or family verbalized understanding of discharge instructions and all questions answered.  AVS to patient via [employer].  Patient will return 9/22 for next appointment.   Patient discharged in stable condition accompanied by: .  Departure Mode: Ambulatory.      Latisha Bhakta RN

## 2023-08-30 NOTE — PROGRESS NOTES
"Oncology Rooming Note    August 30, 2023 9:06 AM   Luda Rai is a 43 year old female who presents for:    Chief Complaint   Patient presents with    Oncology Clinic Visit     Malignant neoplasm of right breast in female, estrogen receptor positive, unspecified site of breast (H)  Invasive ductal carcinoma of breast, female, left (H)       Initial Vitals: BP (!) 159/89   Pulse 83   Temp 97.4  F (36.3  C) (Oral)   Resp 16   Wt 101.8 kg (224 lb 6.4 oz)   LMP 07/31/2023 (Exact Date)   SpO2 99%   BMI 35.15 kg/m   Estimated body mass index is 35.15 kg/m  as calculated from the following:    Height as of 8/11/23: 1.702 m (5' 7\").    Weight as of this encounter: 101.8 kg (224 lb 6.4 oz). Body surface area is 2.19 meters squared.  No Pain (0) Comment: Data Unavailable   Patient's last menstrual period was 07/31/2023 (exact date).  Allergies reviewed: Yes  Medications reviewed: Yes    Medications: Medication refills not needed today.  Pharmacy name entered into [employer]: [facility] #[id number] - [place], [place] - [address] AT [place] [address] [address]    Clinical concerns: follow up        Mary Ellen Wesley            "

## 2023-08-30 NOTE — PROGRESS NOTES
Nursing Note:  Luda Rai presents today for labs.    Patient seen by provider today: Yes: Dr Zeng   present during visit today: Not Applicable.    Note: N/A.    Intravenous Access:  Labs drawn without difficulty  Port    Discharge Plan:   Patient was sent to clinic for MD appointment.    Cristy Miranda RN

## 2023-08-30 NOTE — PROGRESS NOTES
Luda is a 43-year-old patient who is here today for interim follow-up.    CHIEF COMPLAINT:     1.  Invasive ductal carcinoma of the right breast at the 11 o'clock position T2 N0 M0, ER/AL positive, HER-2 receptor negative.  2.  Status post lumpectomy.  3. On Taxotere / Cytoxan adjuvant   4. Here for cycle #2       HISTORY OF PRESENTING COMPLAINT: The patient is a 43-year-old premenopausal patient who went for a screening mammogram in 04/2023 because she was having discomfort in the right breast. A suspicious lesion was seen at the 11 o'clock position of the breast and a biopsy revealed an invasive ductal carcinoma, grade II, ER/AL positive, HER-2 receptor negative. I saw her in initial consultation on 05/30/2023. She then went on to have a right-sided lumpectomy with Dr. Gore   The pathology  showed invasive ductal carcinoma, grade II in the upper outer quadrant of the right breast at the 11 o'clock position.  The size of the tumor was 2.4 cm.  It was a single focus associated with DCIS.  All the margins were negative, and she had 3 sentinel lymph nodes, which were negative.  Stage of disease is a T2 N0, ER/AL positive, HER-2 receptor negative tumor.     The tumor then went out for Oncotype testing and came back in the intermediate risk range with a score of 21.      Luda started on adjuvant Taxotere and Cytoxan and has completed at cycle #1 and is here today for anticipation of cycle #2.  She tolerated the first cycle quite well but she did get some heartburn and needed to come in for fluids because her appetite was down .  She got some mild nausea.  She got nothing in the way of peripheral neuropathy constipation diarrhea or mouth sores or fevers.  She is using cold capping and that seems to be going well for her.  She is now on omeprazole and hopefully if that can keep her heartburn at bay then she may not need fluids this cycle.  We have discussed this in clinic today.      PAST MEDICAL HISTORY:    1.   Right-sided breast cancer.  2.  Bariatric surgery.  3.  History of knee replacement.  4.  History of ectopic pregnancy.  5.  History of Nissen fundoplication procedure.     MEDICATIONS AND ALLERGIES:  Outlined in the nursing records.     SOCIAL HISTORY:  The patient has 1 child, she has had with IVF.  She has now finished with her family and not planning on any more children.  She is premenopausal and works as an .  She is a nonsmoker.    A 12 point comprehensive review of systems has been done with her today and overall she feels well she is got really no major problems she is ready to do her next cycle of chemotherapy.    Data review:  White cell count 6.1, hemoglobin 11.4, platelets 204    Physical exam:    Patient is alert and orientated x3  Respiratory exam normal  Cardiovascular exam normal  Neck is no masses or lymph nodes trachea central  Regular status post right-sided lumpectomy the scar looks good no further palpable masses right axilla negative  Implantable port site looks good  Left breast no palpable masses left axilla negative  GI exam normal  Skin is no rashes or lesions  Psychiatric exam normal.        Assessment and plan:  Luda is a 43-year-old premenopausal patient who was diagnosed with a T2N0 infiltrating ductal carcinoma of the right breast at the 11 o'clock position ER/GA positive HER2 receptor negative and an Oncotype score of 21.  We elected to do 4 cycles of adjuvant Taxotere Cytoxan today is cycle #2 and she is ready for treatment today.  We will monitor side effects on this cycle and we have made some adjustments to her steroid treatment also.  She has started on omeprazole.  She will be seen back in 3 weeks for cycle #3.  She seems to be managing her cold therapy well.  Her labs look good for chemotherapy today.  I have answered all of her questions to the best of my ability.    Total time spent today 30 minutes.    Adolfo Zeng MD

## 2023-09-18 ENCOUNTER — TELEPHONE (OUTPATIENT)
Dept: ONCOLOGY | Facility: CLINIC | Age: 43
End: 2023-09-18
Payer: COMMERCIAL

## 2023-09-18 NOTE — CONFIDENTIAL NOTE
Writer called pt with recommendations from Nuvia Castañeda RYLEE.    Nuvia Ryan, HELLEN  You; Cintia Griggs, RN9 minutes ago (8:48 AM)     AK  Thanks, Jorge.    That's a good start. She could try Benadryl instead of Claritin, but of course, that is more sedating for most patients. If no improvement, then we could try a stronger topical steroid. Can she send in photos of this rash?    Nuvia     No answer, left VM that recommendations and picture request will be sent in a [employer] message.

## 2023-09-18 NOTE — CONFIDENTIAL NOTE
"Pt calling in to report signs of a rash that started on Saturday.    She reports that the rash is \"random little red bumps\" on her arms, legs, and some on her face. The only symptom associated with this rash is itching and she denies swelling, fever, bleeding, shortness of breath, bruising, chest pain, or open areas. Pt has not taken any medication for her symptoms. Pt's last infusion was 8/30 (C2D1 Taxotere, Cytoxan ).    Writer advised that she could utilize OTC hydrocortisone cream but to only use for external use and to avoid the eyes and mouth areas. Also offered use of daily Claritin as this may help with itchiness. Other recommendations included ice packs, fans, and cool showers, but primary prevention aimed towards not itching the rash to prevent open areas and possible infection.    Pt verbalized understanding and will trial these recommendations. Will route to provider for any additional recommendations.  "

## 2023-09-19 RX ORDER — EPINEPHRINE 1 MG/ML
0.3 INJECTION, SOLUTION INTRAMUSCULAR; SUBCUTANEOUS EVERY 5 MIN PRN
Status: CANCELLED | OUTPATIENT
Start: 2023-09-22

## 2023-09-19 RX ORDER — ONDANSETRON 2 MG/ML
8 INJECTION INTRAMUSCULAR; INTRAVENOUS ONCE
Status: CANCELLED | OUTPATIENT
Start: 2023-09-22

## 2023-09-19 RX ORDER — MEPERIDINE HYDROCHLORIDE 25 MG/ML
25 INJECTION INTRAMUSCULAR; INTRAVENOUS; SUBCUTANEOUS EVERY 30 MIN PRN
Status: CANCELLED | OUTPATIENT
Start: 2023-09-22

## 2023-09-19 RX ORDER — HEPARIN SODIUM (PORCINE) LOCK FLUSH IV SOLN 100 UNIT/ML 100 UNIT/ML
5 SOLUTION INTRAVENOUS
Status: CANCELLED | OUTPATIENT
Start: 2023-09-22

## 2023-09-19 RX ORDER — ALBUTEROL SULFATE 90 UG/1
1-2 AEROSOL, METERED RESPIRATORY (INHALATION)
Status: CANCELLED
Start: 2023-09-22

## 2023-09-19 RX ORDER — LORAZEPAM 2 MG/ML
0.5 INJECTION INTRAMUSCULAR EVERY 4 HOURS PRN
Status: CANCELLED | OUTPATIENT
Start: 2023-09-22

## 2023-09-19 RX ORDER — DIPHENHYDRAMINE HYDROCHLORIDE 50 MG/ML
50 INJECTION INTRAMUSCULAR; INTRAVENOUS
Status: CANCELLED
Start: 2023-09-22

## 2023-09-19 RX ORDER — ALBUTEROL SULFATE 0.83 MG/ML
2.5 SOLUTION RESPIRATORY (INHALATION)
Status: CANCELLED | OUTPATIENT
Start: 2023-09-22

## 2023-09-19 RX ORDER — HEPARIN SODIUM,PORCINE 10 UNIT/ML
5-20 VIAL (ML) INTRAVENOUS DAILY PRN
Status: CANCELLED | OUTPATIENT
Start: 2023-09-22

## 2023-09-19 RX ORDER — METHYLPREDNISOLONE SODIUM SUCCINATE 125 MG/2ML
125 INJECTION, POWDER, LYOPHILIZED, FOR SOLUTION INTRAMUSCULAR; INTRAVENOUS
Status: CANCELLED
Start: 2023-09-22

## 2023-09-21 NOTE — PROGRESS NOTES
Oncology/Hematology Visit Note    Sep 22, 2023    Reason for visit: Follow up breast cancer    Oncology HPI: Luda Rai is a 43 year old female with right sided breast cancer. She went in for a screening mammogram in April and was having pain in the right breast. Suspicious lesion noted and biopsy revealed invasive ductal carcinoma, grade 2, ER/SC positive, HER2 negative.  She underwent right-sided lumpectomy on 6/26/2023.  Margins were negative and she had 3 sentinel lymph nodes negative.  Oncotype testing came back at 21 and Dr. Zeng recommended adjuvant chemotherapy with Taxotere/Cytoxan, then adjuvant radiation therapy followed by 5 to 10 years of hormonal therapy.      TC C1 D1 completed on 8/11/2023 with Neulasta support.  She was having severe reflux and her dexamethasone dose was adjusted and was started on PPI and Pepcid was added to her premeds.  She completed cycle 2 on 8/30/2023.    She is here today for TC cycle 3.    Interval History: Luda is here with her , Glen.  She continues cold capping and this is working well for her.  Since ingesting dexamethasone, adding Pepcid and omeprazole to her regimen, her reflux is much better and she tolerated cycle 2 much better.  She has noticed some increased insomnia, tries melatonin and falls asleep quickly, but wakes up often about 2 AM and hard to get back to sleep.  She has many hot flashes throughout the night as well, but nothing during the day.  She had a brief rash on her arms and chin, almost like pimples, but took Claritin and improved.  It was itching, now improved.  Nausea was better controlled cycle 2 as well.  She is hoping to have her port removed in December when Dr. Gore is available.  No new headache or vision changes, vomiting, diarrhea, bleeding.    Review of Systems: See interval hx. Denies fevers, chills, HA, dizziness, changes in vision, cough, sore throat, CP, SOB, abdominal pain, diarrhea, changes in urination, bleeding,  "bruising.      PMHx and Social Hx reviewed per EPIC.      Medications:  Current Outpatient Medications   Medication Sig Dispense Refill    BIOTIN PO Take 1 capsule by mouth daily      CALCIUM CITRATE PO Take 500 mg by mouth daily B-L-D      cyanocobalamin (CYANOCOBALAMIN) 1000 MCG/ML injection Inject 1 mL (1,000 mcg) Subcutaneous every 30 days 3 mL 3    lidocaine-prilocaine (EMLA) 2.5-2.5 % external cream Apply topically as needed for moderate pain 30 g 0    multivitamin w/minerals (THERA-VIT-M) tablet Take 1 tablet by mouth At Bedtime Abel Adv Chew      naltrexone (DEPADE/REVIA) 50 MG tablet Take 1/2 tablet 1-2 hours before biggest craving time for 7 days, then increase to a full tablet 30 tablet 3    omeprazole (PRILOSEC) 40 MG DR capsule take 1 capsule by mouth every day before a meal      ondansetron (ZOFRAN) 8 MG tablet Take 1 tablet (8 mg) by mouth every 8 hours as needed for nausea (vomiting) 30 tablet 2    prochlorperazine (COMPAZINE) 10 MG tablet Take 1 tablet (10 mg) by mouth every 6 hours as needed for nausea or vomiting 30 tablet 2    syringe/needle, disp, 25G X 1\" 3 ML MISC Use for B-12 injection 3 each 3    tuberculin-allergy syringes 27G X 1/2\" 1 ML MISC Use for B12 injections. 3 each 3    dexamethasone (DECADRON) 4 MG tablet Take 2 tablets (8 mg) by mouth 2 times daily (with meals) for 3 doses Start evening of Docetaxel infusion and continue for a total of 3 doses. 6 tablet 3    metFORMIN (GLUCOPHAGE XR) 500 MG 24 hr tablet Take 1 tablet (500 mg) by mouth daily (with dinner) for 7 days, THEN 2 tablets (1,000 mg) daily (with dinner) for 7 days, THEN 3 tablets (1,500 mg) daily (with dinner) for 7 days, THEN 4 tablets (2,000 mg) daily (with dinner) for 7 days. 360 tablet 3       Allergies   Allergen Reactions    Asa [Aspirin]      Gastric bypass    Morphine And Related GI Disturbance    Nsaids Other (See Comments)     Avoid due to bariatric surgery 6/16/2022    Rocephin [Ceftriaxone] Hives "       EXAM:    /80   Pulse 84   Temp 98.3  F (36.8  C) (Oral)   Resp 16   Wt 101.2 kg (223 lb 3.2 oz)   LMP 07/31/2023 (Exact Date)   SpO2 100%   BMI 34.96 kg/m      GENERAL:  Female, in no acute distress.  Alert and oriented x3. Well groomed.   HEENT:  Normocephalic, atraumatic. Cold cap in place.   LUNGS:  Nonlabored breathing, no cough or audible wheezing, able to speak full sentences.  MSK: Full ROM UE.    SKIN: No rash on exposed skin.   NEURO: CN grossly intact, speech normal  PSYCH: Mentation appears normal, insight and judgement intact      Labs:    09/22/23 08:20   Sodium 138   Potassium 3.8   Chloride 102   Carbon Dioxide (CO2) 26   Urea Nitrogen 10.2   Creatinine 0.55   GFR Estimate >90   Calcium 9.4   Anion Gap 10   Albumin 4.4   Protein Total 7.5   Alkaline Phosphatase 86   ALT 9   AST 15   Bilirubin Total 1.0   Glucose 173 (H)   WBC 9.8   Hemoglobin 11.5 (L)   Hematocrit 34.9 (L)   Platelet Count 192   RBC Count 4.20   MCV 83   MCH 27.4   MCHC 33.0   RDW 15.1 (H)   % Neutrophils 91   % Lymphocytes 7   % Monocytes 2   % Eosinophils 0   % Basophils 0   Absolute Basophils 0.0   Absolute Eosinophils 0.0   Absolute Immature Granulocytes 0.0   Absolute Lymphocytes 0.7 (L)   Absolute Monocytes 0.2   % Immature Granulocytes 0   Absolute Neutrophils 8.8 (H)   Absolute NRBCs 0.0   NRBCs per 100 WBC 0     Imaging: n/a    Impression/Plan: Luda Rai is a 43 year old female with invasive ductal carcinoma of the right breast s/p lumpectomy, Oncotype 21, currently on adjuvant chemotherapy with Taxotere/Cytoxan.    Breast cancer: Now s/p right-sided lumpectomy, Oncotype score of 21, Taxotere/Cytoxan C1 D1 completed on 8/11/2023.  That cycle was a little tough with severe reflux, fatigue and poor appetite.  This is significantly improved with dexamethasone dose changes, antiemetics and H2 blocker/PPI additions, see below.  She continues to call And this is going well.  Labs reviewed and stable, plan  for TC cycle 3 today.  Neulasta was finally improved and given the cycle 2 and again will be given today for cycle 3. She is interested in her port being removed by Dr. Gore, next available is likely November/December.  We will look into this order.  -Rad Onc referral ordered  -10/13 NICK Pedersen cycle 4    GERD: History of bariatric surgery, significant improvement with Pepcid as a premed, reducing the dose of the dexamethasone and taking PPI at home.  She also continues on antiemetics more frequently.    FEN: Poor appetite, minimal oral intake, likely secondary to severe GERD, now significantly improved.  Electrolytes are great and she will push fluids at home..         Chart documentation with Dragon Voice recognition Software. Although reviewed after completion, some words and grammatical errors may remain.    30 minutes spent on the date of the encounter doing chart review, review of test results, interpretation of tests, patient visit, documentation, and discussion with family         Nuvia Castañeda PA-C  Hematology/Oncology  [facility]

## 2023-09-22 ENCOUNTER — INFUSION THERAPY VISIT (OUTPATIENT)
Dept: INFUSION THERAPY | Facility: CLINIC | Age: 43
End: 2023-09-22
Attending: INTERNAL MEDICINE
Payer: COMMERCIAL

## 2023-09-22 ENCOUNTER — LAB (OUTPATIENT)
Dept: INFUSION THERAPY | Facility: CLINIC | Age: 43
End: 2023-09-22
Attending: PHYSICIAN ASSISTANT
Payer: COMMERCIAL

## 2023-09-22 ENCOUNTER — ONCOLOGY VISIT (OUTPATIENT)
Dept: ONCOLOGY | Facility: CLINIC | Age: 43
End: 2023-09-22
Attending: PHYSICIAN ASSISTANT
Payer: COMMERCIAL

## 2023-09-22 VITALS
RESPIRATION RATE: 16 BRPM | SYSTOLIC BLOOD PRESSURE: 128 MMHG | WEIGHT: 223.2 LBS | BODY MASS INDEX: 34.96 KG/M2 | DIASTOLIC BLOOD PRESSURE: 80 MMHG | TEMPERATURE: 98.3 F | HEART RATE: 84 BPM | OXYGEN SATURATION: 100 %

## 2023-09-22 DIAGNOSIS — Z17.0 MALIGNANT NEOPLASM OF RIGHT BREAST IN FEMALE, ESTROGEN RECEPTOR POSITIVE, UNSPECIFIED SITE OF BREAST (H): Primary | ICD-10-CM

## 2023-09-22 DIAGNOSIS — C50.911 MALIGNANT NEOPLASM OF RIGHT BREAST IN FEMALE, ESTROGEN RECEPTOR POSITIVE, UNSPECIFIED SITE OF BREAST (H): Primary | ICD-10-CM

## 2023-09-22 DIAGNOSIS — K21.9 GASTROESOPHAGEAL REFLUX DISEASE, UNSPECIFIED WHETHER ESOPHAGITIS PRESENT: ICD-10-CM

## 2023-09-22 LAB
ALBUMIN SERPL BCG-MCNC: 4.4 G/DL (ref 3.5–5.2)
ALP SERPL-CCNC: 86 U/L (ref 35–104)
ALT SERPL W P-5'-P-CCNC: 9 U/L (ref 0–50)
ANION GAP SERPL CALCULATED.3IONS-SCNC: 10 MMOL/L (ref 7–15)
AST SERPL W P-5'-P-CCNC: 15 U/L (ref 0–45)
BASOPHILS # BLD AUTO: 0 10E3/UL (ref 0–0.2)
BASOPHILS NFR BLD AUTO: 0 %
BILIRUB SERPL-MCNC: 1 MG/DL
BUN SERPL-MCNC: 10.2 MG/DL (ref 6–20)
CALCIUM SERPL-MCNC: 9.4 MG/DL (ref 8.6–10)
CHLORIDE SERPL-SCNC: 102 MMOL/L (ref 98–107)
CREAT SERPL-MCNC: 0.55 MG/DL (ref 0.51–0.95)
DEPRECATED HCO3 PLAS-SCNC: 26 MMOL/L (ref 22–29)
EGFRCR SERPLBLD CKD-EPI 2021: >90 ML/MIN/1.73M2
EOSINOPHIL # BLD AUTO: 0 10E3/UL (ref 0–0.7)
EOSINOPHIL NFR BLD AUTO: 0 %
ERYTHROCYTE [DISTWIDTH] IN BLOOD BY AUTOMATED COUNT: 15.1 % (ref 10–15)
GLUCOSE SERPL-MCNC: 173 MG/DL (ref 70–99)
HCT VFR BLD AUTO: 34.9 % (ref 35–47)
HGB BLD-MCNC: 11.5 G/DL (ref 11.7–15.7)
IMM GRANULOCYTES # BLD: 0 10E3/UL
IMM GRANULOCYTES NFR BLD: 0 %
LYMPHOCYTES # BLD AUTO: 0.7 10E3/UL (ref 0.8–5.3)
LYMPHOCYTES NFR BLD AUTO: 7 %
MCH RBC QN AUTO: 27.4 PG (ref 26.5–33)
MCHC RBC AUTO-ENTMCNC: 33 G/DL (ref 31.5–36.5)
MCV RBC AUTO: 83 FL (ref 78–100)
MONOCYTES # BLD AUTO: 0.2 10E3/UL (ref 0–1.3)
MONOCYTES NFR BLD AUTO: 2 %
NEUTROPHILS # BLD AUTO: 8.8 10E3/UL (ref 1.6–8.3)
NEUTROPHILS NFR BLD AUTO: 91 %
NRBC # BLD AUTO: 0 10E3/UL
NRBC BLD AUTO-RTO: 0 /100
PLATELET # BLD AUTO: 192 10E3/UL (ref 150–450)
POTASSIUM SERPL-SCNC: 3.8 MMOL/L (ref 3.4–5.3)
PROT SERPL-MCNC: 7.5 G/DL (ref 6.4–8.3)
RBC # BLD AUTO: 4.2 10E6/UL (ref 3.8–5.2)
SODIUM SERPL-SCNC: 138 MMOL/L (ref 136–145)
WBC # BLD AUTO: 9.8 10E3/UL (ref 4–11)

## 2023-09-22 PROCEDURE — 80053 COMPREHEN METABOLIC PANEL: CPT | Performed by: INTERNAL MEDICINE

## 2023-09-22 PROCEDURE — 85004 AUTOMATED DIFF WBC COUNT: CPT | Performed by: INTERNAL MEDICINE

## 2023-09-22 PROCEDURE — 96413 CHEMO IV INFUSION 1 HR: CPT

## 2023-09-22 PROCEDURE — 96375 TX/PRO/DX INJ NEW DRUG ADDON: CPT

## 2023-09-22 PROCEDURE — 258N000003 HC RX IP 258 OP 636: Performed by: INTERNAL MEDICINE

## 2023-09-22 PROCEDURE — 250N000011 HC RX IP 250 OP 636: Mod: JZ | Performed by: INTERNAL MEDICINE

## 2023-09-22 PROCEDURE — 99214 OFFICE O/P EST MOD 30 MIN: CPT | Performed by: PHYSICIAN ASSISTANT

## 2023-09-22 PROCEDURE — 96372 THER/PROPH/DIAG INJ SC/IM: CPT | Performed by: INTERNAL MEDICINE

## 2023-09-22 PROCEDURE — 96367 TX/PROPH/DG ADDL SEQ IV INF: CPT

## 2023-09-22 PROCEDURE — 96377 APPLICATON ON-BODY INJECTOR: CPT | Mod: XS

## 2023-09-22 PROCEDURE — G0463 HOSPITAL OUTPT CLINIC VISIT: HCPCS | Performed by: PHYSICIAN ASSISTANT

## 2023-09-22 PROCEDURE — 96417 CHEMO IV INFUS EACH ADDL SEQ: CPT

## 2023-09-22 RX ORDER — ONDANSETRON 2 MG/ML
8 INJECTION INTRAMUSCULAR; INTRAVENOUS ONCE
Status: COMPLETED | OUTPATIENT
Start: 2023-09-22 | End: 2023-09-22

## 2023-09-22 RX ORDER — HEPARIN SODIUM (PORCINE) LOCK FLUSH IV SOLN 100 UNIT/ML 100 UNIT/ML
5 SOLUTION INTRAVENOUS
Status: DISCONTINUED | OUTPATIENT
Start: 2023-09-22 | End: 2023-09-22 | Stop reason: HOSPADM

## 2023-09-22 RX ADMIN — FAMOTIDINE 20 MG: 10 INJECTION, SOLUTION INTRAVENOUS at 09:31

## 2023-09-22 RX ADMIN — FOSAPREPITANT: 150 INJECTION, POWDER, LYOPHILIZED, FOR SOLUTION INTRAVENOUS at 09:49

## 2023-09-22 RX ADMIN — SODIUM CHLORIDE 250 ML: 9 INJECTION, SOLUTION INTRAVENOUS at 09:29

## 2023-09-22 RX ADMIN — CYCLOPHOSPHAMIDE 1285 MG: 1 INJECTION, POWDER, FOR SOLUTION INTRAVENOUS; ORAL at 11:23

## 2023-09-22 RX ADMIN — DOCETAXEL 160 MG: 10 INJECTION, SOLUTION INTRAVENOUS at 10:17

## 2023-09-22 RX ADMIN — ONDANSETRON 8 MG: 2 INJECTION INTRAMUSCULAR; INTRAVENOUS at 09:41

## 2023-09-22 RX ADMIN — PEGFILGRASTIM 6 MG: KIT SUBCUTANEOUS at 12:08

## 2023-09-22 RX ADMIN — Medication 5 ML: at 11:55

## 2023-09-22 ASSESSMENT — PAIN SCALES - GENERAL: PAINLEVEL: NO PAIN (0)

## 2023-09-22 NOTE — PROGRESS NOTES
Nursing Note:  Luda Rai presents today for Port Labs.    Patient to be seen by provider today: Yes: Nuvia Whelan PA-C    present during visit today: Not Applicable.    Note: N/A.    Intravenous Access:  Labs drawn without difficulty.  Implanted Port.    Discharge Plan:   Patient was sent to [facility] for provider appointment, followed by infusion.      Cb Kaur RN

## 2023-09-22 NOTE — PROGRESS NOTES
Infusion Nursing Note:  Luda Rai presents today for C3D1 Taxotere/Cytoxan and Neulasta Onpro.    Patient seen by provider today: Yes: MARY Ramirez   present during visit today: Not Applicable.    Note:   Patient confirmed that she is taking dexamethasone as prescribed.  Luda wore cold cap during treatment.    ONPRO  Was placed on patient's: back of left arm.    Was placed at 12 PM    Podpal used: Yes    ONPRO injector device Lot number: [id number]    Patient education included: what patient can expect after application, what colored lights mean on the device, when to remove device, when and where to call with questions or issues, all patients questions answered, and that Neulasta administration will occur at 1508.    Patient tolerated administration well.        Intravenous Access:  Implanted Port accessed in FT.      Treatment Conditions:  Lab Results   Component Value Date    HGB 11.5 (L) 09/22/2023    WBC 9.8 09/22/2023    ANEU 4.1 08/30/2023    ANEUTAUTO 8.8 (H) 09/22/2023     09/22/2023        Lab Results   Component Value Date     09/22/2023    POTASSIUM 3.8 09/22/2023    CR 0.55 09/22/2023    TAY 9.4 09/22/2023    BILITOTAL 1.0 09/22/2023    ALBUMIN 4.4 09/22/2023    ALT 9 09/22/2023    AST 15 09/22/2023       Results reviewed, labs MET treatment parameters, ok to proceed with treatment.      Post Infusion Assessment:  Patient tolerated infusion without incident.  Blood return noted pre and post infusion.  Site patent and intact, free from redness, edema or discomfort.  No evidence of extravasations.  Access discontinued per protocol.       Discharge Plan:   Discharge instructions reviewed with: Patient and Family.  Patient and/or family verbalized understanding of discharge instructions and all questions answered.  AVS to patient via [employer].  Patient will return 10/13/23 for next appointment.   Patient discharged in stable condition accompanied by: .  Departure Mode:  Ambulatory.      Oly Marks RN

## 2023-09-22 NOTE — LETTER
9/22/2023         RE: Luda Rai  [address]  [place] [address]        Dear Colleague,    Thank you for referring your patient, Luda Rai, to the [facility]. Please see a copy of my visit note below.    Oncology/Hematology Visit Note    Sep 22, 2023    Reason for visit: Follow up breast cancer    Oncology HPI: Luda Rai is a 43 year old female with right sided breast cancer. She went in for a screening mammogram in April and was having pain in the right breast. Suspicious lesion noted and biopsy revealed invasive ductal carcinoma, grade 2, ER/RI positive, HER2 negative.  She underwent right-sided lumpectomy on 6/26/2023.  Margins were negative and she had 3 sentinel lymph nodes negative.  Oncotype testing came back at 21 and Dr. Zeng recommended adjuvant chemotherapy with Taxotere/Cytoxan, then adjuvant radiation therapy followed by 5 to 10 years of hormonal therapy.      TC C1 D1 completed on 8/11/2023 with Neulasta support.  She was having severe reflux and her dexamethasone dose was adjusted and was started on PPI and Pepcid was added to her premeds.  She completed cycle 2 on 8/30/2023.    She is here today for TC cycle 3.    Interval History: Luda is here with her , Glen.  She continues cold capping and this is working well for her.  Since ingesting dexamethasone, adding Pepcid and omeprazole to her regimen, her reflux is much better and she tolerated cycle 2 much better.  She has noticed some increased insomnia, tries melatonin and falls asleep quickly, but wakes up often about 2 AM and hard to get back to sleep.  She has many hot flashes throughout the night as well, but nothing during the day.  She had a brief rash on her arms and chin, almost like pimples, but took Claritin and improved.  It was itching, now improved.  Nausea was better controlled cycle 2 as well.  She is hoping to have her port removed in December when Dr. Gore  "is available.  No new headache or vision changes, vomiting, diarrhea, bleeding.    Review of Systems: See interval hx. Denies fevers, chills, HA, dizziness, changes in vision, cough, sore throat, CP, SOB, abdominal pain, diarrhea, changes in urination, bleeding, bruising.      PMHx and Social Hx reviewed per EPIC.      Medications:  Current Outpatient Medications   Medication Sig Dispense Refill     BIOTIN PO Take 1 capsule by mouth daily       CALCIUM CITRATE PO Take 500 mg by mouth daily B-L-D       cyanocobalamin (CYANOCOBALAMIN) 1000 MCG/ML injection Inject 1 mL (1,000 mcg) Subcutaneous every 30 days 3 mL 3     lidocaine-prilocaine (EMLA) 2.5-2.5 % external cream Apply topically as needed for moderate pain 30 g 0     multivitamin w/minerals (THERA-VIT-M) tablet Take 1 tablet by mouth At Bedtime Abel Adv Chew       naltrexone (DEPADE/REVIA) 50 MG tablet Take 1/2 tablet 1-2 hours before biggest craving time for 7 days, then increase to a full tablet 30 tablet 3     omeprazole (PRILOSEC) 40 MG DR capsule take 1 capsule by mouth every day before a meal       ondansetron (ZOFRAN) 8 MG tablet Take 1 tablet (8 mg) by mouth every 8 hours as needed for nausea (vomiting) 30 tablet 2     prochlorperazine (COMPAZINE) 10 MG tablet Take 1 tablet (10 mg) by mouth every 6 hours as needed for nausea or vomiting 30 tablet 2     syringe/needle, disp, 25G X 1\" 3 ML MISC Use for B-12 injection 3 each 3     tuberculin-allergy syringes 27G X 1/2\" 1 ML MISC Use for B12 injections. 3 each 3     dexamethasone (DECADRON) 4 MG tablet Take 2 tablets (8 mg) by mouth 2 times daily (with meals) for 3 doses Start evening of Docetaxel infusion and continue for a total of 3 doses. 6 tablet 3     metFORMIN (GLUCOPHAGE XR) 500 MG 24 hr tablet Take 1 tablet (500 mg) by mouth daily (with dinner) for 7 days, THEN 2 tablets (1,000 mg) daily (with dinner) for 7 days, THEN 3 tablets (1,500 mg) daily (with dinner) for 7 days, THEN 4 tablets (2,000 mg) " daily (with dinner) for 7 days. 360 tablet 3       Allergies   Allergen Reactions     Asa [Aspirin]      Gastric bypass     Morphine And Related GI Disturbance     Nsaids Other (See Comments)     Avoid due to bariatric surgery 6/16/2022     Rocephin [Ceftriaxone] Hives       EXAM:    /80   Pulse 84   Temp 98.3  F (36.8  C) (Oral)   Resp 16   Wt 101.2 kg (223 lb 3.2 oz)   LMP 07/31/2023 (Exact Date)   SpO2 100%   BMI 34.96 kg/m      GENERAL:  Female, in no acute distress.  Alert and oriented x3. Well groomed.   HEENT:  Normocephalic, atraumatic. Cold cap in place.   LUNGS:  Nonlabored breathing, no cough or audible wheezing, able to speak full sentences.  MSK: Full ROM UE.    SKIN: No rash on exposed skin.   NEURO: CN grossly intact, speech normal  PSYCH: Mentation appears normal, insight and judgement intact      Labs:    09/22/23 08:20   Sodium 138   Potassium 3.8   Chloride 102   Carbon Dioxide (CO2) 26   Urea Nitrogen 10.2   Creatinine 0.55   GFR Estimate >90   Calcium 9.4   Anion Gap 10   Albumin 4.4   Protein Total 7.5   Alkaline Phosphatase 86   ALT 9   AST 15   Bilirubin Total 1.0   Glucose 173 (H)   WBC 9.8   Hemoglobin 11.5 (L)   Hematocrit 34.9 (L)   Platelet Count 192   RBC Count 4.20   MCV 83   MCH 27.4   MCHC 33.0   RDW 15.1 (H)   % Neutrophils 91   % Lymphocytes 7   % Monocytes 2   % Eosinophils 0   % Basophils 0   Absolute Basophils 0.0   Absolute Eosinophils 0.0   Absolute Immature Granulocytes 0.0   Absolute Lymphocytes 0.7 (L)   Absolute Monocytes 0.2   % Immature Granulocytes 0   Absolute Neutrophils 8.8 (H)   Absolute NRBCs 0.0   NRBCs per 100 WBC 0     Imaging: n/a    Impression/Plan: Luda Rai is a 43 year old female with invasive ductal carcinoma of the right breast s/p lumpectomy, Oncotype 21, currently on adjuvant chemotherapy with Taxotere/Cytoxan.    Breast cancer: Now s/p right-sided lumpectomy, Oncotype score of 21, Taxotere/Cytoxan C1 D1 completed on 8/11/2023.  That  cycle was a little tough with severe reflux, fatigue and poor appetite.  This is significantly improved with dexamethasone dose changes, antiemetics and H2 blocker/PPI additions, see below.  She continues to call And this is going well.  Labs reviewed and stable, plan for TC cycle 3 today.  Neulasta was finally improved and given the cycle 2 and again will be given today for cycle 3. She is interested in her port being removed by Dr. Gore, next available is likely November/December.  We will look into this order.  -Rad Onc referral ordered  -10/13 NICK Pedersen cycle 4    GERD: History of bariatric surgery, significant improvement with Pepcid as a premed, reducing the dose of the dexamethasone and taking PPI at home.  She also continues on antiemetics more frequently.    FEN: Poor appetite, minimal oral intake, likely secondary to severe GERD, now significantly improved.  Electrolytes are great and she will push fluids at home..         Chart documentation with Dragon Voice recognition Software. Although reviewed after completion, some words and grammatical errors may remain.    30 minutes spent on the date of the encounter doing chart review, review of test results, interpretation of tests, patient visit, documentation, and discussion with family         Nuvia Castañeda PA-C  Hematology/Oncology  [facility]              Again, thank you for allowing me to participate in the care of your patient.        Sincerely,        Nuvia Castañeda PA-C

## 2023-09-22 NOTE — NURSING NOTE
"Oncology Rooming Note    September 22, 2023 8:38 AM   Luda Rai is a 43 year old female who presents for:    Chief Complaint   Patient presents with    Oncology Clinic Visit     Initial Vitals: /80   Pulse 84   Temp 98.3  F (36.8  C) (Oral)   Resp 16   Wt 101.2 kg (223 lb 3.2 oz)   LMP 07/31/2023 (Exact Date)   SpO2 100%   BMI 34.96 kg/m   Estimated body mass index is 34.96 kg/m  as calculated from the following:    Height as of 8/11/23: 1.702 m (5' 7\").    Weight as of this encounter: 101.2 kg (223 lb 3.2 oz). Body surface area is 2.19 meters squared.  No Pain (0) Comment: Data Unavailable   Patient's last menstrual period was 07/31/2023 (exact date).  Allergies reviewed: Yes  Medications reviewed: Yes    Medications: Medication refills not needed today.  Pharmacy name entered into [employer]: [id number] [place] [phone] [facility] [address] [place] [address] & [facility]    Clinical concerns: f/u - rad onc referral, port removal order - Dec      Shantell Cruz CMA              "

## 2023-09-26 ENCOUNTER — TELEPHONE (OUTPATIENT)
Dept: SURGERY | Facility: CLINIC | Age: 43
End: 2023-09-26
Payer: COMMERCIAL

## 2023-09-26 DIAGNOSIS — Z85.3 HX: BREAST CANCER: Primary | ICD-10-CM

## 2023-09-26 NOTE — TELEPHONE ENCOUNTER
Type of surgery: REMOVAL VASCULAR ACCESS PORT   Location of surgery: [facility]  Date and time of surgery: 12/11/2023 @ 8:15 AM   Surgeon: Farnaz Gore MD    Pre-Op Appt Date: PATIENT TO SCHEDULE    Post-Op Appt Date: PATIENT TO SCHEDULE     Packet sent out: Yes  Pre-cert/Authorization completed:  Not Applicable  Date: 9/26/2023         REMOVAL VASCULAR ACCESS PORT MAC PT INST TO HAVE H&P WITH PCP 30 MIN REQ PA ASSIST JLS NMS

## 2023-09-30 ENCOUNTER — HOSPITAL ENCOUNTER (EMERGENCY)
Facility: CLINIC | Age: 43
Discharge: HOME | End: 2023-09-30
Attending: EMERGENCY MEDICINE | Admitting: EMERGENCY MEDICINE
Payer: COMMERCIAL

## 2023-09-30 ENCOUNTER — NURSE TRIAGE (OUTPATIENT)
Dept: NURSING | Facility: CLINIC | Age: 43
End: 2023-09-30
Payer: COMMERCIAL

## 2023-09-30 VITALS
SYSTOLIC BLOOD PRESSURE: 112 MMHG | OXYGEN SATURATION: 94 % | DIASTOLIC BLOOD PRESSURE: 76 MMHG | RESPIRATION RATE: 18 BRPM | HEART RATE: 85 BPM | TEMPERATURE: 99.5 F

## 2023-09-30 DIAGNOSIS — C50.919 MALIGNANT NEOPLASM OF FEMALE BREAST, UNSPECIFIED ESTROGEN RECEPTOR STATUS, UNSPECIFIED LATERALITY, UNSPECIFIED SITE OF BREAST (H): ICD-10-CM

## 2023-09-30 DIAGNOSIS — R50.9 FEVER: ICD-10-CM

## 2023-09-30 DIAGNOSIS — J02.9 VIRAL PHARYNGITIS: ICD-10-CM

## 2023-09-30 LAB
ANION GAP SERPL CALCULATED.3IONS-SCNC: 9 MMOL/L (ref 7–15)
BASOPHILS # BLD MANUAL: 0 10E3/UL (ref 0–0.2)
BASOPHILS NFR BLD MANUAL: 0 %
BUN SERPL-MCNC: 8.1 MG/DL (ref 6–20)
CALCIUM SERPL-MCNC: 8.8 MG/DL (ref 8.6–10)
CHLORIDE SERPL-SCNC: 104 MMOL/L (ref 98–107)
CREAT SERPL-MCNC: 0.61 MG/DL (ref 0.51–0.95)
DEPRECATED HCO3 PLAS-SCNC: 28 MMOL/L (ref 22–29)
EGFRCR SERPLBLD CKD-EPI 2021: >90 ML/MIN/1.73M2
ELLIPTOCYTES BLD QL SMEAR: SLIGHT
EOSINOPHIL # BLD MANUAL: 0 10E3/UL (ref 0–0.7)
EOSINOPHIL NFR BLD MANUAL: 1 %
ERYTHROCYTE [DISTWIDTH] IN BLOOD BY AUTOMATED COUNT: 15.6 % (ref 10–15)
FLUAV RNA SPEC QL NAA+PROBE: NEGATIVE
FLUBV RNA RESP QL NAA+PROBE: NEGATIVE
GLUCOSE SERPL-MCNC: 92 MG/DL (ref 70–99)
GROUP A STREP BY PCR: NOT DETECTED
HCT VFR BLD AUTO: 32.6 % (ref 35–47)
HGB BLD-MCNC: 10.5 G/DL (ref 11.7–15.7)
LYMPHOCYTES # BLD MANUAL: 1.9 10E3/UL (ref 0.8–5.3)
LYMPHOCYTES NFR BLD MANUAL: 51 %
MCH RBC QN AUTO: 27.3 PG (ref 26.5–33)
MCHC RBC AUTO-ENTMCNC: 32.2 G/DL (ref 31.5–36.5)
MCV RBC AUTO: 85 FL (ref 78–100)
MONOCYTES # BLD MANUAL: 0.2 10E3/UL (ref 0–1.3)
MONOCYTES NFR BLD MANUAL: 6 %
NEUTROPHILS # BLD MANUAL: 1.6 10E3/UL (ref 1.6–8.3)
NEUTROPHILS NFR BLD MANUAL: 42 %
NRBC # BLD AUTO: 0 10E3/UL
NRBC BLD MANUAL-RTO: 1 %
PLAT MORPH BLD: ABNORMAL
PLATELET # BLD AUTO: 147 10E3/UL (ref 150–450)
POTASSIUM SERPL-SCNC: 3.8 MMOL/L (ref 3.4–5.3)
RBC # BLD AUTO: 3.85 10E6/UL (ref 3.8–5.2)
RBC MORPH BLD: ABNORMAL
RSV RNA SPEC NAA+PROBE: NEGATIVE
SARS-COV-2 RNA RESP QL NAA+PROBE: NEGATIVE
SODIUM SERPL-SCNC: 141 MMOL/L (ref 135–145)
WBC # BLD AUTO: 3.7 10E3/UL (ref 4–11)

## 2023-09-30 PROCEDURE — 36415 COLL VENOUS BLD VENIPUNCTURE: CPT | Performed by: STUDENT IN AN ORGANIZED HEALTH CARE EDUCATION/TRAINING PROGRAM

## 2023-09-30 PROCEDURE — 96374 THER/PROPH/DIAG INJ IV PUSH: CPT

## 2023-09-30 PROCEDURE — 87637 SARSCOV2&INF A&B&RSV AMP PRB: CPT | Performed by: STUDENT IN AN ORGANIZED HEALTH CARE EDUCATION/TRAINING PROGRAM

## 2023-09-30 PROCEDURE — 85027 COMPLETE CBC AUTOMATED: CPT | Performed by: STUDENT IN AN ORGANIZED HEALTH CARE EDUCATION/TRAINING PROGRAM

## 2023-09-30 PROCEDURE — 250N000011 HC RX IP 250 OP 636: Mod: JZ | Performed by: STUDENT IN AN ORGANIZED HEALTH CARE EDUCATION/TRAINING PROGRAM

## 2023-09-30 PROCEDURE — 99284 EMERGENCY DEPT VISIT MOD MDM: CPT | Mod: 25

## 2023-09-30 PROCEDURE — 82310 ASSAY OF CALCIUM: CPT | Performed by: STUDENT IN AN ORGANIZED HEALTH CARE EDUCATION/TRAINING PROGRAM

## 2023-09-30 PROCEDURE — 85007 BL SMEAR W/DIFF WBC COUNT: CPT | Performed by: STUDENT IN AN ORGANIZED HEALTH CARE EDUCATION/TRAINING PROGRAM

## 2023-09-30 PROCEDURE — 96375 TX/PRO/DX INJ NEW DRUG ADDON: CPT

## 2023-09-30 PROCEDURE — 87651 STREP A DNA AMP PROBE: CPT | Performed by: EMERGENCY MEDICINE

## 2023-09-30 RX ORDER — DEXAMETHASONE SODIUM PHOSPHATE 10 MG/ML
10 INJECTION, SOLUTION INTRAMUSCULAR; INTRAVENOUS ONCE
Status: COMPLETED | OUTPATIENT
Start: 2023-09-30 | End: 2023-09-30

## 2023-09-30 RX ORDER — DIPHENHYDRAMINE HYDROCHLORIDE 50 MG/ML
25 INJECTION INTRAMUSCULAR; INTRAVENOUS ONCE
Status: COMPLETED | OUTPATIENT
Start: 2023-09-30 | End: 2023-09-30

## 2023-09-30 RX ADMIN — DIPHENHYDRAMINE HYDROCHLORIDE 25 MG: 50 INJECTION, SOLUTION INTRAMUSCULAR; INTRAVENOUS at 19:56

## 2023-09-30 RX ADMIN — DEXAMETHASONE SODIUM PHOSPHATE 10 MG: 10 INJECTION, SOLUTION INTRAMUSCULAR; INTRAVENOUS at 19:56

## 2023-09-30 ASSESSMENT — ACTIVITIES OF DAILY LIVING (ADL): ADLS_ACUITY_SCORE: 33

## 2023-09-30 NOTE — ED TRIAGE NOTES
Arrives from home. States sore throat and fever, fever which started a couple of hours ago. Fever at home 101.3 tympanic @ home. Chills and sweat. CHEMO last Friday.     States rash which started this morning states this happened the last time she had chemo as well. States took benadryl this AM and aprox 1 hour ago.

## 2023-09-30 NOTE — TELEPHONE ENCOUNTER
The patient has a temp of 101.3  tympanic.  She is complaining of a sore throat, rash all over her face, ears, eyes and lips.  Taking benadryl wih no relief  She recently had chemotherapy eight days ago  Triage guidelines recommend to Go to ED NOW  Caller verbalized and understands directives    Reason for Disposition   [1] Neutropenia known or suspected (e.g., recent cancer chemotherapy) AND [2] fever > 100.4 F (38.0 C)   [1] Neutropenia known or suspected (e.g., recent cancer chemotherapy) AND [2] signs or symptoms of suspected infection are present    Additional Information   Negative: Shock suspected (e.g., cold/pale/clammy skin, too weak to stand, low BP, rapid pulse)   Negative: Difficult to awaken or acting confused (e.g., disoriented, slurred speech)   Negative: Bluish (or gray) lips or face now   Negative: New-onset rash with many purple (or blood-colored) spots or dots   Negative: Sounds like a life-threatening emergency to the triager   Negative: Fever > 103 F (39.4 C)    Protocols used: Cancer - Fever-A-AH

## 2023-10-01 NOTE — DISCHARGE INSTRUCTIONS
Thankfully COVID and flu testing were negative today! You likely have another viral illness causing your symptoms.    Continue use of tylenol every 4-6 hours for ongoing symptomatic management and fever reduction at home. If rash returns, continue benadryl as needed.    Follow up with primary in 1 week to ensure symptoms are improving.    Return to ER if fever cannot be controlled at home.

## 2023-10-01 NOTE — ED NOTES
Emergency Department Attending Supervision Note        I evaluated this patient with [facility].       Briefly, the patient presented with rash and fever several days after chemotherapy treatment.  Exam shows nonspecific erythematous rash as described in PA-C note without evidence of angioedema or anaphylaxis. Evidence of pharyngitis present.  Supportive cares were administered with improvement.  Work-up shows no evidence of neutropenia. Strep testing is negative.  Viral panel is negative.  No evidence of underlying sepsis.  Plan oncology follow-up to further discuss the above phenomenon, as this is current before associated with her chemotherapy agent.  Return precautions for weakness, fever, or any other concerns.    Review of external records: Reviewed nine 2223 infusion therapy visit    Visit Diagnosis, Associated Orders, and Comments     ICD-10-CM    1. Fever  R50.9       2. Viral pharyngitis  J02.9       3. Malignant neoplasm of female breast, unspecified estrogen receptor status, unspecified laterality, unspecified site of breast (H)  C50.919             Barrett Strong MD  [facility]  [facility] Emergency Department      Results for orders placed or performed during the hospital encounter of 09/30/23 (from the past 24 hour(s))   Group A Streptococcus PCR Throat Swab    Specimen: Throat; Swab   Result Value Ref Range    Group A strep by PCR Not Detected Not Detected    Narrative    The Xpert Xpress Strep A test, performed on the [employer], is a rapid, qualitative in vitro diagnostic test for the detection of Streptococcus pyogenes (Group A ß-hemolytic Streptococcus, Strep A) in throat swab specimens from patients with signs and symptoms of pharyngitis. The Xpert Xpress Strep A test can be used as an aid in the diagnosis of Group A Streptococcal pharyngitis. The assay is not intended to monitor treatment for Group A Streptococcus infections. The Xpert Xpress Strep A test utilizes  an automated real-time polymerase chain reaction (PCR) to detect Streptococcus pyogenes DNA.   CBC with platelets differential    Narrative    The following orders were created for panel order CBC with platelets differential.  Procedure                               Abnormality         Status                     ---------                               -----------         ------                     CBC with platelets and d...[[id number]]  Abnormal            Final result               Manual Differential[[id number]]          Abnormal            Final result                 Please view results for these tests on the individual orders.   Basic metabolic panel   Result Value Ref Range    Sodium 141 135 - 145 mmol/L    Potassium 3.8 3.4 - 5.3 mmol/L    Chloride 104 98 - 107 mmol/L    Carbon Dioxide (CO2) 28 22 - 29 mmol/L    Anion Gap 9 7 - 15 mmol/L    Urea Nitrogen 8.1 6.0 - 20.0 mg/dL    Creatinine 0.61 0.51 - 0.95 mg/dL    GFR Estimate >90 >60 mL/min/1.73m2    Calcium 8.8 8.6 - 10.0 mg/dL    Glucose 92 70 - 99 mg/dL   CBC with platelets and differential   Result Value Ref Range    WBC Count 3.7 (L) 4.0 - 11.0 10e3/uL    RBC Count 3.85 3.80 - 5.20 10e6/uL    Hemoglobin 10.5 (L) 11.7 - 15.7 g/dL    Hematocrit 32.6 (L) 35.0 - 47.0 %    MCV 85 78 - 100 fL    MCH 27.3 26.5 - 33.0 pg    MCHC 32.2 31.5 - 36.5 g/dL    RDW 15.6 (H) 10.0 - 15.0 %    Platelet Count 147 (L) 150 - 450 10e3/uL   Manual Differential   Result Value Ref Range    % Neutrophils 42 %    % Lymphocytes 51 %    % Monocytes 6 %    % Eosinophils 1 %    % Basophils 0 %    NRBCs per 100 WBC 1 (H) <=0 %    Absolute Neutrophils 1.6 1.6 - 8.3 10e3/uL    Absolute Lymphocytes 1.9 0.8 - 5.3 10e3/uL    Absolute Monocytes 0.2 0.0 - 1.3 10e3/uL    Absolute Eosinophils 0.0 0.0 - 0.7 10e3/uL    Absolute Basophils 0.0 0.0 - 0.2 10e3/uL    Absolute NRBCs 0.0 <=0.0 10e3/uL    RBC Morphology Confirmed RBC Indices     Platelet Assessment  Automated Count Confirmed. Platelet  morphology is normal.     Automated Count Confirmed. Platelet morphology is normal.    Elliptocytes Slight (A) None Seen   Symptomatic Influenza A/B, RSV, & SARS-CoV2 PCR (COVID-19) Nasopharyngeal    Specimen: Nasopharyngeal; Swab   Result Value Ref Range    Influenza A PCR Negative Negative    Influenza B PCR Negative Negative    RSV PCR Negative Negative    SARS CoV2 PCR Negative Negative    Narrative    Testing was performed using the Xpert Xpress CoV2/Flu/RSV Assay on the [employer] Gene[employer]pert Instrument. This test should be ordered for the detection of SARS-CoV-2, influenza, and RSV viruses in individuals who meet clinical and/or epidemiological criteria. Test performance is unknown in asymptomatic patients. This test is for in vitro diagnostic use under the FDA EUA for laboratories certified under CLIA to perform high or moderate complexity testing. This test has not been FDA cleared or approved. A negative result does not rule out the presence of PCR inhibitors in the specimen or target RNA in concentration below the limit of detection for the assay. If only one viral target is positive but coinfection with multiple targets is suspected, the sample should be re-tested with another FDA cleared, approved, or authorized test, if coinfection would change clinical management. This test was validated by the [facility]. These laboratories are certified under the Clinical Laboratory Improvement Amendments of 1988 (CLIA-88) as qualified to perform high complexity laboratory testing.          Barrett Strong MD  10/01/23 0045

## 2023-10-06 ENCOUNTER — TRANSFERRED RECORDS (OUTPATIENT)
Dept: HEALTH INFORMATION MANAGEMENT | Facility: CLINIC | Age: 43
End: 2023-10-06
Payer: COMMERCIAL

## 2023-10-11 ENCOUNTER — TRANSFERRED RECORDS (OUTPATIENT)
Dept: SURGERY | Facility: CLINIC | Age: 43
End: 2023-10-11
Payer: COMMERCIAL

## 2023-10-11 RX ORDER — ONDANSETRON 2 MG/ML
8 INJECTION INTRAMUSCULAR; INTRAVENOUS ONCE
Status: CANCELLED | OUTPATIENT
Start: 2023-10-13

## 2023-10-11 RX ORDER — MEPERIDINE HYDROCHLORIDE 25 MG/ML
25 INJECTION INTRAMUSCULAR; INTRAVENOUS; SUBCUTANEOUS EVERY 30 MIN PRN
Status: CANCELLED | OUTPATIENT
Start: 2023-10-13

## 2023-10-11 RX ORDER — HEPARIN SODIUM,PORCINE 10 UNIT/ML
5-20 VIAL (ML) INTRAVENOUS DAILY PRN
Status: CANCELLED | OUTPATIENT
Start: 2023-10-13

## 2023-10-11 RX ORDER — LORAZEPAM 2 MG/ML
0.5 INJECTION INTRAMUSCULAR EVERY 4 HOURS PRN
Status: CANCELLED | OUTPATIENT
Start: 2023-10-13

## 2023-10-11 RX ORDER — ALBUTEROL SULFATE 0.83 MG/ML
2.5 SOLUTION RESPIRATORY (INHALATION)
Status: CANCELLED | OUTPATIENT
Start: 2023-10-13

## 2023-10-11 RX ORDER — HEPARIN SODIUM (PORCINE) LOCK FLUSH IV SOLN 100 UNIT/ML 100 UNIT/ML
5 SOLUTION INTRAVENOUS
Status: CANCELLED | OUTPATIENT
Start: 2023-10-13

## 2023-10-11 RX ORDER — EPINEPHRINE 1 MG/ML
0.3 INJECTION, SOLUTION INTRAMUSCULAR; SUBCUTANEOUS EVERY 5 MIN PRN
Status: CANCELLED | OUTPATIENT
Start: 2023-10-13

## 2023-10-11 RX ORDER — METHYLPREDNISOLONE SODIUM SUCCINATE 125 MG/2ML
125 INJECTION, POWDER, LYOPHILIZED, FOR SOLUTION INTRAMUSCULAR; INTRAVENOUS
Status: CANCELLED
Start: 2023-10-13

## 2023-10-11 RX ORDER — DIPHENHYDRAMINE HYDROCHLORIDE 50 MG/ML
50 INJECTION INTRAMUSCULAR; INTRAVENOUS
Status: CANCELLED
Start: 2023-10-13

## 2023-10-11 RX ORDER — ALBUTEROL SULFATE 90 UG/1
1-2 AEROSOL, METERED RESPIRATORY (INHALATION)
Status: CANCELLED
Start: 2023-10-13

## 2023-10-12 NOTE — PROGRESS NOTES
Oncology/Hematology Visit Note    Oct 13, 2023    Reason for visit: Follow up breast cancer    Oncology HPI: Luda Rai is a 43 year old female with right sided breast cancer. She went in for a screening mammogram in April and was having pain in the right breast. Suspicious lesion noted and biopsy revealed invasive ductal carcinoma, grade 2, ER/PA positive, HER2 negative.  She underwent right-sided lumpectomy on 6/26/2023.  Margins were negative and she had 3 sentinel lymph nodes negative.  Oncotype testing came back at 21 and Dr. Zeng recommended adjuvant chemotherapy with Taxotere/Cytoxan, then adjuvant radiation therapy followed by 5 to 10 years of hormonal therapy.      TC C1 D1 completed on 8/11/2023 with Neulasta support.  She was having severe reflux and her dexamethasone dose was adjusted and was started on PPI and Pepcid was added to her premeds.  She completed cycle 2 on 8/30/2023.    She was in the ED 1/30/2023 with fever, sore throat and rash.  She was given Decadron and Benadryl and labs okay.  Strep, COVID and flu were negative.  She returned to the ED again on 10/9/2023 with conjunctivitis and she was given tobramycin ophthalmic prescription. Saw optometry and started steroid eye drops.     She is here today for TC cycle 4.    Interval History: Luda is here with her , Glen.  Cycle 3 was a little harder to recover from she was in the ED for fever and rash, improved with Decadron and Benadryl.  She was then in the urgent care for conjunctivitis, initially started tobramycin eyedrops with minimal improvement, then saw optometry and now on steroid drops with significant improvement.  She will start radiation on 11/12, plan for 19 fractions total.  She relates she is to be done today as her last chemo.  No other new complaints today.    Review of Systems: See interval hx. Denies chills, HA, dizziness, cough, sore throat, CP, SOB, abdominal pain, diarrhea, changes in urination, bleeding,  "bruising.      PMHx and Social Hx reviewed per EPIC.      Medications:  Current Outpatient Medications   Medication Sig Dispense Refill    BIOTIN PO Take 1 capsule by mouth daily      CALCIUM CITRATE PO Take 500 mg by mouth daily B-L-D      cyanocobalamin (CYANOCOBALAMIN) 1000 MCG/ML injection Inject 1 mL (1,000 mcg) Subcutaneous every 30 days 3 mL 3    lidocaine-prilocaine (EMLA) 2.5-2.5 % external cream Apply topically as needed for moderate pain 30 g 0    multivitamin w/minerals (THERA-VIT-M) tablet Take 1 tablet by mouth At Bedtime Abel Adv Chew      naltrexone (DEPADE/REVIA) 50 MG tablet Take 1/2 tablet 1-2 hours before biggest craving time for 7 days, then increase to a full tablet 30 tablet 3    neomycin-polymyxin-dexAMETHasone (MAXITROL) 3.5-40815-8.1 SUSP ophthalmic susp       omeprazole (PRILOSEC) 40 MG DR capsule take 1 capsule by mouth every day before a meal      ondansetron (ZOFRAN) 8 MG tablet Take 1 tablet (8 mg) by mouth every 8 hours as needed for nausea (vomiting) 30 tablet 2    prochlorperazine (COMPAZINE) 10 MG tablet Take 1 tablet (10 mg) by mouth every 6 hours as needed for nausea or vomiting 30 tablet 2    syringe/needle, disp, 25G X 1\" 3 ML MISC Use for B-12 injection 3 each 3    tobramycin (TOBREX) 0.3 % ophthalmic solution Apply 1 drop to eye      tuberculin-allergy syringes 27G X 1/2\" 1 ML MISC Use for B12 injections. 3 each 3    dexamethasone (DECADRON) 4 MG tablet Take 2 tablets (8 mg) by mouth 2 times daily (with meals) for 3 doses Start evening of Docetaxel infusion and continue for a total of 3 doses. 6 tablet 3    metFORMIN (GLUCOPHAGE XR) 500 MG 24 hr tablet Take 1 tablet (500 mg) by mouth daily (with dinner) for 7 days, THEN 2 tablets (1,000 mg) daily (with dinner) for 7 days, THEN 3 tablets (1,500 mg) daily (with dinner) for 7 days, THEN 4 tablets (2,000 mg) daily (with dinner) for 7 days. 360 tablet 3       Allergies   Allergen Reactions    Asa [Aspirin]      Gastric bypass    " Morphine And Related GI Disturbance    Nsaids Other (See Comments)     Avoid due to bariatric surgery 6/16/2022    Rocephin [Ceftriaxone] Hives       EXAM:    /67   Pulse 66   Temp 98.3  F (36.8  C) (Oral)   Resp 16   Wt 100.9 kg (222 lb 8 oz)   SpO2 96%   BMI 34.85 kg/m      GENERAL:  Female, in no acute distress.  Alert and oriented x3. Well groomed.   HEENT:  Normocephalic, atraumatic. Cold cap in place. Eyelids are a little swollen, but no conjunctivitis.  LUNGS:  Nonlabored breathing, no cough or audible wheezing, able to speak full sentences.  MSK: Full ROM UE.    SKIN: No rash on exposed skin.   NEURO: CN grossly intact, speech normal  PSYCH: Mentation appears normal, insight and judgement intact      Labs:    10/13/23 08:02   Sodium 139   Potassium 3.9   Chloride 103   Carbon Dioxide (CO2) 25   Urea Nitrogen 9.6   Creatinine 0.52   GFR Estimate >90   Calcium 9.8   Anion Gap 11   Albumin 4.5   Protein Total 6.9   Alkaline Phosphatase 84   ALT 8   AST 15   Bilirubin Total 0.5   Glucose 169 (H)   Iron 45   Iron Binding Capacity 338   Iron Sat Index 13 (L)   WBC 6.1   Hemoglobin 11.6 (L)   Hematocrit 36.1   Platelet Count 193   RBC Count 4.23   MCV 85   MCH 27.4   MCHC 32.1   RDW 16.5 (H)   % Neutrophils 86   % Lymphocytes 10   % Monocytes 3   % Eosinophils 0   % Basophils 0   Absolute Basophils 0.0   Absolute Eosinophils 0.0   Absolute Immature Granulocytes 0.0   Absolute Lymphocytes 0.6 (L)   Absolute Monocytes 0.2   % Immature Granulocytes 1   Absolute Neutrophils 5.2   Absolute NRBCs 0.0   NRBCs per 100 WBC 0     Imaging: n/a    Impression/Plan: Luda Rai is a 43 year old female with invasive ductal carcinoma of the right breast s/p lumpectomy, Oncotype 21, currently on adjuvant chemotherapy with Taxotere/Cytoxan.    Breast cancer: Now s/p right-sided lumpectomy, Oncotype score of 21, Taxotere/Cytoxan C1 D1 completed on 8/11/2023.  That cycle was a little tough with severe reflux, fatigue  and poor appetite.  Significantly improved with dexamethasone dose changes, antiemetics and H2 blocker/PPI additions, see below.  She developed a minor rash after cycle 2, then a stronger rash after cycle 3, suspected likely from TC, but completely unsure.  Labs look great today and we will continue with TC cycle 4 given curative intent.  I have added Benadryl as a premedication today and I have asked her to take Benadryl 25 mg every 6 hours over the next week.  She will continue her oral Dex doses today and tomorrow.  She will start radiation on 10/12 and then follow-up with Dr. Gore and Dr. Zeng in December.  She will call sooner if needed.  -12/20 Dr. Zeng    Derm: Diffuse rash, seen in the ED on 9/30/2023, improved with Decadron and Benadryl.  Suspect may be reaction to TC, but unsure which chemo drug.  Given her curative intent, I would like to stay on track with chemotherapy and we will support with Benadryl IV today and 25 mg every 6 hours over the next week.  If she develops a rash next week from chemotherapy today, then would start her on a short burst of prednisone 50 mg daily x5 days.  She will also be following up with her ophthalmologist regarding her conjunctivitis.  She will call the clinic immediately if any concerns.    GERD: History of bariatric surgery, significant improvement with Pepcid as a premed, reducing the dose of the dexamethasone and taking PPI at home.  She also continues on antiemetics more frequently.  Given the rash, I have asked her to increase her dexamethasone dose again for this cycle only, see above.    FEN: Poor appetite, minimal oral intake, likely secondary to severe GERD, now significantly improved.  Electrolytes are great and she will push fluids at home..       Chart documentation with Dragon Voice recognition Software. Although reviewed after completion, some words and grammatical errors may remain.    30 minutes spent on the date of the encounter doing chart  review, review of test results, interpretation of tests, patient visit, documentation, and discussion with family         Nuvia Castañeda PA-C  Hematology/Oncology  [facility]

## 2023-10-13 ENCOUNTER — INFUSION THERAPY VISIT (OUTPATIENT)
Dept: INFUSION THERAPY | Facility: CLINIC | Age: 43
End: 2023-10-13
Attending: PHYSICIAN ASSISTANT
Payer: COMMERCIAL

## 2023-10-13 ENCOUNTER — ONCOLOGY VISIT (OUTPATIENT)
Dept: ONCOLOGY | Facility: CLINIC | Age: 43
End: 2023-10-13
Attending: INTERNAL MEDICINE
Payer: COMMERCIAL

## 2023-10-13 VITALS
TEMPERATURE: 98.3 F | HEART RATE: 66 BPM | RESPIRATION RATE: 16 BRPM | BODY MASS INDEX: 34.85 KG/M2 | SYSTOLIC BLOOD PRESSURE: 129 MMHG | WEIGHT: 222.5 LBS | OXYGEN SATURATION: 96 % | DIASTOLIC BLOOD PRESSURE: 67 MMHG

## 2023-10-13 DIAGNOSIS — K91.2 POSTSURGICAL MALABSORPTION: ICD-10-CM

## 2023-10-13 DIAGNOSIS — C50.911 MALIGNANT NEOPLASM OF RIGHT BREAST IN FEMALE, ESTROGEN RECEPTOR POSITIVE, UNSPECIFIED SITE OF BREAST (H): Primary | ICD-10-CM

## 2023-10-13 DIAGNOSIS — Z17.0 MALIGNANT NEOPLASM OF RIGHT BREAST IN FEMALE, ESTROGEN RECEPTOR POSITIVE, UNSPECIFIED SITE OF BREAST (H): Primary | ICD-10-CM

## 2023-10-13 DIAGNOSIS — R21 RASH AND NONSPECIFIC SKIN ERUPTION: ICD-10-CM

## 2023-10-13 DIAGNOSIS — K21.9 GASTROESOPHAGEAL REFLUX DISEASE, UNSPECIFIED WHETHER ESOPHAGITIS PRESENT: ICD-10-CM

## 2023-10-13 DIAGNOSIS — E53.8 VITAMIN B12 DEFICIENCY: ICD-10-CM

## 2023-10-13 LAB
ALBUMIN SERPL BCG-MCNC: 4.5 G/DL (ref 3.5–5.2)
ALP SERPL-CCNC: 84 U/L (ref 35–104)
ALT SERPL W P-5'-P-CCNC: 8 U/L (ref 0–50)
ANION GAP SERPL CALCULATED.3IONS-SCNC: 11 MMOL/L (ref 7–15)
AST SERPL W P-5'-P-CCNC: 15 U/L (ref 0–45)
BASO+EOS+MONOS # BLD AUTO: ABNORMAL 10*3/UL
BASO+EOS+MONOS NFR BLD AUTO: ABNORMAL %
BASOPHILS # BLD AUTO: 0 10E3/UL (ref 0–0.2)
BASOPHILS NFR BLD AUTO: 0 %
BILIRUB SERPL-MCNC: 0.5 MG/DL
BUN SERPL-MCNC: 9.6 MG/DL (ref 6–20)
CALCIUM SERPL-MCNC: 9.8 MG/DL (ref 8.6–10)
CHLORIDE SERPL-SCNC: 103 MMOL/L (ref 98–107)
CREAT SERPL-MCNC: 0.52 MG/DL (ref 0.51–0.95)
DEPRECATED HCO3 PLAS-SCNC: 25 MMOL/L (ref 22–29)
EGFRCR SERPLBLD CKD-EPI 2021: >90 ML/MIN/1.73M2
EOSINOPHIL # BLD AUTO: 0 10E3/UL (ref 0–0.7)
EOSINOPHIL NFR BLD AUTO: 0 %
ERYTHROCYTE [DISTWIDTH] IN BLOOD BY AUTOMATED COUNT: 16.5 % (ref 10–15)
FERRITIN SERPL-MCNC: 35 NG/ML (ref 6–175)
GLUCOSE SERPL-MCNC: 169 MG/DL (ref 70–99)
HCT VFR BLD AUTO: 36.1 % (ref 35–47)
HGB BLD-MCNC: 11.6 G/DL (ref 11.7–15.7)
IMM GRANULOCYTES # BLD: 0 10E3/UL
IMM GRANULOCYTES NFR BLD: 1 %
IRON BINDING CAPACITY (ROCHE): 338 UG/DL (ref 240–430)
IRON SATN MFR SERPL: 13 % (ref 15–46)
IRON SERPL-MCNC: 45 UG/DL (ref 37–145)
LYMPHOCYTES # BLD AUTO: 0.6 10E3/UL (ref 0.8–5.3)
LYMPHOCYTES NFR BLD AUTO: 10 %
MCH RBC QN AUTO: 27.4 PG (ref 26.5–33)
MCHC RBC AUTO-ENTMCNC: 32.1 G/DL (ref 31.5–36.5)
MCV RBC AUTO: 85 FL (ref 78–100)
MONOCYTES # BLD AUTO: 0.2 10E3/UL (ref 0–1.3)
MONOCYTES NFR BLD AUTO: 3 %
NEUTROPHILS # BLD AUTO: 5.2 10E3/UL (ref 1.6–8.3)
NEUTROPHILS NFR BLD AUTO: 86 %
NRBC # BLD AUTO: 0 10E3/UL
NRBC BLD AUTO-RTO: 0 /100
PLATELET # BLD AUTO: 193 10E3/UL (ref 150–450)
POTASSIUM SERPL-SCNC: 3.9 MMOL/L (ref 3.4–5.3)
PROT SERPL-MCNC: 6.9 G/DL (ref 6.4–8.3)
RBC # BLD AUTO: 4.23 10E6/UL (ref 3.8–5.2)
SODIUM SERPL-SCNC: 139 MMOL/L (ref 135–145)
VIT B12 SERPL-MCNC: 853 PG/ML (ref 232–1245)
WBC # BLD AUTO: 6.1 10E3/UL (ref 4–11)

## 2023-10-13 PROCEDURE — 250N000011 HC RX IP 250 OP 636: Mod: JZ | Performed by: INTERNAL MEDICINE

## 2023-10-13 PROCEDURE — 80053 COMPREHEN METABOLIC PANEL: CPT | Performed by: INTERNAL MEDICINE

## 2023-10-13 PROCEDURE — 36415 COLL VENOUS BLD VENIPUNCTURE: CPT | Performed by: INTERNAL MEDICINE

## 2023-10-13 PROCEDURE — 258N000003 HC RX IP 258 OP 636: Performed by: PHYSICIAN ASSISTANT

## 2023-10-13 PROCEDURE — 258N000003 HC RX IP 258 OP 636: Performed by: INTERNAL MEDICINE

## 2023-10-13 PROCEDURE — 99214 OFFICE O/P EST MOD 30 MIN: CPT | Performed by: PHYSICIAN ASSISTANT

## 2023-10-13 PROCEDURE — 96413 CHEMO IV INFUSION 1 HR: CPT

## 2023-10-13 PROCEDURE — 82728 ASSAY OF FERRITIN: CPT | Performed by: PHYSICIAN ASSISTANT

## 2023-10-13 PROCEDURE — 96372 THER/PROPH/DIAG INJ SC/IM: CPT | Performed by: INTERNAL MEDICINE

## 2023-10-13 PROCEDURE — 82607 VITAMIN B-12: CPT | Performed by: PHYSICIAN ASSISTANT

## 2023-10-13 PROCEDURE — 250N000011 HC RX IP 250 OP 636: Mod: JZ | Performed by: PHYSICIAN ASSISTANT

## 2023-10-13 PROCEDURE — 96417 CHEMO IV INFUS EACH ADDL SEQ: CPT

## 2023-10-13 PROCEDURE — 96377 APPLICATON ON-BODY INJECTOR: CPT | Mod: XS

## 2023-10-13 PROCEDURE — 85025 COMPLETE CBC W/AUTO DIFF WBC: CPT | Performed by: INTERNAL MEDICINE

## 2023-10-13 PROCEDURE — 96367 TX/PROPH/DG ADDL SEQ IV INF: CPT

## 2023-10-13 PROCEDURE — 83550 IRON BINDING TEST: CPT | Performed by: PHYSICIAN ASSISTANT

## 2023-10-13 PROCEDURE — 96375 TX/PRO/DX INJ NEW DRUG ADDON: CPT

## 2023-10-13 RX ORDER — DIPHENHYDRAMINE HYDROCHLORIDE 50 MG/ML
50 INJECTION INTRAMUSCULAR; INTRAVENOUS ONCE
Status: DISCONTINUED | OUTPATIENT
Start: 2023-10-13 | End: 2023-10-13 | Stop reason: ALTCHOICE

## 2023-10-13 RX ORDER — HEPARIN SODIUM (PORCINE) LOCK FLUSH IV SOLN 100 UNIT/ML 100 UNIT/ML
5 SOLUTION INTRAVENOUS
Status: DISCONTINUED | OUTPATIENT
Start: 2023-10-13 | End: 2023-10-13 | Stop reason: HOSPADM

## 2023-10-13 RX ORDER — ONDANSETRON 2 MG/ML
8 INJECTION INTRAMUSCULAR; INTRAVENOUS ONCE
Status: COMPLETED | OUTPATIENT
Start: 2023-10-13 | End: 2023-10-13

## 2023-10-13 RX ORDER — TOBRAMYCIN 3 MG/ML
1 SOLUTION/ DROPS OPHTHALMIC
COMMUNITY
Start: 2023-10-09 | End: 2023-12-04

## 2023-10-13 RX ORDER — DIPHENHYDRAMINE HYDROCHLORIDE 50 MG/ML
50 INJECTION INTRAMUSCULAR; INTRAVENOUS ONCE
Status: CANCELLED
Start: 2023-10-13 | End: 2023-10-13

## 2023-10-13 RX ORDER — NEOMYCIN SULFATE, POLYMYXIN B SULFATE AND DEXAMETHASONE 3.5; 10000; 1 MG/ML; [USP'U]/ML; MG/ML
SUSPENSION/ DROPS OPHTHALMIC
COMMUNITY
Start: 2023-10-12 | End: 2023-12-04

## 2023-10-13 RX ADMIN — PEGFILGRASTIM 6 MG: KIT SUBCUTANEOUS at 12:03

## 2023-10-13 RX ADMIN — DIPHENHYDRAMINE HYDROCHLORIDE 50 MG: 50 INJECTION INTRAMUSCULAR; INTRAVENOUS at 09:58

## 2023-10-13 RX ADMIN — CYCLOPHOSPHAMIDE 1285 MG: 1 INJECTION, POWDER, FOR SOLUTION INTRAVENOUS; ORAL at 11:29

## 2023-10-13 RX ADMIN — FOSAPREPITANT: 150 INJECTION, POWDER, LYOPHILIZED, FOR SOLUTION INTRAVENOUS at 09:24

## 2023-10-13 RX ADMIN — Medication 5 ML: at 12:06

## 2023-10-13 RX ADMIN — ONDANSETRON 8 MG: 2 INJECTION INTRAMUSCULAR; INTRAVENOUS at 09:25

## 2023-10-13 RX ADMIN — DOCETAXEL 160 MG: 20 INJECTION, SOLUTION, CONCENTRATE INTRAVENOUS at 10:18

## 2023-10-13 RX ADMIN — FAMOTIDINE 20 MG: 10 INJECTION INTRAVENOUS at 09:55

## 2023-10-13 RX ADMIN — SODIUM CHLORIDE 250 ML: 9 INJECTION, SOLUTION INTRAVENOUS at 09:24

## 2023-10-13 ASSESSMENT — PAIN SCALES - GENERAL: PAINLEVEL: NO PAIN (0)

## 2023-10-13 NOTE — LETTER
10/13/2023         RE: Luda Rai  [address]  [place] [address]        Dear Colleague,    Thank you for referring your patient, Luda Rai, to the [facility]. Please see a copy of my visit note below.    Oncology/Hematology Visit Note    Oct 13, 2023    Reason for visit: Follow up breast cancer    Oncology HPI: Luda Rai is a 43 year old female with right sided breast cancer. She went in for a screening mammogram in April and was having pain in the right breast. Suspicious lesion noted and biopsy revealed invasive ductal carcinoma, grade 2, ER/AZ positive, HER2 negative.  She underwent right-sided lumpectomy on 6/26/2023.  Margins were negative and she had 3 sentinel lymph nodes negative.  Oncotype testing came back at 21 and Dr. Zeng recommended adjuvant chemotherapy with Taxotere/Cytoxan, then adjuvant radiation therapy followed by 5 to 10 years of hormonal therapy.      TC C1 D1 completed on 8/11/2023 with Neulasta support.  She was having severe reflux and her dexamethasone dose was adjusted and was started on PPI and Pepcid was added to her premeds.  She completed cycle 2 on 8/30/2023.    She was in the ED 1/30/2023 with fever, sore throat and rash.  She was given Decadron and Benadryl and labs okay.  Strep, COVID and flu were negative.  She returned to the ED again on 10/9/2023 with conjunctivitis and she was given tobramycin ophthalmic prescription. Saw optometry and started steroid eye drops.     She is here today for TC cycle 4.    Interval History: Luda is here with her , Glen.  Cycle 3 was a little harder to recover from she was in the ED for fever and rash, improved with Decadron and Benadryl.  She was then in the urgent care for conjunctivitis, initially started tobramycin eyedrops with minimal improvement, then saw optometry and now on steroid drops with significant improvement.  She will start radiation on 11/12, plan for 19  "fractions total.  She relates she is to be done today as her last chemo.  No other new complaints today.    Review of Systems: See interval hx. Denies chills, HA, dizziness, cough, sore throat, CP, SOB, abdominal pain, diarrhea, changes in urination, bleeding, bruising.      PMHx and Social Hx reviewed per EPIC.      Medications:  Current Outpatient Medications   Medication Sig Dispense Refill     BIOTIN PO Take 1 capsule by mouth daily       CALCIUM CITRATE PO Take 500 mg by mouth daily B-L-D       cyanocobalamin (CYANOCOBALAMIN) 1000 MCG/ML injection Inject 1 mL (1,000 mcg) Subcutaneous every 30 days 3 mL 3     lidocaine-prilocaine (EMLA) 2.5-2.5 % external cream Apply topically as needed for moderate pain 30 g 0     multivitamin w/minerals (THERA-VIT-M) tablet Take 1 tablet by mouth At Bedtime Abel Adv Chew       naltrexone (DEPADE/REVIA) 50 MG tablet Take 1/2 tablet 1-2 hours before biggest craving time for 7 days, then increase to a full tablet 30 tablet 3     neomycin-polymyxin-dexAMETHasone (MAXITROL) 3.5-62409-2.1 SUSP ophthalmic susp        omeprazole (PRILOSEC) 40 MG DR capsule take 1 capsule by mouth every day before a meal       ondansetron (ZOFRAN) 8 MG tablet Take 1 tablet (8 mg) by mouth every 8 hours as needed for nausea (vomiting) 30 tablet 2     prochlorperazine (COMPAZINE) 10 MG tablet Take 1 tablet (10 mg) by mouth every 6 hours as needed for nausea or vomiting 30 tablet 2     syringe/needle, disp, 25G X 1\" 3 ML MISC Use for B-12 injection 3 each 3     tobramycin (TOBREX) 0.3 % ophthalmic solution Apply 1 drop to eye       tuberculin-allergy syringes 27G X 1/2\" 1 ML MISC Use for B12 injections. 3 each 3     dexamethasone (DECADRON) 4 MG tablet Take 2 tablets (8 mg) by mouth 2 times daily (with meals) for 3 doses Start evening of Docetaxel infusion and continue for a total of 3 doses. 6 tablet 3     metFORMIN (GLUCOPHAGE XR) 500 MG 24 hr tablet Take 1 tablet (500 mg) by mouth daily (with dinner) " for 7 days, THEN 2 tablets (1,000 mg) daily (with dinner) for 7 days, THEN 3 tablets (1,500 mg) daily (with dinner) for 7 days, THEN 4 tablets (2,000 mg) daily (with dinner) for 7 days. 360 tablet 3       Allergies   Allergen Reactions     Asa [Aspirin]      Gastric bypass     Morphine And Related GI Disturbance     Nsaids Other (See Comments)     Avoid due to bariatric surgery 6/16/2022     Rocephin [Ceftriaxone] Hives       EXAM:    /67   Pulse 66   Temp 98.3  F (36.8  C) (Oral)   Resp 16   Wt 100.9 kg (222 lb 8 oz)   SpO2 96%   BMI 34.85 kg/m      GENERAL:  Female, in no acute distress.  Alert and oriented x3. Well groomed.   HEENT:  Normocephalic, atraumatic. Cold cap in place. Eyelids are a little swollen, but no conjunctivitis.  LUNGS:  Nonlabored breathing, no cough or audible wheezing, able to speak full sentences.  MSK: Full ROM UE.    SKIN: No rash on exposed skin.   NEURO: CN grossly intact, speech normal  PSYCH: Mentation appears normal, insight and judgement intact      Labs:    10/13/23 08:02   Sodium 139   Potassium 3.9   Chloride 103   Carbon Dioxide (CO2) 25   Urea Nitrogen 9.6   Creatinine 0.52   GFR Estimate >90   Calcium 9.8   Anion Gap 11   Albumin 4.5   Protein Total 6.9   Alkaline Phosphatase 84   ALT 8   AST 15   Bilirubin Total 0.5   Glucose 169 (H)   Iron 45   Iron Binding Capacity 338   Iron Sat Index 13 (L)   WBC 6.1   Hemoglobin 11.6 (L)   Hematocrit 36.1   Platelet Count 193   RBC Count 4.23   MCV 85   MCH 27.4   MCHC 32.1   RDW 16.5 (H)   % Neutrophils 86   % Lymphocytes 10   % Monocytes 3   % Eosinophils 0   % Basophils 0   Absolute Basophils 0.0   Absolute Eosinophils 0.0   Absolute Immature Granulocytes 0.0   Absolute Lymphocytes 0.6 (L)   Absolute Monocytes 0.2   % Immature Granulocytes 1   Absolute Neutrophils 5.2   Absolute NRBCs 0.0   NRBCs per 100 WBC 0     Imaging: n/a    Impression/Plan: Luda Rai is a 43 year old female with invasive ductal carcinoma of the  right breast s/p lumpectomy, Oncotype 21, currently on adjuvant chemotherapy with Taxotere/Cytoxan.    Breast cancer: Now s/p right-sided lumpectomy, Oncotype score of 21, Taxotere/Cytoxan C1 D1 completed on 8/11/2023.  That cycle was a little tough with severe reflux, fatigue and poor appetite.  Significantly improved with dexamethasone dose changes, antiemetics and H2 blocker/PPI additions, see below.  She developed a minor rash after cycle 2, then a stronger rash after cycle 3, suspected likely from TC, but completely unsure.  Labs look great today and we will continue with TC cycle 4 given curative intent.  I have added Benadryl as a premedication today and I have asked her to take Benadryl 25 mg every 6 hours over the next week.  She will continue her oral Dex doses today and tomorrow.  She will start radiation on 10/12 and then follow-up with Dr. Gore and Dr. Zeng in December.  She will call sooner if needed.  -12/20 Dr. Zeng    Derm: Diffuse rash, seen in the ED on 9/30/2023, improved with Decadron and Benadryl.  Suspect may be reaction to TC, but unsure which chemo drug.  Given her curative intent, I would like to stay on track with chemotherapy and we will support with Benadryl IV today and 25 mg every 6 hours over the next week.  If she develops a rash next week from chemotherapy today, then would start her on a short burst of prednisone 50 mg daily x5 days.  She will also be following up with her ophthalmologist regarding her conjunctivitis.  She will call the clinic immediately if any concerns.    GERD: History of bariatric surgery, significant improvement with Pepcid as a premed, reducing the dose of the dexamethasone and taking PPI at home.  She also continues on antiemetics more frequently.  Given the rash, I have asked her to increase her dexamethasone dose again for this cycle only, see above.    FEN: Poor appetite, minimal oral intake, likely secondary to severe GERD, now significantly  improved.  Electrolytes are great and she will push fluids at home..       Chart documentation with Dragon Voice recognition Software. Although reviewed after completion, some words and grammatical errors may remain.    30 minutes spent on the date of the encounter doing chart review, review of test results, interpretation of tests, patient visit, documentation, and discussion with family         Nuvia Castañeda PA-C  Hematology/Oncology  [facility]              Again, thank you for allowing me to participate in the care of your patient.        Sincerely,        Nuvia Castañeda PA-C

## 2023-10-13 NOTE — PROGRESS NOTES
Infusion Nursing Note:  Luda Rai presents today for Cycle 4 Day 1: Taxotre and Cytoxan with neulasta on-pro.    Patient seen by provider today: Yes: MARY Pinto   present during visit today: Not Applicable.    Note: Benadryl added as pre-medication for patient today. Tolerated well,  to drive home.      Intravenous Access:  Implanted Port.    Treatment Conditions:  Lab Results   Component Value Date    HGB 11.6 (L) 10/13/2023    WBC 6.1 10/13/2023    ANEU 1.6 09/30/2023    ANEUTAUTO 5.2 10/13/2023     10/13/2023        Lab Results   Component Value Date     10/13/2023    POTASSIUM 3.9 10/13/2023    CR 0.52 10/13/2023    TAY 9.8 10/13/2023    BILITOTAL 0.5 10/13/2023    ALBUMIN 4.5 10/13/2023    ALT 8 10/13/2023    AST 15 10/13/2023       Results reviewed, labs MET treatment parameters, ok to proceed with treatment.      Post Infusion Assessment:  Patient tolerated infusion without incident.  Blood return noted pre and post infusion.  Site patent and intact, free from redness, edema or discomfort.  No evidence of extravasations.  Access discontinued per protocol.     ONPRO  Was placed on patient's: back of left arm.    Was placed at 1205 PM    Podpal used: Yes    ONPRO injector device Lot number: [id number]    Patient education included: what patient can expect after application, what colored lights mean on the device, when to remove device, when and where to call with questions or issues, all patients questions answered, and that Neulasta administration will occur at 10/14/23 at 1505.    Patient tolerated administration well.       Discharge Plan:   Discharge instructions reviewed with: Patient.  Patient and/or family verbalized understanding of discharge instructions and all questions answered.  AVS to patient via "[employer]"T.  Patient will return in decemnber for next appointment.   Patient discharged in stable condition accompanied by: .  Departure Mode:  Ambulatory.      Kari Schoen, RN

## 2023-10-13 NOTE — NURSING NOTE
"Oncology Rooming Note    October 13, 2023 8:06 AM   Luda Rai is a 43 year old female who presents for:    Chief Complaint   Patient presents with    Oncology Clinic Visit     Malignant neoplasm of right breast in female, estrogen receptor positive, unspecified site of breast (H)  Invasive ductal carcinoma of breast, female, left (H)       Initial Vitals: /67   Pulse 66   Temp 98.3  F (36.8  C) (Oral)   Resp 16   Wt 100.9 kg (222 lb 8 oz)   SpO2 96%   BMI 34.85 kg/m   Estimated body mass index is 34.85 kg/m  as calculated from the following:    Height as of 8/11/23: 1.702 m (5' 7\").    Weight as of this encounter: 100.9 kg (222 lb 8 oz). Body surface area is 2.18 meters squared.  No Pain (0) Comment: Data Unavailable   No LMP recorded.  Allergies reviewed: Yes  Medications reviewed: Yes    Medications: Medication refills not needed today.  Pharmacy name entered into [facility]: [id number] [place] [id number] AT [place] [id number] & [facility]    Clinical concerns: follow up        Mary Ellen Wesley            "

## 2023-10-13 NOTE — PATIENT INSTRUCTIONS
Your On-body Neulasta injector was applied today at 1205 pm.  The injection will start 27 hours after application, at 305 pm tomorrow.  Note: the medication will be delivered over 45 minutes.  Before removing the injector, check to see that the light is either solid green or off and that the indicator line is on empty.  If there is a red light on the injector at any time or wetness on the dressing or under the injector after removal, you must report this information.   Call the [place] clinic during business hours.  Refer to the written information given to you for additional questions.

## 2023-10-13 NOTE — PROGRESS NOTES
Infusion Nursing Note:  Luda Rai presents today for port labs.     present during visit today: Not Applicable.    Note: N/A.    Intravenous Access:  Labs drawn without difficulty.  Implanted Port.    Treatment Conditions:  NA    Post   Patient tolerated blood collection   Blood return noted pre and post infusion.  Site patent and intact, free from redness, edema or discomfort.  No evidence of extravasations.  Access (remains for infusion use today)     Discharge Plan:   Patient and/or family verbalized understanding of  instructions and all questions answered.  Patient  to lobby in stable condition accompanied by: self.  Patient to see provider today: Yes: MARY Pedersen  Departure Mode: Ambulatory.  Cintia Barnes RN

## 2023-12-02 ENCOUNTER — PATIENT OUTREACH (OUTPATIENT)
Dept: ONCOLOGY | Facility: CLINIC | Age: 43
End: 2023-12-02
Payer: COMMERCIAL

## 2023-12-02 NOTE — PROGRESS NOTES
Enrolling active treatment patient in [employer] [facility].    Cintia Griggs, RN, BSN, OCN, CBCN  Nurse Care Coordinator  [facility] [place]  P: [phone]     F: [phone]

## 2023-12-05 ENCOUNTER — OFFICE VISIT (OUTPATIENT)
Dept: FAMILY MEDICINE | Facility: CLINIC | Age: 43
End: 2023-12-05
Payer: COMMERCIAL

## 2023-12-05 VITALS
SYSTOLIC BLOOD PRESSURE: 114 MMHG | WEIGHT: 227.1 LBS | BODY MASS INDEX: 35.64 KG/M2 | RESPIRATION RATE: 16 BRPM | HEART RATE: 94 BPM | TEMPERATURE: 97.3 F | OXYGEN SATURATION: 97 % | HEIGHT: 67 IN | DIASTOLIC BLOOD PRESSURE: 66 MMHG

## 2023-12-05 DIAGNOSIS — K21.9 GASTROESOPHAGEAL REFLUX DISEASE, UNSPECIFIED WHETHER ESOPHAGITIS PRESENT: ICD-10-CM

## 2023-12-05 DIAGNOSIS — E78.2 MIXED HYPERLIPIDEMIA: ICD-10-CM

## 2023-12-05 DIAGNOSIS — E28.2 PCOS (POLYCYSTIC OVARIAN SYNDROME): ICD-10-CM

## 2023-12-05 DIAGNOSIS — Z01.818 PREOP GENERAL PHYSICAL EXAM: Primary | ICD-10-CM

## 2023-12-05 DIAGNOSIS — E66.01 SEVERE OBESITY (BMI 35.0-39.9) WITH COMORBIDITY (H): ICD-10-CM

## 2023-12-05 DIAGNOSIS — Z95.828 PORT-A-CATH IN PLACE: ICD-10-CM

## 2023-12-05 DIAGNOSIS — C50.912 INVASIVE DUCTAL CARCINOMA OF BREAST, FEMALE, LEFT (H): ICD-10-CM

## 2023-12-05 DIAGNOSIS — R73.03 PREDIABETES: ICD-10-CM

## 2023-12-05 PROBLEM — Z12.4 CERVICAL CANCER SCREENING: Status: RESOLVED | Noted: 2023-06-29 | Resolved: 2023-12-05

## 2023-12-05 PROBLEM — E66.811 OBESITY, CLASS I, BMI 30.0-34.9 (SEE ACTUAL BMI): Status: RESOLVED | Noted: 2023-06-19 | Resolved: 2023-12-05

## 2023-12-05 PROBLEM — S30.1XXA ABDOMINAL WALL SEROMA, INITIAL ENCOUNTER: Status: RESOLVED | Noted: 2022-07-18 | Resolved: 2023-12-05

## 2023-12-05 LAB
ERYTHROCYTE [DISTWIDTH] IN BLOOD BY AUTOMATED COUNT: 15.3 % (ref 10–15)
HBA1C MFR BLD: 5.3 % (ref 0–5.6)
HCG UR QL: NEGATIVE
HCT VFR BLD AUTO: 38.1 % (ref 35–47)
HGB BLD-MCNC: 12 G/DL (ref 11.7–15.7)
MCH RBC QN AUTO: 27 PG (ref 26.5–33)
MCHC RBC AUTO-ENTMCNC: 31.5 G/DL (ref 31.5–36.5)
MCV RBC AUTO: 86 FL (ref 78–100)
PLATELET # BLD AUTO: 157 10E3/UL (ref 150–450)
RBC # BLD AUTO: 4.45 10E6/UL (ref 3.8–5.2)
WBC # BLD AUTO: 4 10E3/UL (ref 4–11)

## 2023-12-05 PROCEDURE — 81025 URINE PREGNANCY TEST: CPT | Performed by: PHYSICIAN ASSISTANT

## 2023-12-05 PROCEDURE — 36415 COLL VENOUS BLD VENIPUNCTURE: CPT | Performed by: PHYSICIAN ASSISTANT

## 2023-12-05 PROCEDURE — 99214 OFFICE O/P EST MOD 30 MIN: CPT | Performed by: PHYSICIAN ASSISTANT

## 2023-12-05 PROCEDURE — 83036 HEMOGLOBIN GLYCOSYLATED A1C: CPT | Performed by: PHYSICIAN ASSISTANT

## 2023-12-05 PROCEDURE — 80048 BASIC METABOLIC PNL TOTAL CA: CPT | Performed by: PHYSICIAN ASSISTANT

## 2023-12-05 PROCEDURE — 85027 COMPLETE CBC AUTOMATED: CPT | Performed by: PHYSICIAN ASSISTANT

## 2023-12-05 RX ORDER — METFORMIN HCL 500 MG
2000 TABLET, EXTENDED RELEASE 24 HR ORAL
Qty: 360 TABLET | Refills: 1 | Status: SHIPPED | OUTPATIENT
Start: 2023-12-05

## 2023-12-05 NOTE — PATIENT INSTRUCTIONS
Preparing for Your Surgery  Getting started  A nurse will call you to review your health history and instructions. They will give you an arrival time based on your scheduled surgery time. Please be ready to share:  Your doctor's clinic name and phone number  Your medical, surgical, and anesthesia history  A list of allergies and sensitivities  A list of medicines, including herbal treatments and over-the-counter drugs  Whether the patient has a legal guardian (ask how to send us the papers in advance)  Please tell us if you're pregnant--or if there's any chance you might be pregnant. Some surgeries may injure a fetus (unborn baby), so they require a pregnancy test. Surgeries that are safe for a fetus don't always need a test, and you can choose whether to have one.   If you have a child who's having surgery, please ask for a copy of Preparing for Your Child's Surgery.    Preparing for surgery  Within 10 to 30 days of surgery: Have a pre-op exam (sometimes called an H&P, or History and Physical). This can be done at a clinic or pre-operative center.  If you're having a , you may not need this exam. Talk to your care team.  At your pre-op exam, talk to your care team about all medicines you take. If you need to stop any medicines before surgery, ask when to start taking them again.  We do this for your safety. Many medicines can make you bleed too much during surgery. Some change how well surgery (anesthesia) drugs work.  Call your insurance company to let them know you're having surgery. (If you don't have insurance, call [phone].)  Call your clinic if there's any change in your health. This includes signs of a cold or flu (sore throat, runny nose, cough, rash, fever). It also includes a scrape or scratch near the surgery site.  If you have questions on the day of surgery, call your hospital or surgery center.  Eating and drinking guidelines  For your safety: Unless your surgeon tells you otherwise,  follow the guidelines below.  Eat and drink as usual until 8 hours before you arrive for surgery. After that, no food or milk.  Drink clear liquids until 2 hours before you arrive. These are liquids you can see through, like water, Gatorade, and Propel Water. They also include plain black coffee and tea (no cream or milk), candy, and breath mints. You can spit out gum when you arrive.  If you drink alcohol: Stop drinking it the night before surgery.  If your care team tells you to take medicine on the morning of surgery, it's okay to take it with a sip of water.  Preventing infection  Shower or bathe the night before and morning of your surgery. Follow the instructions your clinic gave you. (If no instructions, use regular soap.)  Don't shave or clip hair near your surgery site. We'll remove the hair if needed.  Don't smoke or vape the morning of surgery. You may chew nicotine gum up to 2 hours before surgery. A nicotine patch is okay.  Note: Some surgeries require you to completely quit smoking and nicotine. Check with your surgeon.  Your care team will make every effort to keep you safe from infection. We will:  Clean our hands often with soap and water (or an alcohol-based hand rub).  Clean the skin at your surgery site with a special soap that kills germs.  Give you a special gown to keep you warm. (Cold raises the risk of infection.)  Wear special hair covers, masks, gowns and gloves during surgery.  Give antibiotic medicine, if prescribed. Not all surgeries need antibiotics.  What to bring on the day of surgery  Photo ID and insurance card  Copy of your health care directive, if you have one  Glasses and hearing aids (bring cases)  You can't wear contacts during surgery  Inhaler and eye drops, if you use them (tell us about these when you arrive)  CPAP machine or breathing device, if you use them  A few personal items, if spending the night  If you have . . .  A pacemaker, ICD (cardiac defibrillator) or other  implant: Bring the ID card.  An implanted stimulator: Bring the remote control.  A legal guardian: Bring a copy of the certified (court-stamped) guardianship papers.  Please remove any jewelry, including body piercings. Leave jewelry and other valuables at home.  If you're going home the day of surgery  You must have a responsible adult drive you home. They should stay with you overnight as well.  If you don't have someone to stay with you, and you aren't safe to go home alone, we may keep you overnight. Insurance often won't pay for this.  After surgery  If it's hard to control your pain or you need more pain medicine, please call your surgeon's office.  Questions?   If you have any questions for your care team, list them here: _________________________________________________________________________________________________________________________________________________________________________ ____________________________________ ____________________________________ ____________________________________  For informational purposes only. Not to replace the advice of your health care provider. Copyright   2003, 2019 [employer]. All rights reserved. Clinically reviewed by Lori Anaya MD. [employer] 778923 - REV 12/22.    How to Take Your Medication Before Surgery  - Take omeprazole day of surgery with sip of water  -HOLD metformin night prior to surgery.

## 2023-12-05 NOTE — PROGRESS NOTES
[address] [address]  Phone: [phone]  Primary Provider: Maia Castillo  Pre-op Performing Provider: MAIA CASTILLO      PREOPERATIVE EVALUATION:  Today's date: 12/5/2023    Luda is a 43 year old, presenting for the following:  Pre-Op Exam        12/5/2023    12:43 PM   Additional Questions   Roomed by Blanca GAYE   Accompanied by Self       Surgical Information:  Surgery/Procedure: REMOVAL, VASCULAR ACCESS PORT   Surgery Location: [facility]  Surgeon: Farnaz Gore MD   Surgery Date: 12/11/2023  Time of Surgery: 8:15 AM  Where patient plans to recover: At home with family  Fax number for surgical facility: Note does not need to be faxed, will be available electronically in Epic.    Assessment & Plan     The proposed surgical procedure is considered INTERMEDIATE risk.    Preop general physical exam  Invasive ductal carcinoma of breast, female, left (H)  Port-A-Cath in place  Cleared for surgery without further diagnostics  - Hemoglobin A1c  - Basic metabolic panel  (Ca, Cl, CO2, Creat, Gluc, K, Na, BUN)  - CBC with platelets  - HCG Qual, Urine ([id number])    Gastroesophageal reflux disease, unspecified whether esophagitis present  Positioning considerations during procedure    Severe obesity (BMI 35.0-39.9) with comorbidity (H) - hyperlipidemia, prediabetes  Positioning considerations during procedure    Mixed hyperlipidemia  Managed with diet    Prediabetes  PCOS (polycystic ovarian syndrome)  Improved on most recent labs.  Continue metformin & dietary efforts.    - metFORMIN (GLUCOPHAGE XR) 500 MG 24 hr tablet  Dispense: 360 tablet; Refill: 1      Risks and Recommendations:  The patient has the following additional risks and recommendations for perioperative complications:   - No identified additional risk factors other than previously addressed    Antiplatelet or Anticoagulation Medication Instructions:   - Patient  is on no antiplatelet or anticoagulation medications.    Additional Medication Instructions:  How to Take Your Medication Before Surgery  - Take omeprazole day of surgery with sip of water  -HOLD metformin night prior to surgery.    RECOMMENDATION:  APPROVAL GIVEN to proceed with proposed procedure, without further diagnostic evaluation.    Return in about 6 days (around 12/11/2023) for procedure.        Mulu Montalvo MBA, MS, HELLEN  [facility]      Subjective       HPI related to upcoming procedure: Luda Rai is a 43 year old female with right sided breast cancer. She went in for a screening mammogram in April and was having pain in the right breast. Suspicious lesion noted and biopsy revealed invasive ductal carcinoma, grade 2, ER/WI positive, HER2 negative.  She underwent right-sided lumpectomy on 6/26/2023.  Margins were negative and she had 3 sentinel lymph nodes negative.  Oncotype testing came back at 21 and Dr. Zeng recommended adjuvant chemotherapy with Taxotere/Cytoxan, then adjuvant radiation therapy followed by 5 to 10 years of hormonal therapy.   Completed chemotherapy 10/13/2023 -= getting port removed 12/11/2023.          12/5/2023    12:44 PM   Preop Questions   1. Have you ever had a heart attack or stroke? No   2. Have you ever had surgery on your heart or blood vessels, such as a stent placement, a coronary artery bypass, or surgery on an artery in your head, neck, heart, or legs? No   3. Do you have chest pain with activity? No   4. Do you have a history of  heart failure? No   5. Do you currently have a cold, bronchitis or symptoms of other infection? No   6. Do you have a cough, shortness of breath, or wheezing? No   7. Do you or anyone in your family have previous history of blood clots? No   8. Do you or does anyone in your family have a serious bleeding problem such as prolonged bleeding following surgeries or cuts? No   9. Have you ever had problems with  anemia or been told to take iron pills? No   10. Have you had any abnormal blood loss such as black, tarry or bloody stools, or abnormal vaginal bleeding? No   11. Have you ever had a blood transfusion? No   12. Are you willing to have a blood transfusion if it is medically needed before, during, or after your surgery? Yes   13. Have you or any of your relatives ever had problems with anesthesia? No   14. Do you have sleep apnea, excessive snoring or daytime drowsiness? No   15. Do you have any artifical heart valves or other implanted medical devices like a pacemaker, defibrillator, or continuous glucose monitor? No   16. Do you have artificial joints? YES - right total knee   17. Are you allergic to latex? No   18. Is there any chance that you may be pregnant? No     Health Care Directive:  Patient does not have a Health Care Directive or Living Will: Discussed advance care planning with patient; however, patient declined at this time.    Preoperative Review of :   reviewed - no record of controlled substances prescribed.      Status of Chronic Conditions:  See problem list for active medical problems.  Problems all longstanding and stable, except as noted/documented.  See ROS for pertinent symptoms related to these conditions.    Review of Systems  CONSTITUTIONAL: NEGATIVE for fever, chills, change in weight  INTEGUMENTARY/SKIN: NEGATIVE for worrisome rashes, moles or lesions  EYES: NEGATIVE for vision changes or irritation  ENT/MOUTH: NEGATIVE for ear, mouth and throat problems  RESP: NEGATIVE for significant cough or SOB  CV: NEGATIVE for chest pain, palpitations or peripheral edema  GI: NEGATIVE for nausea, abdominal pain, heartburn, or change in bowel habits  : NEGATIVE for frequency, dysuria, or hematuria  MUSCULOSKELETAL: NEGATIVE for significant arthralgias or myalgia  NEURO: NEGATIVE for weakness, dizziness or paresthesias  ENDOCRINE: NEGATIVE for temperature intolerance, skin/hair changes  HEME:  NEGATIVE for bleeding problems  PSYCHIATRIC: NEGATIVE for changes in mood or affect    Patient Active Problem List    Diagnosis Date Noted    Gastroesophageal reflux disease, unspecified whether esophagitis present 12/05/2023     Priority: Medium    Severe obesity (BMI 35.0-39.9) with comorbidity (H) - hyperlipidemia, prediabetes 08/02/2023     Priority: Medium    Malignant neoplasm of right breast in female, estrogen receptor positive, unspecified site of breast (H) 07/20/2023     Priority: Medium     Check 11.23 for f/u Zeng      Mixed hyperlipidemia 06/22/2023     Priority: Medium    Invasive ductal carcinoma of breast, female, left (H) 06/19/2023     Priority: Medium    Prediabetes 09/16/2022     Priority: Medium    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     Priority: Medium    Bariatric surgery status 06/23/2022     Priority: Medium     6/16/2022 RYGBS and Takedown of Nissen fundoplication LEL        Malnutrition following gastrointestinal surgery 06/23/2022     Priority: Medium    Fatty liver 03/06/2018     Priority: Medium    Primary osteoarthritis of left knee 06/07/2016     Priority: Medium    Vitamin B12 deficiency 01/12/2016     Priority: Medium    Osteoarthrosis, unspecified whether generalized or localized, lower leg 10/30/2014     Priority: Medium     IMO Regulatory Load OCT 2020  Replacing diagnoses that were inactivated after the 10/1/2021 regulatory import.      PCOS (polycystic ovarian syndrome) 01/13/2014     Priority: Medium    Hypovitaminosis D 01/13/2014     Priority: Medium      Past Medical History:   Diagnosis Date    Bone and joint disord back, pelvis, leg complicat preg, childb, puerp     Knee replacement 2020    Endocrine problem     Pcos    Esophageal reflux 2005    Had Nissen Fundiplication 2006    Invasive ductal carcinoma of breast, female, left (H) 06/19/2023    Osteoporosis     Knee     Past Surgical History:   Procedure Laterality Date    BIOPSY, BREAST, WITH RADIOFREQUENCY IDENTIFICATION  "AND SENTINEL LYMPH NODE BIOPSY Left 2023    Procedure: LEFT BREAST LOCALIZED EXCISIONAL BIOPSY;  Surgeon: Farnaz Gore MD;  Location: [facility] OR     SECTION  10/2016    DISCECTOMY LUMBAR ANTERIOR  2011    Diskectomy    INSERT PORT VASCULAR ACCESS Left 8/10/2023    Procedure: INSERTION, VASCULAR ACCESS PORT;  Surgeon: Farnaz Gore MD;  Location: [facility] OR    LAPAROSCOPIC BYPASS GASTRIC N/A 2022    Procedure: laparoscopic gastric bypass, Laparoscopic reversal of Nissen fundoplication, laparoscopic lysis of adhesion, partial gastrectomy;  Surgeon: Panchito Zambrano MD;  Location: [facility] OR    LUMPECTOMY BREAST WITH SENTINEL NODE, COMBINED Right 2023    Procedure: RIGHT BREAST LUMPECTOMY , RIGHT SENTINEL NODE BIOPSY;  Surgeon: Farnaz Gore MD;  Location: [facility] OR    NISSEN FUNDOPLICATION      starting of Up's    SALPINGECTOMY Left 2019    Fallopian tube (including fimbria) with extensive intraluminal hemorrhage,    SALPINGECTOMY Right 2019    tubal pregnancy from IVF    TOTAL KNEE ARTHROPLASTY Right     Knee replacement     Current Outpatient Medications   Medication Sig Dispense Refill    BIOTIN PO Take 1 capsule by mouth daily      CALCIUM CITRATE PO Take 500 mg by mouth daily B-L-D      cyanocobalamin (CYANOCOBALAMIN) 1000 MCG/ML injection Inject 1 mL (1,000 mcg) Subcutaneous every 30 days 3 mL 3    metFORMIN (GLUCOPHAGE XR) 500 MG 24 hr tablet Take 4 tablets (2,000 mg) by mouth daily (with dinner) 360 tablet 1    multivitamin w/minerals (THERA-VIT-M) tablet Take 1 tablet by mouth At Bedtime Abel Adv Chew      omeprazole (PRILOSEC) 40 MG DR capsule take 1 capsule by mouth every day before a meal      syringe/needle, disp, 25G X 1\" 3 ML MISC Use for B-12 injection 3 each 3    tuberculin-allergy syringes 27G X 1/2\" 1 ML MISC Use for B12 injections. 3 each 3       Allergies   Allergen Reactions    Asa [Aspirin]      Gastric bypass    Morphine And Related GI Disturbance    Nsaids " "Other (See Comments)     Avoid due to bariatric surgery 6/16/2022    Rocephin [Ceftriaxone] Hives        Social History     Tobacco Use    Smoking status: Never     Passive exposure: Never    Smokeless tobacco: Never   Substance Use Topics    Alcohol use: Not Currently     Comment: Rare     Family History   Problem Relation Age of Onset    Hypertension Mother     Diabetes Father     Cerebrovascular Disease Father     Breast Cancer No family hx of     Ovarian Cancer No family hx of      History   Drug Use Unknown         Objective     /66   Pulse 94   Temp 97.3  F (36.3  C) (Tympanic)   Resp 16   Ht 1.702 m (5' 7\")   Wt 103 kg (227 lb 1.6 oz)   LMP 06/16/2023 (Approximate)   SpO2 97%   BMI 35.57 kg/m      Physical Exam    GENERAL APPEARANCE: healthy, alert and no distress     EYES: EOMI, PERRL     HENT: ear canals and TM's normal and nose and mouth without ulcers or lesions     NECK: no adenopathy, no asymmetry, masses, or scars and thyroid normal to palpation     RESP: lungs clear to auscultation - no rales, rhonchi or wheezes     CV: regular rates and rhythm, normal S1 S2, no S3 or S4 and no murmur, click or rub     ABDOMEN:  soft, nontender, no HSM or masses and bowel sounds normal     MS: extremities normal- no gross deformities noted, no evidence of inflammation in joints, FROM in all extremities.     SKIN: no suspicious lesions or rashes     NEURO: Normal strength and tone, sensory exam grossly normal, mentation intact and speech normal     PSYCH: mentation appears normal. and affect normal/bright     LYMPHATICS: No cervical adenopathy    Recent Labs   Lab Test 10/13/23  0802 09/30/23  1941 06/09/23  1338 11/04/22  1120   HGB 11.6* 10.5*   < >  --     147*   < >  --     141   < >  --    POTASSIUM 3.9 3.8   < >  --    CR 0.52 0.61   < >  --    A1C  --   --   --  5.3    < > = values in this interval not displayed.        Diagnostics:  Results for orders placed or performed in visit on " 12/05/23   Hemoglobin A1c     Status: Normal   Result Value Ref Range    Hemoglobin A1C 5.3 0.0 - 5.6 %   Basic metabolic panel  (Ca, Cl, CO2, Creat, Gluc, K, Na, BUN)     Status: Abnormal   Result Value Ref Range    Sodium 142 135 - 145 mmol/L    Potassium 4.2 3.4 - 5.3 mmol/L    Chloride 105 98 - 107 mmol/L    Carbon Dioxide (CO2) 25 22 - 29 mmol/L    Anion Gap 12 7 - 15 mmol/L    Urea Nitrogen 11.2 6.0 - 20.0 mg/dL    Creatinine 0.63 0.51 - 0.95 mg/dL    GFR Estimate >90 >60 mL/min/1.73m2    Calcium 9.5 8.6 - 10.0 mg/dL    Glucose 103 (H) 70 - 99 mg/dL   CBC with platelets     Status: Abnormal   Result Value Ref Range    WBC Count 4.0 4.0 - 11.0 10e3/uL    RBC Count 4.45 3.80 - 5.20 10e6/uL    Hemoglobin 12.0 11.7 - 15.7 g/dL    Hematocrit 38.1 35.0 - 47.0 %    MCV 86 78 - 100 fL    MCH 27.0 26.5 - 33.0 pg    MCHC 31.5 31.5 - 36.5 g/dL    RDW 15.3 (H) 10.0 - 15.0 %    Platelet Count 157 150 - 450 10e3/uL   HCG Qual, Urine ([id number])     Status: Normal   Result Value Ref Range    hCG Urine Qualitative Negative Negative        No EKG required, no history of coronary heart disease, significant arrhythmia, peripheral arterial disease or other structural heart disease.    Revised Cardiac Risk Index (RCRI):  The patient has the following serious cardiovascular risks for perioperative complications:   - No serious cardiac risks = 0 points     RCRI Interpretation: 0 points: Class I (very low risk - 0.4% complication rate)         Signed Electronically by: Mulu Montalvo PA-C  Copy of this evaluation report is provided to requesting physician.

## 2023-12-06 LAB
ANION GAP SERPL CALCULATED.3IONS-SCNC: 12 MMOL/L (ref 7–15)
BUN SERPL-MCNC: 11.2 MG/DL (ref 6–20)
CALCIUM SERPL-MCNC: 9.5 MG/DL (ref 8.6–10)
CHLORIDE SERPL-SCNC: 105 MMOL/L (ref 98–107)
CREAT SERPL-MCNC: 0.63 MG/DL (ref 0.51–0.95)
DEPRECATED HCO3 PLAS-SCNC: 25 MMOL/L (ref 22–29)
EGFRCR SERPLBLD CKD-EPI 2021: >90 ML/MIN/1.73M2
GLUCOSE SERPL-MCNC: 103 MG/DL (ref 70–99)
POTASSIUM SERPL-SCNC: 4.2 MMOL/L (ref 3.4–5.3)
SODIUM SERPL-SCNC: 142 MMOL/L (ref 135–145)

## 2023-12-08 ENCOUNTER — TRANSFERRED RECORDS (OUTPATIENT)
Dept: HEALTH INFORMATION MANAGEMENT | Facility: CLINIC | Age: 43
End: 2023-12-08
Payer: COMMERCIAL

## 2023-12-11 ENCOUNTER — HOSPITAL ENCOUNTER (OUTPATIENT)
Facility: CLINIC | Age: 43
Discharge: HOME | End: 2023-12-11
Attending: SURGERY | Admitting: SURGERY
Payer: COMMERCIAL

## 2023-12-11 ENCOUNTER — APPOINTMENT (OUTPATIENT)
Dept: SURGERY | Facility: PHYSICIAN GROUP | Age: 43
End: 2023-12-11
Payer: COMMERCIAL

## 2023-12-11 ENCOUNTER — ANESTHESIA EVENT (OUTPATIENT)
Dept: SURGERY | Facility: CLINIC | Age: 43
End: 2023-12-11
Payer: COMMERCIAL

## 2023-12-11 ENCOUNTER — ANESTHESIA (OUTPATIENT)
Dept: SURGERY | Facility: CLINIC | Age: 43
End: 2023-12-11
Payer: COMMERCIAL

## 2023-12-11 VITALS
OXYGEN SATURATION: 96 % | HEART RATE: 74 BPM | WEIGHT: 227.3 LBS | DIASTOLIC BLOOD PRESSURE: 67 MMHG | RESPIRATION RATE: 14 BRPM | SYSTOLIC BLOOD PRESSURE: 120 MMHG | HEIGHT: 67 IN | TEMPERATURE: 97.5 F | BODY MASS INDEX: 35.67 KG/M2

## 2023-12-11 DIAGNOSIS — Z85.3 HX: BREAST CANCER: ICD-10-CM

## 2023-12-11 PROCEDURE — 36590 REMOVAL TUNNELED CV CATH: CPT | Mod: AS | Performed by: PHYSICIAN ASSISTANT

## 2023-12-11 PROCEDURE — 370N000017 HC ANESTHESIA TECHNICAL FEE, PER MIN: Performed by: SURGERY

## 2023-12-11 PROCEDURE — 710N000012 HC RECOVERY PHASE 2, PER MINUTE: Performed by: SURGERY

## 2023-12-11 PROCEDURE — 250N000011 HC RX IP 250 OP 636: Mod: JZ | Performed by: SURGERY

## 2023-12-11 PROCEDURE — 258N000003 HC RX IP 258 OP 636: Performed by: ANESTHESIOLOGY

## 2023-12-11 PROCEDURE — 250N000009 HC RX 250: Performed by: SURGERY

## 2023-12-11 PROCEDURE — 272N000001 HC OR GENERAL SUPPLY STERILE: Performed by: SURGERY

## 2023-12-11 PROCEDURE — 360N000075 HC SURGERY LEVEL 2, PER MIN: Performed by: SURGERY

## 2023-12-11 PROCEDURE — 36590 REMOVAL TUNNELED CV CATH: CPT | Performed by: SURGERY

## 2023-12-11 PROCEDURE — 250N000011 HC RX IP 250 OP 636: Performed by: NURSE ANESTHETIST, CERTIFIED REGISTERED

## 2023-12-11 PROCEDURE — 999N000141 HC STATISTIC PRE-PROCEDURE NURSING ASSESSMENT: Performed by: SURGERY

## 2023-12-11 PROCEDURE — 250N000009 HC RX 250: Performed by: NURSE ANESTHETIST, CERTIFIED REGISTERED

## 2023-12-11 RX ORDER — LIDOCAINE 40 MG/G
CREAM TOPICAL
Status: DISCONTINUED | OUTPATIENT
Start: 2023-12-11 | End: 2023-12-11 | Stop reason: HOSPADM

## 2023-12-11 RX ORDER — PROPOFOL 10 MG/ML
INJECTION, EMULSION INTRAVENOUS PRN
Status: DISCONTINUED | OUTPATIENT
Start: 2023-12-11 | End: 2023-12-11

## 2023-12-11 RX ORDER — PROPOFOL 10 MG/ML
INJECTION, EMULSION INTRAVENOUS CONTINUOUS PRN
Status: DISCONTINUED | OUTPATIENT
Start: 2023-12-11 | End: 2023-12-11

## 2023-12-11 RX ORDER — ACETAMINOPHEN 325 MG/1
650 TABLET ORAL
Status: DISCONTINUED | OUTPATIENT
Start: 2023-12-11 | End: 2023-12-11 | Stop reason: HOSPADM

## 2023-12-11 RX ORDER — LIDOCAINE HYDROCHLORIDE 20 MG/ML
INJECTION, SOLUTION INFILTRATION; PERINEURAL PRN
Status: DISCONTINUED | OUTPATIENT
Start: 2023-12-11 | End: 2023-12-11

## 2023-12-11 RX ORDER — ONDANSETRON 2 MG/ML
4 INJECTION INTRAMUSCULAR; INTRAVENOUS EVERY 30 MIN PRN
Status: DISCONTINUED | OUTPATIENT
Start: 2023-12-11 | End: 2023-12-11 | Stop reason: HOSPADM

## 2023-12-11 RX ORDER — LABETALOL HYDROCHLORIDE 5 MG/ML
10 INJECTION, SOLUTION INTRAVENOUS
Status: DISCONTINUED | OUTPATIENT
Start: 2023-12-11 | End: 2023-12-11 | Stop reason: HOSPADM

## 2023-12-11 RX ORDER — SODIUM CHLORIDE, SODIUM LACTATE, POTASSIUM CHLORIDE, CALCIUM CHLORIDE 600; 310; 30; 20 MG/100ML; MG/100ML; MG/100ML; MG/100ML
INJECTION, SOLUTION INTRAVENOUS CONTINUOUS
Status: DISCONTINUED | OUTPATIENT
Start: 2023-12-11 | End: 2023-12-11 | Stop reason: HOSPADM

## 2023-12-11 RX ORDER — FENTANYL CITRATE 50 UG/ML
INJECTION, SOLUTION INTRAMUSCULAR; INTRAVENOUS PRN
Status: DISCONTINUED | OUTPATIENT
Start: 2023-12-11 | End: 2023-12-11

## 2023-12-11 RX ORDER — ONDANSETRON 4 MG/1
4 TABLET, ORALLY DISINTEGRATING ORAL EVERY 30 MIN PRN
Status: DISCONTINUED | OUTPATIENT
Start: 2023-12-11 | End: 2023-12-11 | Stop reason: HOSPADM

## 2023-12-11 RX ORDER — HYDRALAZINE HYDROCHLORIDE 20 MG/ML
2.5-5 INJECTION INTRAMUSCULAR; INTRAVENOUS EVERY 10 MIN PRN
Status: DISCONTINUED | OUTPATIENT
Start: 2023-12-11 | End: 2023-12-11 | Stop reason: HOSPADM

## 2023-12-11 RX ORDER — ONDANSETRON 2 MG/ML
INJECTION INTRAMUSCULAR; INTRAVENOUS PRN
Status: DISCONTINUED | OUTPATIENT
Start: 2023-12-11 | End: 2023-12-11

## 2023-12-11 RX ORDER — ACETAMINOPHEN 325 MG/1
975 TABLET ORAL
Status: DISCONTINUED | OUTPATIENT
Start: 2023-12-11 | End: 2023-12-11 | Stop reason: ALTCHOICE

## 2023-12-11 RX ADMIN — SODIUM CHLORIDE, POTASSIUM CHLORIDE, SODIUM LACTATE AND CALCIUM CHLORIDE: 600; 310; 30; 20 INJECTION, SOLUTION INTRAVENOUS at 08:06

## 2023-12-11 RX ADMIN — ONDANSETRON 4 MG: 2 INJECTION INTRAMUSCULAR; INTRAVENOUS at 08:20

## 2023-12-11 RX ADMIN — PROPOFOL 50 MG: 10 INJECTION, EMULSION INTRAVENOUS at 08:20

## 2023-12-11 RX ADMIN — MIDAZOLAM 2 MG: 1 INJECTION INTRAMUSCULAR; INTRAVENOUS at 08:17

## 2023-12-11 RX ADMIN — FENTANYL CITRATE 50 MCG: 50 INJECTION INTRAMUSCULAR; INTRAVENOUS at 08:20

## 2023-12-11 RX ADMIN — PROPOFOL 150 MCG/KG/MIN: 10 INJECTION, EMULSION INTRAVENOUS at 08:20

## 2023-12-11 RX ADMIN — LIDOCAINE HYDROCHLORIDE 30 MG: 20 INJECTION, SOLUTION INFILTRATION; PERINEURAL at 08:20

## 2023-12-11 ASSESSMENT — ACTIVITIES OF DAILY LIVING (ADL)
ADLS_ACUITY_SCORE: 18
ADLS_ACUITY_SCORE: 18

## 2023-12-11 NOTE — OP NOTE
General Surgery Operative Note      Pre-operative diagnosis: HX: breast cancer [Z85.3]   Post-operative diagnosis: Same    Procedure: Removal of port-a-cath   Surgeon: Farnaz Gore MD   Assistant(s): Donal Hernandez PA-C   Anesthesia: Local with MAC    Estimated blood loss:   1 cc           DESCRIPTION OF PROCEDURE: The patient was taken to the operating room after obtaining informed consent. The left chest and neck were prepped and draped in standard sterile fashion. The skin of the previous left upper chest incision was anesthetized with local anesthetic. The incision was reopened with a #15 blade. The port was excised from surrounding subcutaneous tissue with the Bovie electrocautery, then the port was passed off the field. Pressure was held at the internal jugular catheter entrance site for 2 minutes after removal. Hemostasis was maintained with electrocautery.  The skin was closed with interrupted 3-0 vicryl and running 4-0 Vicryl subcuticular sutures and Dermabond. The patient tolerated the procedure well. Sponge and needle counts were correct.   Farnaz Gore MD

## 2023-12-11 NOTE — ANESTHESIA PREPROCEDURE EVALUATION
Anesthesia Pre-Procedure Evaluation    Patient: Luda Rai   MRN: [id number] : 1980        Procedure : Procedure(s):  REMOVAL, VASCULAR ACCESS PORT          Past Medical History:   Diagnosis Date    Bone and joint disord back, pelvis, leg complicat preg, childb, puerp     Knee replacement     Endocrine problem     Pcos    Esophageal reflux     Had Nissen Fundiplication     Invasive ductal carcinoma of breast, female, left (H) 2023    Osteoporosis     Knee      Past Surgical History:   Procedure Laterality Date    BIOPSY, BREAST, WITH RADIOFREQUENCY IDENTIFICATION AND SENTINEL LYMPH NODE BIOPSY Left 2023    Procedure: LEFT BREAST LOCALIZED EXCISIONAL BIOPSY;  Surgeon: Farnaz Gore MD;  Location: [facility] OR     SECTION  10/2016    DISCECTOMY LUMBAR ANTERIOR      Diskectomy    INSERT PORT VASCULAR ACCESS Left 8/10/2023    Procedure: INSERTION, VASCULAR ACCESS PORT;  Surgeon: Farnaz Gore MD;  Location: [facility] OR    LAPAROSCOPIC BYPASS GASTRIC N/A 2022    Procedure: laparoscopic gastric bypass, Laparoscopic reversal of Nissen fundoplication, laparoscopic lysis of adhesion, partial gastrectomy;  Surgeon: Panchito Zambrano MD;  Location: [facility]    LUMPECTOMY BREAST WITH SENTINEL NODE, COMBINED Right 2023    Procedure: RIGHT BREAST LUMPECTOMY , RIGHT SENTINEL NODE BIOPSY;  Surgeon: Farnaz Gore MD;  Location: [facility] OR    NISSEN FUNDOPLICATION      starting of Up's    SALPINGECTOMY Left 2019    Fallopian tube (including fimbria) with extensive intraluminal hemorrhage,    SALPINGECTOMY Right 2019    tubal pregnancy from IVF    TOTAL KNEE ARTHROPLASTY Right     Knee replacement      Allergies   Allergen Reactions    Asa [Aspirin]      Gastric bypass    Morphine And Related GI Disturbance    Nsaids Other (See Comments)     Avoid due to bariatric surgery 2022    Rocephin [Ceftriaxone] Hives      Social History     Tobacco Use    Smoking status:  "Never     Passive exposure: Never    Smokeless tobacco: Never   Substance Use Topics    Alcohol use: Not Currently     Comment: Rare      Wt Readings from Last 1 Encounters:   12/05/23 103 kg (227 lb 1.6 oz)        Anesthesia Evaluation            ROS/MED HX  ENT/Pulmonary:  - neg pulmonary ROS     Neurologic:       Cardiovascular:       METS/Exercise Tolerance:     Hematologic:       Musculoskeletal:       GI/Hepatic: Comment: History of bariatric surgery      (+) GERD,            liver disease (hepatosteatosis),       Renal/Genitourinary:       Endo:     (+)               Obesity (BMI 35),       Psychiatric/Substance Use:       Infectious Disease:       Malignancy:   (+) Malignancy, History of Breast.    Other:            Physical Exam    Airway        Mallampati: II   TM distance: > 3 FB   Neck ROM: full   Mouth opening: > 3 cm    Respiratory Devices and Support         Dental           Cardiovascular   cardiovascular exam normal          Pulmonary   pulmonary exam normal                OUTSIDE LABS:  CBC:   Lab Results   Component Value Date    WBC 4.0 12/05/2023    WBC 6.1 10/13/2023    HGB 12.0 12/05/2023    HGB 11.6 (L) 10/13/2023    HCT 38.1 12/05/2023    HCT 36.1 10/13/2023     12/05/2023     10/13/2023     BMP:   Lab Results   Component Value Date     12/05/2023     10/13/2023    POTASSIUM 4.2 12/05/2023    POTASSIUM 3.9 10/13/2023    CHLORIDE 105 12/05/2023    CHLORIDE 103 10/13/2023    CO2 25 12/05/2023    CO2 25 10/13/2023    BUN 11.2 12/05/2023    BUN 9.6 10/13/2023    CR 0.63 12/05/2023    CR 0.52 10/13/2023     (H) 12/05/2023     (H) 10/13/2023     COAGS: No results found for: \"PTT\", \"INR\", \"FIBR\"  POC:   Lab Results   Component Value Date    HCG Negative 12/05/2023     HEPATIC:   Lab Results   Component Value Date    ALBUMIN 4.5 10/13/2023    PROTTOTAL 6.9 10/13/2023    ALT 8 10/13/2023    AST 15 10/13/2023    ALKPHOS 84 10/13/2023    BILITOTAL 0.5 10/13/2023 " "    OTHER:   Lab Results   Component Value Date    A1C 5.3 12/05/2023    TAY 9.5 12/05/2023    LIPASE 227 05/18/2020    AMYLASE 44 05/18/2020    TSH 1.16 06/22/2023    CRP 13.0 (H) 05/18/2020    SED 17 05/18/2020       Anesthesia Plan    ASA Status:  2    NPO Status:  NPO Appropriate    Anesthesia Type: MAC.   Induction: Intravenous.   Maintenance: TIVA.        Consents    Anesthesia Plan(s) and associated risks, benefits, and realistic alternatives discussed. Questions answered and patient/representative(s) expressed understanding.     - Discussed:     - Discussed with:  Patient            Postoperative Care    Pain management: Multi-modal analgesia.   PONV prophylaxis: Ondansetron (or other 5HT-3)     Comments:               Erica Lyn MD    I have reviewed the pertinent notes and labs in the chart from the past 30 days and (re)examined the patient.  Any updates or changes from those notes are reflected in this note.              # Obesity: Estimated body mass index is 35.57 kg/m  as calculated from the following:    Height as of 12/5/23: 1.702 m (5' 7\").    Weight as of 12/5/23: 103 kg (227 lb 1.6 oz).      "

## 2023-12-11 NOTE — RESULT ENCOUNTER NOTE
Luda  I have reviewed your recent test results:    -Normal red blood cell (hgb) levels, normal white blood cell count and normal platelet levels.  -Kidney function is normal (Cr, GFR), Sodium is normal, Potassium is normal, Calcium is normal, Glucose is normal.   -A1C (diabetic test) is normal and indicates that your blood sugar has been in a normal range the last 3 months.    For additional lab test information, www.[employer].com is an excellent reference.    I hope your procedure went well!  Congrats on being cancer free!     If you have any questions please do not hesitate to contact our office via phone ([phone]) or MyChart.    Healthy regards,     Mulu Montalvo MBA, MS, PA-C  [facility]

## 2023-12-11 NOTE — ANESTHESIA POSTPROCEDURE EVALUATION
Patient: Luda Rai    Procedure: Procedure(s):  REMOVAL, VASCULAR ACCESS PORT       Anesthesia Type:  MAC    Note:  Disposition: Outpatient   Postop Pain Control: Uneventful            Sign Out: Well controlled pain   PONV: No   Neuro/Psych: Uneventful            Sign Out: Acceptable/Baseline neuro status   Airway/Respiratory: Uneventful            Sign Out: Acceptable/Baseline resp. status   CV/Hemodynamics: Uneventful            Sign Out: Acceptable CV status; No obvious hypovolemia; No obvious fluid overload   Other NRE:    DID A NON-ROUTINE EVENT OCCUR? No           Last vitals:  Vitals Value Taken Time   /67 12/11/23 0915   Temp 97.5  F (36.4  C) 12/11/23 0915   Pulse 74 12/11/23 0915   Resp 14 12/11/23 0915   SpO2 96 % 12/11/23 0915       Electronically Signed By: Erica Lyn MD  December 11, 2023  12:58 PM

## 2023-12-11 NOTE — ANESTHESIA CARE TRANSFER NOTE
Patient: Luda Rai    Procedure: Procedure(s):  REMOVAL, VASCULAR ACCESS PORT       Diagnosis: HX: breast cancer [Z85.3]  Diagnosis Additional Information: No value filed.    Anesthesia Type:   MAC     Note:    Oropharynx: oropharynx clear of all foreign objects and spontaneously breathing  Level of Consciousness: awake  Oxygen Supplementation: room air    Independent Airway: airway patency satisfactory and stable  Dentition: dentition unchanged  Vital Signs Stable: post-procedure vital signs reviewed and stable  Report to RN Given: handoff report given  Patient transferred to: Phase II    Handoff Report: Identifed the Patient, Identified the Reponsible Provider, Reviewed the pertinent medical history, Discussed the surgical course, Reviewed Intra-OP anesthesia mangement and issues during anesthesia, Set expectations for post-procedure period and Allowed opportunity for questions and acknowledgement of understanding      Vitals:  Vitals Value Taken Time   BP     Temp     Pulse     Resp     SpO2 100 % 12/11/23 0855   Vitals shown include unfiled device data.    Electronically Signed By: MAXIM Cuellar CRNA  December 11, 2023  8:56 AM

## 2023-12-14 ENCOUNTER — TRANSFERRED RECORDS (OUTPATIENT)
Dept: HEALTH INFORMATION MANAGEMENT | Facility: CLINIC | Age: 43
End: 2023-12-14
Payer: COMMERCIAL

## 2023-12-20 ENCOUNTER — ONCOLOGY VISIT (OUTPATIENT)
Dept: ONCOLOGY | Facility: CLINIC | Age: 43
End: 2023-12-20
Attending: INTERNAL MEDICINE
Payer: COMMERCIAL

## 2023-12-20 ENCOUNTER — PATIENT OUTREACH (OUTPATIENT)
Dept: ONCOLOGY | Facility: CLINIC | Age: 43
End: 2023-12-20
Payer: COMMERCIAL

## 2023-12-20 VITALS
HEIGHT: 67 IN | DIASTOLIC BLOOD PRESSURE: 69 MMHG | BODY MASS INDEX: 35.39 KG/M2 | HEART RATE: 65 BPM | TEMPERATURE: 97.8 F | OXYGEN SATURATION: 100 % | RESPIRATION RATE: 18 BRPM | WEIGHT: 225.5 LBS | SYSTOLIC BLOOD PRESSURE: 114 MMHG

## 2023-12-20 DIAGNOSIS — Z17.0 MALIGNANT NEOPLASM OF RIGHT BREAST IN FEMALE, ESTROGEN RECEPTOR POSITIVE, UNSPECIFIED SITE OF BREAST (H): Primary | ICD-10-CM

## 2023-12-20 DIAGNOSIS — C50.911 MALIGNANT NEOPLASM OF RIGHT BREAST IN FEMALE, ESTROGEN RECEPTOR POSITIVE, UNSPECIFIED SITE OF BREAST (H): Primary | ICD-10-CM

## 2023-12-20 PROCEDURE — 99215 OFFICE O/P EST HI 40 MIN: CPT | Mod: 24 | Performed by: INTERNAL MEDICINE

## 2023-12-20 PROCEDURE — 99214 OFFICE O/P EST MOD 30 MIN: CPT | Performed by: INTERNAL MEDICINE

## 2023-12-20 RX ORDER — TAMOXIFEN CITRATE 20 MG/1
20 TABLET ORAL DAILY
Qty: 90 TABLET | Refills: 3 | Status: SHIPPED | OUTPATIENT
Start: 2023-12-20

## 2023-12-20 ASSESSMENT — PAIN SCALES - GENERAL: PAINLEVEL: NO PAIN (0)

## 2023-12-20 NOTE — PROGRESS NOTES
Luda is a 43-year-old patient who is here today for interim follow-up.     CHIEF COMPLAINT:     1.  Invasive ductal carcinoma of the right breast at the 11 o'clock position T2 N0 M0, ER/OK positive, HER-2 receptor negative.  2.  Status post lumpectomy.  3. Taxotere / Cytoxan adjuvant  4 Finishing adjuvant radiation          HISTORY OF PRESENTING COMPLAINT: The patient is a 43-year-old premenopausal patient who went for a screening mammogram in 04/2023 because she was having discomfort in the right breast. A suspicious lesion was seen at the 11 o'clock position of the breast and a biopsy revealed an invasive ductal carcinoma, grade II, ER/OK positive, HER-2 receptor negative. I saw her in initial consultation on 05/30/2023. She then went on to have a right-sided lumpectomy with Dr. Gore   The pathology  showed invasive ductal carcinoma, grade II in the upper outer quadrant of the right breast at the 11 o'clock position.  The size of the tumor was 2.4 cm.  It was a single focus associated with DCIS.  All the margins were negative, and she had 3 sentinel lymph nodes, which were negative.  Stage of disease is a T2 N0, ER/OK positive, HER-2 receptor negative tumor.     The tumor then went out for Oncotype testing and came back in the intermediate risk range with a score of 21.       Luda started on adjuvant Taxotere and Cytoxan and completed out for full cycles.    She did well with chemotherapy and then started on adjuvant radiation therapy and she will be completing out adjuvant radiation therapy in the next couple of days.  Since she was premenopausal coming into the disease I discussed with her today adjuvant tamoxifen post radiation therapy which she will start about 3 to 4 weeks after the end of the radiation.  I discussed the risk benefits and side effects of tamoxifen and she is in agreement with that plan.    We have also discussed that her at her young age I would like her to see our genetics team to see  if there is any role for genetic testing.  She is in agreement with that plan also      She is No concerning symptoms at this time she denies cough shortness of breath bone pain or worrisome symptomatology    12 point comprehensive review of systems is unremarkable    Medications and allergies are outlined in the nursing records    Pain assessment she is in no pain today    Past medical history:  Right-sided breast cancer as outlined above  History of bariatric surgery  History of knee replacement  History of ectopic pregnancy    Social history:  Patient has 1 child who is age 7.  She did have multiple attempts at IVF in the past  She is premenopausal and works as an .  She is a non-smoker    Physical exam:     Patient is alert and orientated x3  Respiratory exam normal  Cardiovascular exam normal  Neck is no masses or lymph nodes trachea central  Regular status post right-sided lumpectomy the scar looks good no further palpable masses right axilla negative  Implantable port site looks good  Left breast no palpable masses left axilla negative  GI exam normal  Skin is no rashes or lesions  Psychiatric exam normal.      Data review:    I have reviewed lab work from 12/5/2023 which was all within normal limits.  Her next diagnostic mammogram will be 6 months after the end of the radiation.      Impression and plan    Is a 43-year-old patient who was diagnosed with right-sided breast cancer which is as outlined above in the chief complaint.  Was ER/CO positive HER2 receptor negative and node-negative.  She has finished out adjuvant chemotherapy and finishing up now on adjuvant radiation therapy.  I have talked to her about adjuvant hormonal therapy with tamoxifen since she was premenopausal coming into the disease.  I we have discussed the risk benefits and side effects of the tamoxifen she is in agreement to start tamoxifen about 3 to 4 weeks once the radiation is finished  I will see her back in about 4  months to assess toxicity.    Overall I am happy with her progress.      Total time spent today 45 minutes.      Adolfo Zeng MD

## 2023-12-20 NOTE — PROGRESS NOTES
Writer received Cancer Risk Management Program referral, referred for:    Malignant neoplasm of right breast in female, estrogen receptor positive, unspecified site of breast      Reviewed for appropriate plan, and sent to New Patient Scheduling for completion.

## 2023-12-20 NOTE — LETTER
12/20/2023         RE: Luda Rai  [address]  [place] [address]        Dear Colleague,    Thank you for referring your patient, Luda Rai, to the [facility]. Please see a copy of my visit note below.    Luda is a 43-year-old patient who is here today for interim follow-up.     CHIEF COMPLAINT:     1.  Invasive ductal carcinoma of the right breast at the 11 o'clock position T2 N0 M0, ER/IL positive, HER-2 receptor negative.  2.  Status post lumpectomy.  3. Taxotere / Cytoxan adjuvant  4 Finishing adjuvant radiation          HISTORY OF PRESENTING COMPLAINT: The patient is a 43-year-old premenopausal patient who went for a screening mammogram in 04/2023 because she was having discomfort in the right breast. A suspicious lesion was seen at the 11 o'clock position of the breast and a biopsy revealed an invasive ductal carcinoma, grade II, ER/IL positive, HER-2 receptor negative. I saw her in initial consultation on 05/30/2023. She then went on to have a right-sided lumpectomy with Dr. Gore   The pathology  showed invasive ductal carcinoma, grade II in the upper outer quadrant of the right breast at the 11 o'clock position.  The size of the tumor was 2.4 cm.  It was a single focus associated with DCIS.  All the margins were negative, and she had 3 sentinel lymph nodes, which were negative.  Stage of disease is a T2 N0, ER/IL positive, HER-2 receptor negative tumor.     The tumor then went out for Oncotype testing and came back in the intermediate risk range with a score of 21.       Luda started on adjuvant Taxotere and Cytoxan and completed out for full cycles.    She did well with chemotherapy and then started on adjuvant radiation therapy and she will be completing out adjuvant radiation therapy in the next couple of days.  Since she was premenopausal coming into the disease I discussed with her today adjuvant tamoxifen post radiation therapy which she  will start about 3 to 4 weeks after the end of the radiation.  I discussed the risk benefits and side effects of tamoxifen and she is in agreement with that plan.    We have also discussed that her at her young age I would like her to see our genetics team to see if there is any role for genetic testing.  She is in agreement with that plan also      She is No concerning symptoms at this time she denies cough shortness of breath bone pain or worrisome symptomatology    12 point comprehensive review of systems is unremarkable    Medications and allergies are outlined in the nursing records    Pain assessment she is in no pain today    Past medical history:  Right-sided breast cancer as outlined above  History of bariatric surgery  History of knee replacement  History of ectopic pregnancy    Social history:  Patient has 1 child who is age 7.  She did have multiple attempts at IVF in the past  She is premenopausal and works as an .  She is a non-smoker    Physical exam:     Patient is alert and orientated x3  Respiratory exam normal  Cardiovascular exam normal  Neck is no masses or lymph nodes trachea central  Regular status post right-sided lumpectomy the scar looks good no further palpable masses right axilla negative  Implantable port site looks good  Left breast no palpable masses left axilla negative  GI exam normal  Skin is no rashes or lesions  Psychiatric exam normal.      Data review:    I have reviewed lab work from 12/5/2023 which was all within normal limits.  Her next diagnostic mammogram will be 6 months after the end of the radiation.      Impression and plan    Is a 43-year-old patient who was diagnosed with right-sided breast cancer which is as outlined above in the chief complaint.  Was ER/IA positive HER2 receptor negative and node-negative.  She has finished out adjuvant chemotherapy and finishing up now on adjuvant radiation therapy.  I have talked to her about adjuvant hormonal therapy  with tamoxifen since she was premenopausal coming into the disease.  I we have discussed the risk benefits and side effects of the tamoxifen she is in agreement to start tamoxifen about 3 to 4 weeks once the radiation is finished  I will see her back in about 4 months to assess toxicity.    Overall I am happy with her progress.      Total time spent today 45 minutes.      Adolfo Zeng MD         Again, thank you for allowing me to participate in the care of your patient.        Sincerely,        Adolfo Zeng MD

## 2023-12-20 NOTE — NURSING NOTE
"Oncology Rooming Note    December 20, 2023 3:03 PM   Luda Rai is a 43 year old female who presents for:    Chief Complaint   Patient presents with    Oncology Clinic Visit     Initial Vitals: /69   Pulse 65   Temp 97.8  F (36.6  C) (Tympanic)   Resp 18   Ht 1.702 m (5' 7\")   Wt 102.3 kg (225 lb 8 oz)   LMP 06/16/2023 (Approximate)   SpO2 100%   BMI 35.32 kg/m   Estimated body mass index is 35.32 kg/m  as calculated from the following:    Height as of this encounter: 1.702 m (5' 7\").    Weight as of this encounter: 102.3 kg (225 lb 8 oz). Body surface area is 2.2 meters squared.  No Pain (0) Comment: Data Unavailable   Patient's last menstrual period was 06/16/2023 (approximate).  Allergies reviewed: Yes  Medications reviewed: Yes    Medications: Medication refills not needed today.  Pharmacy name entered into [facility]: [id number] [place] [phone] [facility] [address] [facility]    Frailty Screening:   Is the patient here for a new oncology consult visit in cancer care? 2. No      Clinical concerns: f/u       Shantell Cruz CMA              "

## 2023-12-22 ENCOUNTER — TRANSFERRED RECORDS (OUTPATIENT)
Dept: HEALTH INFORMATION MANAGEMENT | Facility: CLINIC | Age: 43
End: 2023-12-22
Payer: COMMERCIAL

## 2024-01-16 DIAGNOSIS — Z17.0 MALIGNANT NEOPLASM OF RIGHT BREAST IN FEMALE, ESTROGEN RECEPTOR POSITIVE, UNSPECIFIED SITE OF BREAST (H): Primary | ICD-10-CM

## 2024-01-16 DIAGNOSIS — C50.911 MALIGNANT NEOPLASM OF RIGHT BREAST IN FEMALE, ESTROGEN RECEPTOR POSITIVE, UNSPECIFIED SITE OF BREAST (H): Primary | ICD-10-CM

## 2024-01-17 NOTE — ED PROVIDER NOTES
History     Chief Complaint:  Fever       HPI   Luda Rai is a 43 year old female with a history of osteoporosis who presents in the ED today due to a fever of 101.3 that began today. Other symptoms include a wet cough, sore throat and rashes that began today. Patient notes that her rashes go away and come back in different locations around her neck. Patient disclosed that she did have chemotherapy on 23. She took Benadryl for her symptoms, but it did not help. She reports similar symptoms after her last chemotherapy session. Patient denies nausea and emesis. Her son is sick, but taking all of his medications. Patient denies chest pain. Patient denies new topical ointments or detergents.        Independent Historian:    The patient     Review of External Notes:  Reviewed nurse triage note from today    Reviewed oncology note from 23 - She had a brief rash on her arms and chin, almost like pimples, but took Claritin and improved. It was itching, now improved.   Chemotherapy with Taxotere/Cytoxan, then adjuvant radiation therapy followed by 5 to 10 years of hormonal therapy.     Medications:  Decadron   Glucophage   Revia  Zofran   Prilosec   Compazine     Past Medical History:    Bone and joint discord back, pelvis leg complicat preg, childb, puerp  Endocrine problem   Esophageal reflux   Invasive ductal carcinoma of breast female left  Osteoporosis     Past Surgical History:    Biopsy breast with radiofrequency identification and sentinel lymph node biopsy    section   Discectomy lumbar anterior   Insert port vascular access   Laparoscopic bypass gastric   Nissen fundoplication   Salpingectomy   Total knee arthroplasty          Physical Exam   Patient Vitals for the past 24 hrs:   BP Temp Temp src Pulse Resp SpO2   23 -- -- -- -- -- 97 %   23 127/75 -- -- 79 -- --   23 1845 (!) 151/83 99.5  F (37.5  C) Oral 103 18 98 %        Physical Exam  General: Hoarse  voice, alert and cooperative with exam. Normal mentation  Head:  Scalp is NC/AT  Eyes:  No scleral icterus, PERRL  ENT:  The external nose and ears are normal. The oropharynx is normal and without erythema; mucus membranes are moist. Uvula midline, no evidence of deep space infection.  Neck:  Normal range of motion without rigidity.  CV:  Regular rate and rhythm    No pathologic murmur   Resp:  Breath sounds are clear bilaterally    Non-labored, no retractions or accessory muscle use  GI:  Abdomen is soft, no distension, no tenderness. No peritoneal signs  MS:  No lower extremity edema   Skin:  Urticaria present to bilateral extensor surfaces of forearms. Small urticarial rash with pruritus to right side of face, into ear, and down right neck. No ocular involvement. No vesicles or bullae present.   Neuro:  Oriented x 3. No gross motor deficits.      Emergency Department Course     Laboratory:  Labs Ordered and Resulted from Time of ED Arrival to Time of ED Departure   CBC WITH PLATELETS AND DIFFERENTIAL - Abnormal       Result Value    WBC Count 3.7 (*)     RBC Count 3.85      Hemoglobin 10.5 (*)     Hematocrit 32.6 (*)     MCV 85      MCH 27.3      MCHC 32.2      RDW 15.6 (*)     Platelet Count 147 (*)    INFLUENZA A/B, RSV, & SARS-COV2 PCR - Normal    Influenza A PCR Negative      Influenza B PCR Negative      RSV PCR Negative      SARS CoV2 PCR Negative     BASIC METABOLIC PANEL - Normal    Sodium 141      Potassium 3.8      Chloride 104      Carbon Dioxide (CO2) 28      Anion Gap 9      Urea Nitrogen 8.1      Creatinine 0.61      GFR Estimate >90      Calcium 8.8      Glucose 92     GROUP A STREPTOCOCCUS PCR THROAT SWAB - Normal    Group A strep by PCR Not Detected          Procedures   None    Emergency Department Course & Assessments:        Interventions:  Medications   dexAMETHasone PF (DECADRON) injection 10 mg (10 mg Intravenous $Given 9/30/23 1956)   diphenhydrAMINE (BENADRYL) injection 25 mg (25 mg  Intravenous $Given 9/30/23 1956)        Assessments:  1927 I obtained history and examined the patient as noted above.      Independent Interpretation (X-rays, CTs, rhythm strip):  None    Consultations/Discussion of Management or Tests:  None      Social Determinants of Health affecting care:  None     Disposition:  The patient was discharged to home.     Impression & Plan      Medical Decision Making:  Luda Rai is a 43 year old female who presents with fever, sore throat, and rash. Symptoms all started today. On exam, urticarial rash present to bilateral forearms on extensor surfaces. Also has right sided facial rash with extension up into ear and down neck. No bullae or vesicles present to suggest zoster, not discrete to one dermatome. Patient has history of similar rash with previous chemotherapy. She was provided decadron and benadryl in the ED with complete solution rash and pruritus. Likely drug reaction versus allergy. She is borderline febrile here, otherwise vitally stable. Temp of 101.3 at home PTA. No evidence of neutropenic fever on labs. She was strep negative and additionally COVID and flu testing were negative as well.  Suspect that she has other viral illness as cause of her symptoms today.  Recommend she continue her Tylenol every 4-6 hours at home for symptomatic relief and fever reduction.  If rash or pruritus returns, take Benadryl as needed.  Recommend she follow-up with her primary care provider in 1 week for reassessment.  If fever cannot be controlled at home, return to ER.      Diagnosis:    ICD-10-CM    1. Fever  R50.9       2. Viral pharyngitis  J02.9            Discharge Medications:  New Prescriptions    No medications on file          Scribe Disclosure:    I, Shai Wyman, am serving as a scribe at 7:38 PM on 9/30/2023 to document services personally performed by Teresa Roper PA-C  based on my observations and the provider's statements to me.    9/30/2023   Teresa Roper,  Teresa Kwon PA-C  09/30/23 8052     can shower in 48 hours

## 2024-01-25 NOTE — PROGRESS NOTES
"  Luda is a 44 year old who is being evaluated via a billable video visit.      The patient has been notified of following:     \"This video visit will be conducted via a call between you and your physician/provider. We have found that certain health care needs can be provided without the need for an in-person physical exam.  This service lets us provide the care you need with a video conversation.  If a prescription is necessary we can send it directly to your pharmacy.  If lab work is needed we can place an order for that and you can then stop by our lab to have the test done at a later time.    Video visits are billed at different rates depending on your insurance coverage.  Please reach out to your insurance provider with any questions.    If during the course of the call the physician/provider feels a video visit is not appropriate, you will not be charged for this service.\"    Patient has given verbal consent for Video visit? Yes    How would you like to obtain your AVS? MyChart    If the video visit is dropped, the invitation should be resent by: Text to cell phone: [phone]    Will anyone else be joining your video visit? No    I    Video-Visit Details    Type of service:  Video Visit    Originating Location (pt. Location): Home    Distant Location (provider location):   Off-Site - Provider Home Office    Platform used for Video Visit: [employer]    Video Start Time: 9:38 AM    Video End Time:10:01 AM          Return Bariatric Surgery Note    RE: Luda Rai  MR#: [id number]  : 1980  VISIT DATE: 2024    Dear Mulu Montalvo,    I had the pleasure of seeing your patient, Luda Rai, in my post-bariatric surgery assessment clinic.    Assessment & Plan   Problem List Items Addressed This Visit       Class 1 obesity due to excess calories with serious comorbidity and body mass index (BMI) of 33.0 to 33.9 in adult - Primary     We discussed healthy habits to assist with weight loss. We " discussed medication that may assist with weight loss. Qsymia was prescribed. Risks/ benefits and possible side effects were discussed and questions were answered. Written information was given.  She will also continue her metformin 2000 mg daily. Patient will check blood pressure/pulse 2 weeks days after starting and send result through [employer].            Relevant Medications    Phentermine-Topiramate (QSYMIA) 7.5-46 MG CP24    Bariatric surgery status    Hyperphagia    Relevant Medications    Phentermine-Topiramate (QSYMIA) 7.5-46 MG CP24        PATIENT INSTRUCTIONS:  Start Qsymia 7.5/46 mg 1 capsule in the morning.     Please get blood pressure/pulse checked in 2 weeks.  Send result through [employer].   Can get [facility]Pulse checked [facility] [email or web address], at a [facility] Primary care office (nurse visit) [phone], or with a home machine.     Please call [phone] to schedule with medical therapy management with a  management, MTM pharmacist in 6-8 wks.     FOLLOW-UP:    Please call [phone] to schedule your next visit in June for your annual visit.     30 minutes spent on the date of the encounter doing chart review, history and exam, result review, counseling, developing plan of care, documentation, and further activities as noted        CHIEF COMPLAINT: Post-bariatric surgery follow-up    HISTORY OF PRESENT ILLNESS:      1/26/2024     1:32 PM   Questions Regarding Prior Weight Loss Surgery Reviewed With Patient   I had the following weight loss procedure Drea-en-y Gastric Bypass   What year was your surgery? 2022   How has your weight changed since your last visit? I have gained weight   Do you currently have any of the following None of the above   Do you have any concerns today? Weight gain with cancer treatment     Pt last seen July 14, 2023.  Had been recently diagnosed with breast cancer. - is now done with radiation and chemo.  Will start tamoxifen for 5 years.    We started her on Naltrexone and metformin to help with cravings and constant food chatter.  Is still on the metformin 2000 mg daily. The naltrexone makes her super sleeping.  Has not had a lot of success with it.      Is thinking about food all the time.    All of this cancer has made her constantly hungry.   Is frustrating for her.         Weight History:      1/26/2024     1:32 PM   --   What is your highest lifetime weight? 280   What is your lowest weight since surgery? (In pounds) 208     Initial Weight (lbs): 284 lbs  Weight: 223 lb (101.2 kg) (pt reported)  Last Visits Weight: 220 lb (99.8 kg)  Cumulative weight loss (lbs): 61  Weight Loss Percentage: 21.48%        1/26/2024     1:32 PM   Questions Regarding Co-Morbidities and Health Concerns Reviewed With Patient   Pre-diabetes Never   Diabetes II Never   High Blood Pressure Never   High cholesterol Never   Heartburn/Reflux Worsened   Sleep apnea Never   PCOS Worsened   Back pain Improved   Joint pain Stayed the same   Lower leg swelling Never           1/26/2024     1:32 PM   Eating Habits   How many meals do you eat per day? 3   Do you snack between meals? Sometimes   How much food are you eating at each meal? Greater than 1 cup   Are you able to separate your meals and liquids by at least 30 minutes? Yes   Are you able to avoid liquid calories? Yes           1/26/2024     1:32 PM   Exercise Questions Reviewed With Patient   How often do you exercise? 3 to 4 times per week   What is the duration of your exercise (in minutes)? 30 Minutes   What types of exercise do you do? walking   What keeps you from being more active? Pain    I have recently been sick    I should be more active but I just have not gotten around to it       Social History:      1/26/2024     1:32 PM   --   Are you smoking? No   Are you drinking alcohol? No       Medications:  Current Outpatient Medications   Medication    BIOTIN PO    CALCIUM CITRATE PO    cyanocobalamin  "(CYANOCOBALAMIN) 1000 MCG/ML injection    metFORMIN (GLUCOPHAGE XR) 500 MG 24 hr tablet    multivitamin w/minerals (THERA-VIT-M) tablet    omeprazole (PRILOSEC) 40 MG DR capsule    Phentermine-Topiramate (QSYMIA) 7.5-46 MG CP24    syringe/needle, disp, 25G X 1\" 3 ML MISC    tamoxifen (NOLVADEX) 20 MG tablet    tuberculin-allergy syringes 27G X 1/2\" 1 ML MISC     No current facility-administered medications for this visit.         1/26/2024     1:32 PM   --   Do you avoid NSAIDs such as (Ibuprofen, Aleve, Naproxen, Advil)? Yes       ROS:  GI:       1/26/2024     1:32 PM   --   Vomiting No   Diarrhea No   Constipation No   Swallowing trouble No   Abdominal pain No   Heartburn No     ROS:    HEENT  H/O glaucoma:  no  Cardiovascular  CAD:   no  Palpitations:   no  HTN:    no  Gastrointestinal  Liver Dz:   no  H/O Pancreatitis:  no  H/O Gallbladder Dz: no  Psychiatric  H/O ETOH/Drug Use no  H/O eating disorder: no  Endocrine  PMH/FMH of MTC or MEN2:  no  Neurologic:  H/O seizures:   no  Headaches:  no  Memory Impairment:  no    H/O kidney stones:  no  Kidney disease:  no  Current birth control:  Tubal ligation    Skin:       1/26/2024     1:32 PM   BAR RBS ROS - SKIN   Rash in skin folds No     Psych:       1/26/2024     1:32 PM   --   Depression No   Anxiety No     Female Only:       1/26/2024     1:32 PM   BAR RBS ROS -    Female only Loss of menstrual cycles    Polycystic ovarian syndrome (PCOS)   Stress urinary incontinence No       LABS/IMAGING/MEDICAL RECORDS REVIEW:   Reviewed    BP Readings from Last 6 Encounters:   12/20/23 114/69   12/11/23 120/67   12/05/23 114/66   10/13/23 129/67   09/30/23 112/76   09/22/23 128/80       Pulse Readings from Last 6 Encounters:   12/20/23 65   12/11/23 74   12/05/23 94   10/13/23 66   09/30/23 85   09/22/23 84      PHYSICAL EXAMINATION:  Ht 5' 8\" (1.727 m)   Wt 223 lb (101.2 kg)   LMP 06/16/2023 (Approximate)   BMI 33.91 kg/m    GENERAL: Healthy, alert and no " distress  RESP: No audible wheeze, cough, or visible cyanosis.  No visible retractions or increased work of breathing.    SKIN: Visible skin clear. No significant rash, abnormal pigmentation or lesions.  PSYCH: Mentation appears normal, affect normal/bright, judgement and insight intact    COUNSELING PROVIDED:  We reviewed the important post op bariatric recommendations:  eating 3 meals daily  eating protein first, getting >60gm protein daily  eating slowly, chewing food well  avoiding/limiting calorie containing beverages  avoiding fluids 30 minutes before, during, and after meals  limiting restaurant or cafeteria eating to twice a week or less  Pt reminded to avoid marginal ulcers she should avoid tobacco at all, alcohol in excess, caffeine, and NSAIDS (unless indicated for cardioprotection or otherwise and opposed by a PPI).  Pt encouraged to establish and maintain a consistent physical activity routine, 6-8 hours of restorative sleep each night and optimal stress management.  Pt counseled on the importance of life long vitamin supplementation and life long follow up.

## 2024-02-02 ENCOUNTER — VIRTUAL VISIT (OUTPATIENT)
Dept: SURGERY | Facility: CLINIC | Age: 44
End: 2024-02-02
Payer: COMMERCIAL

## 2024-02-02 VITALS — WEIGHT: 223 LBS | BODY MASS INDEX: 33.8 KG/M2 | HEIGHT: 68 IN

## 2024-02-02 DIAGNOSIS — Z98.84 BARIATRIC SURGERY STATUS: ICD-10-CM

## 2024-02-02 DIAGNOSIS — R63.2 HYPERPHAGIA: ICD-10-CM

## 2024-02-02 DIAGNOSIS — E66.811 CLASS 1 OBESITY DUE TO EXCESS CALORIES WITH SERIOUS COMORBIDITY AND BODY MASS INDEX (BMI) OF 33.0 TO 33.9 IN ADULT: Primary | ICD-10-CM

## 2024-02-02 DIAGNOSIS — E66.09 CLASS 1 OBESITY DUE TO EXCESS CALORIES WITH SERIOUS COMORBIDITY AND BODY MASS INDEX (BMI) OF 33.0 TO 33.9 IN ADULT: Primary | ICD-10-CM

## 2024-02-02 PROCEDURE — 99214 OFFICE O/P EST MOD 30 MIN: CPT | Mod: 95 | Performed by: PHYSICIAN ASSISTANT

## 2024-02-02 RX ORDER — PHENTERMINE AND TOPIRAMATE 7.5; 46 MG/1; MG/1
1 CAPSULE, EXTENDED RELEASE ORAL DAILY
Qty: 90 CAPSULE | Refills: 1 | Status: SHIPPED | OUTPATIENT
Start: 2024-02-02 | End: 2024-02-06

## 2024-02-02 NOTE — PATIENT INSTRUCTIONS
"To ensure quality you may receive a patient satisfaction survey. The greatest compliment you can give is \"Likely to Recommend.\"    Nice to talk with you today.  Thank you for your trust. Below is the plan discussed.-  HELLEN Dunbar      Plan:    Start Qsymia 7.5/46 mg 1 capsule in the morning.     Please get blood pressure/pulse checked in 2 weeks.  Send result through [employer].   Can get BP/Pulse checked at [facility] [email or web address], at a [facility] Primary care office (nurse visit) [phone], or with a home machine.     Please call [phone] to schedule with medical therapy management with a wt management, MTM pharmacist in 6-8 wks.       FOLLOW-UP:    Please call [phone] to schedule your next visit in June for your annual visit.     QSYMIA (phentermine and topiramate)    We are considering starting Qsymia. This is a specific obesity medication and is a combination of Phentermine and Topiramate, formulated as a sustained release, taken one time a day. There are a few different doses of the medication. Doses come in 3.75/23 mg (usually skipped), 7.5/46 mg, 11.25/69 mg, and 15/92 mg.       For some of our patients, the pills work right away. They feel and think quite differently about food. Other patients don't feel much of a change but find in fact they have lost weight! Like all weight loss medications, Qsymia works best when you help it work.  This means:   1) Have less tempting high calorie (fattening) food around the house or office    2) Have lower calorie food (fruits, vegetables,low fat meats and dairy) for snacks    3) Eat out only one time or less each week.   4) Eat your meals at a table with the TV or computer off.    Side-effects (generally well tolerated because it is a sustained release medication)    Topiramate:   -Tingling in hands,feet, or face (usually not very troublesome)   -Mental confusion and word finding trouble (about 10% of patients have this.) "     -Feeling sleepy or a bit dopey- this goes away very soon after starting.      Phentermine:    -Feelings of racing pulse or rapid heart beat.    -Increased anxiety.   -Some people can get an elevated blood pressure. Because of this we may have  you come back within a week or so of starting the medication for a blood pressure check.

## 2024-02-02 NOTE — ASSESSMENT & PLAN NOTE
We discussed healthy habits to assist with weight loss. We discussed medication that may assist with weight loss. Qsymia was prescribed. Risks/ benefits and possible side effects were discussed and questions were answered. Written information was given.  She will also continue her metformin 2000 mg daily. Patient will check blood pressure/pulse 2 weeks days after starting and send result through [employer].

## 2024-02-28 ENCOUNTER — TELEPHONE (OUTPATIENT)
Dept: SURGERY | Facility: CLINIC | Age: 44
End: 2024-02-28
Payer: COMMERCIAL

## 2024-02-28 DIAGNOSIS — K91.2 POSTSURGICAL MALABSORPTION: ICD-10-CM

## 2024-02-28 DIAGNOSIS — Z98.84 BARIATRIC SURGERY STATUS: Primary | ICD-10-CM

## 2024-02-28 NOTE — TELEPHONE ENCOUNTER
Reason for Call: Request for an order or referral:    Order or referral being requested: Labs for annual visit    Date needed: at your convenience    Has the patient been seen by the PCP for this problem? Not Applicable    Additional comments: n/a    Phone number Patient can be reached at:  Cell number on file:    Telephone Information:   Mobile [phone]       Best Time:  any    Can we leave a detailed message on this number?  YES    Call taken on 2/28/2024 at 4:06 PM by Oly Cota

## 2024-04-05 ENCOUNTER — MYC MEDICAL ADVICE (OUTPATIENT)
Dept: SURGERY | Facility: CLINIC | Age: 44
End: 2024-04-05
Payer: COMMERCIAL

## 2024-04-05 DIAGNOSIS — E66.09 CLASS 1 OBESITY DUE TO EXCESS CALORIES WITH SERIOUS COMORBIDITY AND BODY MASS INDEX (BMI) OF 33.0 TO 33.9 IN ADULT: Primary | ICD-10-CM

## 2024-04-05 DIAGNOSIS — E66.811 CLASS 1 OBESITY DUE TO EXCESS CALORIES WITH SERIOUS COMORBIDITY AND BODY MASS INDEX (BMI) OF 33.0 TO 33.9 IN ADULT: Primary | ICD-10-CM

## 2024-04-08 ENCOUNTER — TELEPHONE (OUTPATIENT)
Dept: SURGERY | Facility: CLINIC | Age: 44
End: 2024-04-08
Payer: COMMERCIAL

## 2024-04-08 RX ORDER — SEMAGLUTIDE 1 MG/.5ML
1 INJECTION, SOLUTION SUBCUTANEOUS WEEKLY
Qty: 2 ML | Refills: 0 | Status: SHIPPED | OUTPATIENT
Start: 2024-04-08

## 2024-04-08 RX ORDER — SEMAGLUTIDE 0.25 MG/.5ML
0.25 INJECTION, SOLUTION SUBCUTANEOUS WEEKLY
Qty: 2 ML | Refills: 0 | Status: SHIPPED | OUTPATIENT
Start: 2024-04-08 | End: 2024-06-11

## 2024-04-08 NOTE — TELEPHONE ENCOUNTER
See above    BP Readings from Last 6 Encounters:  12/20/23 : 114/69  12/11/23 : 120/67  12/05/23 : 114/66  10/13/23 : 129/67  09/30/23 : 112/76  09/22/23 : 128/80    Pulse Readings from Last 6 Encounters:  12/20/23 : 65  12/11/23 : 74  12/05/23 : 94  10/13/23 : 66  09/30/23 : 85  09/22/23 : 84

## 2024-04-08 NOTE — TELEPHONE ENCOUNTER
Pt answered no to the contraindications and yes to heartburn -takes meds for heartburn and that is effective.      Could you send it to [facility] in [place]. [address] [place] [address].    Plz advise re: rx'g the Wegovy.  Randee Cobian, MS, RD, RN

## 2024-04-08 NOTE — TELEPHONE ENCOUNTER
"Confirm pt does not have: H/O pf Medullary Thyroid Ca (incl Fhx), MEN type II, or pancreatitis  Any problems with heartburn, reflux, constipation            WEGOVY (semaglutide)      Wegovy (semaglutide) injection 2.4 mg is an injectable prescription medicine used for adults with obesity (BMI ?30) or overweight (excess weight) (BMI ?27) who also have weight-related medical problems to help them lose weight and keep the weight off.  It is a GLP-1 agonist medication. GLP1 agonists stimulate the hormone GLP-1 in your body that allows you to feel full quickly and stay full longer.    Due to the shortage, You may need to be persistent and patient to get these initial dosages due to the shortage.  Once you are able to obtain the 0.25 and 0.5 mg dose \"8 weeks of therapy\" you can begin treatment.     Directions:  Start Wegovy (semaglutide) 0.25 mg once weekly for 4 weeks, then if tolerating increase to 0.5 mg weekly for 4 weeks, then if tolerating increase to 1 mg weekly for 4 weeks, then if tolerating increase to 1.7 mg weekly for 4 weeks, then if tolerating increase to 2.4 mg weekly thereafter.      -Each Wegovy pen is a once weekly single-dose prefilled pen with a pen injector already built within the pen. Discard the Wegovy pen after use in sharps container.     Common Side Effects:    nausea, headache, diarrhea, stomach upset.  If these become unmanageable call or mychart.    Serious Side effects:   Pancreatitis (inflammation of the pancreas) has been associated with this type of medication, but is very rare.Symptoms of pancreatitis include: Pain in your upper stomach area which may travel to your back and be worse after eating.     Storage:   Store the prescription in the refrigerator. Once it is time to use the Wegovy pen, you can keep the pen at room temperature and it is good for up to 28 days at room temperature.     How to inject:  For a video on how to use the pen please go to:  " [email or web address]://[email or web address]/about[employer]/how-to-use-the-wegovy-pen.html#itemTwo       For any questions or concerns please send a [employer] message to our team or call  [phone].  For emergencies please 911 or seek immediate medical care.

## 2024-04-08 NOTE — TELEPHONE ENCOUNTER
Pt last seen 2/2/24 and next 6/11/24.  H/O sleeve 6/16/22.  Don't see any AOMs.  Plz advise.  John Cobian, MS, RD, RN

## 2024-04-08 NOTE — TELEPHONE ENCOUNTER
Please let her know about prior auth process.  I have sent the first 3 doses.      Please let pt know about most common SE of constipation/nausea/heartburn and how to decrease chance of these.

## 2024-04-08 NOTE — TELEPHONE ENCOUNTER
"Prior Authorization Retail Medication Request    Medication/Dose:    Disp Refills Start End JAKE   Semaglutide-Weight Management (WEGOVY) 0.5 MG/0.5ML pen 2 mL 0 4/8/2024 -- --   Sig - Route: Inject 0.5 mg Subcutaneous once a week        Disp Refills Start End JAKE   Semaglutide-Weight Management (WEGOVY) 1 MG/0.5ML pen 2 mL 0 4/8/2024 -- No   Sig - Route: Inject 1 mg Subcutaneous once a week      Disp Refills Start End JAKE   Semaglutide-Weight Management (WEGOVY) 0.25 MG/0.5ML pen 2 mL 0 4/8/2024 -- --   Sig - Route: Inject 0.25 mg Subcutaneous once a week     ICD code (if different than what is on RX):  E66.09; Z68.33  Previously Tried and Failed:  naltrexone very sleepy. Metformin, Qsymia - still hungry - gaining weight wit tamoxifen.   Gastric bypass 6/16/22.  Rationale:  Weight gain with CA treatment for breast CA    Hx of obesity  Ht 5'8\"  Body mass index is 43.2 kg/m .   Current Weight:  284 lb (129.1 kg)       Insurance Name:  [facility]   Insurance ID:  [id number]       Pharmacy Information (if different than what is on RX)  Name:  [facility] - [place], [place] - [address] [place] [address]   Phone:  [phone]     "

## 2024-04-15 ENCOUNTER — LAB (OUTPATIENT)
Dept: LAB | Facility: CLINIC | Age: 44
End: 2024-04-15
Payer: COMMERCIAL

## 2024-04-15 DIAGNOSIS — C50.911 MALIGNANT NEOPLASM OF RIGHT BREAST IN FEMALE, ESTROGEN RECEPTOR POSITIVE, UNSPECIFIED SITE OF BREAST (H): ICD-10-CM

## 2024-04-15 DIAGNOSIS — Z17.0 MALIGNANT NEOPLASM OF RIGHT BREAST IN FEMALE, ESTROGEN RECEPTOR POSITIVE, UNSPECIFIED SITE OF BREAST (H): ICD-10-CM

## 2024-04-15 LAB
BASOPHILS # BLD AUTO: 0 10E3/UL (ref 0–0.2)
BASOPHILS NFR BLD AUTO: 0 %
EOSINOPHIL # BLD AUTO: 0.1 10E3/UL (ref 0–0.7)
EOSINOPHIL NFR BLD AUTO: 2 %
ERYTHROCYTE [DISTWIDTH] IN BLOOD BY AUTOMATED COUNT: 14.2 % (ref 10–15)
HCT VFR BLD AUTO: 40.1 % (ref 35–47)
HGB BLD-MCNC: 12.8 G/DL (ref 11.7–15.7)
IMM GRANULOCYTES # BLD: 0 10E3/UL
IMM GRANULOCYTES NFR BLD: 0 %
LYMPHOCYTES # BLD AUTO: 0.4 10E3/UL (ref 0.8–5.3)
LYMPHOCYTES NFR BLD AUTO: 13 %
MCH RBC QN AUTO: 27.3 PG (ref 26.5–33)
MCHC RBC AUTO-ENTMCNC: 31.9 G/DL (ref 31.5–36.5)
MCV RBC AUTO: 86 FL (ref 78–100)
MONOCYTES # BLD AUTO: 0.2 10E3/UL (ref 0–1.3)
MONOCYTES NFR BLD AUTO: 5 %
NEUTROPHILS # BLD AUTO: 2.6 10E3/UL (ref 1.6–8.3)
NEUTROPHILS NFR BLD AUTO: 80 %
PLATELET # BLD AUTO: 123 10E3/UL (ref 150–450)
RBC # BLD AUTO: 4.69 10E6/UL (ref 3.8–5.2)
WBC # BLD AUTO: 3.3 10E3/UL (ref 4–11)

## 2024-04-15 PROCEDURE — 80053 COMPREHEN METABOLIC PANEL: CPT

## 2024-04-15 PROCEDURE — 85025 COMPLETE CBC W/AUTO DIFF WBC: CPT

## 2024-04-15 PROCEDURE — 36415 COLL VENOUS BLD VENIPUNCTURE: CPT

## 2024-04-16 LAB
ALBUMIN SERPL BCG-MCNC: 4.3 G/DL (ref 3.5–5.2)
ALP SERPL-CCNC: 121 U/L (ref 40–150)
ALT SERPL W P-5'-P-CCNC: 10 U/L (ref 0–50)
ANION GAP SERPL CALCULATED.3IONS-SCNC: 11 MMOL/L (ref 7–15)
AST SERPL W P-5'-P-CCNC: 21 U/L (ref 0–45)
BILIRUB SERPL-MCNC: 1.4 MG/DL
BUN SERPL-MCNC: 11.3 MG/DL (ref 6–20)
CALCIUM SERPL-MCNC: 9.8 MG/DL (ref 8.6–10)
CHLORIDE SERPL-SCNC: 100 MMOL/L (ref 98–107)
CREAT SERPL-MCNC: 0.66 MG/DL (ref 0.51–0.95)
DEPRECATED HCO3 PLAS-SCNC: 27 MMOL/L (ref 22–29)
EGFRCR SERPLBLD CKD-EPI 2021: >90 ML/MIN/1.73M2
GLUCOSE SERPL-MCNC: 114 MG/DL (ref 70–99)
POTASSIUM SERPL-SCNC: 3.9 MMOL/L (ref 3.4–5.3)
PROT SERPL-MCNC: 7.5 G/DL (ref 6.4–8.3)
SODIUM SERPL-SCNC: 138 MMOL/L (ref 135–145)

## 2024-04-18 ENCOUNTER — VIRTUAL VISIT (OUTPATIENT)
Dept: ONCOLOGY | Facility: CLINIC | Age: 44
End: 2024-04-18
Attending: INTERNAL MEDICINE
Payer: COMMERCIAL

## 2024-04-18 VITALS — WEIGHT: 220 LBS | BODY MASS INDEX: 34.53 KG/M2 | HEIGHT: 67 IN

## 2024-04-18 DIAGNOSIS — C50.911 MALIGNANT NEOPLASM OF RIGHT BREAST IN FEMALE, ESTROGEN RECEPTOR POSITIVE, UNSPECIFIED SITE OF BREAST (H): Primary | ICD-10-CM

## 2024-04-18 DIAGNOSIS — Z17.0 MALIGNANT NEOPLASM OF RIGHT BREAST IN FEMALE, ESTROGEN RECEPTOR POSITIVE, UNSPECIFIED SITE OF BREAST (H): Primary | ICD-10-CM

## 2024-04-18 PROCEDURE — 99214 OFFICE O/P EST MOD 30 MIN: CPT | Mod: 95 | Performed by: INTERNAL MEDICINE

## 2024-04-18 ASSESSMENT — PAIN SCALES - GENERAL: PAINLEVEL: NO PAIN (0)

## 2024-04-18 NOTE — LETTER
4/18/2024         RE: Luda Rai  [address]  [place] [address]        Dear Colleague,    Thank you for referring your patient, Luda Rai, to the [facility]. Please see a copy of my visit note below.    Virtual Visit Details    Type of service:  Video Visit         Luda is a 44-year-old patient who is here today for interim follow-up.      She has been evaluated today by video visit.  Video platform [facility]  Physician location Home  Physician location [facility].     CHIEF COMPLAINT:     1.  Invasive ductal carcinoma of the right breast at the 11 o'clock position T2 N0 M0, ER/IN positive, HER-2 receptor negative.  2.  Status post lumpectomy.  3. Taxotere / Cytoxan adjuvant  4  adjuvant radiation finished DEC23   5  On tamoxifen             HISTORY OF PRESENTING COMPLAINT:     The patient is a 44-year-old premenopausal patient who went for a screening mammogram in 04/2023 because she was having discomfort in the right breast. A suspicious lesion was seen at the 11 o'clock position of the breast and a biopsy revealed an invasive ductal carcinoma, grade II, ER/IN positive, HER-2 receptor negative. I saw her in initial consultation on 05/30/2023. She then went on to have a right-sided lumpectomy with Dr. Gore   The pathology  showed invasive ductal carcinoma, grade II in the upper outer quadrant of the right breast at the 11 o'clock position.  The size of the tumor was 2.4 cm.  It was a single focus associated with DCIS.  All the margins were negative, and she had 3 sentinel lymph nodes, which were negative.  Stage of disease is a T2 N0, ER/IN positive, HER-2 receptor negative tumor.     The tumor then went out for Oncotype testing and came back in the intermediate risk range with a score of 21.      She then went on to have chemotherapy with 4 cycles of Taxotere Cytoxan and then finished up adjuvant radiation therapy in December 2023.    Now she  is on active treatment with adjuvant tamoxifen.    Tamoxifen is going well but she does complain today of fatigue from it.  This is a common side effect.  It may get better over time.    From my standpoint she will need a mammogram and ultrasound done in June 2024 which will be about 6 months after the end of her radiation therapy and I have written her mammogram to be diagnostic.    She had lab work done on 4/15/2024 which I reviewed today and her white cell count was a little low at 3.3 hemoglobin 12.8 and platelets 123.    I am going to repeat those again in about a month.    Tamoxifen can cause mild thrombocytopenia.      Apart from fatigue she is good no concerning symptoms at this time  12 point comprehensive review of systems otherwise unremarkable      Medications and allergies are outlined in the nursing records      Social history:    Patient has 1 child.  She is premenopausal and works as an  she is a non-smoker      Physical exam:    Well-appearing lady no acute distress  Eyes with no redness or discharge  Respiratory exam no cough or labored breathing  Patient is alert and orientated x 3  Musculoskeletal exam is moving all extremities  Psychiatric exam normal  Skin is no rashes or lesions  The rest of her comprehensive physical exam is deferred today because this is a video visit with video visit restrictions.      Data review    I have reviewed lab work and ordered more lab work on her.    I have ordered a diagnostic mammogram and ultrasound.      Impression and plan:    This a 44-year-old premenopausal patient who was diagnosed with a invasive ductal carcinoma at 11 o'clock position of the right breast ER/RI positive.  She had an Oncotype score of 21 and underwent adjuvant Taxotere Cytoxan for 4 cycles.  She then underwent adjuvant radiation therapy and is cannot currently on active treatment with adjuvant tamoxifen.  We will keep an eye on her blood counts as tamoxifen can cause  thrombocytopenia.  She will be due for diagnostic mammogram and ultrasound in June.  Overall I am happy with her progress to date  She will be seen back in 6 months.      Adolfo Zeng MD           Again, thank you for allowing me to participate in the care of your patient.        Sincerely,        Adolfo Zeng MD

## 2024-04-18 NOTE — LETTER
4/18/2024         RE: Luda Rai  [address]  [facility] [address]        Dear Colleague,    Thank you for referring your patient, Luda Rai, to the [facility]. Please see a copy of my visit note below.    Virtual Visit Details    Type of service:  Video Visit         Luda is a 44-year-old patient who is here today for interim follow-up.      She has been evaluated today by video visit.  Video platform [facility]  Physician location Home  Physician location [facility].     CHIEF COMPLAINT:     1.  Invasive ductal carcinoma of the right breast at the 11 o'clock position T2 N0 M0, ER/ID positive, HER-2 receptor negative.  2.  Status post lumpectomy.  3. Taxotere / Cytoxan adjuvant  4  adjuvant radiation finished DEC23   5  On tamoxifen             HISTORY OF PRESENTING COMPLAINT:     The patient is a 44-year-old premenopausal patient who went for a screening mammogram in 04/2023 because she was having discomfort in the right breast. A suspicious lesion was seen at the 11 o'clock position of the breast and a biopsy revealed an invasive ductal carcinoma, grade II, ER/ID positive, HER-2 receptor negative. I saw her in initial consultation on 05/30/2023. She then went on to have a right-sided lumpectomy with Dr. Gore   The pathology  showed invasive ductal carcinoma, grade II in the upper outer quadrant of the right breast at the 11 o'clock position.  The size of the tumor was 2.4 cm.  It was a single focus associated with DCIS.  All the margins were negative, and she had 3 sentinel lymph nodes, which were negative.  Stage of disease is a T2 N0, ER/ID positive, HER-2 receptor negative tumor.     The tumor then went out for Oncotype testing and came back in the intermediate risk range with a score of 21.      She then went on to have chemotherapy with 4 cycles of Taxotere Cytoxan and then finished up adjuvant radiation therapy in December 2023.    Now she  is on active treatment with adjuvant tamoxifen.    Tamoxifen is going well but she does complain today of fatigue from it.  This is a common side effect.  It may get better over time.    From my standpoint she will need a mammogram and ultrasound done in June 2024 which will be about 6 months after the end of her radiation therapy and I have written her mammogram to be diagnostic.    She had lab work done on 4/15/2024 which I reviewed today and her white cell count was a little low at 3.3 hemoglobin 12.8 and platelets 123.    I am going to repeat those again in about a month.    Tamoxifen can cause mild thrombocytopenia.      Apart from fatigue she is good no concerning symptoms at this time  12 point comprehensive review of systems otherwise unremarkable      Medications and allergies are outlined in the nursing records      Social history:    Patient has 1 child.  She is premenopausal and works as an  she is a non-smoker      Physical exam:    Well-appearing lady no acute distress  Eyes with no redness or discharge  Respiratory exam no cough or labored breathing  Patient is alert and orientated x 3  Musculoskeletal exam is moving all extremities  Psychiatric exam normal  Skin is no rashes or lesions  The rest of her comprehensive physical exam is deferred today because this is a video visit with video visit restrictions.      Data review    I have reviewed lab work and ordered more lab work on her.    I have ordered a diagnostic mammogram and ultrasound.      Impression and plan:    This a 44-year-old premenopausal patient who was diagnosed with a invasive ductal carcinoma at 11 o'clock position of the right breast ER/MO positive.  She had an Oncotype score of 21 and underwent adjuvant Taxotere Cytoxan for 4 cycles.  She then underwent adjuvant radiation therapy and is cannot currently on active treatment with adjuvant tamoxifen.  We will keep an eye on her blood counts as tamoxifen can cause  thrombocytopenia.  She will be due for diagnostic mammogram and ultrasound in June.  Overall I am happy with her progress to date  She will be seen back in 6 months.      Adolfo Zeng MD           Again, thank you for allowing me to participate in the care of your patient.        Sincerely,        Adolfo Zeng MD

## 2024-04-18 NOTE — NURSING NOTE
Is the patient currently in the state of [place]? YES    Visit mode:VIDEO    If the visit is dropped, the patient can be reconnected by: VIDEO VISIT: Text to cell phone:   Telephone Information:   Mobile [phone]       Will anyone else be joining the visit? NO  (If patient encounters technical issues they should call [phone]    How would you like to obtain your AVS? MyChart    Are changes needed to the allergy or medication list? Pt stated no changes to allergies and Pt stated no med changes    Are refills needed on medications prescribed by this physician? NO    Reason for visit: RECHECK    Yuliana COOPER

## 2024-04-19 NOTE — TELEPHONE ENCOUNTER
Retail Pharmacy Prior Authorization Team   Phone: [phone]    PA Initiation    Medication: WEGOVY 0.25 MG/0.5ML SC SOAJ  Insurance Company: [employer] ([facility]) - Phone [phone] Fax [phone]  Pharmacy Filling the Rx: [facility] - [place], [place] - [address]  Filling Pharmacy Phone: [phone]  Filling Pharmacy Fax:    Start Date: 4/19/2024      Note: Due to record-high volumes, our turn-around time is taking longer than usual . We are currently 10 business days behind in the pools.   We are working diligently to submit all requests in a timely manner and in the order they are received. Please only flag TRUE URGENT requests as high priority to the pool at this time.   If you have questions - please send a note/message in the active PA encounter and send back to the [facility] PA pool [[phone]].    If you have more specific questions about our process please reach out to our supervisor Farnaz Duarte.   Thank you!   RPPA (Retail Pharmacy Prior Authorization) team

## 2024-04-22 NOTE — TELEPHONE ENCOUNTER
Prior Authorization Approval    Medication: WEGOVY 0.25 MG/0.5ML SC SOAJ  Authorization Effective Date: 4/19/2024  Authorization Expiration Date: 8/19/2024  Approved Dose/Quantity:   Reference #:     Insurance Company: [employer] ([facility]) - Phone [phone] Fax [phone]  Expected CoPay: $    CoPay Card Available:      Financial Assistance Needed:   Which Pharmacy is filling the prescription: [facility] - [place], [place] - [address] [place] [address]  Pharmacy Notified: Yes  Patient Notified: **Instructed pharmacy to notify patient when script is ready to /ship.**

## 2024-04-29 NOTE — PROGRESS NOTES
Luda is a 44-year-old patient who is here today for interim follow-up.      She has been evaluated today by video visit.  Video platform [facility]  Physician location Home  Physician location [facility].     CHIEF COMPLAINT:     1.  Invasive ductal carcinoma of the right breast at the 11 o'clock position T2 N0 M0, ER/SC positive, HER-2 receptor negative.  2.  Status post lumpectomy.  3. Taxotere / Cytoxan adjuvant  4  adjuvant radiation finished DEC23   5  On tamoxifen             HISTORY OF PRESENTING COMPLAINT:     The patient is a 44-year-old premenopausal patient who went for a screening mammogram in 04/2023 because she was having discomfort in the right breast. A suspicious lesion was seen at the 11 o'clock position of the breast and a biopsy revealed an invasive ductal carcinoma, grade II, ER/SC positive, HER-2 receptor negative. I saw her in initial consultation on 05/30/2023. She then went on to have a right-sided lumpectomy with Dr. Gore   The pathology  showed invasive ductal carcinoma, grade II in the upper outer quadrant of the right breast at the 11 o'clock position.  The size of the tumor was 2.4 cm.  It was a single focus associated with DCIS.  All the margins were negative, and she had 3 sentinel lymph nodes, which were negative.  Stage of disease is a T2 N0, ER/SC positive, HER-2 receptor negative tumor.     The tumor then went out for Oncotype testing and came back in the intermediate risk range with a score of 21.      She then went on to have chemotherapy with 4 cycles of Taxotere Cytoxan and then finished up adjuvant radiation therapy in December 2023.    Now she is on active treatment with adjuvant tamoxifen.    Tamoxifen is going well but she does complain today of fatigue from it.  This is a common side effect.  It may get better over time.    From my standpoint she will need a mammogram and ultrasound done in June 2024 which will be about 6 months after the end of her radiation  therapy and I have written her mammogram to be diagnostic.    She had lab work done on 4/15/2024 which I reviewed today and her white cell count was a little low at 3.3 hemoglobin 12.8 and platelets 123.    I am going to repeat those again in about a month.    Tamoxifen can cause mild thrombocytopenia.      Apart from fatigue she is good no concerning symptoms at this time  12 point comprehensive review of systems otherwise unremarkable      Medications and allergies are outlined in the nursing records      Social history:    Patient has 1 child.  She is premenopausal and works as an  she is a non-smoker      Physical exam:    Well-appearing lady no acute distress  Eyes with no redness or discharge  Respiratory exam no cough or labored breathing  Patient is alert and orientated x 3  Musculoskeletal exam is moving all extremities  Psychiatric exam normal  Skin is no rashes or lesions  The rest of her comprehensive physical exam is deferred today because this is a video visit with video visit restrictions.      Data review    I have reviewed lab work and ordered more lab work on her.    I have ordered a diagnostic mammogram and ultrasound.      Impression and plan:    This a 44-year-old premenopausal patient who was diagnosed with a invasive ductal carcinoma at 11 o'clock position of the right breast ER/TX positive.  She had an Oncotype score of 21 and underwent adjuvant Taxotere Cytoxan for 4 cycles.  She then underwent adjuvant radiation therapy and is cannot currently on active treatment with adjuvant tamoxifen.  We will keep an eye on her blood counts as tamoxifen can cause thrombocytopenia.  She will be due for diagnostic mammogram and ultrasound in June.  Overall I am happy with her progress to date  She will be seen back in 6 months.      Adolfo Zeng MD

## 2024-05-09 ENCOUNTER — LAB (OUTPATIENT)
Dept: LAB | Facility: CLINIC | Age: 44
End: 2024-05-09
Payer: COMMERCIAL

## 2024-05-09 DIAGNOSIS — C50.911 MALIGNANT NEOPLASM OF RIGHT BREAST IN FEMALE, ESTROGEN RECEPTOR POSITIVE, UNSPECIFIED SITE OF BREAST (H): ICD-10-CM

## 2024-05-09 DIAGNOSIS — Z17.0 MALIGNANT NEOPLASM OF RIGHT BREAST IN FEMALE, ESTROGEN RECEPTOR POSITIVE, UNSPECIFIED SITE OF BREAST (H): ICD-10-CM

## 2024-05-09 LAB
BASOPHILS # BLD AUTO: 0 10E3/UL (ref 0–0.2)
BASOPHILS NFR BLD AUTO: 0 %
EOSINOPHIL # BLD AUTO: 0.2 10E3/UL (ref 0–0.7)
EOSINOPHIL NFR BLD AUTO: 3 %
ERYTHROCYTE [DISTWIDTH] IN BLOOD BY AUTOMATED COUNT: 13.9 % (ref 10–15)
HCT VFR BLD AUTO: 37.6 % (ref 35–47)
HGB BLD-MCNC: 12 G/DL (ref 11.7–15.7)
IMM GRANULOCYTES # BLD: 0 10E3/UL
IMM GRANULOCYTES NFR BLD: 0 %
LYMPHOCYTES # BLD AUTO: 1.2 10E3/UL (ref 0.8–5.3)
LYMPHOCYTES NFR BLD AUTO: 24 %
MCH RBC QN AUTO: 27.3 PG (ref 26.5–33)
MCHC RBC AUTO-ENTMCNC: 31.9 G/DL (ref 31.5–36.5)
MCV RBC AUTO: 86 FL (ref 78–100)
MONOCYTES # BLD AUTO: 0.3 10E3/UL (ref 0–1.3)
MONOCYTES NFR BLD AUTO: 6 %
NEUTROPHILS # BLD AUTO: 3.4 10E3/UL (ref 1.6–8.3)
NEUTROPHILS NFR BLD AUTO: 67 %
PLATELET # BLD AUTO: 153 10E3/UL (ref 150–450)
RBC # BLD AUTO: 4.4 10E6/UL (ref 3.8–5.2)
WBC # BLD AUTO: 5 10E3/UL (ref 4–11)

## 2024-05-09 PROCEDURE — 36415 COLL VENOUS BLD VENIPUNCTURE: CPT

## 2024-05-09 PROCEDURE — 85025 COMPLETE CBC W/AUTO DIFF WBC: CPT

## 2024-05-21 ENCOUNTER — HOSPITAL ENCOUNTER (OUTPATIENT)
Dept: MAMMOGRAPHY | Facility: CLINIC | Age: 44
Discharge: HOME | End: 2024-05-21
Attending: INTERNAL MEDICINE
Payer: COMMERCIAL

## 2024-05-21 DIAGNOSIS — C50.911 MALIGNANT NEOPLASM OF RIGHT BREAST IN FEMALE, ESTROGEN RECEPTOR POSITIVE, UNSPECIFIED SITE OF BREAST (H): ICD-10-CM

## 2024-05-21 DIAGNOSIS — Z17.0 MALIGNANT NEOPLASM OF RIGHT BREAST IN FEMALE, ESTROGEN RECEPTOR POSITIVE, UNSPECIFIED SITE OF BREAST (H): ICD-10-CM

## 2024-05-21 PROCEDURE — 77062 BREAST TOMOSYNTHESIS BI: CPT

## 2024-05-21 PROCEDURE — 76642 ULTRASOUND BREAST LIMITED: CPT | Mod: RT

## 2024-05-28 NOTE — PROGRESS NOTES
"Luda is a 44 year old who is being evaluated via a billable video visit.      The patient has been notified of following:     \"This video visit will be conducted via a call between you and your physician/provider. We have found that certain health care needs can be provided without the need for an in-person physical exam.  This service lets us provide the care you need with a video conversation.  If a prescription is necessary we can send it directly to your pharmacy.  If lab work is needed we can place an order for that and you can then stop by our lab to have the test done at a later time.    Video visits are billed at different rates depending on your insurance coverage.  Please reach out to your insurance provider with any questions.    If during the course of the call the physician/provider feels a video visit is not appropriate, you will not be charged for this service.\"    Patient has given verbal consent for Video visit? Yes    How would you like to obtain your AVS? MyChart    If the video visit is dropped, the invitation should be resent by:MY CHART    Will anyone else be joining your video visit? No    I    Video-Visit Details    Type of service:  Video Visit    Originating Location (pt. Location): Home    Distant Location (provider location):   Off-Site - Provider Home Office    Platform used for Video Visit: [employer]    Video Start Time: 10:38 AM    Video End Time:10:58 AM    Return Bariatric Surgery Note    RE: Luda Rai  MR#: [id number]  : 1980  VISIT DATE: 2024    Dear Mulu Montalvo,    I had the pleasure of seeing your patient, Luda Rai, in my post-bariatric surgery assessment clinic.    Assessment & Plan   Problem List Items Addressed This Visit       Vitamin B12 deficiency    Relevant Medications    cyanocobalamin (CYANOCOBALAMIN) 1000 MCG/ML injection    syringe/needle, disp, 25G X 1\" 3 ML MISC    Postsurgical malabsorption     Continue taking recommended post-op " "vitamins.  Labs ordered per protocol.           Relevant Medications    cyanocobalamin (CYANOCOBALAMIN) 1000 MCG/ML injection    syringe/needle, disp, 25G X 1\" 3 ML MISC    Prediabetes     Stable on Wegovy         Severe obesity (BMI 35.0-39.9) with comorbidity (H) - hyperlipidemia, prediabetes - Primary     On Wegovy.  Continue titration up to 2.4 mg.  CMP within normal limits         Relevant Medications    Semaglutide-Weight Management (WEGOVY) 1.7 MG/0.75ML pen    Semaglutide-Weight Management (WEGOVY) 2.4 MG/0.75ML pen    Gastroesophageal reflux disease, unspecified whether esophagitis present     Controlled.  Decrease omeprazole to 20 mg.  Can increase to twice daily if needed         Relevant Medications    omeprazole (PRILOSEC) 20 MG DR capsule        PATIENT INSTRUCTIONS:  Labs ordered. Call [phone] to schedule.  Continue your bariatric vitamins     Increase Wegovy to 1 mg for 4 weeks, then 1.7 mg for 4 weeks if tolerating then 2.4 mg.  Can stay at 1.7 mg longer if needed.    Goals: Add in strength training    FOLLOW-UP:  Call [phone] to schedule next visit November.    22 minutes spent on the date of the encounter doing chart review, history and exam, result review, counseling, developing plan of care, documentation, and further activities as noted        CHIEF COMPLAINT: Post-bariatric surgery follow-up    HISTORY OF PRESENT ILLNESS:      6/4/2024    12:22 PM   Questions Regarding Prior Weight Loss Surgery Reviewed With Patient   I had the following weight loss procedure Drea-en-y Gastric Bypass   What year was your surgery? 2022   How has your weight changed since your last visit? I have stayed about the same   Do you currently have any of the following Heartburn, acid reflux, or GERD (acid reflux disease)?   Do you have any concerns today? No     Interval Hx: Started Wegovy and is on metformin for wt loss.   Sunday is on 0.5 mg dose.  Has not lost any weight yet.  Can tell a difference.  Does " not obese about food. Drive to eat has gone down.  Trying t0 priotize protein.  Having one protein drink a day.  Eats cottage cheese.  Deli meat/ with cheese.       Weight History:      6/4/2024    12:22 PM   --   What is your highest lifetime weight? 281   What is your lowest weight since surgery? (In pounds) 209     Initial Weight (lbs): 284 lbs  Weight: 230 lb (104.3 kg) (PATIENT REPORTED)  Last Visits Weight: 223 lb (101.2 kg)  Cumulative weight loss (lbs): 54  Weight Loss Percentage: 19.01%    VITAMINS AND MINERALS:  1 Multivitamin with Minerals (HS; BA Ultra Solo) (Reordered and just started) (was on a difference MVI)  500 mg Calcium With Vitamin D TID w/meals   1,000 mcg SL Vitamin B12  MVT sufficient to meet additional Vitamin D, Vitamin B1 and Iron needs  Was taking vit D 5000 international unit(s) in winter mnths.          6/4/2024    12:22 PM   Questions Regarding Co-Morbidities and Health Concerns Reviewed With Patient   Pre-diabetes Improved   Diabetes II Never   High Blood Pressure Never   High cholesterol Improved   Heartburn/Reflux Improved   Sleep apnea Never   PCOS Improved   Back pain Never   Joint pain Improved   Lower leg swelling Never           6/4/2024    12:22 PM   Eating Habits   How many meals do you eat per day? 3   Do you snack between meals? No   How much food are you eating at each meal? 1/2 cup to 1 cup   Are you able to separate your meals and liquids by at least 30 minutes? Yes   Are you able to avoid liquid calories? Yes           6/4/2024    12:22 PM   Exercise Questions Reviewed With Patient   How often do you exercise? 3 to 4 times per week   What is the duration of your exercise (in minutes)? 30 Minutes   What types of exercise do you do? walking   What keeps you from being more active? Pain   Never done strength training.    Social History:      6/4/2024    12:22 PM   --   Are you smoking? No   Are you drinking alcohol? No       Medications:  Current Outpatient Medications  "  Medication Sig Dispense Refill    CALCIUM CITRATE PO Take 500 mg by mouth daily B-L-D      cyanocobalamin (CYANOCOBALAMIN) 1000 MCG/ML injection Inject 1 mL (1,000 mcg) Subcutaneous every 30 days 3 mL 3    metFORMIN (GLUCOPHAGE XR) 500 MG 24 hr tablet Take 4 tablets (2,000 mg) by mouth daily (with dinner) 360 tablet 1    multivitamin w/minerals (THERA-VIT-M) tablet Take 1 tablet by mouth At Bedtime Abel Adv Chew      omeprazole (PRILOSEC) 20 MG DR capsule Take 2 capsules (40 mg) by mouth daily as needed (gerd) 180 capsule 3    Semaglutide-Weight Management (WEGOVY) 1.7 MG/0.75ML pen Inject 1.7 mg Subcutaneous once a week 3 mL 1    Semaglutide-Weight Management (WEGOVY) 2.4 MG/0.75ML pen Inject 2.4 mg Subcutaneous once a week 9 mL 1    syringe/needle, disp, 25G X 1\" 3 ML MISC Use for B-12 injection 3 each 3    tamoxifen (NOLVADEX) 20 MG tablet Take 1 tablet (20 mg) by mouth daily 90 tablet 3    tuberculin-allergy syringes 27G X 1/2\" 1 ML MISC Use for B12 injections. 3 each 3    BIOTIN PO Take 1 capsule by mouth daily (Patient not taking: Reported on 6/11/2024)      Semaglutide-Weight Management (WEGOVY) 1 MG/0.5ML pen Inject 1 mg Subcutaneous once a week (Patient not taking: Reported on 6/11/2024) 2 mL 0     No current facility-administered medications for this visit.         6/4/2024    12:22 PM   --   Do you avoid NSAIDs such as (Ibuprofen, Aleve, Naproxen, Advil)? Yes       ROS:  GI:       6/4/2024    12:22 PM   --   Vomiting No   Diarrhea No   Constipation No   Swallowing trouble No   Abdominal pain No   Heartburn No     Skin:       6/4/2024    12:22 PM   BAR RBS ROS - SKIN   Rash in skin folds No     Psych:       6/4/2024    12:22 PM   --   Depression No   Anxiety No     Female Only:       6/4/2024    12:22 PM   BAR RBS ROS -    Female only Loss of menstrual cycles   Stress urinary incontinence No       LABS/IMAGING/MEDICAL RECORDS REVIEW:   Reviewed    PHYSICAL EXAMINATION:  Ht 5' 7\" (1.702 m)   Wt 230 lb " (104.3 kg)   BMI 36.02 kg/m    GENERAL: Healthy, alert and no distress  RESP: No audible wheeze, cough, or visible cyanosis.  No visible retractions or increased work of breathing.    SKIN: Visible skin clear. No significant rash, abnormal pigmentation or lesions.  PSYCH: Mentation appears normal, affect normal/bright, judgement and insight intact    COUNSELING PROVIDED:  We reviewed the important post op bariatric recommendations:  eating 3 meals daily  eating protein first, getting >60gm protein daily  eating slowly, chewing food well  avoiding/limiting calorie containing beverages  avoiding fluids 30 minutes before, during, and after meals  limiting restaurant or cafeteria eating to twice a week or less  Pt reminded to avoid marginal ulcers she should avoid tobacco at all, alcohol in excess, caffeine, and NSAIDS (unless indicated for cardioprotection or otherwise and opposed by a PPI).  Pt encouraged to establish and maintain a consistent physical activity routine, 6-8 hours of restorative sleep each night and optimal stress management.  Pt counseled on the importance of life long vitamin supplementation and life long follow up.

## 2024-06-10 NOTE — PROGRESS NOTES
"Luda is a 44 year old who is being evaluated via a billable video visit.      The patient has been notified of following:     \"This video visit will be conducted via a call between you and your physician/provider. We have found that certain health care needs can be provided without the need for an in-person physical exam.  This service lets us provide the care you need with a video conversation.  If a prescription is necessary we can send it directly to your pharmacy.  If lab work is needed we can place an order for that and you can then stop by our lab to have the test done at a later time.    Video visits are billed at different rates depending on your insurance coverage.  Please reach out to your insurance provider with any questions.    If during the course of the call the physician/provider feels a video visit is not appropriate, you will not be charged for this service.\"    Patient has given verbal consent for Video visit? Yes    How would you like to obtain your AVS? MyChart    If the video visit is dropped, the invitation should be resent by: Send to e-mail at: [email or web address]    Will anyone else be joining your video visit? No    I    Video-Visit Details    Type of service:  Video Visit    Originating Location (pt. Location): Home    Distant Location (provider location):   Off-Site - Provider Home Office    Platform used for Video Visit: Amaru    Video Start Time: 10:58    Video End Time: 11:16      NUTRITION POST OP APPOINTMENT + MEDICAL WEIGHT LOSS  DATE OF VISIT: Luzmaria 10, 2024    Luda BARTON Cecelia  1980  female  [id number]  44 year old     ASSESSMENT:    REASON FOR VISIT:  Luda is a 44 year old year old female presents today for 2 year PO nutrition follow-up appointment. Patient is accompanied by self.    DIAGNOSIS:  Status post gastric bypass surgery.  Obesity Grade II BMI 35-39.9     ANTHROPOMETRICS:  Initial Weight: 284 lb    Height: 67\"   Current Weight: 230 lb  per pt  BMI: 36.02  " kg/(m^2).    VITAMINS AND MINERALS:   1 Multivitamin with Minerals (HS; BA Ultra Solo with 45 mg iron)  500 mg Calcium With Vitamin D TID w/meals  Plans to start Vitamin b-12 injection monthly     MVT sufficient to meet additional Vitamin D, Vitamin B1 and Iron needs    MEDICATION FOR WEIGHT LOSS:  Wegovy  Metformin      NUTRITION HISTORY:  Breakfast: 2 eggs, sausage or occasional phillips  Lunch: ground turkey, rice, 2 slices avocado  Supper: hamburger/ no bun, cooked veggies, fruit  Snacks: protein drink or a few grapes  Fluids consumed: 64 oz water or enhanced water, protein drink, ETOH-none    Consuming liquid calories: Yes  Protein intake: 65-75 grams/day  Tolerate regular texture food: Yes  Any foods not tolerated details: No  If any food not tolerated: n/a  Portion size: 1 cup  Take 20-30 minutes to consume each meal: Yes   Eat protein foods first: Yes  Fluids and meals separate by at least 30 minutes: Yes  Chew foods thoroughly: Yes  Tolerating diet: Yes  Drinking high protein supplements: Yes  Consuming snacks per day: 0-1  Additional Information: Patient feels obsessing less about food with use of Wegvoy. Lowest post-op weight was 208 lb. Patient is pleased to be maintaining weight for now. Was on steroids for breast cancer treatment for 6 months and is now on Tamoxifen. Denies nausea with use of Wegovy at current dose. Suggested 4-5 small meals if having nausea with higher does of Wegovy.    PHYSICAL ACTIVITY:  Type: walking  Frequency (days per week): 5  Duration (min): 30-40    DIAGNOSIS:  Previous Nutrition Diagnosis: Altered gastrointestinal function related to alteration in gastrointestinal structure as evidenced by history of gastric bypass surgery.- no change    Previous goals:  Replace snacks with milk or protein drinks-met  Add in strength training 2-3x/week-not met  Continue to practice all post-op guidelines- met    Current Nutrition Diagnosis: Altered gastrointestinal function related to  alteration in gastrointestinal structure as evidenced by history of gastric bypass surgery.    INTERVENTION:   Nutrition Prescription: Eat 3 meals a day at regular intervals. Consume 60-90 grams of protein daily. Follow post-surgical vitamin and mineral protocol.  Assessed learning needs and learning preferences. Discussed and will send: Keeping Up Your Diet After Weight Loss surgery.    GOALS:  Eat protein + 2 other food groups at each meal (focus on a fruit and or a vegetable at each meal)  Schedule strength training 2X per week      Implementation: Discussed progress toward previous goals; reinforced importance of following bariatric lifestyle changes.    NUTRITION MONITORING AND EVALUATION:  Anticipated compliance: good  Verbalized good understanding.    Follow up: Patient to follow up in 6 months.    TIME SPENT WITH PATIENT:  17 minutes  Perfecto Cosme RD, LD  [facility]

## 2024-06-11 ENCOUNTER — VIRTUAL VISIT (OUTPATIENT)
Dept: SURGERY | Facility: CLINIC | Age: 44
End: 2024-06-11
Payer: COMMERCIAL

## 2024-06-11 VITALS — WEIGHT: 230 LBS | HEIGHT: 67 IN | BODY MASS INDEX: 36.1 KG/M2

## 2024-06-11 DIAGNOSIS — K91.2 POSTSURGICAL MALABSORPTION: ICD-10-CM

## 2024-06-11 DIAGNOSIS — E66.01 SEVERE OBESITY (BMI 35.0-39.9) WITH COMORBIDITY (H): ICD-10-CM

## 2024-06-11 DIAGNOSIS — R73.03 PREDIABETES: ICD-10-CM

## 2024-06-11 DIAGNOSIS — K21.9 GASTROESOPHAGEAL REFLUX DISEASE, UNSPECIFIED WHETHER ESOPHAGITIS PRESENT: ICD-10-CM

## 2024-06-11 DIAGNOSIS — K91.2 POSTSURGICAL MALABSORPTION: Primary | ICD-10-CM

## 2024-06-11 DIAGNOSIS — Z98.84 BARIATRIC SURGERY STATUS: ICD-10-CM

## 2024-06-11 DIAGNOSIS — E53.8 VITAMIN B12 DEFICIENCY: ICD-10-CM

## 2024-06-11 DIAGNOSIS — E66.01 SEVERE OBESITY (BMI 35.0-39.9) WITH COMORBIDITY (H): Primary | ICD-10-CM

## 2024-06-11 PROCEDURE — 97803 MED NUTRITION INDIV SUBSEQ: CPT | Mod: 95

## 2024-06-11 PROCEDURE — 99214 OFFICE O/P EST MOD 30 MIN: CPT | Mod: 95 | Performed by: PHYSICIAN ASSISTANT

## 2024-06-11 RX ORDER — SEMAGLUTIDE 1.7 MG/.75ML
1.7 INJECTION, SOLUTION SUBCUTANEOUS WEEKLY
Qty: 3 ML | Refills: 1 | Status: SHIPPED | OUTPATIENT
Start: 2024-06-11

## 2024-06-11 RX ORDER — CYANOCOBALAMIN 1000 UG/ML
1 INJECTION, SOLUTION INTRAMUSCULAR; SUBCUTANEOUS
Qty: 3 ML | Refills: 3 | Status: SHIPPED | OUTPATIENT
Start: 2024-06-11

## 2024-06-11 RX ORDER — SEMAGLUTIDE 2.4 MG/.75ML
2.4 INJECTION, SOLUTION SUBCUTANEOUS WEEKLY
Qty: 9 ML | Refills: 1 | Status: SHIPPED | OUTPATIENT
Start: 2024-06-11

## 2024-06-11 NOTE — PATIENT INSTRUCTIONS
Nice to talk with you today.  Below is the plan discussed.-  . Bettina Donovan PA-C    Plan:  Labs ordered. Call [phone] to schedule.  Continue your bariatric vitamins     Increase Wegovy to 1 mg for 4 weeks, then 1.7 mg for 4 weeks if tolerating then 2.4 mg.  Can stay at 1.7 mg longer if needed.    Goals: Add in strength training    FOLLOW-UP:  Call [phone] to schedule next visit November.    Strength Training  Strength training is exercise that involves your own body weight or equipment to build muscle, endurance, and strength. Increasing muscle helps increase your metabolism.     Muscle Conditioning: Helping You Get and Stay Fit  [email or web address]    Muscle Conditionin Exercises    Resistance Training with Free Weights: 3 Exercises    Seated Exercises for Arms and Legs: 11 Exercises    Strength training [employer] workouts  [email or web address]    Yoga with Lamar  [email or web address]  High quality free yoga videos for all abilities.          Bariatric Post Op Guidelines  General:  Follow up annually lifelong.   Obesity is a chronic disease.  Weight gain can be expected. The goal of follow-up visits is to ensure adequate vitamin and protein absorption, evaluate food intake behavior, review exercise/activity level, and assist with weight regain.    To avoid marginal ulcers avoid all forms of tobacco, alcohol in excess, caffeine, and NSAIDS     Exercise is key for weight loss and weight maintenance. Aim for 30-60 minutes of physical activity most days.  Include cardiovascular and strength training.    Continue lifelong vitamins supplementation and annual lab follow up.  All patients should supplement with the following bariatric postoperative vitamins:  2 Complete multivitamins with minerals (at different times than calcium)  Vitamin D 5000 Int Units/125 mg daily   Calcium 600 mg twice daily or 500 mg three times daily   Vitamin B12:  500 mcg sl daily or 1000 mcg Inj monthly  B complex daily or Thiamine 100 mg weekly  1 Iron/Vit C. Daily for females who menstruate and/or as directed    Relay and GI symptoms which can be a sign of complications. Inability to tolerate solid food (chicken, steak, fish) should by need to be evaluated.    There is a 10% increase of Alcohol Use Disorder in patients with bariatric surgery.   Most often occurring around 2 years post op.  Call if you feel alcohol is interfering in your daily life.  We can help.     Nutritional:  Eat 3 meals per day  (No snacks between meals.)  Do not skip meals.  This can cause overeating at the next meal and will prevent adequate protein and nutritional intake.    Aim for 60-80 grams of protein per day.  Always eat your protein first. This assists with optimal nutrition and helps you stay full longer.    Eat your protein first, and then follow with fiber.    Add fiber by including fruits, vegetables, whole grains, and beans.     Portions should be 1 cup per meal.   Continue to use saucer/salad plates, infant/toddler silverware to keep portion sizes small and take small bites.  Make each meal last 20-30 minutes. Always stop eating when satisfied.    Aim for 64 oz. of calorie-free fluids daily.    Avoid drinking 30 min before, during, and 30 min after meal    Avoid high sugar and high fat foods to prevent high calorie intake.   Check nutrition labels for less than 10 grams of sugar and less than 10 grams of fat per serving.

## 2024-06-11 NOTE — PATIENT INSTRUCTIONS
Junior Lares-  It was great to visit with you and learn about your progress. Below are the goals we discussed.  GOALS:  Eat protein + 2 other food groups at each meal (focus on a fruit and or a vegetable at each meal)  Schedule strength training 2X per week    Nutrition Educational Materials:  Keeping Up Your Diet after Weight-Loss Surgery    Please call [phone] to schedule your next visit with a Dietitian in 6 months  Thanks!  Perfecto Cosme RD, LD  [facility]

## 2024-08-25 ENCOUNTER — HEALTH MAINTENANCE LETTER (OUTPATIENT)
Age: 44
End: 2024-08-25

## 2024-09-16 ENCOUNTER — TELEPHONE (OUTPATIENT)
Dept: SURGERY | Facility: CLINIC | Age: 44
End: 2024-09-16
Payer: COMMERCIAL

## 2024-09-16 NOTE — TELEPHONE ENCOUNTER
Prior Authorization Retail Medication Request    Medication/Dose: Insurance is requiring prior auth for Wegovy 2.4 mg  Diagnosis and ICD code (if different than what is on RX):  E66.01  New/renewal/insurance change PA/secondary ins. PA:  Previously Tried and Failed:  Unknown  Rationale:  Unknown    Insurance   Primary: [employer]  Insurance ID:  [id number]    Secondary (if applicable):N/A  Insurance ID:  N/A    Thank You,  Naila Alegre [facility]  [phone]

## 2024-09-19 NOTE — TELEPHONE ENCOUNTER
Retail Pharmacy Prior Authorization Team   Phone: [phone]    PA Initiation    Medication: WEGOVY 2.4 MG/0.75ML SC SOAJ  Insurance Company: [employer] ([facility]) - Phone [phone] Fax [phone]  Pharmacy Filling the Rx: [facility] [place], [place] - [address] [place] [address]  Filling Pharmacy Phone: [phone]  Filling Pharmacy Fax:    Start Date: 9/19/2024

## 2024-09-24 NOTE — TELEPHONE ENCOUNTER
Retail Pharmacy Prior Authorization Team   Phone: [phone]    Prior Authorization Approval    Medication: WEGOVY 2.4 MG/0.75ML SC SOAJ  Authorization Effective Date: 9/19/2024  Authorization Expiration Date: 9/9/2025  Reference #:[id number]     Insurance Company: [employer] ([facility]) - Phone [phone] Fax [phone]  Which Pharmacy is filling the prescription: [facility] [place] - [address]  Pharmacy Notified: YES  Patient Notified: YES **Instructed pharmacy to notify patient when script is ready to /ship.**

## 2024-10-21 ENCOUNTER — TELEPHONE (OUTPATIENT)
Dept: FAMILY MEDICINE | Facility: CLINIC | Age: 44
End: 2024-10-21
Payer: COMMERCIAL

## 2024-10-21 NOTE — TELEPHONE ENCOUNTER
Reason for Call:  Appointment Request    Patient requesting this type of appt:  Preventive     Requested provider: Mulu Montalvo    Reason patient unable to be scheduled: Not within requested timeframe    When does patient want to be seen/preferred time:  Next month or Two    Comments: want to be put on wait list for any cancellations     Could we send this information to you in [employer] or would you prefer to receive a phone call?:   Patient would like to be contacted via [employer]    Call taken on 10/21/2024 at 1:48 PM by Lelia Alba

## 2024-10-25 ENCOUNTER — LAB (OUTPATIENT)
Dept: LAB | Facility: CLINIC | Age: 44
End: 2024-10-25
Payer: COMMERCIAL

## 2024-10-25 DIAGNOSIS — K91.2 POSTSURGICAL MALABSORPTION: ICD-10-CM

## 2024-10-25 DIAGNOSIS — Z13.29 SCREENING FOR THYROID DISORDER: Primary | ICD-10-CM

## 2024-10-25 DIAGNOSIS — E78.2 MIXED HYPERLIPIDEMIA: ICD-10-CM

## 2024-10-25 DIAGNOSIS — Z98.84 BARIATRIC SURGERY STATUS: ICD-10-CM

## 2024-10-25 LAB
FERRITIN SERPL-MCNC: 16 NG/ML (ref 6–175)
PTH-INTACT SERPL-MCNC: 42 PG/ML (ref 15–65)
VIT B12 SERPL-MCNC: 930 PG/ML (ref 232–1245)
VIT D+METAB SERPL-MCNC: 34 NG/ML (ref 20–50)

## 2024-10-25 PROCEDURE — 82607 VITAMIN B-12: CPT

## 2024-10-25 PROCEDURE — 36415 COLL VENOUS BLD VENIPUNCTURE: CPT

## 2024-10-25 PROCEDURE — 82728 ASSAY OF FERRITIN: CPT

## 2024-10-25 PROCEDURE — 83550 IRON BINDING TEST: CPT

## 2024-10-25 PROCEDURE — 82306 VITAMIN D 25 HYDROXY: CPT

## 2024-10-25 PROCEDURE — 84443 ASSAY THYROID STIM HORMONE: CPT

## 2024-10-25 PROCEDURE — 83970 ASSAY OF PARATHORMONE: CPT

## 2024-10-25 PROCEDURE — 99000 SPECIMEN HANDLING OFFICE-LAB: CPT

## 2024-10-25 PROCEDURE — 83540 ASSAY OF IRON: CPT

## 2024-10-25 PROCEDURE — 84590 ASSAY OF VITAMIN A: CPT | Mod: 90

## 2024-10-26 LAB
IRON BINDING CAPACITY (ROCHE): 356 UG/DL (ref 240–430)
IRON SATN MFR SERPL: 11 % (ref 15–46)
IRON SERPL-MCNC: 39 UG/DL (ref 37–145)
TSH SERPL DL<=0.005 MIU/L-ACNC: 0.59 UIU/ML (ref 0.3–4.2)

## 2024-10-28 LAB
ANNOTATION COMMENT IMP: NORMAL
RETINYL PALMITATE SERPL-MCNC: 0.04 MG/L
VIT A SERPL-MCNC: 0.43 MG/L

## 2024-10-28 NOTE — RESULT ENCOUNTER NOTE
Luda  I have reviewed your recent test results:    -TSH (thyroid stimulating hormone) level is normal which indicates normal thyroid function.    For additional lab test information, www.testing.com is an excellent reference.     If you have any questions please do not hesitate to contact our office via phone ([phone]) or MyChart.    Healthy regards,     Mulu Montalvo MBA, MS, PA-C  [facility]

## 2024-11-21 ENCOUNTER — TRANSFERRED RECORDS (OUTPATIENT)
Dept: HEALTH INFORMATION MANAGEMENT | Facility: CLINIC | Age: 44
End: 2024-11-21

## 2024-11-26 ENCOUNTER — TRANSFERRED RECORDS (OUTPATIENT)
Dept: HEALTH INFORMATION MANAGEMENT | Facility: CLINIC | Age: 44
End: 2024-11-26

## 2024-12-02 DIAGNOSIS — C50.912 INVASIVE DUCTAL CARCINOMA OF BREAST, FEMALE, LEFT (H): Primary | ICD-10-CM

## 2024-12-10 ENCOUNTER — ONCOLOGY VISIT (OUTPATIENT)
Dept: ONCOLOGY | Facility: CLINIC | Age: 44
End: 2024-12-10
Attending: INTERNAL MEDICINE
Payer: COMMERCIAL

## 2024-12-10 VITALS
OXYGEN SATURATION: 99 % | RESPIRATION RATE: 16 BRPM | BODY MASS INDEX: 32.3 KG/M2 | TEMPERATURE: 98.1 F | SYSTOLIC BLOOD PRESSURE: 115 MMHG | WEIGHT: 206.2 LBS | DIASTOLIC BLOOD PRESSURE: 68 MMHG | HEART RATE: 76 BPM

## 2024-12-10 DIAGNOSIS — C50.912 INVASIVE DUCTAL CARCINOMA OF BREAST, FEMALE, LEFT (H): ICD-10-CM

## 2024-12-10 DIAGNOSIS — Z17.0 MALIGNANT NEOPLASM OF RIGHT BREAST IN FEMALE, ESTROGEN RECEPTOR POSITIVE, UNSPECIFIED SITE OF BREAST (H): ICD-10-CM

## 2024-12-10 DIAGNOSIS — C50.911 MALIGNANT NEOPLASM OF RIGHT BREAST IN FEMALE, ESTROGEN RECEPTOR POSITIVE, UNSPECIFIED SITE OF BREAST (H): ICD-10-CM

## 2024-12-10 LAB
ALBUMIN SERPL BCG-MCNC: 4.2 G/DL (ref 3.5–5.2)
ALP SERPL-CCNC: 90 U/L (ref 40–150)
ALT SERPL W P-5'-P-CCNC: 8 U/L (ref 0–50)
ANION GAP SERPL CALCULATED.3IONS-SCNC: 10 MMOL/L (ref 7–15)
AST SERPL W P-5'-P-CCNC: 9 U/L (ref 0–45)
BASOPHILS # BLD AUTO: 0 10E3/UL (ref 0–0.2)
BASOPHILS NFR BLD AUTO: 0 %
BILIRUB SERPL-MCNC: 0.6 MG/DL
BUN SERPL-MCNC: 9.2 MG/DL (ref 6–20)
CALCIUM SERPL-MCNC: 8.9 MG/DL (ref 8.8–10.4)
CHLORIDE SERPL-SCNC: 103 MMOL/L (ref 98–107)
CREAT SERPL-MCNC: 0.56 MG/DL (ref 0.51–0.95)
EGFRCR SERPLBLD CKD-EPI 2021: >90 ML/MIN/1.73M2
EOSINOPHIL # BLD AUTO: 0.1 10E3/UL (ref 0–0.7)
EOSINOPHIL NFR BLD AUTO: 2 %
ERYTHROCYTE [DISTWIDTH] IN BLOOD BY AUTOMATED COUNT: 13.6 % (ref 10–15)
GLUCOSE SERPL-MCNC: 132 MG/DL (ref 70–99)
HCO3 SERPL-SCNC: 25 MMOL/L (ref 22–29)
HCT VFR BLD AUTO: 37.4 % (ref 35–47)
HGB BLD-MCNC: 11.8 G/DL (ref 11.7–15.7)
IMM GRANULOCYTES # BLD: 0 10E3/UL
IMM GRANULOCYTES NFR BLD: 1 %
LYMPHOCYTES # BLD AUTO: 1.2 10E3/UL (ref 0.8–5.3)
LYMPHOCYTES NFR BLD AUTO: 17 %
MCH RBC QN AUTO: 28 PG (ref 26.5–33)
MCHC RBC AUTO-ENTMCNC: 31.6 G/DL (ref 31.5–36.5)
MCV RBC AUTO: 89 FL (ref 78–100)
MONOCYTES # BLD AUTO: 0.3 10E3/UL (ref 0–1.3)
MONOCYTES NFR BLD AUTO: 4 %
NEUTROPHILS # BLD AUTO: 5.3 10E3/UL (ref 1.6–8.3)
NEUTROPHILS NFR BLD AUTO: 77 %
NRBC # BLD AUTO: 0 10E3/UL
NRBC BLD AUTO-RTO: 0 /100
PLATELET # BLD AUTO: 143 10E3/UL (ref 150–450)
POTASSIUM SERPL-SCNC: 4 MMOL/L (ref 3.4–5.3)
PROT SERPL-MCNC: 6.8 G/DL (ref 6.4–8.3)
RBC # BLD AUTO: 4.21 10E6/UL (ref 3.8–5.2)
SODIUM SERPL-SCNC: 138 MMOL/L (ref 135–145)
WBC # BLD AUTO: 6.9 10E3/UL (ref 4–11)

## 2024-12-10 PROCEDURE — 85025 COMPLETE CBC W/AUTO DIFF WBC: CPT | Performed by: INTERNAL MEDICINE

## 2024-12-10 PROCEDURE — 84155 ASSAY OF PROTEIN SERUM: CPT | Performed by: INTERNAL MEDICINE

## 2024-12-10 PROCEDURE — 99214 OFFICE O/P EST MOD 30 MIN: CPT | Performed by: INTERNAL MEDICINE

## 2024-12-10 PROCEDURE — 36415 COLL VENOUS BLD VENIPUNCTURE: CPT

## 2024-12-10 PROCEDURE — 82435 ASSAY OF BLOOD CHLORIDE: CPT | Performed by: INTERNAL MEDICINE

## 2024-12-10 PROCEDURE — 82247 BILIRUBIN TOTAL: CPT | Performed by: INTERNAL MEDICINE

## 2024-12-10 RX ORDER — TAMOXIFEN CITRATE 20 MG/1
20 TABLET ORAL DAILY
Qty: 90 TABLET | Refills: 3 | Status: SHIPPED | OUTPATIENT
Start: 2024-12-10

## 2024-12-10 ASSESSMENT — PAIN SCALES - GENERAL: PAINLEVEL_OUTOF10: NO PAIN (0)

## 2024-12-10 NOTE — PROGRESS NOTES
Oncology Progress Note    Name: Luda Rai  : 1980  MRN: [id number]    CC: Breast Cancer    HPI: Luda Rai is a 44 year old female with a right pT2N0 hormone receptor positive, HER2 negative breast cancer s/p lumpectomy, adjuvant TC x 4, radiation and currently on tamoxifen here for transfer of care.    She was previously cared for by Dr. Zeng.  Her cancer history is below and reviewed. Prior medical oncology notes reviewed.    Had 2 menses in past 6 months. No new symptoms.  No breast masses.    History  23: right lumpectomy: 2.4cm 0/3LN pT2N0 oncotype 21  -10/13/23: TC x 4  Nov-Dec 2023: radiation  2024: tamoxifen    Review of systems: All other systems reviewed and are negative except for what is described in the history of present illness.      PMH:   Patient Active Problem List    Diagnosis Date Noted    Hyperphagia 2024     Priority: Medium    Gastroesophageal reflux disease, unspecified whether esophagitis present 2023     Priority: Medium    Severe obesity (BMI 35.0-39.9) with comorbidity (H) - hyperlipidemia, prediabetes 2023     Priority: Medium    Malignant neoplasm of right breast in female, estrogen receptor positive, unspecified site of breast (H) 2023     Priority: Medium     Check 11.23 for f/u Zeng      Mixed hyperlipidemia 2023     Priority: Medium    Invasive ductal carcinoma of breast, female, left (H) 2023     Priority: Medium    Prediabetes 2022     Priority: Medium    Telogen effluvium 2022     Priority: Medium    Bariatric surgery status 2022     Priority: Medium     2022 RYGBS and Takedown of Nissen fundoplication LEL        Postsurgical malabsorption 2022     Priority: Medium    Fatty liver 2018     Priority: Medium    Primary osteoarthritis of left knee 2016     Priority: Medium    Vitamin B12 deficiency 2016     Priority: Medium    Osteoarthrosis, unspecified whether  "generalized or localized, lower leg 10/30/2014     Priority: Medium     IMO Regulatory Load OCT 2020  Replacing diagnoses that were inactivated after the 10/1/2021 regulatory import.      PCOS (polycystic ovarian syndrome) 01/13/2014     Priority: Medium    Hypovitaminosis D 01/13/2014     Priority: Medium     Past Medical History:   Diagnosis Date    Bone and joint disord back, pelvis, leg complicat preg, childb, puerp     Knee replacement 2020    Endocrine problem     Pcos    Esophageal reflux 2005    Had Nissen Fundiplication 2006    Invasive ductal carcinoma of breast, female, left (H) 06/19/2023    Osteoporosis     Knee       Medications:   Current Outpatient Medications:     BIOTIN PO, Take 1 capsule by mouth daily., Disp: , Rfl:     CALCIUM CITRATE PO, Take 500 mg by mouth daily B-L-D, Disp: , Rfl:     cyanocobalamin (CYANOCOBALAMIN) 1000 MCG/ML injection, Inject 1 mL (1,000 mcg) Subcutaneous every 30 days, Disp: 3 mL, Rfl: 3    multivitamin w/minerals (THERA-VIT-M) tablet, Take 1 tablet by mouth At Bedtime Abel Adv Chew, Disp: , Rfl:     omeprazole (PRILOSEC) 20 MG DR capsule, Take 2 capsules (40 mg) by mouth daily as needed (gerd), Disp: 180 capsule, Rfl: 3    Semaglutide-Weight Management (WEGOVY) 2.4 MG/0.75ML pen, Inject 2.4 mg Subcutaneous once a week, Disp: 9 mL, Rfl: 1    syringe/needle, disp, 25G X 1\" 3 ML MISC, Use for B-12 injection, Disp: 3 each, Rfl: 3    tamoxifen (NOLVADEX) 20 MG tablet, Take 1 tablet (20 mg) by mouth daily., Disp: 90 tablet, Rfl: 3    tuberculin-allergy syringes 27G X 1/2\" 1 ML MISC, Use for B12 injections., Disp: 3 each, Rfl: 3    metFORMIN (GLUCOPHAGE XR) 500 MG 24 hr tablet, Take 4 tablets (2,000 mg) by mouth daily (with dinner) (Patient not taking: Reported on 12/10/2024), Disp: 360 tablet, Rfl: 1      Allergies:   Allergies   Allergen Reactions    Asa [Aspirin]      Gastric bypass    Morphine And Codeine GI Disturbance    Nsaids Other (See Comments)     Avoid due to " bariatric surgery 6/16/2022    Rocephin [Ceftriaxone] Hives         Social history:   Social History     Socioeconomic History    Marital status:      Spouse name: Not on file    Number of children: Not on file    Years of education: Not on file    Highest education level: Not on file   Occupational History    Not on file   Tobacco Use    Smoking status: Never     Passive exposure: Never    Smokeless tobacco: Never   Vaping Use    Vaping status: Never Used   Substance and Sexual Activity    Alcohol use: Not Currently     Comment: Rare    Drug use: Never    Sexual activity: Yes     Partners: Male     Birth control/protection: Female Surgical   Other Topics Concern    Parent/sibling w/ CABG, MI or angioplasty before 65F 55M? No   Social History Narrative    Not on file     Social Drivers of Health     Financial Resource Strain: Low Risk  (12/5/2023)    Financial Resource Strain     Within the past 12 months, have you or your family members you live with been unable to get utilities (heat, electricity) when it was really needed?: No   Food Insecurity: Low Risk  (12/5/2023)    Food Insecurity     Within the past 12 months, did you worry that your food would run out before you got money to buy more?: No     Within the past 12 months, did the food you bought just not last and you didn t have money to get more?: No   Transportation Needs: Low Risk  (12/5/2023)    Transportation Needs     Within the past 12 months, has lack of transportation kept you from medical appointments, getting your medicines, non-medical meetings or appointments, work, or from getting things that you need?: No   Physical Activity: Not on file   Stress: Not on file   Social Connections: Unknown (1/1/2022)    [facility]    Social Connections     Frequency of Communication with Friends and Family: Not on file   Interpersonal Safety: Low Risk   (12/5/2023)    Interpersonal Safety     Do you feel physically and emotionally safe where you currently live?: Yes     Within the past 12 months, have you been hit, slapped, kicked or otherwise physically hurt by someone?: No     Within the past 12 months, have you been humiliated or emotionally abused in other ways by your partner or ex-partner?: No   Housing Stability: Low Risk  (12/5/2023)    Housing Stability     Do you have housing? : Yes     Are you worried about losing your housing?: No       Family history:I have reviewed this patient's family history and updated it with pertinent information if needed.  Family History   Problem Relation Age of Onset    Hypertension Mother     Diabetes Father     Cerebrovascular Disease Father     Breast Cancer No family hx of     Ovarian Cancer No family hx of        Physical exam:  Vitals:/68   Pulse 76   Temp 98.1  F (36.7  C) (Tympanic)   Resp 16   Wt 93.5 kg (206 lb 3.2 oz)   SpO2 99%   BMI 32.30 kg/m    GENERAL: No acute distress, pleasant, PS 0  EYES: Pupils equal round reactive to light, sclera anicteric  NECK: Supple  LYMPH NODES: No cervical, supraclavicular lymphadenopathy  CARDIOVASCULAR: Regular rate and rhythm  LUNGS: Clear to auscultation bilaterally  GASTROINTESTINAL: Soft, nontender, nondistended.  No palpable hepatosplenomegaly  EXTREMITIES: No edema  SKIN: No rashes or petechiae  NEURO: Normal gait  BREASTS: No dominant masses palpated bilaterally.  No axillary lymphadenopathy bilaterally.    Labs:  Lab Results   Component Value Date    WBC 6.9 12/10/2024    HGB 11.8 12/10/2024    HCT 37.4 12/10/2024     (L) 12/10/2024     12/10/2024    POTASSIUM 4.0 12/10/2024    CHLORIDE 103 12/10/2024    CO2 25 12/10/2024    BUN 9.2 12/10/2024    CR 0.56 12/10/2024     (H) 12/10/2024    SED 17 05/18/2020    AST 9 12/10/2024    ALT 8 12/10/2024    ALKPHOS 90 12/10/2024    BILITOTAL 0.6 12/10/2024       Radiology:   Mammo/US May 2024 BIRADS  2    Assessment/plan:   Luda Rai is a 44 year old female with a right pT2N0 hormone receptor positive, HER2 negative breast cancer s/p lumpectomy, adjuvant TC x 4, radiation and currently on tamoxifen here for transfer of care.    1. Breast Cancer: No clinical evidence of recurrence.  Continue with tamoxifen with planned treatment through January 2029 although consider extended therapy.  Mammogram up to date and due again in  May/June 2024. Given premenopausal status and dense breast, will plan for surveillance breast MRI ordered for now.  Call if any new concerns.    2. Thrombocytopenia: mild and intermittent. Will monitor annually at this point pending a change in her clinical status.    All of her questions were answered.    Return to the office in 6 months or sooner as needed.    Thank you for allowing me to participate in the care of this patient.  Please call with any questions.    I personally reviewed the recent studies and I explained the rationale of the tests ordered today to the patient.     Orders Placed This Encounter   Procedures    MR Breast Bilateral w/o & w Contrast    MA Diagnostic Bilateral w/ Suman    US Breast Right Complete 4 Quadrants

## 2024-12-10 NOTE — NURSING NOTE
"Oncology Rooming Note    December 10, 2024 2:29 PM   Luda Rai is a 44 year old female who presents for:    Chief Complaint   Patient presents with    Oncology Clinic Visit     Initial Vitals: /68   Pulse 76   Temp 98.1  F (36.7  C) (Tympanic)   Resp 16   Wt 93.5 kg (206 lb 3.2 oz)   SpO2 99%   BMI 32.30 kg/m   Estimated body mass index is 32.3 kg/m  as calculated from the following:    Height as of 6/11/24: 1.702 m (5' 7\").    Weight as of this encounter: 93.5 kg (206 lb 3.2 oz). Body surface area is 2.1 meters squared.  No Pain (0) Comment: Data Unavailable   No LMP recorded.  Allergies reviewed: Yes  Medications reviewed: Yes    Medications: Medication refills not needed today.  Pharmacy name entered into Grockit:    [id number] - [place], [place] - [address]  [facility] - [address] [facility] [place] [facility] [address]    Frailty Screening:   Is the patient here for a new oncology consult visit in cancer care? 2. No      Clinical concerns: f/u       Shantell Cruz CMA              "

## 2024-12-10 NOTE — LETTER
12/10/2024      Luda Rai  [address]  [place] [address]      Dear Colleague,    Thank you for referring your patient, Luda Rai, to the [facility]. Please see a copy of my visit note below.    Oncology Progress Note    Name: Luda Rai  : 1980  MRN: [id number]    CC: Breast Cancer    HPI: Luda Rai is a 44 year old female with a right pT2N0 hormone receptor positive, HER2 negative breast cancer s/p lumpectomy, adjuvant TC x 4, radiation and currently on tamoxifen here for transfer of care.    She was previously cared for by Dr. Zeng.  Her cancer history is below and reviewed. Prior medical oncology notes reviewed.    Had 2 menses in past 6 months. No new symptoms.  No breast masses.    History  23: right lumpectomy: 2.4cm 0/3LN pT2N0 oncotype 21  -10/13/23: TC x 4  Nov-Dec 2023: radiation  2024: tamoxifen    Review of systems: All other systems reviewed and are negative except for what is described in the history of present illness.      PMH:   Patient Active Problem List    Diagnosis Date Noted     Hyperphagia 2024     Priority: Medium     Gastroesophageal reflux disease, unspecified whether esophagitis present 2023     Priority: Medium     Severe obesity (BMI 35.0-39.9) with comorbidity (H) - hyperlipidemia, prediabetes 2023     Priority: Medium     Malignant neoplasm of right breast in female, estrogen receptor positive, unspecified site of breast (H) 2023     Priority: Medium     Check 11.23 for f/u Zeng       Mixed hyperlipidemia 2023     Priority: Medium     Invasive ductal carcinoma of breast, female, left (H) 2023     Priority: Medium     Prediabetes 2022     Priority: Medium     Telogen effluvium 2022     Priority: Medium     Bariatric surgery status 2022     Priority: Medium     2022 RYGBS and Takedown of Nissen fundoplication LEL         Postsurgical  "malabsorption 06/23/2022     Priority: Medium     Fatty liver 03/06/2018     Priority: Medium     Primary osteoarthritis of left knee 06/07/2016     Priority: Medium     Vitamin B12 deficiency 01/12/2016     Priority: Medium     Osteoarthrosis, unspecified whether generalized or localized, lower leg 10/30/2014     Priority: Medium     IMO Regulatory Load OCT 2020  Replacing diagnoses that were inactivated after the 10/1/2021 regulatory import.       PCOS (polycystic ovarian syndrome) 01/13/2014     Priority: Medium     Hypovitaminosis D 01/13/2014     Priority: Medium     Past Medical History:   Diagnosis Date     Bone and joint disord back, pelvis, leg complicat preg, childb, puerp     Knee replacement 2020     Endocrine problem     Pcos     Esophageal reflux 2005    Had Nissen Fundiplication 2006     Invasive ductal carcinoma of breast, female, left (H) 06/19/2023     Osteoporosis     Knee       Medications:   Current Outpatient Medications:      BIOTIN PO, Take 1 capsule by mouth daily., Disp: , Rfl:      CALCIUM CITRATE PO, Take 500 mg by mouth daily B-L-D, Disp: , Rfl:      cyanocobalamin (CYANOCOBALAMIN) 1000 MCG/ML injection, Inject 1 mL (1,000 mcg) Subcutaneous every 30 days, Disp: 3 mL, Rfl: 3     multivitamin w/minerals (THERA-VIT-M) tablet, Take 1 tablet by mouth At Bedtime Abel Adv Chew, Disp: , Rfl:      omeprazole (PRILOSEC) 20 MG DR capsule, Take 2 capsules (40 mg) by mouth daily as needed (gerd), Disp: 180 capsule, Rfl: 3     Semaglutide-Weight Management (WEGOVY) 2.4 MG/0.75ML pen, Inject 2.4 mg Subcutaneous once a week, Disp: 9 mL, Rfl: 1     syringe/needle, disp, 25G X 1\" 3 ML MISC, Use for B-12 injection, Disp: 3 each, Rfl: 3     tamoxifen (NOLVADEX) 20 MG tablet, Take 1 tablet (20 mg) by mouth daily., Disp: 90 tablet, Rfl: 3     tuberculin-allergy syringes 27G X 1/2\" 1 ML MISC, Use for B12 injections., Disp: 3 each, Rfl: 3     metFORMIN (GLUCOPHAGE XR) 500 MG 24 hr tablet, Take 4 tablets (2,000 " mg) by mouth daily (with dinner) (Patient not taking: Reported on 12/10/2024), Disp: 360 tablet, Rfl: 1      Allergies:   Allergies   Allergen Reactions     Asa [Aspirin]      Gastric bypass     Morphine And Codeine GI Disturbance     Nsaids Other (See Comments)     Avoid due to bariatric surgery 6/16/2022     Rocephin [Ceftriaxone] Hives         Social history:   Social History     Socioeconomic History     Marital status:      Spouse name: Not on file     Number of children: Not on file     Years of education: Not on file     Highest education level: Not on file   Occupational History     Not on file   Tobacco Use     Smoking status: Never     Passive exposure: Never     Smokeless tobacco: Never   Vaping Use     Vaping status: Never Used   Substance and Sexual Activity     Alcohol use: Not Currently     Comment: Rare     Drug use: Never     Sexual activity: Yes     Partners: Male     Birth control/protection: Female Surgical   Other Topics Concern     Parent/sibling w/ CABG, MI or angioplasty before 65F 55M? No   Social History Narrative     Not on file     Social Drivers of Health     Financial Resource Strain: Low Risk  (12/5/2023)    Financial Resource Strain      Within the past 12 months, have you or your family members you live with been unable to get utilities (heat, electricity) when it was really needed?: No   Food Insecurity: Low Risk  (12/5/2023)    Food Insecurity      Within the past 12 months, did you worry that your food would run out before you got money to buy more?: No      Within the past 12 months, did the food you bought just not last and you didn t have money to get more?: No   Transportation Needs: Low Risk  (12/5/2023)    Transportation Needs      Within the past 12 months, has lack of transportation kept you from medical appointments, getting your medicines, non-medical meetings or appointments, work, or from getting things that you need?: No   Physical Activity: Not on file    Stress: Not on file   Social Connections: Unknown (1/1/2022)    Received from [facility], [facility]    Social Connections      Frequency of Communication with Friends and Family: Not on file   Interpersonal Safety: Low Risk  (12/5/2023)    Interpersonal Safety      Do you feel physically and emotionally safe where you currently live?: Yes      Within the past 12 months, have you been hit, slapped, kicked or otherwise physically hurt by someone?: No      Within the past 12 months, have you been humiliated or emotionally abused in other ways by your partner or ex-partner?: No   Housing Stability: Low Risk  (12/5/2023)    Housing Stability      Do you have housing? : Yes      Are you worried about losing your housing?: No       Family history:I have reviewed this patient's family history and updated it with pertinent information if needed.  Family History   Problem Relation Age of Onset     Hypertension Mother      Diabetes Father      Cerebrovascular Disease Father      Breast Cancer No family hx of      Ovarian Cancer No family hx of        Physical exam:  Vitals:/68   Pulse 76   Temp 98.1  F (36.7  C) (Tympanic)   Resp 16   Wt 93.5 kg (206 lb 3.2 oz)   SpO2 99%   BMI 32.30 kg/m    GENERAL: No acute distress, pleasant, PS 0  EYES: Pupils equal round reactive to light, sclera anicteric  NECK: Supple  LYMPH NODES: No cervical, supraclavicular lymphadenopathy  CARDIOVASCULAR: Regular rate and rhythm  LUNGS: Clear to auscultation bilaterally  GASTROINTESTINAL: Soft, nontender, nondistended.  No palpable hepatosplenomegaly  EXTREMITIES: No edema  SKIN: No rashes or petechiae  NEURO: Normal gait  BREASTS: No dominant masses palpated bilaterally.  No axillary lymphadenopathy bilaterally.    Labs:  Lab Results   Component Value Date    WBC 6.9 12/10/2024    HGB 11.8 12/10/2024    HCT 37.4 12/10/2024     (L) 12/10/2024      12/10/2024    POTASSIUM 4.0 12/10/2024    CHLORIDE 103 12/10/2024    CO2 25 12/10/2024    BUN 9.2 12/10/2024    CR 0.56 12/10/2024     (H) 12/10/2024    SED 17 05/18/2020    AST 9 12/10/2024    ALT 8 12/10/2024    ALKPHOS 90 12/10/2024    BILITOTAL 0.6 12/10/2024       Radiology:   Mammo/US May 2024 BIRADS 2    Assessment/plan:   Luda Rai is a 44 year old female with a right pT2N0 hormone receptor positive, HER2 negative breast cancer s/p lumpectomy, adjuvant TC x 4, radiation and currently on tamoxifen here for transfer of care.    1. Breast Cancer: No clinical evidence of recurrence.  Continue with tamoxifen with planned treatment through January 2029 although consider extended therapy.  Mammogram up to date and due again in  May/June 2024. Given premenopausal status and dense breast, will plan for surveillance breast MRI ordered for now.  Call if any new concerns.    2. Thrombocytopenia: mild and intermittent. Will monitor annually at this point pending a change in her clinical status.    All of her questions were answered.    Return to the office in 6 months or sooner as needed.    Thank you for allowing me to participate in the care of this patient.  Please call with any questions.    I personally reviewed the recent studies and I explained the rationale of the tests ordered today to the patient.     Orders Placed This Encounter   Procedures     MR Breast Bilateral w/o & w Contrast     MA Diagnostic Bilateral w/ Suman     US Breast Right Complete 4 Quadrants         Again, thank you for allowing me to participate in the care of your patient.        Sincerely,        Ana Corbett MD

## 2024-12-10 NOTE — PROGRESS NOTES
Medical Assistant Note:  Luda Rai presents today for blood draw.    Patient seen by provider today: Yes: .   present during visit today: Not Applicable.    Concerns: No Concerns.    Procedure:  Lab draw site: right antecub, Needle type: butterfly, Gauge: 23.    Post Assessment:  Labs drawn without difficulty: Yes.    Discharge Plan:  Departure Mode: Ambulatory.    Face to Face Time: 10.    Shantell Cruz, CMA

## 2024-12-29 SDOH — HEALTH STABILITY: PHYSICAL HEALTH: ON AVERAGE, HOW MANY DAYS PER WEEK DO YOU ENGAGE IN MODERATE TO STRENUOUS EXERCISE (LIKE A BRISK WALK)?: 4 DAYS

## 2024-12-29 SDOH — HEALTH STABILITY: PHYSICAL HEALTH: ON AVERAGE, HOW MANY MINUTES DO YOU ENGAGE IN EXERCISE AT THIS LEVEL?: 30 MIN

## 2024-12-29 ASSESSMENT — SOCIAL DETERMINANTS OF HEALTH (SDOH): HOW OFTEN DO YOU GET TOGETHER WITH FRIENDS OR RELATIVES?: TWICE A WEEK

## 2024-12-30 ENCOUNTER — HOSPITAL ENCOUNTER (OUTPATIENT)
Dept: MRI IMAGING | Facility: CLINIC | Age: 44
Discharge: HOME | End: 2024-12-30
Attending: INTERNAL MEDICINE | Admitting: INTERNAL MEDICINE
Payer: COMMERCIAL

## 2024-12-30 DIAGNOSIS — C50.911 MALIGNANT NEOPLASM OF RIGHT BREAST IN FEMALE, ESTROGEN RECEPTOR POSITIVE, UNSPECIFIED SITE OF BREAST (H): ICD-10-CM

## 2024-12-30 DIAGNOSIS — Z17.0 MALIGNANT NEOPLASM OF RIGHT BREAST IN FEMALE, ESTROGEN RECEPTOR POSITIVE, UNSPECIFIED SITE OF BREAST (H): ICD-10-CM

## 2024-12-30 PROCEDURE — 77049 MRI BREAST C-+ W/CAD BI: CPT

## 2024-12-30 PROCEDURE — A9585 GADOBUTROL INJECTION: HCPCS | Performed by: INTERNAL MEDICINE

## 2024-12-30 PROCEDURE — 255N000002 HC RX 255 OP 636: Performed by: INTERNAL MEDICINE

## 2024-12-30 RX ORDER — GADOBUTROL 604.72 MG/ML
9 INJECTION INTRAVENOUS ONCE
Status: COMPLETED | OUTPATIENT
Start: 2024-12-30 | End: 2024-12-30

## 2024-12-30 RX ADMIN — GADOBUTROL 9 ML: 604.72 INJECTION INTRAVENOUS at 13:16

## 2024-12-31 ENCOUNTER — TRANSFERRED RECORDS (OUTPATIENT)
Dept: HEALTH INFORMATION MANAGEMENT | Facility: CLINIC | Age: 44
End: 2024-12-31
Payer: COMMERCIAL

## 2025-01-02 ENCOUNTER — OFFICE VISIT (OUTPATIENT)
Dept: FAMILY MEDICINE | Facility: CLINIC | Age: 45
End: 2025-01-02
Payer: COMMERCIAL

## 2025-01-02 VITALS
RESPIRATION RATE: 14 BRPM | BODY MASS INDEX: 31.8 KG/M2 | HEART RATE: 84 BPM | WEIGHT: 202.6 LBS | OXYGEN SATURATION: 96 % | DIASTOLIC BLOOD PRESSURE: 64 MMHG | HEIGHT: 67 IN | TEMPERATURE: 97 F | SYSTOLIC BLOOD PRESSURE: 110 MMHG

## 2025-01-02 DIAGNOSIS — E66.811 OBESITY (BMI 30.0-34.9): ICD-10-CM

## 2025-01-02 DIAGNOSIS — R73.03 PREDIABETES: ICD-10-CM

## 2025-01-02 DIAGNOSIS — Z00.00 ROUTINE GENERAL MEDICAL EXAMINATION AT A HEALTH CARE FACILITY: Primary | ICD-10-CM

## 2025-01-02 DIAGNOSIS — Z17.0 MALIGNANT NEOPLASM OF RIGHT BREAST IN FEMALE, ESTROGEN RECEPTOR POSITIVE, UNSPECIFIED SITE OF BREAST (H): ICD-10-CM

## 2025-01-02 DIAGNOSIS — C50.911 MALIGNANT NEOPLASM OF RIGHT BREAST IN FEMALE, ESTROGEN RECEPTOR POSITIVE, UNSPECIFIED SITE OF BREAST (H): ICD-10-CM

## 2025-01-02 DIAGNOSIS — E78.2 MIXED HYPERLIPIDEMIA: ICD-10-CM

## 2025-01-02 DIAGNOSIS — Z98.84 BARIATRIC SURGERY STATUS: ICD-10-CM

## 2025-01-02 PROBLEM — C50.912 INVASIVE DUCTAL CARCINOMA OF BREAST, FEMALE, LEFT (H): Status: RESOLVED | Noted: 2023-06-19 | Resolved: 2025-01-02

## 2025-01-02 PROBLEM — E66.01 SEVERE OBESITY (BMI 35.0-39.9) WITH COMORBIDITY (H): Status: RESOLVED | Noted: 2023-08-02 | Resolved: 2025-01-02

## 2025-01-02 NOTE — PROGRESS NOTES
"-Preventive Care Visit  [facility]  Mulu Montalvo PA-C, Family Medicine  Jan 2, 2025      Assessment & Plan     Routine general medical examination at a health care facility  - REVIEW OF HEALTH MAINTENANCE PROTOCOL ORDERS  - PRIMARY CARE FOLLOW-UP SCHEDULING    Malignant neoplasm of right breast in female, estrogen receptor positive, unspecified site of breast (H)  Doing well.  Continue to follow with oncology as recommended.    Obesity (BMI 30.0-34.9)  Bariatric surgery status  Doing well overall.  Applauded weight loss efforts.  Following with bariatric clinic.  Recommend incorporating weightbearing/strength training as able.    Mixed hyperlipidemia  Anticipate improvement on repeat labs based on weight loss.  Applauded weight loss efforts.  Will keep patient apprised of results once available  - Lipid panel reflex to direct LDL Fasting      Patient has been advised of split billing requirements and indicates understanding: Yes        BMI  Estimated body mass index is 31.73 kg/m  as calculated from the following:    Height as of this encounter: 1.702 m (5' 7\").    Weight as of this encounter: 91.9 kg (202 lb 9.6 oz).   Weight management plan: Discussed healthy diet and exercise guidelines    Counseling  Appropriate preventive services were addressed with this patient via screening, questionnaire, or discussion as appropriate for fall prevention, nutrition, physical activity, Tobacco-use cessation, social engagement, weight loss and cognition.  Checklist reviewing preventive services available has been given to the patient.  Reviewed patient's diet, addressing concerns and/or questions.       Return in about 3 months (around 4/7/2025) for Fasting labs.      Mulu Montalvo MBA, MS, PA-C  [facility] Lares is a 44 year old, presenting for the following:  Physical        1/2/2025     7:23 AM   Additional Questions   Roomed by Blanca HUIZAR "   Accompanied by Self          HPI    Thrombocytopenia:   Per oncology note: mild and intermittent. Will monitor annually at this point pending a change in her clinical status.     Left breast Invasive ductal carcinoma  Found on routine screening mammogram done at the end of 04/2023.  Invasive ductal carcinoma of the right breast pT2N0 hormone receptor positive, HER2 negative breast cancer s/p lumpectomy 6/26/2023, adjuvant TC x 4, radiation and currently on tamoxifen.  Hot flashes  have improved.  The regular menstrual cycles.  Continue with tamoxifen with planned treatment through January 2029 (considering extended therapy).  Given premenopausal status and dense breast, planning to continue high risk cancer screening alternating mammogram and breast MRI every 6 months.  Breast MRI was normal 12/30/2024.    Low back pain  Has a history of the discectomy in 2011.  Has had a flare recently and received a cortisone injection.  Is starting to improve.  Is being followed by [facility] orthopedics.  Continuing physical therapy as well.    PCOS (polycystic ovarian syndrome)  Has been on metformin in the past.  Stopped approximately 6 months ago that she was feeling so well on semaglutide.  Not currently taking since her bariatric surgery.     Bariatric surgery status/medical weight loss  6/16/2022 RYGBS and Takedown of Nissen fundoplication LEL.  Following with medical weight loss/bariatric clinic.  Sees once yearly.  Is on semaglutide 2.4 mg weekly and finds this quite helpful.  Is very happy with weight loss.  Is now doing B12 injections once monthly.  Does find that this is helpful as well.  Is walking 4 times weekly and planning to incorporate more weightbearing/strength training exercises..  Using omeprazole sparingly and only when needed.   Wt Readings from Last 5 Encounters:   01/02/25 91.9 kg (202 lb 9.6 oz)   12/10/24 93.5 kg (206 lb 3.2 oz)   06/11/24 104.3 kg (230 lb)   04/18/24 99.8 kg (220 lb)   02/02/24  101.2 kg (223 lb)      Health Care Directive  Patient does not have a Health Care Directive: Discussed advance care planning with patient; however, patient declined at this time.      12/29/2024   General Health   How would you rate your overall physical health? Good   Feel stress (tense, anxious, or unable to sleep) Only a little   (!) STRESS CONCERN      12/29/2024   Nutrition   Three or more servings of calcium each day? Yes   Diet: Regular (no restrictions)   How many servings of fruit and vegetables per day? 4 or more   How many sweetened beverages each day? 0-1         12/29/2024   Exercise   Days per week of moderate/strenous exercise 4 days   Average minutes spent exercising at this level 30 min         12/29/2024   Social Factors   Frequency of gathering with friends or relatives Twice a week   Worry food won't last until get money to buy more No   Food not last or not have enough money for food? No   Do you have housing? (Housing is defined as stable permanent housing and does not include staying ouside in a car, in a tent, in an abandoned building, in an overnight shelter, or couch-surfing.) Yes   Are you worried about losing your housing? No   Lack of transportation? No   Unable to get utilities (heat,electricity)? No         12/29/2024   Dental   Dentist two times every year? Yes         12/29/2024   TB Screening   Were you born outside of the [place]? No         Today's PHQ-2 Score:       1/2/2025     7:19 AM   PHQ-2 ( 1999 [employer])   Q1: Little interest or pleasure in doing things 0   Q2: Feeling down, depressed or hopeless 0   PHQ-2 Score 0    Q1: Little interest or pleasure in doing things Not at all   Q2: Feeling down, depressed or hopeless Not at all   PHQ-2 Score 0       Patient-reported           12/29/2024   Substance Use   Alcohol more than 3/day or more than 7/wk No   Do you use any other substances recreationally? No     Social History     Tobacco Use    Smoking status: Never     Passive exposure:  Never    Smokeless tobacco: Never   Vaping Use    Vaping status: Never Used   Substance Use Topics    Alcohol use: Not Currently     Comment: Rare    Drug use: Never           5/21/2024   LAST FHS-7 RESULTS   1st degree relative breast or ovarian cancer No   Any relative bilateral breast cancer No   Any male have breast cancer No   Any ONE woman have BOTH breast AND ovarian cancer No   Any woman with breast cancer before 50yrs No   2 or more relatives with breast AND/OR ovarian cancer No   2 or more relatives with breast AND/OR bowel cancer No        Mammogram Screening - Annual screen due to history of breast cancer, carcinoma in situ, or hyperplasia        12/29/2024   STI Screening   New sexual partner(s) since last STI/HIV test? No     History of abnormal Pap smear: No - age 30- 64 PAP with HPV every 5 years recommended        Latest Ref Rng & Units 6/22/2023     8:08 AM 11/8/2018    12:00 AM   PAP / HPV   PAP  Negative for Intraepithelial Lesion or Malignancy (NILM)     HPV 16 DNA Negative Negative     HPV 18 DNA Negative Negative     Other HR HPV Negative Negative     PAP-ABSTRACT   See Scanned Document           This result is from an external source.     ASCVD Risk   The 10-year ASCVD risk score (Harry JAVED, et al., 2019) is: 0.5%    Values used to calculate the score:      Age: 44 years      Sex: Female      Is Non- : No      Diabetic: No      Tobacco smoker: No      Systolic Blood Pressure: 110 mmHg      Is BP treated: No      HDL Cholesterol: 53 mg/dL      Total Cholesterol: 182 mg/dL        12/29/2024   Contraception/Family Planning   Questions about contraception or family planning No        Reviewed and updated as needed this visit by Provider   Tobacco  Allergies  Meds  Problems  Med Hx  Surg Hx  Fam Hx  Soc   Hx Sexual Activity          Past Medical History:   Diagnosis Date    Bone and joint disord back, pelvis, leg complicat preg, childb, puerp     Knee  "replacement     Endocrine problem     Pcos    Esophageal reflux 2005    Had Nissen Fundiplication 2006    Invasive ductal carcinoma of breast, female, left (H) 2023    Osteoporosis     Knee     Past Surgical History:   Procedure Laterality Date    BIOPSY, BREAST, WITH RADIOFREQUENCY IDENTIFICATION AND SENTINEL LYMPH NODE BIOPSY Left 2023    Procedure: LEFT BREAST LOCALIZED EXCISIONAL BIOPSY;  Surgeon: Farnaz Gore MD;  Location: [facility] OR     SECTION  10/2016    DISCECTOMY LUMBAR ANTERIOR  2011    Diskectomy    INSERT PORT VASCULAR ACCESS Left 8/10/2023    Procedure: INSERTION, VASCULAR ACCESS PORT;  Surgeon: Farnaz Gore MD;  Location: [facility] OR    LAPAROSCOPIC BYPASS GASTRIC N/A 2022    Procedure: laparoscopic gastric bypass, Laparoscopic reversal of Nissen fundoplication, laparoscopic lysis of adhesion, partial gastrectomy;  Surgeon: Panchito Zambrano MD;  Location: [facility] OR    LUMPECTOMY BREAST WITH SENTINEL NODE, COMBINED Right 2023    Procedure: RIGHT BREAST LUMPECTOMY , RIGHT SENTINEL NODE BIOPSY;  Surgeon: Farnaz oGre MD;  Location: [facility] OR    NISSEN FUNDOPLICATION      starting of Up's    REMOVE PORT VASCULAR ACCESS N/A 2023    Procedure: REMOVAL, VASCULAR ACCESS PORT;  Surgeon: Farnaz Gore MD;  Location: [facility] OR    SALPINGECTOMY Left 2019    Fallopian tube (including fimbria) with extensive intraluminal hemorrhage,    SALPINGECTOMY Right 2019    tubal pregnancy from IVF    TOTAL KNEE ARTHROPLASTY Right 2020    Knee replacement         Review of Systems  Constitutional, HEENT, cardiovascular, pulmonary, GI, , musculoskeletal, neuro, skin, endocrine and psych systems are negative, except as otherwise noted.     Objective    Exam  /64   Pulse 84   Temp 97  F (36.1  C) (Tympanic)   Resp 14   Ht 1.702 m (5' 7\")   Wt 91.9 kg (202 lb 9.6 oz)   LMP 2024 (Approximate)   SpO2 96%   BMI 31.73 kg/m     Estimated body mass index is " "31.73 kg/m  as calculated from the following:    Height as of this encounter: 1.702 m (5' 7\").    Weight as of this encounter: 91.9 kg (202 lb 9.6 oz).    Physical Exam  GENERAL: alert and no distress  EYES: Eyes grossly normal to inspection, PERRL and conjunctivae and sclerae normal  HENT: ear canals and TM's normal, nose and mouth without ulcers or lesions  NECK: no adenopathy, no asymmetry, masses, or scars  RESP: lungs clear to auscultation - no rales, rhonchi or wheezes  CV: regular rate and rhythm, normal S1 S2, no S3 or S4, no murmur, click or rub, no peripheral edema  ABDOMEN: soft, nontender, no hepatosplenomegaly, no masses and bowel sounds normal  MS: no gross musculoskeletal defects noted, no edema  SKIN: no suspicious lesions or rashes  NEURO: Normal strength and tone, mentation intact and speech normal  PSYCH: mentation appears normal, affect normal/bright        Signed Electronically by: Mulu Montalvo PA-C    "

## 2025-01-02 NOTE — PATIENT INSTRUCTIONS
Patient Education   Preventive Care Advice   This is general advice given by our system to help you stay healthy. However, your care team may have specific advice just for you. Please talk to your care team about your preventive care needs.  Nutrition  Eat 5 or more servings of fruits and vegetables each day.  Try wheat bread, brown rice and whole grain pasta (instead of white bread, rice, and pasta).  Get enough calcium and vitamin D. Check the label on foods and aim for 100% of the RDA (recommended daily allowance).  Lifestyle  Exercise at least 150 minutes each week  (30 minutes a day, 5 days a week).  Do muscle strengthening activities 2 days a week. These help control your weight and prevent disease.  No smoking.  Wear sunscreen to prevent skin cancer.  Have a dental exam and cleaning every 6 months.  Yearly exams  See your health care team every year to talk about:  Any changes in your health.  Any medicines your care team has prescribed.  Preventive care, family planning, and ways to prevent chronic diseases.  Shots (vaccines)   HPV shots (up to age 26), if you've never had them before.  Hepatitis B shots (up to age 59), if you've never had them before.  COVID-19 shot: Get this shot when it's due.  Flu shot: Get a flu shot every year.  Tetanus shot: Get a tetanus shot every 10 years.  Pneumococcal, hepatitis A, and RSV shots: Ask your care team if you need these based on your risk.  Shingles shot (for age 50 and up)  General health tests  Diabetes screening:  Starting at age 35, Get screened for diabetes at least every 3 years.  If you are younger than age 35, ask your care team if you should be screened for diabetes.  Cholesterol test: At age 39, start having a cholesterol test every 5 years, or more often if advised.  Bone density scan (DEXA): At age 50, ask your care team if you should have this scan for osteoporosis (brittle bones).  Hepatitis C: Get tested at least once in your life.  STIs (sexually  transmitted infections)  Before age 24: Ask your care team if you should be screened for STIs.  After age 24: Get screened for STIs if you're at risk. You are at risk for STIs (including HIV) if:  You are sexually active with more than one person.  You don't use condoms every time.  You or a partner was diagnosed with a sexually transmitted infection.  If you are at risk for HIV, ask about PrEP medicine to prevent HIV.  Get tested for HIV at least once in your life, whether you are at risk for HIV or not.  Cancer screening tests  Cervical cancer screening: If you have a cervix, begin getting regular cervical cancer screening tests starting at age 21.  Breast cancer scan (mammogram): If you've ever had breasts, begin having regular mammograms starting at age 40. This is a scan to check for breast cancer.  Colon cancer screening: It is important to start screening for colon cancer at age 45.  Have a colonoscopy test every 10 years (or more often if you're at risk) Or, ask your provider about stool tests like a FIT test every year or Cologuard test every 3 years.  To learn more about your testing options, visit:   .  For help making a decision, visit:   https://bit.ly/zf52089.  Prostate cancer screening test: If you have a prostate, ask your care team if a prostate cancer screening test (PSA) at age 55 is right for you.  Lung cancer screening: If you are a current or former smoker ages 50 to 80, ask your care team if ongoing lung cancer screenings are right for you.  For informational purposes only. Not to replace the advice of your health care provider. Copyright   2023 [email or web address]. All rights reserved. Clinically reviewed by the [facility]. Birdi [id number] - REV 01/24.

## 2025-02-04 ENCOUNTER — TRANSFERRED RECORDS (OUTPATIENT)
Dept: HEALTH INFORMATION MANAGEMENT | Facility: CLINIC | Age: 45
End: 2025-02-04
Payer: COMMERCIAL

## 2025-02-11 ENCOUNTER — DOCUMENTATION ONLY (OUTPATIENT)
Dept: SURGERY | Facility: CLINIC | Age: 45
End: 2025-02-11
Payer: COMMERCIAL

## 2025-02-14 ENCOUNTER — ANCILLARY PROCEDURE (OUTPATIENT)
Dept: ULTRASOUND IMAGING | Facility: CLINIC | Age: 45
End: 2025-02-14
Attending: STUDENT IN AN ORGANIZED HEALTH CARE EDUCATION/TRAINING PROGRAM
Payer: COMMERCIAL

## 2025-02-14 DIAGNOSIS — R10.11 RUQ PAIN: ICD-10-CM

## 2025-02-14 PROCEDURE — 76705 ECHO EXAM OF ABDOMEN: CPT

## 2025-02-18 ENCOUNTER — TRANSFERRED RECORDS (OUTPATIENT)
Dept: HEALTH INFORMATION MANAGEMENT | Facility: CLINIC | Age: 45
End: 2025-02-18
Payer: COMMERCIAL

## 2025-03-10 ENCOUNTER — ONCOLOGY VISIT (OUTPATIENT)
Dept: ONCOLOGY | Facility: CLINIC | Age: 45
End: 2025-03-10
Attending: PHYSICIAN ASSISTANT
Payer: COMMERCIAL

## 2025-03-10 VITALS
TEMPERATURE: 98.4 F | HEART RATE: 73 BPM | OXYGEN SATURATION: 99 % | DIASTOLIC BLOOD PRESSURE: 84 MMHG | HEIGHT: 67 IN | RESPIRATION RATE: 16 BRPM | SYSTOLIC BLOOD PRESSURE: 141 MMHG | WEIGHT: 195.3 LBS | BODY MASS INDEX: 30.65 KG/M2

## 2025-03-10 DIAGNOSIS — Z17.0 MALIGNANT NEOPLASM OF RIGHT BREAST IN FEMALE, ESTROGEN RECEPTOR POSITIVE, UNSPECIFIED SITE OF BREAST (H): ICD-10-CM

## 2025-03-10 DIAGNOSIS — C50.912 INVASIVE DUCTAL CARCINOMA OF BREAST, FEMALE, LEFT (H): ICD-10-CM

## 2025-03-10 DIAGNOSIS — C50.911 MALIGNANT NEOPLASM OF RIGHT BREAST IN FEMALE, ESTROGEN RECEPTOR POSITIVE, UNSPECIFIED SITE OF BREAST (H): ICD-10-CM

## 2025-03-10 DIAGNOSIS — R07.81 PLEURITIC CHEST PAIN: Primary | ICD-10-CM

## 2025-03-10 PROCEDURE — 99213 OFFICE O/P EST LOW 20 MIN: CPT | Performed by: PHYSICIAN ASSISTANT

## 2025-03-10 ASSESSMENT — PAIN SCALES - GENERAL: PAINLEVEL_OUTOF10: MILD PAIN (3)

## 2025-03-10 NOTE — PROGRESS NOTES
Oncology/Hematology Visit Note    Mar 10, 2025    Reason for visit: Follow up breast cancer    Oncology HPI: Luda Rai is a 45 year old female with right sided breast cancer. She went in for a screening mammogram in April 2023 and was having pain in the right breast. Suspicious lesion noted and biopsy revealed invasive ductal carcinoma, grade 2, ER/[place] positive, HER2 negative.  She underwent right-sided lumpectomy on 6/26/2023.  Margins were negative and she had 3 sentinel lymph nodes negative.  Oncotype testing came back at 21 and Dr. Zeng recommended adjuvant chemotherapy with Taxotere/Cytoxan, then adjuvant radiation therapy followed by 5 to 10 years of hormonal therapy.      TC C1 D1 completed on 8/11/2023 with Neulasta support.  She was having severe reflux and her dexamethasone dose was adjusted and was started on PPI and Pepcid was added to her premeds.  She completed 4 cycles, final cycle on 10/13/2023.  Completed radiation from November - December 2023 and started tamoxifen in January 2024.    She is here today breast concerns.    Interval History: Luda is here unaccompanied today.  She has noticed over the last 6 weeks, some right sided chest pain, deep under the right side of her chest, under the right breast area, but not the breast tissue itself.  It is constant there, usually as low as 1/10, high as 5/10 when she takes a deep breath.  Denies any injury or trauma.  She does travel a lot for work and has been taking flights.  She was recently in [place] 2 weeks ago, but the pain was prior to that.  No cough or URI symptoms.  She had her gallbladder checked out and this was negative.  She has been taking some Tylenol, but has not been helpful.    Review of Systems: See interval hx. Denies chills, HA, dizziness, cough, CP, SOB, abdominal pain, diarrhea, changes in urination, bleeding, bruising.      PMHx and Social Hx reviewed per EPIC.      Medications:  Current Outpatient Medications   Medication  "Sig Dispense Refill    BIOTIN PO Take 1 capsule by mouth daily.      CALCIUM CITRATE PO Take 500 mg by mouth daily B-L-D      cyanocobalamin (CYANOCOBALAMIN) 1000 MCG/ML injection Inject 1 mL (1,000 mcg) Subcutaneous every 30 days 3 mL 3    multivitamin w/minerals (THERA-VIT-M) tablet Take 1 tablet by mouth At Bedtime Abel Adv Chew      omeprazole (PRILOSEC) 20 MG DR capsule Take 2 capsules (40 mg) by mouth daily as needed (gerd) 180 capsule 3    Semaglutide-Weight Management (WEGOVY) 2.4 MG/0.75ML pen Inject 2.4 mg Subcutaneous once a week 9 mL 1    syringe/needle, disp, 25G X 1\" 3 ML MISC Use for B-12 injection 3 each 3    tamoxifen (NOLVADEX) 20 MG tablet Take 1 tablet (20 mg) by mouth daily. 90 tablet 3    tuberculin-allergy syringes 27G X 1/2\" 1 ML MISC Use for B12 injections. 3 each 3       Allergies   Allergen Reactions    Asa [Aspirin]      Gastric bypass    Morphine And Codeine GI Disturbance    Nsaids Other (See Comments)     Avoid due to bariatric surgery 6/16/2022    Rocephin [Ceftriaxone] Hives       EXAM:    BP (!) 141/84   Pulse 73   Temp 98.4  F (36.9  C) (Temporal)   Resp 16   Ht 1.702 m (5' 7\")   Wt 88.6 kg (195 lb 4.8 oz)   SpO2 99%   BMI 30.59 kg/m      GENERAL:  Female, in no acute distress.  Alert and oriented x3. Well groomed.   HEENT:  Normocephalic, atraumatic. Cold cap in place. Eyelids are a little swollen, but no conjunctivitis.  LUNGS:  Nonlabored breathing, no cough or audible wheezing, able to speak full sentences.  CHEST: Unable to reproduce pain, no signs of trauma, no tenderness over the ribs  BREAST: No tenderness, breast changes, nipple discharge  MSK: Full ROM UE.    SKIN: No rash on exposed skin.   NEURO: CN grossly intact, speech normal  PSYCH: Mentation appears normal, insight and judgement intact    Labs: n/a    Imaging: PENDING    Impression/Plan: Luda Rai is a 43 year old female with invasive ductal carcinoma of the right breast s/p lumpectomy, adjuvant " Taxotere/Cytoxan, radiation and now on tamoxifen.     CP: Constant on the right side for the last 6 weeks, recent traveling and currently on tamoxifen.  She is at higher risk of a PE.  Although her sats are normal and no evidence of tachycardia, I feel that a CT PE study is indicated for her.  I am unable to reproduce any of her discomfort.   -Stat CT PE study ordered, we will schedule    Breast cancer: ER/TN positive, HER2 negative s/p right-sided lumpectomy in June 2023, Oncotype 21, s/p Taxotere/Cytoxan x 4 cycles, radiation therapy started tamoxifen January 2024.  MRI of the breast in December 2024 was negative.  -Diag mammogram and breast US scheduled in May 2025  -Follow-up Nuvia in June      Chart documentation with Dragon Voice recognition Software. Although reviewed after completion, some words and grammatical errors may remain.    20 minutes spent on the date of the encounter doing chart review, patient visit, and documentation         Nuvia Castañeda PA-C  Hematology/Oncology  [facility]

## 2025-03-10 NOTE — LETTER
3/10/2025      Luda Rai  [address]  [place] [address]      Dear Colleague,    Thank you for referring your patient, Luda Rai, to the [facility]. Please see a copy of my visit note below.    Oncology/Hematology Visit Note    Mar 10, 2025    Reason for visit: Follow up breast cancer    Oncology HPI: Luda Rai is a 45 year old female with right sided breast cancer. She went in for a screening mammogram in April 2023 and was having pain in the right breast. Suspicious lesion noted and biopsy revealed invasive ductal carcinoma, grade 2, ER/[place] positive, HER2 negative.  She underwent right-sided lumpectomy on 6/26/2023.  Margins were negative and she had 3 sentinel lymph nodes negative.  Oncotype testing came back at 21 and Dr. Zeng recommended adjuvant chemotherapy with Taxotere/Cytoxan, then adjuvant radiation therapy followed by 5 to 10 years of hormonal therapy.      TC C1 D1 completed on 8/11/2023 with Neulasta support.  She was having severe reflux and her dexamethasone dose was adjusted and was started on PPI and Pepcid was added to her premeds.  She completed 4 cycles, final cycle on 10/13/2023.  Completed radiation from November - December 2023 and started tamoxifen in January 2024.    She is here today breast concerns.    Interval History: Luda is here unaccompanied today.  She has noticed over the last 6 weeks, some right sided chest pain, deep under the right side of her chest, under the right breast area, but not the breast tissue itself.  It is constant there, usually as low as 1/10, high as 5/10 when she takes a deep breath.  Denies any injury or trauma.  She does travel a lot for work and has been taking flights.  She was recently in [place] 2 weeks ago, but the pain was prior to that.  No cough or URI symptoms.  She had her gallbladder checked out and this was negative.  She has been taking some Tylenol, but has not been helpful.    Review  "of Systems: See interval hx. Denies chills, HA, dizziness, cough, CP, SOB, abdominal pain, diarrhea, changes in urination, bleeding, bruising.      PMHx and Social Hx reviewed per EPIC.      Medications:  Current Outpatient Medications   Medication Sig Dispense Refill     BIOTIN PO Take 1 capsule by mouth daily.       CALCIUM CITRATE PO Take 500 mg by mouth daily B-L-D       cyanocobalamin (CYANOCOBALAMIN) 1000 MCG/ML injection Inject 1 mL (1,000 mcg) Subcutaneous every 30 days 3 mL 3     multivitamin w/minerals (THERA-VIT-M) tablet Take 1 tablet by mouth At Bedtime Abel Adv Chew       omeprazole (PRILOSEC) 20 MG DR capsule Take 2 capsules (40 mg) by mouth daily as needed (gerd) 180 capsule 3     Semaglutide-Weight Management (WEGOVY) 2.4 MG/0.75ML pen Inject 2.4 mg Subcutaneous once a week 9 mL 1     syringe/needle, disp, 25G X 1\" 3 ML MISC Use for B-12 injection 3 each 3     tamoxifen (NOLVADEX) 20 MG tablet Take 1 tablet (20 mg) by mouth daily. 90 tablet 3     tuberculin-allergy syringes 27G X 1/2\" 1 ML MISC Use for B12 injections. 3 each 3       Allergies   Allergen Reactions     Asa [Aspirin]      Gastric bypass     Morphine And Codeine GI Disturbance     Nsaids Other (See Comments)     Avoid due to bariatric surgery 6/16/2022     Rocephin [Ceftriaxone] Hives       EXAM:    BP (!) 141/84   Pulse 73   Temp 98.4  F (36.9  C) (Temporal)   Resp 16   Ht 1.702 m (5' 7\")   Wt 88.6 kg (195 lb 4.8 oz)   SpO2 99%   BMI 30.59 kg/m      GENERAL:  Female, in no acute distress.  Alert and oriented x3. Well groomed.   HEENT:  Normocephalic, atraumatic. Cold cap in place. Eyelids are a little swollen, but no conjunctivitis.  LUNGS:  Nonlabored breathing, no cough or audible wheezing, able to speak full sentences.  CHEST: Unable to reproduce pain, no signs of trauma, no tenderness over the ribs  BREAST: No tenderness, breast changes, nipple discharge  MSK: Full ROM UE.    SKIN: No rash on exposed skin.   NEURO: CN " grossly intact, speech normal  PSYCH: Mentation appears normal, insight and judgement intact    Labs: n/a    Imaging: PENDING    Impression/Plan: Luda Rai is a 43 year old female with invasive ductal carcinoma of the right breast s/p lumpectomy, adjuvant Taxotere/Cytoxan, radiation and now on tamoxifen.     CP: Constant on the right side for the last 6 weeks, recent traveling and currently on tamoxifen.  She is at higher risk of a PE.  Although her sats are normal and no evidence of tachycardia, I feel that a CT PE study is indicated for her.  I am unable to reproduce any of her discomfort.   -Stat CT PE study ordered, we will schedule    Breast cancer: ER/SD positive, HER2 negative s/p right-sided lumpectomy in June 2023, Oncotype 21, s/p Taxotere/Cytoxan x 4 cycles, radiation therapy started tamoxifen January 2024.  MRI of the breast in December 2024 was negative.  -Diag mammogram and breast US scheduled in May 2025  -Follow-up Nuvia in June      Chart documentation with Dragon Voice recognition Software. Although reviewed after completion, some words and grammatical errors may remain.    20 minutes spent on the date of the encounter doing chart review, patient visit, and documentation         Nuvia Castañeda PA-C  Hematology/Oncology  [facility]              Again, thank you for allowing me to participate in the care of your patient.        Sincerely,        Nuvia Castañeda PA-C    Electronically signed

## 2025-03-10 NOTE — NURSING NOTE
"Oncology Rooming Note    March 10, 2025 3:29 PM   Luda Rai is a 45 year old female who presents for:    Chief Complaint   Patient presents with    Oncology Clinic Visit     Malignant neoplasm of right breast in female     Initial Vitals: BP (!) 141/84   Pulse 73   Temp 98.4  F (36.9  C) (Temporal)   Resp 16   Ht 1.702 m (5' 7\")   Wt 88.6 kg (195 lb 4.8 oz)   SpO2 99%   BMI 30.59 kg/m   Estimated body mass index is 30.59 kg/m  as calculated from the following:    Height as of this encounter: 1.702 m (5' 7\").    Weight as of this encounter: 88.6 kg (195 lb 4.8 oz). Body surface area is 2.05 meters squared.  Mild Pain (3) Comment: Data Unavailable   No LMP recorded. Patient is perimenopausal.  Allergies reviewed: Yes  Medications reviewed: Yes    Medications: Medication refills not needed today.  Pharmacy name entered into [facility]:    [id number] [place], [place] - [address] [facility] [address] & [facility] - [address] [facility] [place] - [address]    Frailty Screening:   Is the patient here for a new oncology consult visit in cancer care? 2. No    PHQ9:  Did this patient require a PHQ9?: No      Clinical concerns: f/u       Loyda Leung CMA              "

## 2025-03-13 ENCOUNTER — HOSPITAL ENCOUNTER (OUTPATIENT)
Dept: CT IMAGING | Facility: CLINIC | Age: 45
Discharge: HOME | End: 2025-03-13
Attending: PHYSICIAN ASSISTANT
Payer: COMMERCIAL

## 2025-03-13 DIAGNOSIS — R07.81 PLEURITIC CHEST PAIN: ICD-10-CM

## 2025-03-13 PROCEDURE — 71275 CT ANGIOGRAPHY CHEST: CPT

## 2025-03-13 PROCEDURE — 250N000011 HC RX IP 250 OP 636: Performed by: PHYSICIAN ASSISTANT

## 2025-03-13 PROCEDURE — 250N000009 HC RX 250: Performed by: PHYSICIAN ASSISTANT

## 2025-03-13 RX ORDER — IOPAMIDOL 755 MG/ML
500 INJECTION, SOLUTION INTRAVASCULAR ONCE
Status: COMPLETED | OUTPATIENT
Start: 2025-03-13 | End: 2025-03-13

## 2025-03-13 RX ADMIN — SODIUM CHLORIDE 85 ML: 9 INJECTION, SOLUTION INTRAVENOUS at 14:51

## 2025-03-13 RX ADMIN — IOPAMIDOL 67 ML: 755 INJECTION, SOLUTION INTRAVENOUS at 14:51

## 2025-04-11 PROBLEM — Z86.39 HISTORY OF OBESITY: Status: ACTIVE | Noted: 2025-04-11

## 2025-04-11 PROBLEM — E66.3 OVERWEIGHT: Status: ACTIVE | Noted: 2025-04-11

## 2025-04-11 PROBLEM — K91.2 MALNUTRITION FOLLOWING GASTROINTESTINAL SURGERY: Status: ACTIVE | Noted: 2022-06-23

## 2025-05-29 ENCOUNTER — RESULTS FOLLOW-UP (OUTPATIENT)
Dept: ONCOLOGY | Facility: CLINIC | Age: 45
End: 2025-05-29

## 2025-06-09 ENCOUNTER — ONCOLOGY VISIT (OUTPATIENT)
Dept: ONCOLOGY | Facility: CLINIC | Age: 45
End: 2025-06-09
Attending: INTERNAL MEDICINE
Payer: COMMERCIAL

## 2025-06-09 VITALS
DIASTOLIC BLOOD PRESSURE: 67 MMHG | HEART RATE: 64 BPM | BODY MASS INDEX: 29.35 KG/M2 | TEMPERATURE: 97.6 F | WEIGHT: 187 LBS | RESPIRATION RATE: 16 BRPM | OXYGEN SATURATION: 98 % | SYSTOLIC BLOOD PRESSURE: 106 MMHG | HEIGHT: 67 IN

## 2025-06-09 DIAGNOSIS — Z17.0 MALIGNANT NEOPLASM OF RIGHT BREAST IN FEMALE, ESTROGEN RECEPTOR POSITIVE, UNSPECIFIED SITE OF BREAST (H): Primary | ICD-10-CM

## 2025-06-09 DIAGNOSIS — R92.30 DENSE BREASTS: ICD-10-CM

## 2025-06-09 DIAGNOSIS — C50.911 MALIGNANT NEOPLASM OF RIGHT BREAST IN FEMALE, ESTROGEN RECEPTOR POSITIVE, UNSPECIFIED SITE OF BREAST (H): Primary | ICD-10-CM

## 2025-06-09 DIAGNOSIS — R91.8 PULMONARY NODULES: ICD-10-CM

## 2025-06-09 PROCEDURE — 99213 OFFICE O/P EST LOW 20 MIN: CPT | Performed by: PHYSICIAN ASSISTANT

## 2025-06-09 ASSESSMENT — PAIN SCALES - GENERAL: PAINLEVEL_OUTOF10: NO PAIN (0)

## 2025-06-09 NOTE — LETTER
6/9/2025      Luda Rai  [address]  [place] [address]      Dear Colleague,    Thank you for referring your patient, Luda Rai, to the [facility]. Please see a copy of my visit note below.    Oncology/Hematology Visit Note    Jun 9, 2025    Reason for visit: Follow up breast cancer    Oncology HPI: Luda Rai is a 45 year old female with right sided breast cancer. She went in for a screening mammogram in April 2023 and was having pain in the right breast. Suspicious lesion noted and biopsy revealed invasive ductal carcinoma, grade 2, ER/[place] positive, HER2 negative.  She underwent right-sided lumpectomy on 6/26/2023.  Margins were negative and she had 3 sentinel lymph nodes negative.  Oncotype testing came back at 21 and Dr. Zeng recommended adjuvant chemotherapy with Taxotere/Cytoxan, then adjuvant radiation therapy followed by 5 to 10 years of hormonal therapy.      TC C1 D1 completed on 8/11/2023 with Neulasta support.  She was having severe reflux and her dexamethasone dose was adjusted and was started on PPI and Pepcid was added to her premeds.  She completed 4 cycles, final cycle on 10/13/2023.  Completed radiation from November - December 2023 and started tamoxifen in January 2024.    CT chest negative for PE, but noted to have pulmonary nodules. Mammogram in May 2025 negative.     She is here today for follow-up.     Interval History: Luda is here unaccompanied today.  She continues to tolerate tamoxifen really well.  No significant hot flashes, actually, they have improved since she started.  Denies any breast concerns, axilla fullness, headache, vision changes, vomiting, diarrhea or dizziness.    Review of Systems: See interval hx. Denies chills, HA, dizziness, cough, CP, SOB, abdominal pain, diarrhea, changes in urination, bleeding, bruising.      PMHx and Social Hx reviewed per EPIC.      Medications:  Current Outpatient Medications  "  Medication Sig Dispense Refill     BIOTIN PO Take 1 capsule by mouth daily.       CALCIUM CITRATE PO Take 500 mg by mouth daily B-L-D       cyanocobalamin (CYANOCOBALAMIN) 1000 mcg/mL injection Inject 1 mL (1,000 mcg) subcutaneously every 30 days. 3 mL 3     multivitamin w/minerals (THERA-VIT-M) tablet Take 1 tablet by mouth At Bedtime Abel Adv Chew       Semaglutide-Weight Management (WEGOVY) 2.4 MG/0.75ML pen Inject 2.4 mg subcutaneously once a week. 9 mL 1     tamoxifen (NOLVADEX) 20 MG tablet Take 1 tablet (20 mg) by mouth daily. 90 tablet 3     tuberculin-allergy syringes 27G X 1/2\" 1 ML MISC Use for B12 injections. 3 each 3       Allergies   Allergen Reactions     Asa [Aspirin]      Gastric bypass     Morphine And Codeine GI Disturbance     Nsaids Other (See Comments)     Avoid due to bariatric surgery 6/16/2022     Rocephin [Ceftriaxone] Hives       EXAM:    /67   Pulse 64   Temp 97.6  F (36.4  C) (Temporal)   Resp 16   Ht 1.702 m (5' 7\")   Wt 84.8 kg (187 lb)   SpO2 98%   BMI 29.29 kg/m      GENERAL:  Female, in no acute distress.  Alert and oriented x3. Well groomed.   HEENT:  Normocephalic, atraumatic.   LUNGS:  Nonlabored breathing, no cough or audible wheezing, able to speak full sentences.  CHEST: Unable to reproduce pain, no signs of trauma, no tenderness over the ribs  BREAST: No tenderness, breast changes, nipple discharge  MSK: Full ROM UE.    SKIN: No rash on exposed skin.   NEURO: CN grossly intact, speech normal  PSYCH: Mentation appears normal, insight and judgement intact    Labs: n/a    Imaging: n/a    Impression/Plan: Luda Rai is a 43 year old female with invasive ductal carcinoma of the right breast s/p lumpectomy, adjuvant Taxotere/Cytoxan, radiation and now on tamoxifen.     Breast cancer: ER/SC positive, HER2 negative s/p right-sided lumpectomy in June 2023, Oncotype 21, s/p Taxotere/Cytoxan x 4 cycles, radiation therapy started tamoxifen January 2024.  MRI of the " breast in December 2024 was negative and mammogram May 2025 also negative. Due to dense breast tissues, she has been alternating breast MRI and mammograms every 6 months  -Continue tamoxifen  -No clinical concerns for recurrence on exam today  -Breast MRI ordered in December 2025  -Dr. Corbett in 6 months    Pulmonary nodules: She developed chest pain in March 2025, CT PE study negative, however noted to have pulmonary nodules.  Recommended repeat CT in 4-6 months.  -CT ordered for August 2025, we will assistance scheduling      Chart documentation with Dragon Voice recognition Software. Although reviewed after completion, some words and grammatical errors may remain.    20 minutes spent on the date of the encounter doing chart review, patient visit, and documentation         Nuvia Castañeda PA-C  Hematology/Oncology  [facility]              Again, thank you for allowing me to participate in the care of your patient.        Sincerely,        Nuvia Castañeda PA-C    Electronically signed

## 2025-06-09 NOTE — NURSING NOTE
"Oncology Rooming Note    June 9, 2025 11:09 AM   Luda Rai is a 45 year old female who presents for:    Chief Complaint   Patient presents with    Oncology Clinic Visit     Initial Vitals: /67   Pulse 64   Temp 97.6  F (36.4  C) (Temporal)   Resp 16   Ht 1.702 m (5' 7\")   Wt 84.8 kg (187 lb)   SpO2 98%   BMI 29.29 kg/m   Estimated body mass index is 29.29 kg/m  as calculated from the following:    Height as of this encounter: 1.702 m (5' 7\").    Weight as of this encounter: 84.8 kg (187 lb). Body surface area is 2 meters squared.  No Pain (0) Comment: Data Unavailable   No LMP recorded. Patient is perimenopausal.  Allergies reviewed: Yes  Medications reviewed: Yes    Medications: Medication refills not needed today.  Pharmacy name entered into [employer]:    [id number] - [place], [place] - [address] AT [facility] [address] [facility] - [address] [facility] [place] - [address]    Frailty Screening:   Is the patient here for a new oncology consult visit in cancer care? 2. No    PHQ9:  Did this patient require a PHQ9?: No      Clinical concerns: f/u       Sharri Serra, GAYE              "

## 2025-06-09 NOTE — PROGRESS NOTES
Oncology/Hematology Visit Note    Jun 9, 2025    Reason for visit: Follow up breast cancer    Oncology HPI: Luda Rai is a 45 year old female with right sided breast cancer. She went in for a screening mammogram in April 2023 and was having pain in the right breast. Suspicious lesion noted and biopsy revealed invasive ductal carcinoma, grade 2, ER/IL positive, HER2 negative.  She underwent right-sided lumpectomy on 6/26/2023.  Margins were negative and she had 3 sentinel lymph nodes negative.  Oncotype testing came back at 21 and Dr. Zeng recommended adjuvant chemotherapy with Taxotere/Cytoxan, then adjuvant radiation therapy followed by 5 to 10 years of hormonal therapy.      TC C1 D1 completed on 8/11/2023 with Neulasta support.  She was having severe reflux and her dexamethasone dose was adjusted and was started on PPI and Pepcid was added to her premeds.  She completed 4 cycles, final cycle on 10/13/2023.  Completed radiation from November - December 2023 and started tamoxifen in January 2024.    CT chest negative for PE, but noted to have pulmonary nodules. Mammogram in May 2025 negative.     She is here today for follow-up.     Interval History: Luda is here unaccompanied today.  She continues to tolerate tamoxifen really well.  No significant hot flashes, actually, they have improved since she started.  Denies any breast concerns, axilla fullness, headache, vision changes, vomiting, diarrhea or dizziness.    Review of Systems: See interval hx. Denies chills, HA, dizziness, cough, CP, SOB, abdominal pain, diarrhea, changes in urination, bleeding, bruising.      PMHx and Social Hx reviewed per EPIC.      Medications:  Current Outpatient Medications   Medication Sig Dispense Refill    BIOTIN PO Take 1 capsule by mouth daily.      CALCIUM CITRATE PO Take 500 mg by mouth daily B-L-D      cyanocobalamin (CYANOCOBALAMIN) 1000 mcg/mL injection Inject 1 mL (1,000 mcg) subcutaneously every 30 days. 3 mL 3     "multivitamin w/minerals (THERA-VIT-M) tablet Take 1 tablet by mouth At Bedtime Abel Adv Chew      Semaglutide-Weight Management (WEGOVY) 2.4 MG/0.75ML pen Inject 2.4 mg subcutaneously once a week. 9 mL 1    tamoxifen (NOLVADEX) 20 MG tablet Take 1 tablet (20 mg) by mouth daily. 90 tablet 3    tuberculin-allergy syringes 27G X 1/2\" 1 ML MISC Use for B12 injections. 3 each 3       Allergies   Allergen Reactions    Asa [Aspirin]      Gastric bypass    Morphine And Codeine GI Disturbance    Nsaids Other (See Comments)     Avoid due to bariatric surgery 6/16/2022    Rocephin [Ceftriaxone] Hives       EXAM:    /67   Pulse 64   Temp 97.6  F (36.4  C) (Temporal)   Resp 16   Ht 1.702 m (5' 7\")   Wt 84.8 kg (187 lb)   SpO2 98%   BMI 29.29 kg/m      GENERAL:  Female, in no acute distress.  Alert and oriented x3. Well groomed.   HEENT:  Normocephalic, atraumatic.   LUNGS:  Nonlabored breathing, no cough or audible wheezing, able to speak full sentences.  CHEST: Unable to reproduce pain, no signs of trauma, no tenderness over the ribs  BREAST: No tenderness, breast changes, nipple discharge  MSK: Full ROM UE.    SKIN: No rash on exposed skin.   NEURO: CN grossly intact, speech normal  PSYCH: Mentation appears normal, insight and judgement intact    Labs: n/a    Imaging: n/a    Impression/Plan: Luda Rai is a 43 year old female with invasive ductal carcinoma of the right breast s/p lumpectomy, adjuvant Taxotere/Cytoxan, radiation and now on tamoxifen.     Breast cancer: ER/LA positive, HER2 negative s/p right-sided lumpectomy in June 2023, Oncotype 21, s/p Taxotere/Cytoxan x 4 cycles, radiation therapy started tamoxifen January 2024.  MRI of the breast in December 2024 was negative and mammogram May 2025 also negative. Due to dense breast tissues, she has been alternating breast MRI and mammograms every 6 months  -Continue tamoxifen  -No clinical concerns for recurrence on exam today  -Breast MRI ordered in " December 2025  -Dr. Corbett in 6 months    Pulmonary nodules: She developed chest pain in March 2025, CT PE study negative, however noted to have pulmonary nodules.  Recommended repeat CT in 4-6 months.  -CT ordered for August 2025, we will assistance scheduling      Chart documentation with Dragon Voice recognition Software. Although reviewed after completion, some words and grammatical errors may remain.    20 minutes spent on the date of the encounter doing chart review, patient visit, and documentation         Nuvia Castañeda PA-C  Hematology/Oncology  [facility]

## (undated) DEVICE — LINEN TOWEL PACK X5 5464

## (undated) DEVICE — ENDO TROCAR FIRST ENTRY KII FIOS Z-THRD 12X100MM CTF73

## (undated) DEVICE — PACK LAP CHOLE SLC15LCFSD

## (undated) DEVICE — SU VICRYL 3-0 SH 27" UND J416H

## (undated) DEVICE — ENDO SCOPE WARMER LF TM500

## (undated) DEVICE — SYR 05ML LL W/O NDL

## (undated) DEVICE — DECANTER BAG 2002S

## (undated) DEVICE — STRYKER HARMONIC 36CM REPRCSED

## (undated) DEVICE — SOL  .9 1000ML BTL

## (undated) DEVICE — SOL NACL 0.9% IRRIG 1000ML BOTTLE 2F7124

## (undated) DEVICE — GLOVE BIOGEL PI MICRO INDICATOR UNDERGLOVE SZ 6.5 48965

## (undated) DEVICE — BNDG ABDOMINAL BINDER 9X62-84" 79-89210

## (undated) DEVICE — BAG CLEAR TRASH 1.3M 39X33" P4040C

## (undated) DEVICE — ESU GROUND PAD UNIVERSAL W/O CORD

## (undated) DEVICE — DRAPE X-RAY TUBE 00-901169-01-OEC

## (undated) DEVICE — NDL 22GA 1.5"

## (undated) DEVICE — CLIP APPLIER ENDO ROTATING 10MM MED/LG ER320

## (undated) DEVICE — SUCTION CATH 18FR ORAL TRI-FLO T62

## (undated) DEVICE — SOL WATER IRRIG 1000ML BOTTLE 2F7114

## (undated) DEVICE — SU VICRYL 4-0 PS-2 18" UND J496H

## (undated) DEVICE — PREP CHLORAPREP 26ML TINTED HI-LITE ORANGE 930815

## (undated) DEVICE — SU MONOCRYL 4-0 PS-2 18" UND Y496G

## (undated) DEVICE — GLOVE BIOGEL PI MICRO INDICATOR UNDERGLOVE SZ 7.0 48970

## (undated) DEVICE — ENDO TROCAR SLEEVE KII Z-THREADED 12X100MM CTS22

## (undated) DEVICE — LINEN FULL SHEET 5511

## (undated) DEVICE — DECANTER VIAL 2006S

## (undated) DEVICE — 40580 - THE PINK PAD - ADVANCED TRENDELENBURG POSITIONING KIT: Brand: 40580 - THE PINK PAD - ADVANCED TRENDELENBURG POSITIONING KIT

## (undated) DEVICE — SU SILK 3-0 PS-1 18" 1684G

## (undated) DEVICE — SU DERMABOND ADVANCED .7ML DNX12

## (undated) DEVICE — DRSG BANDAID 2X4" FABRIC LATEX FREE

## (undated) DEVICE — LINEN HALF SHEET 5512

## (undated) DEVICE — STPL POWERED ECHELON 45MM PSEE45A

## (undated) DEVICE — GLOVE BIOGEL PI MICRO SZ 6.5 48565

## (undated) DEVICE — ENDOPATH 5MM CURVED SCISSORS WITH MONOPOLAR CAUTERY: Brand: ENDOPATH

## (undated) DEVICE — TROCAR: Brand: KII® SLEEVE

## (undated) DEVICE — SUTURE MONOCRYL 4-0 PS-2

## (undated) DEVICE — VISUALIZATION SYSTEM: Brand: CLEARIFY

## (undated) DEVICE — SOL  .9 3000ML

## (undated) DEVICE — SUCTION CANISTER MEDIVAC LINER 3000ML W/LID 65651-530

## (undated) DEVICE — SYR 03ML LL W/O NDL 309657

## (undated) DEVICE — ESU GROUND PAD ADULT W/CORD E7507

## (undated) DEVICE — GLOVE PROTEXIS MICRO 7.5  2D73PM75

## (undated) DEVICE — SU SILK 2-0 SH 30" K833H

## (undated) DEVICE — SUCTION TIP YANKAUER W/O VENT K86

## (undated) DEVICE — COVER ULTRASOUND PROBE FLEXI-FEEL 6X48" LF 25-FF648

## (undated) DEVICE — SUCTION IRR STRYKERFLOW II W/TIP 250-070-520

## (undated) DEVICE — CLIP ETHICON LIGACLIP SM BLUE LT100

## (undated) DEVICE — SU VICRYL 2-0 TIE 12X18" J905T

## (undated) DEVICE — SOL NACL 0.9% INJ 250ML BAG 2B1322Q

## (undated) DEVICE — LINEN TOWEL PACK X10 5473

## (undated) DEVICE — EVAC SYSTEM CLEAR FLOW SC082500

## (undated) DEVICE — TUBING C02 INSUFFLATION LAP FILTER HEATER 6198

## (undated) DEVICE — DRAPE LEGGINGS 8421

## (undated) DEVICE — SYR EAR BULB 3OZ 0035830

## (undated) DEVICE — SU VICRYL 3-0 SH 27" J316H

## (undated) DEVICE — GYN LAP CDS: Brand: MEDLINE INDUSTRIES, INC.

## (undated) DEVICE — SOLUTION SURG DURA PREP HAZMAT

## (undated) DEVICE — ESU HOLDER LAP INST DISP PURPLE LONG 330MM H-PRO-330

## (undated) DEVICE — DRAPE BREAST/CHEST 29420

## (undated) DEVICE — SOL DEXTROSE 5% 250ML BAG 2B0062Q

## (undated) DEVICE — SOL WATER BACTERIOSTATIC 30ML VIAL

## (undated) DEVICE — STERILE POLYISOPRENE POWDER-FREE SURGICAL GLOVES: Brand: PROTEXIS

## (undated) DEVICE — DRAPE LAP W/ARMBOARD 29410

## (undated) DEVICE — COVER PROBE ULTRASOUND 3D W/GEL 5X96" LF 20-P3D596

## (undated) DEVICE — DRSG STERI STRIP 1/2X4" B1557

## (undated) DEVICE — PACK MINOR CUSTOM RIDGES SBA32RMRMA

## (undated) DEVICE — SUTURE BOOTS 051003PBX

## (undated) DEVICE — SYR 30ML LL W/O NDL 302832

## (undated) DEVICE — SET BREAST BIOPSY LOCALIZER 20 PROBE 8MM PENCIL 09-0006

## (undated) DEVICE — STPL RELOAD REG TISSUE ECHELON 45 X 3.6MM BLUE GST45B

## (undated) DEVICE — BLADE KNIFE SURG 15 371115

## (undated) DEVICE — TROCAR: Brand: KII FIOS FIRST ENTRY

## (undated) DEVICE — PREP CHLORAPREP 26ML TINTED ORANGE  260815

## (undated) DEVICE — SYR 10ML SLIP TIP W/O NDL 303134

## (undated) DEVICE — SU VICRYL 0 TIE 12X18" J906G

## (undated) DEVICE — PAD CHUX UNDERPAD 30X36" P3036C

## (undated) DEVICE — ESU DISSECTOR SONICISION CORDLESS 39CM SCD396

## (undated) DEVICE — SYR 50ML CATH TIP W/O NDL 309620

## (undated) DEVICE — NDL 19GA 1.5"

## (undated) DEVICE — TISSUE RETRIEVAL SYSTEM: Brand: INZII RETRIEVAL SYSTEM

## (undated) DEVICE — SOL NACL 0.9% IRRIG 3000ML BAG 2B7477

## (undated) DEVICE — INSUFFLATION NEEDLE TO ESTABLISH PNEUMOPERITONEUM.: Brand: INSUFFLATION NEEDLE

## (undated) DEVICE — SOL LACT RINGERS 3000ML

## (undated) DEVICE — GLOVE SURG TRIUMPH SZ 71/2

## (undated) DEVICE — ENDO POUCH UNIV RETRIEVAL SYSTEM INZII 10MM CD001

## (undated) DEVICE — GLOVE PROTEXIS BLUE W/NEU-THERA 7.5  2D73EB75

## (undated) DEVICE — NDL SPINAL 20GA 3.5" 405182

## (undated) DEVICE — STPL CIRCULAR XL 25MM W/TILT TIP EEAXL25

## (undated) DEVICE — STPL DELIVERY DEVICE TRANS-ORAL ANVIL 25MM EEAORVIL25A

## (undated) DEVICE — TUBING SUCTION 12"X1/4" N612

## (undated) DEVICE — SYSTEM LAPAROVUE VISIBILITY LAPVUE10

## (undated) DEVICE — DRAPE LAP PEDS DISP 29492

## (undated) DEVICE — INTRODUCER EEA STPL TRANS ANAL/ABD 25MM EEATAID25

## (undated) DEVICE — SU PROLENE 2-0 CT-2 30" 8411H

## (undated) DEVICE — Device

## (undated) RX ORDER — PROPOFOL 10 MG/ML
INJECTION, EMULSION INTRAVENOUS
Status: DISPENSED
Start: 2023-06-26

## (undated) RX ORDER — BUPIVACAINE HYDROCHLORIDE 2.5 MG/ML
INJECTION, SOLUTION EPIDURAL; INFILTRATION; INTRACAUDAL
Status: DISPENSED
Start: 2023-12-11

## (undated) RX ORDER — ONDANSETRON 2 MG/ML
INJECTION INTRAMUSCULAR; INTRAVENOUS
Status: DISPENSED
Start: 2022-06-16

## (undated) RX ORDER — PROPOFOL 10 MG/ML
INJECTION, EMULSION INTRAVENOUS
Status: DISPENSED
Start: 2022-06-16

## (undated) RX ORDER — PROPOFOL 10 MG/ML
INJECTION, EMULSION INTRAVENOUS
Status: DISPENSED
Start: 2023-08-10

## (undated) RX ORDER — FENTANYL CITRATE 50 UG/ML
INJECTION, SOLUTION INTRAMUSCULAR; INTRAVENOUS
Status: DISPENSED
Start: 2023-08-10

## (undated) RX ORDER — FENTANYL CITRATE 50 UG/ML
INJECTION, SOLUTION INTRAMUSCULAR; INTRAVENOUS
Status: DISPENSED
Start: 2022-06-16

## (undated) RX ORDER — LIDOCAINE HYDROCHLORIDE 10 MG/ML
INJECTION, SOLUTION EPIDURAL; INFILTRATION; INTRACAUDAL; PERINEURAL
Status: DISPENSED
Start: 2023-12-11

## (undated) RX ORDER — LIDOCAINE HYDROCHLORIDE 10 MG/ML
INJECTION, SOLUTION EPIDURAL; INFILTRATION; INTRACAUDAL; PERINEURAL
Status: DISPENSED
Start: 2023-06-26

## (undated) RX ORDER — ONDANSETRON 2 MG/ML
INJECTION INTRAMUSCULAR; INTRAVENOUS
Status: DISPENSED
Start: 2023-06-26

## (undated) RX ORDER — BUPIVACAINE HYDROCHLORIDE 2.5 MG/ML
INJECTION, SOLUTION EPIDURAL; INFILTRATION; INTRACAUDAL
Status: DISPENSED
Start: 2022-06-16

## (undated) RX ORDER — HYDROMORPHONE HCL IN WATER/PF 6 MG/30 ML
PATIENT CONTROLLED ANALGESIA SYRINGE INTRAVENOUS
Status: DISPENSED
Start: 2022-06-16

## (undated) RX ORDER — FENTANYL CITRATE 0.05 MG/ML
INJECTION, SOLUTION INTRAMUSCULAR; INTRAVENOUS
Status: DISPENSED
Start: 2022-06-16

## (undated) RX ORDER — HEPARIN SODIUM 5000 [USP'U]/.5ML
INJECTION, SOLUTION INTRAVENOUS; SUBCUTANEOUS
Status: DISPENSED
Start: 2022-06-16

## (undated) RX ORDER — FENTANYL CITRATE 50 UG/ML
INJECTION, SOLUTION INTRAMUSCULAR; INTRAVENOUS
Status: DISPENSED
Start: 2023-06-26

## (undated) RX ORDER — EPINEPHRINE 1 MG/ML
INJECTION, SOLUTION INTRAMUSCULAR; SUBCUTANEOUS
Status: DISPENSED
Start: 2022-06-16

## (undated) RX ORDER — GLYCOPYRROLATE 0.2 MG/ML
INJECTION, SOLUTION INTRAMUSCULAR; INTRAVENOUS
Status: DISPENSED
Start: 2022-06-16

## (undated) RX ORDER — ALBUTEROL SULFATE 90 UG/1
AEROSOL, METERED RESPIRATORY (INHALATION)
Status: DISPENSED
Start: 2022-06-16

## (undated) RX ORDER — DEXAMETHASONE SODIUM PHOSPHATE 4 MG/ML
INJECTION, SOLUTION INTRA-ARTICULAR; INTRALESIONAL; INTRAMUSCULAR; INTRAVENOUS; SOFT TISSUE
Status: DISPENSED
Start: 2022-06-16

## (undated) RX ORDER — FENTANYL CITRATE 50 UG/ML
INJECTION, SOLUTION INTRAMUSCULAR; INTRAVENOUS
Status: DISPENSED
Start: 2023-12-11

## (undated) RX ORDER — BUPIVACAINE HYDROCHLORIDE AND EPINEPHRINE 2.5; 5 MG/ML; UG/ML
INJECTION, SOLUTION EPIDURAL; INFILTRATION; INTRACAUDAL; PERINEURAL
Status: DISPENSED
Start: 2023-08-10

## (undated) RX ORDER — LIDOCAINE HYDROCHLORIDE 20 MG/ML
INJECTION, SOLUTION EPIDURAL; INFILTRATION; INTRACAUDAL; PERINEURAL
Status: DISPENSED
Start: 2022-06-16

## (undated) RX ORDER — HYDROMORPHONE HYDROCHLORIDE 1 MG/ML
INJECTION, SOLUTION INTRAMUSCULAR; INTRAVENOUS; SUBCUTANEOUS
Status: DISPENSED
Start: 2022-06-16

## (undated) RX ORDER — HEPARIN SODIUM (PORCINE) LOCK FLUSH IV SOLN 100 UNIT/ML 100 UNIT/ML
SOLUTION INTRAVENOUS
Status: DISPENSED
Start: 2023-08-10

## (undated) RX ORDER — CLINDAMYCIN PHOSPHATE 900 MG/50ML
INJECTION, SOLUTION INTRAVENOUS
Status: DISPENSED
Start: 2022-06-16

## (undated) RX ORDER — DEXAMETHASONE SODIUM PHOSPHATE 4 MG/ML
INJECTION, SOLUTION INTRA-ARTICULAR; INTRALESIONAL; INTRAMUSCULAR; INTRAVENOUS; SOFT TISSUE
Status: DISPENSED
Start: 2023-06-26

## (undated) RX ORDER — BUPIVACAINE HYDROCHLORIDE 2.5 MG/ML
INJECTION, SOLUTION EPIDURAL; INFILTRATION; INTRACAUDAL
Status: DISPENSED
Start: 2023-06-26

## (undated) RX ORDER — ACETAMINOPHEN 325 MG/1
TABLET ORAL
Status: DISPENSED
Start: 2022-06-16

## (undated) NOTE — ED AVS SNAPSHOT
[facility] in [address] [place] [address] Drive,[address] Floor    [address] [facility]    Phone:  [phone]    Fax:  [phone]           Eduardo Rodney   MRN: [id number]    Department:  THE [facility] OF [facility] in [facility]   Date of Visit:  1/31/2017 Take 5 mL by mouth nightly as needed for cough.             Where to Get Your Medications      These medications were sent to [place] [address] [address] [facility], [address] [facility] [address] [place] [address] , [phone], [address] [facility] [address] [phone] [address] [facility], [address]  [phone] [address] [place] [address]   [phone]       To Check ER Wait Times:  TEXT 'Jonnathan Colunga' to [id number]      Click [email or web address] reading, you will be contacted. Please make sure we have your correct phone number before you leave. After you leave, you should follow the attached instructions. I have read and understand the instructions given to me by my caregivers.         24-Hour XR CHEST PA + LAT CHEST (CPT=71020) (Final result) Result time:  01/31/17 10:12:22    Final result    Impression:    CONCLUSION:  No acute pulmonary findings.            Dictated by: Karla Gonzalez MD on 1/31/2017 at 10:12       Approved by: Karla Gonzalez

## (undated) NOTE — LETTER
Patient Name: Brielle Schmid  YOB: 1980          MRN number:  [id number]  Date:  10/31/2017  Referring Physician:  She Houser          LOWER EXTREMITY EVALUATION:    Referring Physician: Dr. Jessica Tate  Diagnosis: Arthritis of left knee (M1 Toby To would benefit from skilled Physical Therapy to address the above impairments to help return to prior level of functioning and address goals below. Precautions:  None  OBJECTIVE:   Observation: bilat hip ER, bilat foot pronation.  Pt conscious of w Increased quad strength to enable negotiation of stairs reciprocally without need for UE assist.  SLS balance 10 sec for improved safety and independence with gait on uneven surfaces.   Able to perform functional squat with quad control/in good form to reac

## (undated) NOTE — MR AVS SNAPSHOT
[facility], [address] [place] [id number] [phone]               Thank you for choosing us for your health care visit with Olivia Mojica DO.   We are glad to serve you and happy to provide you with this summary Morbid obesity    PCOS (polycystic ovarian syndrome)    Vitamin B12 deficiency    Vitamin D deficiency    Fatigue, unspecified type    -  Primary    RUQ pain          Instructions and Information about Your Health     None      Allergies as of Mar 03, 201

## (undated) NOTE — LETTER
2/19/2018    [place] [address]    Dear Ms. Gotti,       Our office has been trying to contact you to to remind you to have your labs completed. It is important that we reach you to discuss your healthcare needs. Please contact our office at your earliest convenience. Also,  Did you know that you can receive test result information and reminders securely on line through [address] [place] [address] [address]? To register for "[email or web address] open your Internet browser and go to [email or web address] and click \"sign up now\". Follow the on-screen instructions to complete your registration. Thank you for your prompt attention to this matter. You may reach our office at [phone].      Sincerely,      [employer] and Staff

## (undated) NOTE — ED AVS SNAPSHOT
THE [facility] in [place] [address] [address]    Phone:  [phone]    Fax:  Aminata Foster   MRN: [id number]    Department:  THE [facility] in [place]   Date of Visit:  6/9/2017           Diagnbryon Discharge References/Attachments     VOMITING (ADULT) (ENGLISH)    TRAVELER'S DIARRHEA (ADULT) (ENGLISH)      Disclosure     Insurance plans vary and the physician(s) referred by the Immediate Care may not be covered by your plan.  Please contact your insur IF THERE IS ANY CHANGE OR WORSENING OF YOUR CONDITION, CALL YOUR PRIMARY CARE PHYSICIAN AT ONCE OR GO TO THE EMERGENCY DEPARTMENT.     If you have been prescribed any medication(s), please fill your prescription right away and begin taking the medication(s) Patient [address] to help you get signed up for insurance coverage. Patient [address] is a Federal Navigator program that can help with your Affordable Care Act coverage, as well as all types of Medicaid plans.   To get signed up and covere

## (undated) NOTE — MR AVS SNAPSHOT
[facility], [address] [facility]  [phone]               Thank you for choosing us for your health care visit with Giancarlo Mendez MD.  We are glad to serve you and happy to provide you with this summ medications, or tests may also be needed. Home care  Your healthcare provider may prescribe medicine for pain, symptoms, or an infection.  Follow the healthcare provider's instructions for taking these medicines.   General care  · Rest as much as you can u · Unable to pass stool for more than 3 days  · Fever of 100.4ºF (38ºC) or higher, or as directed by your healthcare provider.   · Blood in vomit or bowel movements (dark red or black color)  · Jaundice (yellow color of eyes and skin)  · Weakness, dizziness (Approximate)        Comp Metabolic Panel (14) [E]    Complete by:  Jan 03, 2017 (Approximate)        Amylase [E]    Complete by:  Jan 03, 2017 (Approximate)        Lipase [E]    Complete by:  Jan 03, 2017 (Approximate)              Follow-up Instructions EAT THESE FOODS MORE OFTEN: EAT THESE FOODS LESS OFTEN:   Make half your plate fruits and vegetables Highly refined, white starches including white bread, rice and pasta   Eat plenty of protein, keep the fat content low Sugars:  sodas and sports drinks, ca

## (undated) NOTE — ED AVS SNAPSHOT
Patt Tovar   MRN: [id number]    Department:  THE [facility] Emergency Department in [place]   Date of Visit:  2/26/2018           Disclosure     Insurance plans vary and the physician(s) referred by the ER may not be covered by your plan.  Please contact tell this physician (or your personal doctor if your instructions are to return to your personal doctor) about any new or lasting problems. The primary care or specialist physician will see patients referred from the [facility] Emergency Department.  Bebo Reyna

## (undated) NOTE — ED AVS SNAPSHOT
Megha Beaver   MRN: [id number]    Department:  THE [facility] Emergency Department in [place]   Date of Visit:  1/10/2018           Disclosure     Insurance plans vary and the physician(s) referred by the ER may not be covered by your plan.  Please contact tell this physician (or your personal doctor if your instructions are to return to your personal doctor) about any new or lasting problems. The primary care or specialist physician will see patients referred from the [facility] Emergency Department.  Paula Morris